# Patient Record
Sex: FEMALE | Race: OTHER | HISPANIC OR LATINO | Employment: FULL TIME | URBAN - METROPOLITAN AREA
[De-identification: names, ages, dates, MRNs, and addresses within clinical notes are randomized per-mention and may not be internally consistent; named-entity substitution may affect disease eponyms.]

---

## 2017-04-05 ENCOUNTER — OFFICE VISIT (OUTPATIENT)
Dept: MEDICAL GROUP | Facility: CLINIC | Age: 32
End: 2017-04-05
Payer: COMMERCIAL

## 2017-04-05 VITALS
OXYGEN SATURATION: 96 % | TEMPERATURE: 98.1 F | DIASTOLIC BLOOD PRESSURE: 64 MMHG | HEIGHT: 66 IN | SYSTOLIC BLOOD PRESSURE: 98 MMHG | WEIGHT: 255 LBS | HEART RATE: 72 BPM | BODY MASS INDEX: 40.98 KG/M2 | RESPIRATION RATE: 16 BRPM

## 2017-04-05 DIAGNOSIS — J06.9 VIRAL UPPER RESPIRATORY TRACT INFECTION: ICD-10-CM

## 2017-04-05 PROCEDURE — 99213 OFFICE O/P EST LOW 20 MIN: CPT | Performed by: NURSE PRACTITIONER

## 2017-04-05 RX ORDER — IBUPROFEN 200 MG
200 TABLET ORAL EVERY 6 HOURS PRN
COMMUNITY
End: 2018-05-30

## 2017-04-05 ASSESSMENT — ENCOUNTER SYMPTOMS
CHILLS: 0
SHORTNESS OF BREATH: 0
SORE THROAT: 0
SPUTUM PRODUCTION: 0
MYALGIAS: 0
COUGH: 0
HEADACHES: 1
FEVER: 0
WHEEZING: 0

## 2017-04-05 ASSESSMENT — PATIENT HEALTH QUESTIONNAIRE - PHQ9: CLINICAL INTERPRETATION OF PHQ2 SCORE: 0

## 2017-04-05 NOTE — MR AVS SNAPSHOT
"        Kinza Juan   2017 2:40 PM   Office Visit   MRN: [id number]    Department:  [facility]   Dept Phone:  [phone]    Description:  Female : 1985   Provider:  TIERRA Marroquin           Reason for Visit     URI sore throat/ ringing on ears/ tired/ x 2 days       Allergies as of 2017     Allergen Noted Reactions    Nkda [No Known Drug Allergy] 2013         You were diagnosed with     Viral upper respiratory tract infection   [[id number]]         Vital Signs     Blood Pressure Pulse Temperature Respirations Height Weight    98/64 mmHg 72 36.7 °C (98.1 °F) 16 1.676 m (5' 6\") 115.667 kg (255 lb)    Body Mass Index Oxygen Saturation Last Menstrual Period Smoking Status          41.18 kg/m2 96% 2012 Never Smoker         Basic Information     Date Of Birth Sex Race Ethnicity Preferred Language    1985 Female  or   Origin (Botswanan,Dominican,Martiniquais,Central African, etc) English      Problem List              ICD-10-CM Priority Class Noted - Resolved    Obesity, morbid, BMI 40.0-49.9 (CMS-HCC) E66.01   2011 - Present    IUD (intrauterine device) in place- placed 2013 Z97.5   2015 - Present    Gastroesophageal reflux disease without esophagitis K21.9   2016 - Present    Family history of diabetes mellitus (DM) Z83.3   2016 - Present    ASCUS with positive high risk HPV [id number]   2016 - Present      Health Maintenance        Date Due Completion Dates    PAP SMEAR 2018, 2014, 2012, 2011    IMM DTaP/Tdap/Td Vaccine (3 - Td) 3/27/2023 3/27/2013, 2010            Current Immunizations     HPV Quadrivalent Vaccine (GARDASIL) 11/10/2009, 2009    Influenza TIV (IM) 2012    Tdap Vaccine 3/27/2013  5:30 AM, 2010      Below and/or attached are the medications your provider expects you to take. Review all of your home medications and newly ordered medications with your provider and/or " pharmacist. Follow medication instructions as directed by your provider and/or pharmacist. Please keep your medication list with you and share with your provider. Update the information when medications are discontinued, doses are changed, or new medications (including over-the-counter products) are added; and carry medication information at all times in the event of emergency situations     Allergies:  NKDA - (reactions not documented)               Medications  Valid as of: April 05, 2017 -  3:13 PM    Generic Name Brand Name Tablet Size Instructions for use    Ibuprofen (Tab) MOTRIN 200 MG Take 200 mg by mouth every 6 hours as needed.        Levonorgestrel (IUD) MIRENA 20 MCG/24HR 1 Each by Intrauterine route Once.        Omeprazole (CAPSULE DELAYED RELEASE) PRILOSEC 20 MG Take 20 mg by mouth every day.        .                 Medicines prescribed today were sent to:     [facility] [address] [place] [address] - [address]     [address] [facility] [address]    Phone: [phone] Fax: [phone]    Open 24 Hours?: No      Medication refill instructions:       If your prescription bottle indicates you have medication refills left, it is not necessary to call your provider’s office. Please contact your pharmacy and they will refill your medication.    If your prescription bottle indicates you do not have any refills left, you may request refills at any time through one of the following ways: The online Vuclip system (except Urgent Care), by calling your provider’s office, or by asking your pharmacy to contact your provider’s office with a refill request. Medication refills are processed only during regular business hours and may not be available until the next business day. Your provider may request additional information or to have a follow-up visit with you prior to refilling your medication.   *Please Note: Medication refills are assigned a new Rx number when refilled electronically. Your pharmacy may indicate that  no refills were authorized even though a new prescription for the same medication is available at the pharmacy. Please request the medicine by name with the pharmacy before contacting your provider for a refill.           [employer]ter[employer] Access Code: Activation code not generated  Current WinBuyert Status: Active

## 2017-04-05 NOTE — PROGRESS NOTES
Chief Complaint   Patient presents with   • URI     sore throat/ ringing on ears/ tired/ x 2 days        HISTORY OF PRESENT ILLNESS: Patient is a 31 y.o. female established patient who presents today due to 2 days of ear plug, itchiness and achy sometimes, causing intermittent headache too. No fever, chills, no coughing. Weird sensation to throat but not sore throat. She is not taking any medications for her symptoms. She would just want to make sure everything looks fine.       Patient Active Problem List    Diagnosis Date Noted   • ASCUS with positive high risk HPV 2016   • Gastroesophageal reflux disease without esophagitis 2016   • Family history of diabetes mellitus (DM) 2016   • IUD (intrauterine device) in place- placed 2015   • Obesity, morbid, BMI 40.0-49.9 (CMS-[facility]) 2011       Allergies:Nkda    Current Outpatient Prescriptions   Medication Sig Dispense Refill   • ibuprofen (MOTRIN) 200 MG Tab Take 200 mg by mouth every 6 hours as needed.     • levonorgestrel (MIRENA) 20 MCG/24HR IUD 1 Each by Intrauterine route Once.     • omeprazole (PRILOSEC) 20 MG delayed-release capsule Take 20 mg by mouth every day.       No current facility-administered medications for this visit.       Social History   Substance Use Topics   • Smoking status: Never Smoker    • Smokeless tobacco: Never Used   • Alcohol Use: 2.4 oz/week     2 Standard drinks or equivalent, 2 Glasses of wine per week       Family Status   Relation Status Death Age   • Paternal Grandmother     • Mother Alive    • Father Alive    • Sister Alive    • Brother Alive    • Maternal Grandmother Alive    • Maternal Grandfather     • Paternal Grandfather       accident     Family History   Problem Relation Age of Onset   • Diabetes Paternal Grandmother    • Diabetes Father    • Stroke Maternal Grandmother    • Hypertension Maternal Grandmother    • Diabetes Maternal Grandfather    • Cancer Neg Hx   "        ROS:  Review of Systems   Constitutional: Negative for fever and chills.   HENT: Positive for ear pain. Negative for congestion and sore throat.    Respiratory: Negative for cough, sputum production, shortness of breath and wheezing.    Musculoskeletal: Negative for myalgias.   Neurological: Positive for headaches.        Objective:     Blood pressure 98/64, pulse 72, temperature 36.7 °C (98.1 °F), resp. rate 16, height 1.676 m (5' 6\"), weight 115.667 kg (255 lb), last menstrual period 06/08/2012, SpO2 96 %.     Physical Exam:  Physical Exam   Constitutional: She is oriented to person, place, and time and well-developed, well-nourished, and in no distress.   HENT:   Head: Normocephalic and atraumatic.   Right Ear: External ear normal. Tympanic membrane is bulging ( very mild).   Left Ear: External ear normal. Tympanic membrane is bulging.   Nose: Nose normal.   Mouth/Throat: Oropharynx is clear and moist. No oropharyngeal exudate.   Eyes: Conjunctivae are normal.   Neck: Neck supple.   Cardiovascular: Normal rate.    Pulmonary/Chest: Effort normal. No respiratory distress. She has no wheezes. She has no rales.   Musculoskeletal: Normal range of motion.   Neurological: She is alert and oriented to person, place, and time.   Vitals reviewed.        Assessment/Plan:  1. Viral upper respiratory tract infection  - explained virus URI to patient, instructed to wait for 5 more days and call back if worsening symptoms in 5 days or any new symptoms. Will consider to cover with antibiotics if indicated. Otherwise, keep hydrated, enough rest, prn tylenol if needed. Pt verbalized understanding.        "

## 2018-01-25 ENCOUNTER — OFFICE VISIT (OUTPATIENT)
Dept: MEDICAL GROUP | Facility: MEDICAL CENTER | Age: 33
End: 2018-01-25
Payer: COMMERCIAL

## 2018-01-25 VITALS
BODY MASS INDEX: 45.96 KG/M2 | TEMPERATURE: 97.5 F | DIASTOLIC BLOOD PRESSURE: 72 MMHG | SYSTOLIC BLOOD PRESSURE: 114 MMHG | OXYGEN SATURATION: 95 % | HEIGHT: 66 IN | RESPIRATION RATE: 16 BRPM | HEART RATE: 74 BPM | WEIGHT: 286 LBS

## 2018-01-25 DIAGNOSIS — J06.9 VIRAL URI WITH COUGH: ICD-10-CM

## 2018-01-25 DIAGNOSIS — E66.01 OBESITY, MORBID, BMI 40.0-49.9 (HCC): ICD-10-CM

## 2018-01-25 PROCEDURE — 99213 OFFICE O/P EST LOW 20 MIN: CPT | Performed by: FAMILY MEDICINE

## 2018-01-25 NOTE — LETTER
2018        Kinza Juan  [address] [place] [id number]        To Whom It May Concern:    Ms. Ayo Juan (: 1985) is a patient at this clinic and was seen for an evaluation today. She can return to work on  with no restrictions.        If you have any questions or concerns, please don't hesitate to call.        Sincerely,        Brodie Shah M.D.    Electronically Signed

## 2018-01-25 NOTE — PROGRESS NOTES
cc: Voice loss    Subjective:     Kinza Juan is a 32 y.o. female presenting with loss of voice that started yesterday. Approximately 3 days ago, she developed a mild headache, sore throat, nasal drainage, mild cough, mild ear pain. She then lost her voice yesterday. Denies any fevers, shortness of breath, chest pain, Angel pain, diarrhea, rashes, recent travel, sick contacts. Overall feels well despite the voiceloss. Has been taking ibuprofen, decongestions, NyQuil with some improvement. She has not had her flu vaccine yet this season      Review of systems:  See above.         Current Outpatient Prescriptions:   •  ibuprofen (MOTRIN) 200 MG Tab, Take 200 mg by mouth every 6 hours as needed., Disp: , Rfl:   •  levonorgestrel (MIRENA) 20 MCG/24HR IUD, 1 Each by Intrauterine route Once., Disp: , Rfl:     Allergies   Allergen Reactions   • Nkda [No Known Drug Allergy]        Past Medical History:   Diagnosis Date   • GERD (gastroesophageal reflux disease)     h pylori 12/2015   • Infectious disease     to the flu   • Pap smear 4/28/9    Normal     Past Surgical History:   Procedure Laterality Date   • PRIMARY C SECTION  3/24/2013    Performed by Briana Cano M.D. at LABOR AND DELIVERY   • ACL RECONSTRUCTION SCOPE  5/31/2012    Performed by CAITY DORSEY at [facility]S   • MEDIAL MENISCECTOMY  5/31/2012    Performed by CAITY DORSEY at [facility] ORS   • DENTAL EXTRACTION(S)  2005     Family History   Problem Relation Age of Onset   • Diabetes Paternal Grandmother    • Diabetes Father    • Stroke Maternal Grandmother    • Hypertension Maternal Grandmother    • Diabetes Maternal Grandfather    • Cancer Neg Hx      Social History     Social History   • Marital status:      Spouse name: N/A   • Number of children: N/A   • Years of education: N/A     Occupational History   • Not on file.     Social History Main Topics   • Smoking status: Never Smoker   • Smokeless tobacco: Never  "Used   • Alcohol use 2.4 oz/week     2 Glasses of wine, 2 Standard drinks or equivalent per week   • Drug use: No   • Sexual activity: Yes     Partners: Male     Birth control/ protection: IUD      Comment: , with boyfriend     Other Topics Concern   • Not on file     Social History Narrative   • No narrative on file       Objective:     Vitals: /72   Pulse 74   Temp 36.4 °C (97.5 °F)   Resp 16   Ht 1.676 m (5' 6\")   Wt (!) 129.7 kg (286 lb)   LMP 06/08/2012   SpO2 95%   Breastfeeding? No   BMI 46.16 kg/m²   General: Alert, pleasant, NAD  HEENT: Normocephalic.  PERRL, EOMI, no icterus or pallor.  Conjunctivae and lids normal. External ears normal. Tympanic membranes pearly, opaque.  Nares patent, mucosa pink.  Oropharynx non-erythematous, mucous membranes moist.  Neck supple.  No thyromegaly or masses palpated. No cervical or supraclavicular lymphadenopathy.  Heart: Regular rate and rhythm.  S1 and S2 normal.  No murmurs appreciated.  Respiratory: Normal respiratory effort.  Clear to auscultation bilaterally.  Abdomen: Non-distended, soft  Skin: Warm, dry, no rashes.  Musculoskeletal: Gait is normal.  Moves all extremities well.  Extremities: No leg edema.   Psych:  Affect/mood is normal, judgement is good, memory is intact, grooming is appropriate.    Assessment/Plan:     Kinza was seen today for uri.    Diagnoses and all orders for this visit:    Viral URI with cough  Discussed likely viral URI, recommended conservative management with rest, fluids, otc meds, hot water and honey, nasal saline rinses.  Note given for work.  Discussed reasons to return, advised cough may last up to 3 weeks.    Obesity, morbid, BMI 40.0-49.9 (CMS-[facility])  -     Patient identified as having weight management issue.  Appropriate orders and counseling given.        Return if symptoms worsen or fail to improve.  "

## 2018-01-29 ENCOUNTER — OFFICE VISIT (OUTPATIENT)
Dept: MEDICAL GROUP | Facility: MEDICAL CENTER | Age: 33
End: 2018-01-29
Payer: COMMERCIAL

## 2018-01-29 VITALS
DIASTOLIC BLOOD PRESSURE: 70 MMHG | SYSTOLIC BLOOD PRESSURE: 110 MMHG | BODY MASS INDEX: 47.09 KG/M2 | HEIGHT: 66 IN | WEIGHT: 293 LBS | OXYGEN SATURATION: 93 % | HEART RATE: 78 BPM | TEMPERATURE: 97.5 F | RESPIRATION RATE: 16 BRPM

## 2018-01-29 DIAGNOSIS — J02.9 PHARYNGITIS, UNSPECIFIED ETIOLOGY: ICD-10-CM

## 2018-01-29 DIAGNOSIS — J06.9 VIRAL UPPER RESPIRATORY TRACT INFECTION: ICD-10-CM

## 2018-01-29 LAB
INT CON NEG: NEGATIVE
INT CON POS: POSITIVE
S PYO AG THROAT QL: NORMAL

## 2018-01-29 PROCEDURE — 99214 OFFICE O/P EST MOD 30 MIN: CPT | Performed by: NURSE PRACTITIONER

## 2018-01-29 PROCEDURE — 87880 STREP A ASSAY W/OPTIC: CPT | Performed by: NURSE PRACTITIONER

## 2018-01-29 RX ORDER — METHYLPREDNISOLONE 4 MG/1
TABLET ORAL
Qty: 21 TAB | Refills: 0 | Status: SHIPPED | OUTPATIENT
Start: 2018-01-29 | End: 2018-05-30

## 2018-01-29 RX ORDER — PROMETHAZINE HYDROCHLORIDE AND CODEINE PHOSPHATE 6.25; 1 MG/5ML; MG/5ML
5 SYRUP ORAL 4 TIMES DAILY PRN
Qty: 120 ML | Refills: 0 | Status: SHIPPED | OUTPATIENT
Start: 2018-01-29 | End: 2018-02-05

## 2018-01-29 ASSESSMENT — ENCOUNTER SYMPTOMS
COUGH: 1
SORE THROAT: 1

## 2018-01-29 NOTE — PROGRESS NOTES
"Subjective:      Kinza Juan is a 32 y.o. female who presents with Follow-Up (cough x 1 week )        CC: 32-year-old patient of  here today for continuing upper respiratory complaints.    HPI Kinza Juan was seen by  on January 25 for 3 days of cough and postnasal drip and sore throat. She was appropriately treated for a viral URI. Patient is here today because she states now she is having more cough, sore throat and hoarse voice. She still has no myalgias, fever, chills, shortness of breath or chest pain. Nothing seems to make her feel better and symptoms are worse when she is active. Vital signs in the office are normal. She is mostly concerned about the sore throat.      Social History   Substance Use Topics   • Smoking status: Never Smoker   • Smokeless tobacco: Never Used   • Alcohol use 2.4 oz/week     2 Glasses of wine, 2 Standard drinks or equivalent per week     Current Outpatient Prescriptions   Medication Sig Dispense Refill   • levonorgestrel (MIRENA) 20 MCG/24HR IUD 1 Each by Intrauterine route Once.     • ibuprofen (MOTRIN) 200 MG Tab Take 200 mg by mouth every 6 hours as needed.       No current facility-administered medications for this visit.      Family History   Problem Relation Age of Onset   • Diabetes Paternal Grandmother    • Diabetes Father    • Stroke Maternal Grandmother    • Hypertension Maternal Grandmother    • Diabetes Maternal Grandfather    • Cancer Neg Hx      Past Medical History:   Diagnosis Date   • GERD (gastroesophageal reflux disease)     h pylori 12/2015   • Infectious disease     to the flu   • Pap smear 4/28/9    Normal       Review of Systems   HENT: Positive for sore throat.    Respiratory: Positive for cough.    All other systems reviewed and are negative.         Objective:     /70   Pulse 78   Temp 36.4 °C (97.5 °F)   Resp 16   Ht 1.676 m (5' 6\")   Wt (!) 133.4 kg (294 lb)   LMP 06/08/2012   SpO2 93%   BMI " 47.45 kg/m²      Physical Exam   Constitutional: She is oriented to person, place, and time. She appears well-developed and well-nourished. No distress.   HENT:   Head: Normocephalic and atraumatic.   Right Ear: External ear normal.   Left Ear: External ear normal.   Nose: Nose normal.   Horse voice. Pharynx shows no erythema or exudate.   Eyes: Right eye exhibits no discharge. Left eye exhibits no discharge.   Neck: Normal range of motion. Neck supple. No thyromegaly present.   Cardiovascular: Normal rate, regular rhythm and normal heart sounds.  Exam reveals no gallop and no friction rub.    No murmur heard.  Pulmonary/Chest: Effort normal and breath sounds normal. She has no wheezes. She has no rales.   Musculoskeletal: She exhibits no edema or tenderness.   Neurological: She is alert and oriented to person, place, and time. She displays normal reflexes.   Skin: Skin is warm and dry. No rash noted. She is not diaphoretic.   Psychiatric: She has a normal mood and affect. Her behavior is normal. Judgment and thought content normal.   Nursing note and vitals reviewed.              Assessment/Plan:     1. Pharyngitis, unspecified etiology  Rapid strep is negative and I advised over-the-counter cough drops or Chloraseptic spray. She may use the cough syrup in the evening for the sore throat and cough but only when not needing to be alert. I explained this is not refillable. She will avoid foods and foods which are spicy or acidic until her throat feels better.  - POCT Rapid Strep A  - promethazine-codeine (PHENERGAN-CODEINE) 6.25-10 MG/5ML Syrup; Take 5 mL by mouth 4 times a day as needed for up to 7 days.  Dispense: 120 mL; Refill: 0    2. Viral upper respiratory tract infection  Lungs sound clear and symptoms of only been present 5 days so I will put her on a Medrol Dosepak for the upper respiratory symptoms. She also has cough syrup to use if needed during the night. She will follow back with her PCP if symptoms do  not improve within the next few days. She was advised to go to urgent care or the emergency room if symptoms worsen despite treatment.   - promethazine-codeine (PHENERGAN-CODEINE) 6.25-10 MG/5ML Syrup; Take 5 mL by mouth 4 times a day as needed for up to 7 days.  Dispense: 120 mL; Refill: 0  - MethylPREDNISolone (MEDROL DOSEPAK) 4 MG Tablet Therapy Pack; As directed on the packaging label.  Dispense: 21 Tab; Refill: 0

## 2018-05-17 ENCOUNTER — HOSPITAL ENCOUNTER (OUTPATIENT)
Facility: MEDICAL CENTER | Age: 33
End: 2018-05-17
Attending: OBSTETRICS & GYNECOLOGY
Payer: COMMERCIAL

## 2018-05-17 ENCOUNTER — GYNECOLOGY VISIT (OUTPATIENT)
Dept: OBGYN | Facility: MEDICAL CENTER | Age: 33
End: 2018-05-17
Payer: COMMERCIAL

## 2018-05-17 VITALS
WEIGHT: 293 LBS | HEIGHT: 66 IN | BODY MASS INDEX: 47.09 KG/M2 | DIASTOLIC BLOOD PRESSURE: 80 MMHG | SYSTOLIC BLOOD PRESSURE: 125 MMHG

## 2018-05-17 DIAGNOSIS — Z97.5 IUD (INTRAUTERINE DEVICE) IN PLACE: ICD-10-CM

## 2018-05-17 DIAGNOSIS — Z30.09 FAMILY PLANNING EDUCATION, GUIDANCE, AND COUNSELING: ICD-10-CM

## 2018-05-17 DIAGNOSIS — E66.01 OBESITY, MORBID, BMI 40.0-49.9 (HCC): ICD-10-CM

## 2018-05-17 DIAGNOSIS — Z12.4 ENCOUNTER FOR SCREENING FOR CERVICAL CANCER: ICD-10-CM

## 2018-05-17 DIAGNOSIS — Z01.419 WELL WOMAN EXAM WITH ROUTINE GYNECOLOGICAL EXAM: ICD-10-CM

## 2018-05-17 PROCEDURE — 88175 CYTOPATH C/V AUTO FLUID REDO: CPT

## 2018-05-17 PROCEDURE — 87591 N.GONORRHOEAE DNA AMP PROB: CPT

## 2018-05-17 PROCEDURE — 87491 CHLMYD TRACH DNA AMP PROBE: CPT

## 2018-05-17 PROCEDURE — 87624 HPV HI-RISK TYP POOLED RSLT: CPT

## 2018-05-17 PROCEDURE — 99385 PREV VISIT NEW AGE 18-39: CPT | Performed by: OBSTETRICS & GYNECOLOGY

## 2018-05-18 DIAGNOSIS — Z12.4 ENCOUNTER FOR SCREENING FOR CERVICAL CANCER: ICD-10-CM

## 2018-05-18 NOTE — PROGRESS NOTES
ANNUAL Gynecologic Exam        HPI Comments:   32 YEAR OLD  presents for well woman exam.   Patient's last menstrual period was 2012.  No periods on the IUD mirena. Due for replacement  No pelvic pains  She gained a lot of wt - not taking care of herself lately  No other gyn complaints  Never smoker  No family history of breast/ovarian cancer    Review of Systems   Pertinent positives documented in HPI and all other systems reviewed & are negative    All PMH, PSH, allergies, social history and FH reviewed and updated today:  Past Medical History:   Diagnosis Date   • GERD (gastroesophageal reflux disease)     h pylori 2015   • Infectious disease     to the flu   • Pap smear     Normal     Past Surgical History:   Procedure Laterality Date   • PRIMARY C SECTION  3/24/2013    Performed by Briana Cano M.D. at LABOR AND DELIVERY   • ACL RECONSTRUCTION SCOPE  2012    Performed by CAITY DORSEY at SURGERY [facility]   • MEDIAL MENISCECTOMY  2012    Performed by CAITY DORSEY at [facility]   • DENTAL EXTRACTION(S)       Nkda [no known drug allergy]  Social History     Social History   • Marital status:      Spouse name: N/A   • Number of children: N/A   • Years of education: N/A     Social History Main Topics   • Smoking status: Never Smoker   • Smokeless tobacco: Never Used   • Alcohol use 2.4 oz/week     2 Glasses of wine, 2 Standard drinks or equivalent per week   • Drug use: No   • Sexual activity: Yes     Partners: Male     Birth control/ protection: IUD      Comment: , with boyfriend     Other Topics Concern   • Not on file     Social History Narrative   • No narrative on file     Family History   Problem Relation Age of Onset   • Diabetes Paternal Grandmother    • Diabetes Father    • Stroke Maternal Grandmother    • Hypertension Maternal Grandmother    • Diabetes Maternal Grandfather    • Cancer Neg Hx      Medications:   Current Outpatient  "Prescriptions Ordered in Epic   Medication Sig Dispense Refill   • MethylPREDNISolone (MEDROL DOSEPAK) 4 MG Tablet Therapy Pack As directed on the packaging label. 21 Tab 0   • ibuprofen (MOTRIN) 200 MG Tab Take 200 mg by mouth every 6 hours as needed.     • levonorgestrel (MIRENA) 20 MCG/24HR IUD 1 Each by Intrauterine route Once.       No current Epic-ordered facility-administered medications on file.       Objective:   Vital measurements:  Blood pressure 125/80, height 1.676 m (5' 6\"), weight (!) 136.1 kg (300 lb), last menstrual period 06/08/2012, not currently breastfeeding.  Body mass index is 48.42 kg/m². (Goal BM I>18 <25)    Physical Exam   Nursing note and vitals reviewed.  Constitutional: She is oriented to person, place, and time. She appears well-developed and well-nourished. No distress.     HEENT:   Head: Normocephalic and atraumatic.   Right Ear: External ear normal.   Left Ear: External ear normal.   Nose: Nose normal.   Eyes: Conjunctivae and EOM are normal. Pupils are equal, round, and reactive to light. No scleral icterus.     Neck: Normal range of motion. Neck supple. No tracheal deviation present. No thyromegaly present.     Pulmonary/Chest: Effort normal and breath sounds normal. No respiratory distress. She has no wheezes. She has no rales. She exhibits no tenderness.     Cardiovascular: Regular, rate and rhythm. No JVD.    Abdominal: Soft. Bowel sounds are normal. She exhibits no distension and no mass. No tenderness. She has no rebound and no guarding.   obese  Breast:  Symmetrical, normal consistency without masses., No dimpling or skin changes, Normal nipples without discharge, negative, No masses    Genitourinary:  Pelvic exam was performed with patient supine.  External genitalia with no abnormal pigmentation, labial fusion,rash, tenderness, lesion or injury to the labia bilaterally.  Vagina is moist with no lesions, foul discharge, erythema, tenderness or bleeding. No foreign body " around the vagina or signs of injury.   Cervix exhibits no motion tenderness, no discharge and no friability. IUD string seen  Uterus and adnexa - not palpable because of body habitus.     Musculoskeletal: Normal range of motion. She exhibits no edema and no tenderness.     Lymphadenopathy: She has no cervical adenopathy.     Neurological: She is alert and oriented to person, place, and time. She exhibits normal muscle tone.     Skin: Skin is warm and dry. No rash noted. She is not diaphoretic. No erythema. No pallor.     Psychiatric: She has a normal mood and affect. Her behavior is normal. Judgment and thought content normal  Assessment:     1. Well woman exam with routine gynecological exam  LIPID PANEL    CMP (12)    HEMOGLOBIN A1C    CBC WITHOUT DIFFERENTIAL    VITAMIN D,25 HYDROXY   2. IUD (intrauterine device) in place  REFERRAL TO GYNECOLOGY   3. Obesity, morbid, BMI 40.0-49.9 ([facility])  LIPID PANEL    CMP (12)    HEMOGLOBIN A1C    CBC WITHOUT DIFFERENTIAL    VITAMIN D,25 HYDROXY    REFERRAL TO BARIATRIC SURGERY   4. Family planning education, guidance, and counseling  REFERRAL TO GYNECOLOGY   5. Encounter for screening for cervical cancer   THINPREP PAP W/HPV AND CTNG     Plan:   Pap and physical exam performed  Monthly SBE.  Counseling: breast self exam, STD prevention, HIV risk factors and prevention and family planning choices; would like another mirena IUD  Encourage exercise and proper diet. Weight loss  Mammograms starting @ age 40 annually.  See medications and orders placed in encounter report.

## 2018-05-21 LAB
C TRACH DNA GENITAL QL NAA+PROBE: NEGATIVE
CYTOLOGY REG CYTOL: NORMAL
HPV HR 12 DNA CVX QL NAA+PROBE: NEGATIVE
HPV16 DNA SPEC QL NAA+PROBE: NEGATIVE
HPV18 DNA SPEC QL NAA+PROBE: NEGATIVE
N GONORRHOEA DNA GENITAL QL NAA+PROBE: NEGATIVE
SPECIMEN SOURCE: NORMAL
SPECIMEN SOURCE: NORMAL

## 2018-05-30 ENCOUNTER — OFFICE VISIT (OUTPATIENT)
Dept: MEDICAL GROUP | Facility: MEDICAL CENTER | Age: 33
End: 2018-05-30
Payer: COMMERCIAL

## 2018-05-30 VITALS
HEIGHT: 66 IN | SYSTOLIC BLOOD PRESSURE: 124 MMHG | BODY MASS INDEX: 47.09 KG/M2 | HEART RATE: 72 BPM | DIASTOLIC BLOOD PRESSURE: 76 MMHG | WEIGHT: 293 LBS | RESPIRATION RATE: 16 BRPM | OXYGEN SATURATION: 99 %

## 2018-05-30 DIAGNOSIS — M77.9 TENDINITIS: ICD-10-CM

## 2018-05-30 DIAGNOSIS — R20.2 RIGHT HAND PARESTHESIA: ICD-10-CM

## 2018-05-30 PROCEDURE — 99214 OFFICE O/P EST MOD 30 MIN: CPT | Performed by: NURSE PRACTITIONER

## 2018-05-30 RX ORDER — MELOXICAM 15 MG/1
15 TABLET ORAL DAILY
Qty: 30 TAB | Refills: 0 | Status: SHIPPED | OUTPATIENT
Start: 2018-05-30 | End: 2018-12-23

## 2018-05-30 ASSESSMENT — ENCOUNTER SYMPTOMS: TINGLING: 1

## 2018-05-30 ASSESSMENT — PATIENT HEALTH QUESTIONNAIRE - PHQ9: CLINICAL INTERPRETATION OF PHQ2 SCORE: 0

## 2018-05-30 NOTE — PROGRESS NOTES
"Subjective:      Kinza Juan is a 33 y.o. female who presents with Other (right hand pain )        CC: Patient here today for hand and arm pain and paresthesias on the right side.    HPI Kinza Juan states she does much typing and use of a computer and therefore has had problems on and off with her right wrist and hand for a while. Recently, it started to worsen. She states now that when she uses a mouse on the computer or types she develops pain along the tendon sheath extending from the base of her right thumb to her elbow. She also reports tingling and numbness mostly of her right thumb and second finger. Symptoms improve with rest. She denies neck pain or upper arm pain. She has not used any medicine for this.      Social History   Substance Use Topics   • Smoking status: Never Smoker   • Smokeless tobacco: Never Used   • Alcohol use 2.4 oz/week     2 Glasses of wine, 2 Standard drinks or equivalent per week     Past Medical History:   Diagnosis Date   • GERD (gastroesophageal reflux disease)     h pylori 12/2015   • Infectious disease     to the flu   • Pap smear 4/28/9    Normal     Current Outpatient Prescriptions   Medication Sig Dispense Refill   • meloxicam (MOBIC) 15 MG tablet Take 1 Tab by mouth every day. 30 Tab 0   • levonorgestrel (MIRENA) 20 MCG/24HR IUD 1 Each by Intrauterine route Once.       No current facility-administered medications for this visit.      Family History   Problem Relation Age of Onset   • Diabetes Paternal Grandmother    • Diabetes Father    • Stroke Maternal Grandmother    • Hypertension Maternal Grandmother    • Diabetes Maternal Grandfather    • Cancer Neg Hx        Review of Systems   Musculoskeletal: Positive for joint pain.   Neurological: Positive for tingling.   All other systems reviewed and are negative.         Objective:     /76   Pulse 72   Resp 16   Ht 1.676 m (5' 6\")   Wt (!) 136.5 kg (301 lb)   LMP 06/08/2012   SpO2 99%   BMI " 48.58 kg/m²      Physical Exam   Constitutional: She is oriented to person, place, and time. She appears well-developed and well-nourished. No distress.   HENT:   Head: Normocephalic and atraumatic.   Right Ear: External ear normal.   Left Ear: External ear normal.   Nose: Nose normal.   Eyes: Right eye exhibits no discharge. Left eye exhibits no discharge.   Neck: Normal range of motion. Neck supple. No thyromegaly present.   Cardiovascular: Normal rate, regular rhythm and normal heart sounds.  Exam reveals no gallop and no friction rub.    No murmur heard.  Pulmonary/Chest: Effort normal and breath sounds normal. She has no wheezes. She has no rales.   Musculoskeletal: She exhibits no edema or tenderness.   Mild tenderness along the tendon sheath of the right anterior arm from the base of thumb to the elbow. Numbness reported of the right thumb and second finger. No edema or erythema noted area and pulses are good.   Neurological: She is alert and oriented to person, place, and time. She displays normal reflexes.   Skin: Skin is warm and dry. No rash noted. She is not diaphoretic.   Psychiatric: She has a normal mood and affect. Her behavior is normal. Judgment and thought content normal.   Nursing note and vitals reviewed.              Assessment/Plan:     1. Tendinitis  Patient advised to rest the area as much as possible and use cool compresses to the area. She was given an anti-inflammatory to use short-term with food for the pain.  - REFERRAL TO PHYSIATRY (PMR)  - meloxicam (MOBIC) 15 MG tablet; Take 1 Tab by mouth every day.  Dispense: 30 Tab; Refill: 0    2. Right hand paresthesia  Patient advised to try a wrist splint and to avoid use of the computer and mouse until this improves. Because of the paresthesias and recurrent history I am going to refer her to physiatry to see if carpal tunnel workup is needed.  - REFERRAL TO PHYSIATRY (PMR)  - meloxicam (MOBIC) 15 MG tablet; Take 1 Tab by mouth every day.   Dispense: 30 Tab; Refill: 0

## 2018-09-06 ENCOUNTER — OFFICE VISIT (OUTPATIENT)
Dept: PHYSICAL MEDICINE AND REHAB | Facility: MEDICAL CENTER | Age: 33
End: 2018-09-06
Payer: COMMERCIAL

## 2018-09-06 VITALS
DIASTOLIC BLOOD PRESSURE: 90 MMHG | WEIGHT: 293 LBS | HEART RATE: 76 BPM | OXYGEN SATURATION: 93 % | SYSTOLIC BLOOD PRESSURE: 132 MMHG | HEIGHT: 66 IN | BODY MASS INDEX: 47.09 KG/M2 | TEMPERATURE: 97.8 F

## 2018-09-06 DIAGNOSIS — G56.01 CARPAL TUNNEL SYNDROME OF RIGHT WRIST: ICD-10-CM

## 2018-09-06 DIAGNOSIS — M65.4 DE QUERVAIN'S TENOSYNOVITIS: ICD-10-CM

## 2018-09-06 PROCEDURE — 99203 OFFICE O/P NEW LOW 30 MIN: CPT | Performed by: PHYSICAL MEDICINE & REHABILITATION

## 2018-09-06 ASSESSMENT — PAIN SCALES - GENERAL: PAINLEVEL: 6=MODERATE PAIN

## 2018-09-06 ASSESSMENT — PATIENT HEALTH QUESTIONNAIRE - PHQ9: CLINICAL INTERPRETATION OF PHQ2 SCORE: 0

## 2018-09-06 NOTE — PROGRESS NOTES
New patient note    Physiatry (physical medicine and  Rehabilitation), interventional spine and sports medicine    Date of Service: 9/6/2018    Chief complaint: Right hand    HISTORY    HPI: Kinza Juan 33 y.o. Right-handed female who presents today for evaluation of symptoms that started in her right hand about 6 months ago.  She states that she has been having symptoms at work with computer work/mouse work.  Some relief with use of splint during the day.  Symptoms started as numbness and tingling in the thumb and index finger, but have progressed to include sharp pain that shoots in these fingers as well as weakness.  Positive flick sign that patient self reports.     The patient denies shoulder or neck pain.     Medical records review:  I reviewed the note from the referring provider Puneet Moreau A.P.N.    Previous treatments:    Physical Therapy: No    Medications the patient is tried: NSAIDs, meloxicam from PCP without help      ROS:   GI: history of GERD  : history of UTI    All other systems reviewed and negative.       PMHx:   Past Medical History:   Diagnosis Date   • GERD (gastroesophageal reflux disease)     h pylori 12/2015   • Infectious disease     to the flu   • Pap smear 4/28/9    Normal       PSHx:   Past Surgical History:   Procedure Laterality Date   • PRIMARY C SECTION  3/24/2013    Performed by Briana Cano M.D. at [facility]   • ACL RECONSTRUCTION SCOPE  5/31/2012    Performed by CAITY DORSEY at SUR[facility]   • MEDIAL MENISCECTOMY  5/31/2012    Performed by CAITY DORSEY at [facility] ORS   • DENTAL EXTRACTION(S)  2005       Family history   Family History   Problem Relation Age of Onset   • Diabetes Paternal Grandmother    • Diabetes Father    • Stroke Maternal Grandmother    • Hypertension Maternal Grandmother    • Diabetes Maternal Grandfather    • Cancer Neg Hx          Medications:   Current Outpatient Prescriptions   Medication   •  "meloxicam (MOBIC) 15 MG tablet   • levonorgestrel (MIRENA) 20 MCG/24HR IUD     No current facility-administered medications for this visit.        Allergies:   Allergies   Allergen Reactions   • Nkda [No Known Drug Allergy]        Social Hx:   Social History     Social History   • Marital status:      Spouse name: N/A   • Number of children: N/A   • Years of education: N/A     Occupational History   • Not on file.     Social History Main Topics   • Smoking status: Never Smoker   • Smokeless tobacco: Never Used   • Alcohol use No   • Drug use: No   • Sexual activity: Yes     Partners: Male     Birth control/ protection: IUD      Comment: , with boyfriend     Other Topics Concern   •  Service No   • Blood Transfusions No   • Caffeine Concern No   • Occupational Exposure No   • Hobby Hazards No   • Sleep Concern No   • Stress Concern No   • Weight Concern Yes   • Special Diet No   • Back Care No   • Exercise No   • Bike Helmet No   • Seat Belt Yes   • Self-Exams No     Social History Narrative   • No narrative on file         EXAMINATION     Physical Exam:   Vitals: Blood pressure 132/90, pulse 76, temperature 36.6 °C (97.8 °F), height 1.676 m (5' 6\"), weight (!) 139.5 kg (307 lb 8.7 oz), SpO2 93 %.    Constitutional:   Body Habitus: Body mass index is 49.64 kg/m².  Cooperation: Fully cooperates with exam  Appearance: Well-groomed, well-nourished, not disheveled, no acute distress    Eyes: No scleral icterus to suggest severe liver disease, no proptosis to suggest severe hyperthyroid    ENT -no obvious auditory deficits, no facial droop     Skin -no rashes or lesions noted     Respiratory-  breathing comfortable on room air, no audible wheezing    Psychiatric- alert and oriented ×3. Normal affect.      Gait - normal gait, no use of ambulatory device, nonantalgic.    Musculoskeletal -   Cervical spine   Inspection: No deformities of the skin over the cervical spine. No rashes or lesions.    full  " A/P ROM in all directions, without  pain      Spurling’s sign: negative bilaterally    No signs of muscular atrophy in bilateral upper extremities     Tinel's is positive at the right wrist and negative at the left wrist.  Phalen's is positive at the right wrist and negative on the left wrist.  Positive Finkelstein's test on the right and negative on the left    Neuro       Key points for the international standards for neurological classification of spinal cord injury (ISNCSCI) to light touch.     Dermatome R L   C4 2 2   C5 2 2   C6 2 2   C7 2 2   C8 2 2   T1 2 2   T2 2 2   L2 2 2   L3 2 2   L4 2 2   L5 2 2   S1 2 2   S2 2 2           Motor Exam Upper Extremities   ? Myotome R L   Shoulder flexion C5 5 5   Elbow flexion C5 5 5   Wrist extension C6 5 5   Elbow extension C7 5 5   Finger flexion C8 5 5   Finger abduction T1 5 5         Motor Exam Lower Extremities    ? Myotome R L   Hip flexion L2 5 5   Knee extension L3 5 5   Ankle dorsiflexion L4 5 5   Toe extension L5 5 5   Ankle plantarflexion S1 5 5         Escobar’s sign negative bilaterally   Clonus of the ankle negative bilaterally     Reflexes  ?  R L   Biceps  2+ 2+   Triceps  2+ 2+       MEDICAL DECISION MAKING    Medical records review: see under HPI section.     DATA    Labs:   Lab Results   Component Value Date/Time    SODIUM 137 02/22/2016 04:48 PM    POTASSIUM 3.7 02/22/2016 04:48 PM    CHLORIDE 104 02/22/2016 04:48 PM    CO2 23 02/22/2016 04:48 PM    ANION 10.0 02/22/2016 04:48 PM    GLUCOSE 87 02/22/2016 04:48 PM    BUN 12 02/22/2016 04:48 PM    CREATININE 0.73 02/22/2016 04:48 PM    CALCIUM 9.4 02/22/2016 04:48 PM    ASTSGOT 21 02/22/2016 04:48 PM    ALTSGPT 18 02/22/2016 04:48 PM    TBILIRUBIN 0.4 02/22/2016 04:48 PM    ALBUMIN 3.8 02/22/2016 04:48 PM    TOTPROTEIN 7.4 02/22/2016 04:48 PM    GLOBULIN 3.6 (H) 02/22/2016 04:48 PM    AGRATIO 1.1 02/22/2016 04:48 PM       Lab Results   Component Value Date/Time    PROTHROMBTM 12.6 04/02/2014 05:00 AM     INR 0.95 04/02/2014 05:00 AM        Lab Results   Component Value Date/Time    WBC 12.9 (H) 02/22/2016 04:48 PM    RBC 4.86 02/22/2016 04:48 PM    HEMOGLOBIN 13.8 02/22/2016 04:48 PM    HEMATOCRIT 44.7 02/22/2016 04:48 PM    MCV 92.0 02/22/2016 04:48 PM    MCH 28.4 02/22/2016 04:48 PM    MCHC 30.9 (L) 02/22/2016 04:48 PM    MPV 11.0 02/22/2016 04:48 PM    NEUTSPOLYS 67.20 02/22/2016 04:48 PM    LYMPHOCYTES 24.90 02/22/2016 04:48 PM    MONOCYTES 5.70 02/22/2016 04:48 PM    EOSINOPHILS 1.20 02/22/2016 04:48 PM    BASOPHILS 0.50 02/22/2016 04:48 PM    HYPOCHROMIA 1+ 04/02/2014 05:00 AM        Lab Results   Component Value Date/Time    HBA1C 6.1 (H) 04/26/2016 04:29 PM        Imaging:  No relevant imaging to review                                                                                    Diagnosis   Visit Diagnoses     ICD-10-CM   1. Carpal tunnel syndrome of right wrist G56.01   2. De Quervain's tenosynovitis M65.4           ASSESSMENT:  Kinza Juan 33 y.o. Female who presents with carpal tunnel syndrome of the right wrist and De Quervain's tenosynovitis     Kinza was seen today for new patient.    Diagnoses and all orders for this visit:    Carpal tunnel syndrome of right wrist  -     REFERRAL TO EMG - PHYSIATRY (PMR)    De Quervain's tenosynovitis      Adjustable thumb spica splint fitted for patient and given at the time of the visit.  Instructed that she should wear this during the day.  She seems to have both CTS and DQT.  Suspect rest will help.    EMG ordered to evaluate severity of the CTS.   Discussed other treatments including physical therapy.    Follow-up:3 weeks and for EMG     Please note that this dictation was created using voice recognition software. I have made every reasonable attempt to correct obvious errors but there may be errors of grammar and content that I may have overlooked prior to finalization of this note.      Jadiel Jennings MD  Physical Medicine and  Rehabilitation  Interventional Spine and Sports Physiatry  [facility]           CC Puneet Moreau A.P.N.

## 2018-09-27 ENCOUNTER — OFFICE VISIT (OUTPATIENT)
Dept: PHYSICAL MEDICINE AND REHAB | Facility: MEDICAL CENTER | Age: 33
End: 2018-09-27
Payer: COMMERCIAL

## 2018-09-27 VITALS
TEMPERATURE: 97.1 F | HEART RATE: 80 BPM | OXYGEN SATURATION: 91 % | WEIGHT: 293 LBS | SYSTOLIC BLOOD PRESSURE: 128 MMHG | HEIGHT: 66 IN | DIASTOLIC BLOOD PRESSURE: 86 MMHG | BODY MASS INDEX: 47.09 KG/M2

## 2018-09-27 DIAGNOSIS — R20.2 PARESTHESIAS: ICD-10-CM

## 2018-09-27 DIAGNOSIS — M65.4 DE QUERVAIN'S TENOSYNOVITIS: ICD-10-CM

## 2018-09-27 DIAGNOSIS — M25.531 RIGHT WRIST PAIN: ICD-10-CM

## 2018-09-27 PROCEDURE — 95909 NRV CNDJ TST 5-6 STUDIES: CPT | Performed by: PHYSICAL MEDICINE & REHABILITATION

## 2018-09-27 PROCEDURE — 99213 OFFICE O/P EST LOW 20 MIN: CPT | Mod: 25 | Performed by: PHYSICAL MEDICINE & REHABILITATION

## 2018-09-27 ASSESSMENT — PAIN SCALES - GENERAL: PAINLEVEL: NO PAIN

## 2018-09-27 NOTE — PROGRESS NOTES
Test Date:  2018    Patient: Kinza Juan : 1985 Physician: Jadiel Jennings MD   Sex: Female Height:  cm Ref Phys: Jadiel Jennings MD   MRN#: [id number] Weight:  lbs. Technician:      Complaints: Right wrist pain    Kinza Juan 33 y.o. Right-handed female who presents today for electrodiagnostic evaluation of symptoms in the right hand and wrist that started about 6 months ago.  She states that she has been having symptoms at work with computer work/mouse work.  She has had symptoms started as numbness and tingling in the thumb and index finger.  Movement of her wrist can still cause pain.  She previously reported positive flick sign that patient self reports and reports that the use of the thumb spica splint has been helpful for her symptoms.  She does feel like the sharp wrist pain has started to improve.       The patient denies shoulder or neck pain.  No radicular symptoms.    PMH/PSH/All/Meds/ROS/FH: unchanged from previous visit 2018    Exam:  Gen: Patient is alert and cooperative and in no acute distress.  Cervical spine ROM is within functional limits   Manual muscle testing reveals no focal motor deficits in the upper or lower extremities bilaterally.  Sensation is grossly intact to light touch in the upper and lower extremities.  Seated SLR is negative bilaterally  Reflexes are 2+ biceps, triceps, brachioradialis bilaterally.  There is no clonus  Gait is steady without assist device or loss of balance  Tinel's is negative at the wrists bilaterally, Phalen's positive on the right and positive finkelstein's test on the right  Tinel's is negative at the elbows bilaterally      NCV & EMG Findings:  All nerve conduction studies (as indicated in the following tables) were within normal limits.      Impression/Recommendations:  Normal study  1. Today's electrodiagnostic findings point against right:      A. Right median mononeuropathy at the wrist (e.g. Carpal tunnel  syndrome (CTS)).  2. Clinically, she has had some improvement of symptoms that were suggestive of CTS.  She is reassured.  It does not seem that she has CTS based on electrodiagnostic findings.  It is very uncommon to have normal NCS and clinical CTS.  Will follow-up and see how she does, consider ultrasound if symptoms persist.  We elected to defer needle exam today based on low clinical suspicion.    3. We did discuss that she continues to have some symptoms of de Quervain's tenosynovitis.  I have recommended that she continue to use the thumb spica splint as much as she can until next follow-up.  We discussed other treatment options, but will exhaust conservative care first.    ___________________________  Jadiel Jennings MD  Physical Medicine and Rehabilitation  Interventional Spine and Sports Physiatry  [facility]          Nerve Conduction Studies  Motor Summary Table     Stim Site NR Onset (ms) Norm Onset (ms) O-P Amp (mV) Norm O-P Amp Site1 Site2 Delta-0 (ms) Dist (cm) Brad (m/s) Norm Brad (m/s)   Right Median Motor (Abd Poll Brev)   Wrist    3.0 <4.2 12.7 >5 Elbow Wrist 3.6 23.0 64 >50   Elbow    6.6  11.3          Ulnar to APB    2.8  6.0            Comparison Summary Table     Stim Site NR Peak (ms) Norm Peak (ms) P-T Amp (µV) Site1 Site2 Delta-P (ms) Norm Delta (ms)   Right Median/Radial Dig I Comparison (Digit 1 - 10cm)   Median    2.4 <2.9 37.3 Median Radial 0.1 <0.4   Radial    2.3 <2.8 11.2       Right Median/Ulnar Dig IV Comparison (Digit 4 - 14cm)   Median Wr    2.9 <3.3 9.7 Median Wr Ulnar Wr 0.1 <0.4   Ulnar Wr    3.0 <3.3 46.0           Nerve Conduction Studies  Motor Left/Right Comparison     Stim Site L Lat (ms) R Lat (ms) L-R Lat (ms) L Amp (mV) R Amp (mV) L-R Amp (%) Site1 Site2 L Brad (m/s) R Brad (m/s) L-R Brad (m/s)   Median Motor (Abd Poll Brev)   Wrist  3.0   12.7  Elbow Wrist  64    Elbow  6.6   11.3         Ulnar to APB  2.8   6.0           Comparison Left/Right Comparison     Stim  Site L Lat (ms) R Lat (ms) L-R Lat (ms) L Amp (µV) R Amp (µV) L-R Amp (%)   Median/Radial Dig I Comparison (Digit 1 - 10cm)   Median  2.4   37.3    Radial  2.3   11.2    Median/Ulnar Dig IV Comparison (Digit 4 - 14cm)   Median Wr  2.9   9.7    Ulnar Wr  3.0   46.0          Waveforms:

## 2018-10-29 DIAGNOSIS — R20.2 RIGHT HAND PARESTHESIA: ICD-10-CM

## 2018-10-29 DIAGNOSIS — M77.9 TENDINITIS: ICD-10-CM

## 2018-10-29 RX ORDER — MELOXICAM 15 MG/1
15 TABLET ORAL DAILY
Qty: 30 TAB | Refills: 0 | OUTPATIENT
Start: 2018-10-29

## 2018-11-01 ENCOUNTER — APPOINTMENT (OUTPATIENT)
Dept: PHYSICAL MEDICINE AND REHAB | Facility: MEDICAL CENTER | Age: 33
End: 2018-11-01
Payer: COMMERCIAL

## 2018-11-13 ENCOUNTER — HOSPITAL ENCOUNTER (OUTPATIENT)
Dept: RADIOLOGY | Facility: MEDICAL CENTER | Age: 33
End: 2018-11-13
Attending: COLON & RECTAL SURGERY
Payer: COMMERCIAL

## 2018-11-13 DIAGNOSIS — Z01.818 PREOPERATIVE EXAMINATION: ICD-10-CM

## 2018-11-13 DIAGNOSIS — E66.01 MORBID OBESITY (HCC): ICD-10-CM

## 2018-11-13 PROCEDURE — A9270 NON-COVERED ITEM OR SERVICE: HCPCS | Performed by: COLON & RECTAL SURGERY

## 2018-11-13 PROCEDURE — 74247 DX-UPPER GI-AIR CONTRAST: CPT

## 2018-11-13 PROCEDURE — 700112 HCHG RX REV CODE 229: Performed by: COLON & RECTAL SURGERY

## 2018-11-13 RX ADMIN — ANTACID/ANTIFLATULENT 1 PACKET: 380; 550; 10; 10 GRANULE, EFFERVESCENT ORAL at 08:45

## 2018-12-23 ENCOUNTER — OFFICE VISIT (OUTPATIENT)
Dept: URGENT CARE | Facility: CLINIC | Age: 33
End: 2018-12-23
Payer: COMMERCIAL

## 2018-12-23 VITALS
HEIGHT: 66 IN | OXYGEN SATURATION: 94 % | HEART RATE: 124 BPM | DIASTOLIC BLOOD PRESSURE: 62 MMHG | TEMPERATURE: 98.9 F | SYSTOLIC BLOOD PRESSURE: 114 MMHG | RESPIRATION RATE: 16 BRPM | BODY MASS INDEX: 47.09 KG/M2 | WEIGHT: 293 LBS

## 2018-12-23 DIAGNOSIS — Z20.828 EXPOSURE TO THE FLU: ICD-10-CM

## 2018-12-23 DIAGNOSIS — R68.89 FLU-LIKE SYMPTOMS: ICD-10-CM

## 2018-12-23 LAB
FLUAV+FLUBV AG SPEC QL IA: POSITIVE
INT CON NEG: NEGATIVE
INT CON POS: POSITIVE

## 2018-12-23 PROCEDURE — 87804 INFLUENZA ASSAY W/OPTIC: CPT | Performed by: FAMILY MEDICINE

## 2018-12-23 PROCEDURE — 99214 OFFICE O/P EST MOD 30 MIN: CPT | Performed by: FAMILY MEDICINE

## 2018-12-23 RX ORDER — OSELTAMIVIR PHOSPHATE 75 MG/1
75 CAPSULE ORAL EVERY 12 HOURS
Qty: 10 CAP | Refills: 0 | Status: SHIPPED | OUTPATIENT
Start: 2018-12-23 | End: 2018-12-28

## 2018-12-23 ASSESSMENT — ENCOUNTER SYMPTOMS
HEMOPTYSIS: 0
FEVER: 1
FOCAL WEAKNESS: 0
HEADACHES: 1
SHORTNESS OF BREATH: 0
COUGH: 1
CHILLS: 1
MYALGIAS: 1
DIZZINESS: 0
ORTHOPNEA: 0

## 2018-12-23 NOTE — LETTER
December 23, 2018         Patient: Kinza Juan   YOB: 1985   Date of Visit: 12/23/2018           To Whom it May Concern:    Kinza Juan was seen in my clinic on 12/23/2018. She may return to work in 3-5 days.    If you have any questions or concerns, please don't hesitate to call.        Sincerely,           Mehran Jansen M.D.  Electronically Signed

## 2018-12-23 NOTE — PROGRESS NOTES
"Subjective:      Kinza Juan is a 33 y.o. female who presents with Flu Like Symptoms (cough, head aches, body aches, chills and fatigue x 1 day )      - This is a very pleasant, well and non-toxic appearing 33 y.o. female with complaints of sudden onset cough/runny nose, body aches, subjective fever, chills and malaise that started last night. Her child currently has the flu.           ALLERGIES:  Nkda [no known drug allergy]     PMH:  Past Medical History:   Diagnosis Date   • GERD (gastroesophageal reflux disease)     h pylori 12/2015   • Infectious disease     to the flu   • Pap smear 4/28/9    Normal        MEDS:    Current Outpatient Prescriptions:   •  oseltamivir (TAMIFLU) 75 MG Cap, Take 1 Cap by mouth every 12 hours for 5 days., Disp: 10 Cap, Rfl: 0  •  levonorgestrel (MIRENA) 20 MCG/24HR IUD, 1 Each by Intrauterine route Once., Disp: , Rfl:     ** I have documented what I find to be significant in regards to past medical, social, family and surgical history  in my HPI or under PMH/PSH/FH review section, otherwise it is contributory **           HPI    Review of Systems   Constitutional: Positive for chills, fever and malaise/fatigue.   HENT: Positive for congestion.    Respiratory: Positive for cough. Negative for hemoptysis and shortness of breath.    Cardiovascular: Negative for chest pain and orthopnea.   Musculoskeletal: Positive for myalgias.   Neurological: Positive for headaches. Negative for dizziness and focal weakness.          Objective:     /62   Pulse (!) 124   Temp 37.2 °C (98.9 °F)   Resp 16   Ht 1.676 m (5' 6\")   Wt (!) 139.7 kg (308 lb)   SpO2 94%   BMI 49.71 kg/m²    HR recheck 100  Physical Exam   Constitutional: She appears well-developed. No distress.   HENT:   Head: Normocephalic and atraumatic.   Mouth/Throat: Oropharynx is clear and moist.   Eyes: Conjunctivae are normal.   Neck: Neck supple.   Cardiovascular: Regular rhythm.    No murmur " heard.  Pulmonary/Chest: Effort normal and breath sounds normal. No respiratory distress.   Neurological: She is alert. She exhibits normal muscle tone.   Skin: Skin is warm and dry.   Psychiatric: She has a normal mood and affect. Judgment normal.   Nursing note and vitals reviewed.              Assessment/Plan:         1. Flu-like symptoms  POCT Influenza A/B    oseltamivir (TAMIFLU) 75 MG Cap   2. Exposure to the flu               Dx & d/c instructions discussed w/ patient and/or family members.     ER precautions (worsening signs symptoms and when to go to ER) discussed.    Follow up w/ PCP in 2-3 days to make sure symptoms improving and no further intervention/treatment and/or work-up needed was advised, ER if feeling worse or not improving in 2 days.    Possible side effects (i.e. Rash, GI upset/constipation, sedation, elevation of BP or sugars) of any medications given discussed.     Patient left in stable condition

## 2019-03-03 ENCOUNTER — HOSPITAL ENCOUNTER (OUTPATIENT)
Facility: MEDICAL CENTER | Age: 34
End: 2019-03-03
Attending: PHYSICIAN ASSISTANT
Payer: COMMERCIAL

## 2019-03-03 ENCOUNTER — OFFICE VISIT (OUTPATIENT)
Dept: URGENT CARE | Facility: CLINIC | Age: 34
End: 2019-03-03
Payer: COMMERCIAL

## 2019-03-03 VITALS
DIASTOLIC BLOOD PRESSURE: 64 MMHG | SYSTOLIC BLOOD PRESSURE: 122 MMHG | HEART RATE: 85 BPM | OXYGEN SATURATION: 97 % | RESPIRATION RATE: 15 BRPM | TEMPERATURE: 97.5 F | HEIGHT: 66 IN | WEIGHT: 293 LBS | BODY MASS INDEX: 47.09 KG/M2

## 2019-03-03 DIAGNOSIS — N30.00 ACUTE CYSTITIS WITHOUT HEMATURIA: ICD-10-CM

## 2019-03-03 DIAGNOSIS — R39.9 UTI SYMPTOMS: ICD-10-CM

## 2019-03-03 LAB
APPEARANCE UR: CLEAR
BILIRUB UR STRIP-MCNC: NORMAL MG/DL
COLOR UR AUTO: YELLOW
GLUCOSE UR STRIP.AUTO-MCNC: NORMAL MG/DL
INT CON NEG: NEGATIVE
INT CON POS: POSITIVE
KETONES UR STRIP.AUTO-MCNC: NORMAL MG/DL
LEUKOCYTE ESTERASE UR QL STRIP.AUTO: NORMAL
NITRITE UR QL STRIP.AUTO: NORMAL
PH UR STRIP.AUTO: 5.5 [PH] (ref 5–8)
POC URINE PREGNANCY TEST: NEGATIVE
PROT UR QL STRIP: NORMAL MG/DL
RBC UR QL AUTO: NORMAL
SP GR UR STRIP.AUTO: 1.02
UROBILINOGEN UR STRIP-MCNC: 0.2 MG/DL

## 2019-03-03 PROCEDURE — 81002 URINALYSIS NONAUTO W/O SCOPE: CPT | Performed by: PHYSICIAN ASSISTANT

## 2019-03-03 PROCEDURE — 99214 OFFICE O/P EST MOD 30 MIN: CPT | Performed by: PHYSICIAN ASSISTANT

## 2019-03-03 PROCEDURE — 99000 SPECIMEN HANDLING OFFICE-LAB: CPT | Performed by: PHYSICIAN ASSISTANT

## 2019-03-03 PROCEDURE — 81025 URINE PREGNANCY TEST: CPT | Performed by: PHYSICIAN ASSISTANT

## 2019-03-03 PROCEDURE — 87086 URINE CULTURE/COLONY COUNT: CPT

## 2019-03-03 RX ORDER — NITROFURANTOIN 25; 75 MG/1; MG/1
100 CAPSULE ORAL EVERY 12 HOURS
Qty: 10 CAP | Refills: 0 | Status: SHIPPED | OUTPATIENT
Start: 2019-03-03 | End: 2019-03-08

## 2019-03-03 ASSESSMENT — ENCOUNTER SYMPTOMS
VOMITING: 0
SORE THROAT: 0
MYALGIAS: 0
WHEEZING: 0
FLANK PAIN: 0
EYE REDNESS: 0
FEVER: 0
NAUSEA: 0
NECK PAIN: 0
HEADACHES: 0
EYE DISCHARGE: 0
CHILLS: 0
COUGH: 0
CHANGE IN BOWEL HABIT: 0

## 2019-03-03 NOTE — PROGRESS NOTES
"Subjective:      Kinza Juan is a 33 y.o. female who presents with UTI            Patient is a 33-year-old female who presents to urgent care with dysuria mainly after urination, lower abdominal pressure off and on for the last 1-2 weeks.  Patient denies any hematuria, flank pain, back pain, nausea, vomiting fevers or chills.  Patient reports that she thought that her symptoms were improving until the last few days.  Patient currently has Mirena which she denies risk of pregnancy.      UTI   This is a new problem. Episode onset: 1-2 weeks ago. The problem occurs intermittently. The problem has been waxing and waning. Associated symptoms include urinary symptoms. Pertinent negatives include no change in bowel habit, chills, congestion, coughing, fever, headaches, myalgias, nausea, neck pain, rash, sore throat or vomiting. Nothing aggravates the symptoms. She has tried nothing for the symptoms.       Review of Systems   Constitutional: Positive for malaise/fatigue. Negative for chills and fever.   HENT: Negative for congestion and sore throat.    Eyes: Negative for discharge and redness.   Respiratory: Negative for cough and wheezing.    Gastrointestinal: Negative for change in bowel habit, nausea and vomiting.   Genitourinary: Positive for dysuria, frequency and urgency. Negative for flank pain and hematuria.   Musculoskeletal: Negative for myalgias and neck pain.   Skin: Negative for itching and rash.   Neurological: Negative for headaches.          Objective:     /64   Pulse 85   Temp 36.4 °C (97.5 °F) (Temporal)   Resp 15   Ht 1.676 m (5' 6\")   Wt (!) 139.7 kg (308 lb)   SpO2 97%   BMI 49.71 kg/m²    PMH:  has a past medical history of GERD (gastroesophageal reflux disease); Infectious disease; and Pap smear (4/28/9). She also has no past medical history of Addisons disease (HCC); Adrenal disorder (HCC); Allergy; Anemia; Anxiety; Arrhythmia; Asthma; Blood transfusion; Cancer (HCC); " Cataract; CHF (congestive heart failure) (HCC); Clotting disorder (HCC); COPD; Cushings syndrome (HCC); Depression; Diabetes (HCC); Diabetic neuropathy (HCC); Glaucoma; Goiter; Headache(784.0); Heart attack (HCC); Heart murmur; HIV (human immunodeficiency virus infection); Hyperlipidemia; Hypertension; IBD (inflammatory bowel disease); Kidney disease; Meningitis; Migraine; Muscle disorder; OSTEOPOROSIS; Parathyroid disorder (HCC); Pituitary disease (HCC); Seizure (HCC); Sickle cell disease (HCC); Stroke (HCC); Substance abuse (HCC); Thyroid disease; Ulcer; or Urinary tract infection, site not specified.  MEDS:   Current Outpatient Prescriptions:   •  nitrofurantoin monohydr macro (MACROBID) 100 MG Cap, Take 1 Cap by mouth every 12 hours for 5 days., Disp: 10 Cap, Rfl: 0  •  levonorgestrel (MIRENA) 20 MCG/24HR IUD, 1 Each by Intrauterine route Once., Disp: , Rfl:   ALLERGIES:   Allergies   Allergen Reactions   • Nkda [No Known Drug Allergy]      SURGHX:   Past Surgical History:   Procedure Laterality Date   • PRIMARY C SECTION  3/24/2013    Performed by Briana Cano M.D. at LABOR AND DELIVERY   • ACL RECONSTRUCTION SCOPE  5/31/2012    Performed by CAITY DORSEY at SURGERY [facility]   • MEDIAL MENISCECTOMY  5/31/2012    Performed by CAITY DORSEY at [facility]   • DENTAL EXTRACTION(S)  2005     SOCHX:  reports that she has never smoked. She has never used smokeless tobacco. She reports that she does not drink alcohol or use drugs.  FH: Family history was reviewed, no pertinent findings to report    Physical Exam   Constitutional: She is oriented to person, place, and time. She appears well-developed.   HENT:   Head: Normocephalic and atraumatic.   Eyes: Pupils are equal, round, and reactive to light. EOM are normal.   Neck: Normal range of motion. Neck supple.   Cardiovascular: Normal rate and regular rhythm.    No murmur heard.  Pulmonary/Chest: Effort normal and breath sounds normal. No  respiratory distress.   Abdominal: Soft. Bowel sounds are normal. She exhibits no distension.    Negative CVAT.    Musculoskeletal: Normal range of motion. She exhibits no edema.   Neurological: She is alert and oriented to person, place, and time.   Skin: Skin is warm and dry.   Psychiatric: She has a normal mood and affect. Her behavior is normal.   Vitals reviewed.            negative- HCG.   Small LE, small blood- sent for culture.     Assessment/Plan:     1. Acute cystitis without hematuria  - POCT Urinalysis  - POCT PREGNANCY  - nitrofurantoin monohydr macro (MACROBID) 100 MG Cap; Take 1 Cap by mouth every 12 hours for 5 days.  Dispense: 10 Cap; Refill: 0  - URINE CULTURE(NEW); Future    Pt. Was given ABX therapy today and will change therapy if culture indicates this is necessary. ER precautions given- worsening symptoms, back pain, abd. Pain, or fevers.   Pt. Is to increase fluids, and take the complete duration of the therapy.   Pt. Understands and agrees with the plan.   F/U with PCP in 3-4 days as needed.

## 2019-03-05 LAB
BACTERIA UR CULT: NORMAL
SIGNIFICANT IND 70042: NORMAL
SITE SITE: NORMAL
SOURCE SOURCE: NORMAL

## 2019-07-19 ENCOUNTER — GYNECOLOGY VISIT (OUTPATIENT)
Dept: OBGYN | Facility: MEDICAL CENTER | Age: 34
End: 2019-07-19
Payer: COMMERCIAL

## 2019-07-19 VITALS
WEIGHT: 293 LBS | SYSTOLIC BLOOD PRESSURE: 110 MMHG | DIASTOLIC BLOOD PRESSURE: 74 MMHG | HEIGHT: 66 IN | BODY MASS INDEX: 47.09 KG/M2

## 2019-07-19 DIAGNOSIS — Z30.019 ENCOUNTER FOR INITIAL PRESCRIPTION OF CONTRACEPTIVES, UNSPECIFIED CONTRACEPTIVE: Primary | ICD-10-CM

## 2019-07-19 DIAGNOSIS — Z30.432 ENCOUNTER FOR IUD REMOVAL: ICD-10-CM

## 2019-07-19 PROBLEM — Z30.9 ENCOUNTER FOR CONTRACEPTIVE MANAGEMENT: Status: ACTIVE | Noted: 2019-07-19

## 2019-07-19 PROCEDURE — 58301 REMOVE INTRAUTERINE DEVICE: CPT | Performed by: NURSE PRACTITIONER

## 2019-07-19 RX ORDER — NORGESTIMATE AND ETHINYL ESTRADIOL 0.25-0.035
1 KIT ORAL DAILY
Qty: 28 TAB | Refills: 11 | Status: SHIPPED | OUTPATIENT
Start: 2019-07-19 | End: 2020-06-19

## 2019-07-19 NOTE — PROGRESS NOTES
S) patient presents for contraceptive encounter today. Wants IUD removed, mirena, placed in 2013. Wants to start on an OCP for now and wants to try to get insurance approval for another mirena.  O) IUD strings clearly visualized       Weight 310       /74  A) contraceptive start up OCP      IUD removal  P) IUD successfully removed with no difficulty. Device intact. Script for sprintec with start up instructions to pharmacy. Paperwork filled out for insurance coverage Mirena

## 2019-07-19 NOTE — PROCEDURES
IUD Removal  Date/Time: 2019 11:29 AM  Performed by: TOMMIE ZAVALA  Authorized by: TOMMIE ZAVALA     Consent:     Consent obtained:  Verbal    Consent given by:  Patient    Procedure risks and benefits discussed: yes      Patient questions answered: yes      Patient agrees, verbalizes understanding, and wants to proceed: yes      Educational handouts given: no      Instructions and paperwork completed: yes    Pre-procedure details:     Reason for removal:  IUD      IUD placed at this facility: yes      Length of time IUD in place:  5 years  Procedure:     Pelvic exam performed: yes      Speculum placed: yes      IUD strings visualized in external os: yes      Removal mechanism:  Ring forceps    IUD removed intact: yes      IUD type removed:  Mirena    Removal complications: no    Post-procedure:     New birth control prescribed: yes      Counseling regarding contraception given: yes

## 2019-07-31 DIAGNOSIS — E66.01 OBESITY, MORBID, BMI 40.0-49.9 (HCC): ICD-10-CM

## 2020-05-19 DIAGNOSIS — E66.01 OBESITY, MORBID, BMI 40.0-49.9 (HCC): ICD-10-CM

## 2020-06-19 ENCOUNTER — HOSPITAL ENCOUNTER (OUTPATIENT)
Facility: MEDICAL CENTER | Age: 35
End: 2020-06-21
Attending: EMERGENCY MEDICINE | Admitting: HOSPITALIST
Payer: COMMERCIAL

## 2020-06-19 ENCOUNTER — HOSPITAL ENCOUNTER (OUTPATIENT)
Dept: LAB | Facility: MEDICAL CENTER | Age: 35
End: 2020-06-19
Attending: EMERGENCY MEDICINE
Payer: COMMERCIAL

## 2020-06-19 ENCOUNTER — HOSPITAL ENCOUNTER (OUTPATIENT)
Dept: RADIOLOGY | Facility: MEDICAL CENTER | Age: 35
End: 2020-06-19
Attending: EMERGENCY MEDICINE
Payer: COMMERCIAL

## 2020-06-19 ENCOUNTER — OFFICE VISIT (OUTPATIENT)
Dept: URGENT CARE | Facility: CLINIC | Age: 35
End: 2020-06-19
Payer: COMMERCIAL

## 2020-06-19 VITALS
RESPIRATION RATE: 16 BRPM | TEMPERATURE: 96.5 F | OXYGEN SATURATION: 98 % | SYSTOLIC BLOOD PRESSURE: 122 MMHG | DIASTOLIC BLOOD PRESSURE: 78 MMHG | BODY MASS INDEX: 42.62 KG/M2 | HEART RATE: 82 BPM | WEIGHT: 265.2 LBS | HEIGHT: 66 IN

## 2020-06-19 DIAGNOSIS — R10.11 RIGHT UPPER QUADRANT ABDOMINAL PAIN: ICD-10-CM

## 2020-06-19 DIAGNOSIS — K80.00 CALCULUS OF GALLBLADDER WITH ACUTE CHOLECYSTITIS WITHOUT OBSTRUCTION: ICD-10-CM

## 2020-06-19 DIAGNOSIS — K81.0 ACUTE CHOLECYSTITIS: ICD-10-CM

## 2020-06-19 PROBLEM — D72.829 LEUKOCYTOSIS: Status: ACTIVE | Noted: 2020-06-19

## 2020-06-19 LAB
ALBUMIN SERPL BCP-MCNC: 3.7 G/DL (ref 3.2–4.9)
ALBUMIN/GLOB SERPL: 1.1 G/DL
ALP SERPL-CCNC: 110 U/L (ref 30–99)
ALT SERPL-CCNC: 7 U/L (ref 2–50)
ANION GAP SERPL CALC-SCNC: 12 MMOL/L (ref 7–16)
APPEARANCE UR: CLEAR
AST SERPL-CCNC: 7 U/L (ref 12–45)
BASOPHILS # BLD AUTO: 0.6 % (ref 0–1.8)
BASOPHILS # BLD: 0.07 K/UL (ref 0–0.12)
BILIRUB SERPL-MCNC: 0.3 MG/DL (ref 0.1–1.5)
BILIRUB UR STRIP-MCNC: NEGATIVE MG/DL
BUN SERPL-MCNC: 10 MG/DL (ref 8–22)
CALCIUM SERPL-MCNC: 8.9 MG/DL (ref 8.5–10.5)
CHLORIDE SERPL-SCNC: 101 MMOL/L (ref 96–112)
CO2 SERPL-SCNC: 23 MMOL/L (ref 20–33)
COLOR UR AUTO: YELLOW
COVID ORDER STATUS COVID19: NORMAL
CREAT SERPL-MCNC: 0.67 MG/DL (ref 0.5–1.4)
EOSINOPHIL # BLD AUTO: 0.14 K/UL (ref 0–0.51)
EOSINOPHIL NFR BLD: 1.2 % (ref 0–6.9)
ERYTHROCYTE [DISTWIDTH] IN BLOOD BY AUTOMATED COUNT: 45.8 FL (ref 35.9–50)
GLOBULIN SER CALC-MCNC: 3.4 G/DL (ref 1.9–3.5)
GLUCOSE SERPL-MCNC: 96 MG/DL (ref 65–99)
GLUCOSE UR STRIP.AUTO-MCNC: NEGATIVE MG/DL
HCT VFR BLD AUTO: 44 % (ref 37–47)
HGB BLD-MCNC: 13.7 G/DL (ref 12–16)
IMM GRANULOCYTES # BLD AUTO: 0.07 K/UL (ref 0–0.11)
IMM GRANULOCYTES NFR BLD AUTO: 0.6 % (ref 0–0.9)
INT CON NEG: NORMAL
INT CON POS: NORMAL
KETONES UR STRIP.AUTO-MCNC: NEGATIVE MG/DL
LEUKOCYTE ESTERASE UR QL STRIP.AUTO: NEGATIVE
LIPASE SERPL-CCNC: 24 U/L (ref 11–82)
LYMPHOCYTES # BLD AUTO: 2.82 K/UL (ref 1–4.8)
LYMPHOCYTES NFR BLD: 23.7 % (ref 22–41)
MAGNESIUM SERPL-MCNC: 2.1 MG/DL (ref 1.5–2.5)
MCH RBC QN AUTO: 29.2 PG (ref 27–33)
MCHC RBC AUTO-ENTMCNC: 31.1 G/DL (ref 33.6–35)
MCV RBC AUTO: 93.8 FL (ref 81.4–97.8)
MONOCYTES # BLD AUTO: 0.66 K/UL (ref 0–0.85)
MONOCYTES NFR BLD AUTO: 5.5 % (ref 0–13.4)
NEUTROPHILS # BLD AUTO: 8.14 K/UL (ref 2–7.15)
NEUTROPHILS NFR BLD: 68.4 % (ref 44–72)
NITRITE UR QL STRIP.AUTO: NEGATIVE
NRBC # BLD AUTO: 0 K/UL
NRBC BLD-RTO: 0 /100 WBC
PH UR STRIP.AUTO: 6 [PH] (ref 5–8)
PLATELET # BLD AUTO: 326 K/UL (ref 164–446)
PMV BLD AUTO: 10.9 FL (ref 9–12.9)
POC URINE PREGNANCY TEST: NEGATIVE
POTASSIUM SERPL-SCNC: 4.3 MMOL/L (ref 3.6–5.5)
PROT SERPL-MCNC: 7.1 G/DL (ref 6–8.2)
PROT UR QL STRIP: NEGATIVE MG/DL
RBC # BLD AUTO: 4.69 M/UL (ref 4.2–5.4)
RBC UR QL AUTO: NORMAL
SARS-COV-2 RNA RESP QL NAA+PROBE: NOTDETECTED
SODIUM SERPL-SCNC: 136 MMOL/L (ref 135–145)
SP GR UR STRIP.AUTO: 1.02
SPECIMEN SOURCE: NORMAL
UROBILINOGEN UR STRIP-MCNC: 0.2 MG/DL
WBC # BLD AUTO: 11.9 K/UL (ref 4.8–10.8)

## 2020-06-19 PROCEDURE — C9803 HOPD COVID-19 SPEC COLLECT: HCPCS | Performed by: HOSPITALIST

## 2020-06-19 PROCEDURE — U0004 COV-19 TEST NON-CDC HGH THRU: HCPCS

## 2020-06-19 PROCEDURE — 81002 URINALYSIS NONAUTO W/O SCOPE: CPT | Performed by: EMERGENCY MEDICINE

## 2020-06-19 PROCEDURE — 85025 COMPLETE CBC W/AUTO DIFF WBC: CPT

## 2020-06-19 PROCEDURE — 76705 ECHO EXAM OF ABDOMEN: CPT

## 2020-06-19 PROCEDURE — 81025 URINE PREGNANCY TEST: CPT | Performed by: EMERGENCY MEDICINE

## 2020-06-19 PROCEDURE — 36415 COLL VENOUS BLD VENIPUNCTURE: CPT

## 2020-06-19 PROCEDURE — 80053 COMPREHEN METABOLIC PANEL: CPT

## 2020-06-19 PROCEDURE — 83735 ASSAY OF MAGNESIUM: CPT

## 2020-06-19 PROCEDURE — 700111 HCHG RX REV CODE 636 W/ 250 OVERRIDE (IP): Performed by: EMERGENCY MEDICINE

## 2020-06-19 PROCEDURE — 96375 TX/PRO/DX INJ NEW DRUG ADDON: CPT

## 2020-06-19 PROCEDURE — G0378 HOSPITAL OBSERVATION PER HR: HCPCS

## 2020-06-19 PROCEDURE — 700102 HCHG RX REV CODE 250 W/ 637 OVERRIDE(OP): Performed by: HOSPITALIST

## 2020-06-19 PROCEDURE — 96374 THER/PROPH/DIAG INJ IV PUSH: CPT

## 2020-06-19 PROCEDURE — 83690 ASSAY OF LIPASE: CPT

## 2020-06-19 PROCEDURE — 99203 OFFICE O/P NEW LOW 30 MIN: CPT | Performed by: EMERGENCY MEDICINE

## 2020-06-19 PROCEDURE — A9270 NON-COVERED ITEM OR SERVICE: HCPCS | Performed by: HOSPITALIST

## 2020-06-19 PROCEDURE — 99285 EMERGENCY DEPT VISIT HI MDM: CPT

## 2020-06-19 PROCEDURE — 700105 HCHG RX REV CODE 258: Performed by: HOSPITALIST

## 2020-06-19 PROCEDURE — 99220 PR INITIAL OBSERVATION CARE,LEVL III: CPT | Performed by: HOSPITALIST

## 2020-06-19 RX ORDER — ACETAMINOPHEN 325 MG/1
650 TABLET ORAL EVERY 6 HOURS PRN
Status: DISCONTINUED | OUTPATIENT
Start: 2020-06-19 | End: 2020-06-21 | Stop reason: HOSPADM

## 2020-06-19 RX ORDER — PROMETHAZINE HYDROCHLORIDE 25 MG/1
12.5-25 TABLET ORAL EVERY 4 HOURS PRN
Status: DISCONTINUED | OUTPATIENT
Start: 2020-06-19 | End: 2020-06-21 | Stop reason: HOSPADM

## 2020-06-19 RX ORDER — PHENTERMINE HYDROCHLORIDE 30 MG/1
CAPSULE ORAL
Status: ON HOLD | COMMUNITY
Start: 2020-06-11 | End: 2020-06-21

## 2020-06-19 RX ORDER — LABETALOL HYDROCHLORIDE 5 MG/ML
10 INJECTION, SOLUTION INTRAVENOUS EVERY 4 HOURS PRN
Status: DISCONTINUED | OUTPATIENT
Start: 2020-06-19 | End: 2020-06-21 | Stop reason: HOSPADM

## 2020-06-19 RX ORDER — BISACODYL 10 MG
10 SUPPOSITORY, RECTAL RECTAL
Status: DISCONTINUED | OUTPATIENT
Start: 2020-06-19 | End: 2020-06-21 | Stop reason: HOSPADM

## 2020-06-19 RX ORDER — PROCHLORPERAZINE EDISYLATE 5 MG/ML
5-10 INJECTION INTRAMUSCULAR; INTRAVENOUS EVERY 4 HOURS PRN
Status: DISCONTINUED | OUTPATIENT
Start: 2020-06-19 | End: 2020-06-21 | Stop reason: HOSPADM

## 2020-06-19 RX ORDER — SODIUM CHLORIDE 9 MG/ML
INJECTION, SOLUTION INTRAVENOUS CONTINUOUS
Status: DISCONTINUED | OUTPATIENT
Start: 2020-06-19 | End: 2020-06-21 | Stop reason: HOSPADM

## 2020-06-19 RX ORDER — OXYCODONE HYDROCHLORIDE 5 MG/1
5 TABLET ORAL
Status: DISCONTINUED | OUTPATIENT
Start: 2020-06-19 | End: 2020-06-21 | Stop reason: HOSPADM

## 2020-06-19 RX ORDER — ONDANSETRON 2 MG/ML
4 INJECTION INTRAMUSCULAR; INTRAVENOUS ONCE
Status: COMPLETED | OUTPATIENT
Start: 2020-06-19 | End: 2020-06-19

## 2020-06-19 RX ORDER — POLYETHYLENE GLYCOL 3350 17 G/17G
1 POWDER, FOR SOLUTION ORAL
Status: DISCONTINUED | OUTPATIENT
Start: 2020-06-19 | End: 2020-06-21 | Stop reason: HOSPADM

## 2020-06-19 RX ORDER — ACETAMINOPHEN 500 MG
500 TABLET ORAL EVERY 6 HOURS PRN
Status: ON HOLD | COMMUNITY
End: 2020-06-21

## 2020-06-19 RX ORDER — PROMETHAZINE HYDROCHLORIDE 12.5 MG/1
12.5-25 SUPPOSITORY RECTAL EVERY 4 HOURS PRN
Status: DISCONTINUED | OUTPATIENT
Start: 2020-06-19 | End: 2020-06-21 | Stop reason: HOSPADM

## 2020-06-19 RX ORDER — MORPHINE SULFATE 4 MG/ML
2 INJECTION, SOLUTION INTRAMUSCULAR; INTRAVENOUS
Status: DISCONTINUED | OUTPATIENT
Start: 2020-06-19 | End: 2020-06-21 | Stop reason: HOSPADM

## 2020-06-19 RX ORDER — ONDANSETRON 2 MG/ML
4 INJECTION INTRAMUSCULAR; INTRAVENOUS EVERY 4 HOURS PRN
Status: DISCONTINUED | OUTPATIENT
Start: 2020-06-19 | End: 2020-06-21 | Stop reason: HOSPADM

## 2020-06-19 RX ORDER — KETOROLAC TROMETHAMINE 30 MG/ML
15 INJECTION, SOLUTION INTRAMUSCULAR; INTRAVENOUS ONCE
Status: COMPLETED | OUTPATIENT
Start: 2020-06-19 | End: 2020-06-19

## 2020-06-19 RX ORDER — OXYCODONE HYDROCHLORIDE 5 MG/1
2.5 TABLET ORAL
Status: DISCONTINUED | OUTPATIENT
Start: 2020-06-19 | End: 2020-06-21 | Stop reason: HOSPADM

## 2020-06-19 RX ORDER — ONDANSETRON 4 MG/1
4 TABLET, ORALLY DISINTEGRATING ORAL EVERY 4 HOURS PRN
Status: DISCONTINUED | OUTPATIENT
Start: 2020-06-19 | End: 2020-06-21 | Stop reason: HOSPADM

## 2020-06-19 RX ORDER — AMOXICILLIN 250 MG
2 CAPSULE ORAL 2 TIMES DAILY
Status: DISCONTINUED | OUTPATIENT
Start: 2020-06-19 | End: 2020-06-21 | Stop reason: HOSPADM

## 2020-06-19 RX ADMIN — OXYCODONE 2.5 MG: 5 TABLET ORAL at 22:19

## 2020-06-19 RX ADMIN — ONDANSETRON 4 MG: 2 INJECTION INTRAMUSCULAR; INTRAVENOUS at 16:25

## 2020-06-19 RX ADMIN — SODIUM CHLORIDE: 9 INJECTION, SOLUTION INTRAVENOUS at 18:30

## 2020-06-19 RX ADMIN — FENTANYL CITRATE 100 MCG: 50 INJECTION INTRAMUSCULAR; INTRAVENOUS at 16:25

## 2020-06-19 RX ADMIN — KETOROLAC TROMETHAMINE 15 MG: 30 INJECTION, SOLUTION INTRAMUSCULAR at 16:24

## 2020-06-19 ASSESSMENT — LIFESTYLE VARIABLES
HAVE YOU EVER FELT YOU SHOULD CUT DOWN ON YOUR DRINKING: NO
EVER HAD A DRINK FIRST THING IN THE MORNING TO STEADY YOUR NERVES TO GET RID OF A HANGOVER: NO
HOW MANY TIMES IN THE PAST YEAR HAVE YOU HAD 5 OR MORE DRINKS IN A DAY: 0
CONSUMPTION TOTAL: INCOMPLETE
TOTAL SCORE: 0
EVER FELT BAD OR GUILTY ABOUT YOUR DRINKING: NO
ALCOHOL_USE: YES
EVER FELT BAD OR GUILTY ABOUT YOUR DRINKING: NO
TOTAL SCORE: 0
DO YOU DRINK ALCOHOL: NO
ON A TYPICAL DAY WHEN YOU DRINK ALCOHOL HOW MANY DRINKS DO YOU HAVE: 1
EVER HAD A DRINK FIRST THING IN THE MORNING TO STEADY YOUR NERVES TO GET RID OF A HANGOVER: NO
DOES PATIENT WANT TO STOP DRINKING: NO
AVERAGE NUMBER OF DAYS PER WEEK YOU HAVE A DRINK CONTAINING ALCOHOL: 0.5
TOTAL SCORE: 0
TOTAL SCORE: 0
HAVE PEOPLE ANNOYED YOU BY CRITICIZING YOUR DRINKING: NO
CONSUMPTION TOTAL: NEGATIVE
HAVE YOU EVER FELT YOU SHOULD CUT DOWN ON YOUR DRINKING: NO
TOTAL SCORE: 0
HAVE PEOPLE ANNOYED YOU BY CRITICIZING YOUR DRINKING: NO
TOTAL SCORE: 0
EVER_SMOKED: NEVER

## 2020-06-19 ASSESSMENT — ENCOUNTER SYMPTOMS
CONSTIPATION: 0
DIARRHEA: 0
RESPIRATORY NEGATIVE: 1
CHILLS: 0
BLOOD IN STOOL: 0
HEARTBURN: 0
PSYCHIATRIC NEGATIVE: 1
PALPITATIONS: 0
HEMOPTYSIS: 0
CONSTIPATION: 0
MUSCULOSKELETAL NEGATIVE: 1
DIZZINESS: 0
DIAPHORESIS: 0
ANOREXIA: 0
NERVOUS/ANXIOUS: 0
VOMITING: 0
HEARTBURN: 1
CARDIOVASCULAR NEGATIVE: 1
SHORTNESS OF BREATH: 0
FEVER: 0
SEIZURES: 0
HEMATOCHEZIA: 0
BLOOD IN STOOL: 0
DOUBLE VISION: 0
NAUSEA: 1
HEADACHES: 1
ABDOMINAL PAIN: 1
BELCHING: 0
BRUISES/BLEEDS EASILY: 0
LOSS OF CONSCIOUSNESS: 0
MYALGIAS: 0
EYES NEGATIVE: 1
ABDOMINAL PAIN: 1
COUGH: 0
FEVER: 0
CHILLS: 0
FOCAL WEAKNESS: 0
NAUSEA: 1
CONSTITUTIONAL NEGATIVE: 1
COUGH: 0
WHEEZING: 0
FLANK PAIN: 1
VOMITING: 0
DIARRHEA: 0

## 2020-06-19 ASSESSMENT — PATIENT HEALTH QUESTIONNAIRE - PHQ9
2. FEELING DOWN, DEPRESSED, IRRITABLE, OR HOPELESS: NOT AT ALL
SUM OF ALL RESPONSES TO PHQ9 QUESTIONS 1 AND 2: 0
1. LITTLE INTEREST OR PLEASURE IN DOING THINGS: NOT AT ALL

## 2020-06-19 ASSESSMENT — PAIN SCALES - GENERAL: PAINLEVEL: 7=MODERATE-SEVERE PAIN

## 2020-06-19 ASSESSMENT — FIBROSIS 4 INDEX
FIB4 SCORE: 0.28
FIB4 SCORE: 0.28

## 2020-06-19 NOTE — PROGRESS NOTES
Subjective:      Kinza Juan is a 35 y.o. female who presents with Abdominal Pain (pt states that she has pain in her upper stomach. started 4 days ago. bloated, pain with urination at times. )            Abdominal Pain   This is a new problem. Episode onset: 4 days. The onset quality is gradual. The problem occurs constantly. The most recent episode lasted 4 days. The problem has been gradually worsening. The pain is located in the RUQ. The quality of the pain is a sensation of fullness and dull. The abdominal pain radiates to the right flank. Associated symptoms include dysuria and nausea. Pertinent negatives include no anorexia, belching, constipation, diarrhea, fever, frequency, hematochezia, hematuria, melena, myalgias or vomiting. Nothing aggravates the pain. The pain is relieved by nothing. She has tried H2 blockers and antacids for the symptoms. The treatment provided mild relief. Her past medical history is significant for GERD.       Review of Systems   Constitutional: Negative for chills and fever.   Respiratory: Negative for cough and shortness of breath.    Cardiovascular: Negative for chest pain.   Gastrointestinal: Positive for abdominal pain, heartburn and nausea. Negative for anorexia, blood in stool, constipation, diarrhea, hematochezia, melena and vomiting.   Genitourinary: Positive for dysuria and flank pain. Negative for frequency, hematuria and urgency.        Denies pregnancy. No vaginal discharge or bleeding.   Musculoskeletal: Negative for myalgias.   Skin: Negative for rash.     Past Medical History:   Diagnosis Date   • GERD (gastroesophageal reflux disease)     h pylori 12/2015   • Infectious disease     to the flu   • Pap smear 4/28/9    Normal     Past Surgical History:   Procedure Laterality Date   • PRIMARY C SECTION  3/24/2013    Performed by Briana Cano M.D. at LABOR AND DELIVERY   • ACL RECONSTRUCTION SCOPE  5/31/2012    Performed by CAITY DORSEY at [facility]  "[facility] ORS   • MEDIAL MENISCECTOMY  5/31/2012    Performed by CAIYT DORSEY at SURGERY [facility]   • DENTAL EXTRACTION(S)  2005      Allergy:  Nkda [no known drug allergy]     Current Outpatient Medications:   •  phentermine, TAKE 1 CAPSULE BY MOUTH ONCE DAILY BEFORE BREAKFAST FOR 30 DAYS, Taking  •  norgestimate-ethinyl estradiol, 1 Tab, Oral, DAILY (Patient not taking: Reported on 6/19/2020), Not Taking  •  levonorgestrel, 1 Each, Intrauterine, Once, Not Taking   family history includes Diabetes in her father, maternal grandfather, and paternal grandmother; Hypertension in her maternal grandmother; Stroke in her maternal grandmother.   Social History     Tobacco Use   • Smoking status: Never Smoker   • Smokeless tobacco: Never Used   Substance Use Topics   • Alcohol use: Yes     Alcohol/week: 1.2 oz     Types: 2 Standard drinks or equivalent per week     Comment: occ   • Drug use: Yes     Types: Marijuana, Oral     Comment: occ         Objective:     /78   Pulse 82   Temp 35.8 °C (96.5 °F) (Temporal)   Resp 16   Ht 1.676 m (5' 6\")   Wt 120.3 kg (265 lb 3.2 oz)   SpO2 98%   BMI 42.80 kg/m²      Physical Exam  Constitutional:       General: She is not in acute distress.     Appearance: She is well-developed and overweight. She is not ill-appearing.   Eyes:      General: Lids are normal. No scleral icterus.     Conjunctiva/sclera: Conjunctivae normal.   Neck:      Vascular: No JVD.      Trachea: Phonation normal.   Cardiovascular:      Rate and Rhythm: Normal rate and regular rhythm.      Heart sounds: Normal heart sounds.   Pulmonary:      Effort: Pulmonary effort is normal.      Breath sounds: Normal breath sounds.   Abdominal:      General: There is no distension.      Palpations: Abdomen is soft.      Tenderness: There is abdominal tenderness in the right upper quadrant and epigastric area. There is no right CVA tenderness, left CVA tenderness, guarding or rebound. Positive signs include " Bray's sign. Negative signs include McBurney's sign.   Skin:     General: Skin is warm and dry.   Neurological:      Mental Status: She is alert and oriented to person, place, and time.   Psychiatric:         Behavior: Behavior is cooperative.            [facility] ED charge nurse provided notification by phone.   Assessment/Plan:       1. Calculus of gallbladder with acute cholecystitis without obstruction  Discussed with surgeon on-call; we will follow-up in office if patient can tolerate it.  Discussed with patient by phone; would prefer admission through ED.  Advised NPO  2. Right upper quadrant abdominal pain  Small blood- POCT Urinalysis  negative- POCT Pregnancy  US GB; per radiologist:  Markedly dilated gallbladder and cholelithiasis. Cholecystitis is not excluded. Radionuclide hepatobiliary imaging is consideration for further evaluation.  The common duct measures 5.5 mm in diameter.  Hepatic steatosis. No free fluid.  CBC minimal leukocytosis, left shift unchanged  CMP unremarkable  Lipase normal

## 2020-06-19 NOTE — ED TRIAGE NOTES
"Chief Complaint   Patient presents with   • Abdominal Pain     Pt seen at  this AM and had bloodwork and US done and stones found in gallbladder. Pain in RUQ worse in the last 4 days radiating to her back.    • Sent from Urgent Care     Told to come to ER to possibly remove.   /61   Pulse 71   Temp 36.1 °C (96.9 °F) (Temporal)   Resp 16   Ht 1.676 m (5' 6\")   Wt 120.5 kg (265 lb 10.5 oz)   SpO2 99%   BMI 42.88 kg/m²   Pt placed back in lobby, educated on triage process, and told to inform staff of any change in condition.     "

## 2020-06-19 NOTE — H&P
Hospital Medicine History & Physical Note    Date of Service  6/19/2020    Primary Care Physician  TANISHA Gao    Code Status  Full Code    Chief Complaint  Chief Complaint   Patient presents with   • Abdominal Pain     Pt seen at  this AM and had bloodwork and US done and stones found in gallbladder. Pain in RUQ worse in the last 4 days radiating to her back.    • Sent from Urgent Care       History of Presenting Illness  35 y.o. female who presented 6/19/2020 with right upper quadrant abdominal pain that is recurrent.  The patient's abdominal pain at this point was evaluated urgent care initially.  The patient now has come to the hospital after she was evaluated and she is found to have multiple gallstones with a distended tight gallbladder.  The patient at this point will be kept n.p.o. will be given fluid resuscitation pain management.  I have spoken with Dr. Iftikhar Flowers of surgery and the patient will be going for surgical removal of the gallbladder this afternoon.    Review of Systems  Review of Systems   Constitutional: Negative.  Negative for chills, diaphoresis and fever.   HENT: Negative.    Eyes: Negative.  Negative for double vision.   Respiratory: Negative.  Negative for cough, hemoptysis and wheezing.    Cardiovascular: Negative.  Negative for chest pain, palpitations and leg swelling.   Gastrointestinal: Positive for abdominal pain (RUQ) and nausea. Negative for blood in stool, constipation, diarrhea, heartburn and vomiting.   Genitourinary: Negative.  Negative for frequency, hematuria and urgency.   Musculoskeletal: Negative.  Negative for joint pain.   Skin: Negative.  Negative for itching and rash.   Neurological: Positive for headaches. Negative for dizziness, focal weakness, seizures and loss of consciousness.   Endo/Heme/Allergies: Negative.  Does not bruise/bleed easily.   Psychiatric/Behavioral: Negative.  Negative for suicidal ideas. The patient is not nervous/anxious.    All  other systems reviewed and are negative.      Past Medical History   has a past medical history of GERD (gastroesophageal reflux disease), Infectious disease, and Pap smear (4/28/9).    Surgical History   has a past surgical history that includes dental extraction(s) (2005); acl reconstruction scope (5/31/2012); medial meniscectomy (5/31/2012); and primary c section (3/24/2013).     Family History  family history includes Diabetes in her father, maternal grandfather, and paternal grandmother; Hypertension in her maternal grandmother; Stroke in her maternal grandmother.     Social History   reports that she has never smoked. She has never used smokeless tobacco. She reports current alcohol use of about 1.2 oz of alcohol per week. She reports current drug use. Drugs: Marijuana and Oral.    Allergies  No Known Allergies    Medications  Prior to Admission Medications   Prescriptions Last Dose Informant Patient Reported? Taking?   phentermine 30 MG capsule taking x 2 weeks at Unknown time  Yes No   Sig: TAKE 1 CAPSULE BY MOUTH ONCE DAILY BEFORE BREAKFAST FOR 30 DAYS      Facility-Administered Medications: None       Physical Exam  Temp:  [36.1 °C (96.9 °F)-36.6 °C (97.8 °F)] 36.6 °C (97.8 °F)  Pulse:  [56-87] 56  Resp:  [16-18] 18  BP: ()/(51-61) 92/51  SpO2:  [94 %-99 %] 94 %    Physical Exam  Vitals signs and nursing note reviewed.   Constitutional:       Appearance: She is well-developed. She is obese. She is ill-appearing.   HENT:      Head: Normocephalic and atraumatic.      Right Ear: External ear normal.      Left Ear: External ear normal.      Nose: Nose normal.      Mouth/Throat:      Mouth: Mucous membranes are moist.      Pharynx: Oropharynx is clear.   Eyes:      Extraocular Movements: Extraocular movements intact.      Conjunctiva/sclera: Conjunctivae normal.      Pupils: Pupils are equal, round, and reactive to light.   Neck:      Musculoskeletal: Normal range of motion and neck supple.      Thyroid:  No thyromegaly.      Vascular: No JVD.   Cardiovascular:      Rate and Rhythm: Normal rate and regular rhythm.      Pulses: Normal pulses.      Heart sounds: Normal heart sounds.   Pulmonary:      Effort: Pulmonary effort is normal.      Breath sounds: Normal breath sounds.   Chest:      Chest wall: No tenderness.   Abdominal:      General: There is no distension.      Palpations: There is no mass.      Tenderness: There is abdominal tenderness in the right upper quadrant. There is no guarding or rebound.   Musculoskeletal: Normal range of motion.      Right lower leg: No edema.      Left lower leg: No edema.   Lymphadenopathy:      Cervical: No cervical adenopathy.   Skin:     General: Skin is warm and dry.      Capillary Refill: Capillary refill takes 2 to 3 seconds.      Findings: No erythema or rash.   Neurological:      General: No focal deficit present.      Mental Status: She is alert and oriented to person, place, and time. Mental status is at baseline.      GCS: GCS eye subscore is 4. GCS verbal subscore is 5. GCS motor subscore is 6.      Cranial Nerves: No cranial nerve deficit.      Deep Tendon Reflexes: Reflexes are normal and symmetric.   Psychiatric:         Mood and Affect: Mood normal.         Behavior: Behavior normal.         Thought Content: Thought content normal.         Judgment: Judgment normal.         Laboratory:  Recent Labs     06/19/20  0926   WBC 11.9*   RBC 4.69   HEMOGLOBIN 13.7   HEMATOCRIT 44.0   MCV 93.8   MCH 29.2   MCHC 31.1*   RDW 45.8   PLATELETCT 326   MPV 10.9     Recent Labs     06/19/20  0926   SODIUM 136   POTASSIUM 4.3   CHLORIDE 101   CO2 23   GLUCOSE 96   BUN 10   CREATININE 0.67   CALCIUM 8.9     Recent Labs     06/19/20  0926   ALTSGPT 7   ASTSGOT 7*   ALKPHOSPHAT 110*   TBILIRUBIN 0.3   LIPASE 24   GLUCOSE 96         No results for input(s): NTPROBNP in the last 72 hours.      No results for input(s): TROPONINT in the last 72 hours.    Imaging:  No orders to display          Assessment/Plan:    * Acute cholecystitis  Assessment & Plan  Patient has been having right upper quadrant abdominal pain for the past several days.  Patient on imaging studies has a distended tense gallbladder with multiple gallstones in it.  The common bile duct is 5.5 mm and not distended  Liver function tests are normal  Patient has been consulted with surgery and will be going to surgery this evening to have her gallbladder removed.  Keep n.p.o.  Optimize pain management    Leukocytosis  Assessment & Plan  Secondary to acute cholecystitis.  This does not indicate an infection but rather an inflammatory response  Continue to monitor white blood cell count currently avoid long-term antibiotic management, perioperative antibiotics are acceptable.    Gastroesophageal reflux disease without esophagitis- (present on admission)  Assessment & Plan  Continue with monitoring currently she does not have any reflux and thus I will not put her on Pepcid or PPI pending surgery.    Obesity, morbid, BMI 40.0-49.9 ([facility])- (present on admission)  Assessment & Plan  Body mass index is 42.91 kg/m².  Patient will need outpatient weight loss management program.

## 2020-06-19 NOTE — ED PROVIDER NOTES
ED Provider Note    Scribed for Yasmany Rg M.D. by Gladys Linares. 6/19/2020,  3:58 PM.    CHIEF COMPLAINT  Chief Complaint   Patient presents with   • Abdominal Pain     Pt seen at  this AM and had bloodwork and US done and stones found in gallbladder. Pain in RUQ worse in the last 4 days radiating to her back.    • Sent from Urgent Care       [facility]  Kinza Juan is a 35 y.o. female who presents to the Emergency Department for worsening abdominal pain onset 2 weeks ago. She states she has pain on and off for a few years which she attributed to GERD. However 2 weeks ago her pain became constant. She describes her pervious pain as a burning sensation which lasted a few hours then would be resolved for a few month before returning. Patient says that her pervious pain is different from her pain now which is a constant discomfort. She adds that she was unable to sleep last night beause of pain which prompted her to go to  this morning. The US showed and enlarged gallbladder and gall stones. Patient has been taking medication for weight loss however has not taken it the last week.  She has not had nausea or vomiting or fevers or chills, chest pain, cough or shortness of breath.    REVIEW OF SYSTEMS  See [facility] for further details. All other systems are negative.     PAST MEDICAL HISTORY   has a past medical history of GERD (gastroesophageal reflux disease), Infectious disease, and Pap smear (4/28/9).    SOCIAL HISTORY  Social History     Tobacco Use   • Smoking status: Never Smoker   • Smokeless tobacco: Never Used   Substance and Sexual Activity   • Alcohol use: Yes     Alcohol/week: 1.2 oz     Types: 2 Standard drinks or equivalent per week     Comment: occ   • Drug use: Yes     Types: Marijuana, Oral     Comment: occ   • Sexual activity: Yes     Partners: Male     Birth control/protection: I.U.D.     Comment: , with boyfriend     Social History     Substance and Sexual Activity   Drug Use Yes  "  • Types: Marijuana, Oral    Comment: occ       SURGICAL HISTORY   has a past surgical history that includes dental extraction(s) (2005); acl reconstruction scope (5/31/2012); medial meniscectomy (5/31/2012); and primary c section (3/24/2013).    CURRENT MEDICATIONS  Home Medications     Reviewed by Miguel Davis (Pharmacy Tech) on 06/19/20 at 1657  Med List Status: Complete   Medication Last Dose Status   acetaminophen (TYLENOL) 500 MG Tab 6/19/2020 Active   bismuth subsalicylate (PEPTO-BISMOL) 262 MG/15ML Suspension 6/19/2020 Active   FAMOTIDINE PO 6/18/2020 Active   phentermine 30 MG capsule 6/17/2020 Active                ALLERGIES  No Known Allergies    PHYSICAL EXAM  VITAL SIGNS: /61   Pulse 71   Temp 36.1 °C (96.9 °F) (Temporal)   Resp 16   Ht 1.676 m (5' 6\")   Wt 120.5 kg (265 lb 10.5 oz)   SpO2 99%   BMI 42.88 kg/m²   Pulse ox interpretation: I interpret this pulse ox as normal.  Constitutional: Alert in no apparent distress.  HENT: No signs of trauma, Bilateral external ears normal, Nose normal.   Eyes: Conjunctiva normal, Non-icteric.   Neck: Normal range of motion, Supple, No stridor.   Lymphatic: No lymphadenopathy noted.   Cardiovascular: Regular rate and rhythm, no murmurs.   Thorax & Lungs: Normal breath sounds, No respiratory distress, No wheezing, No chest tenderness.   Abdomen: Bowel sounds normal, Soft, No tenderness, No masses, No pulsatile masses. No peritoneal signs.  Skin: Warm, Dry, No erythema, No rash.   Back: No midline bony tenderness.  Extremities: Intact distal pulses, No edema, No cyanosis.  Musculoskeletal: Good range of motion in all major joints. No or major deformities noted.   Neurologic: Alert , Normal motor function, Normal sensory function, No focal deficits noted.   Psychiatric: Affect normal, Judgment normal, Mood normal.     COURSE & MEDICAL DECISION MAKING  Nursing notes, VS, PMSFHx reviewed in chart.     3:58 PM Patient seen and examined at bedside. " Reviewed UC evaluation which showed mostly normal labs with a slight alk phos elevation. US results showed distended gallbladder with multiple gallstones. I discussed that gall stones can cause the occasional pain she was having before however they can get stuck and cause a more constant pain. Patient will be admitted for pain control and Hida scan to further evaluate her gallbladder and stones. Patient will be treated with Toradol 15 mg, fentanyl 100 mcg, and Zofran 4 mg for her symptoms.     4:22 PM Paged hospitalist.    4:40 PM - I discussed the patient's case and the above findings with Dr. Gupta (Hospitalist) who agreed to evaluate patient for hospitalization. Patients care will be transferred at this time.     4:41 PM I discussed the patient's case again with Dr. Gupta, who spoke with Dr. Flowers, who will take the patient to the operating room and around 10 PM tonight.     DISPOSITION:  Patient will be hospitalized by Dr. Gupta in guarded condition.    FINAL IMPRESSION  1. Biliary colic  2. Gallstones  3. Intractable abdominal pain     Gladys [facility]IS[facility] (Tim), am scribing for, and in the presence of, Yasmany Rg M.D..    Electronically signed by: Gladys Linares (iTm), 6/19/2020    IYasmany M.D. personally performed the services described in this documentation, as scribed by Gladys Linares in my presence, and it is both accurate and complete. C    The note accurately reflects work and decisions made by me.  Yasmany Rg M.D.  6/19/2020  5:17 PM

## 2020-06-19 NOTE — ED NOTES
Pharmacy Medication Reconciliation      Medication reconciliation updated and complete per pt at bedside  Allergies have been verified  No oral ABX within the last 14 days  Pt home pharmacy:[facility]     Patient reports she has only been taking the PHENTERMINE for about two weeks

## 2020-06-20 ENCOUNTER — ANESTHESIA EVENT (OUTPATIENT)
Dept: SURGERY | Facility: MEDICAL CENTER | Age: 35
End: 2020-06-20
Payer: COMMERCIAL

## 2020-06-20 ENCOUNTER — ANESTHESIA (OUTPATIENT)
Dept: SURGERY | Facility: MEDICAL CENTER | Age: 35
End: 2020-06-20
Payer: COMMERCIAL

## 2020-06-20 LAB
ANION GAP SERPL CALC-SCNC: 7 MMOL/L (ref 7–16)
BUN SERPL-MCNC: 10 MG/DL (ref 8–22)
CALCIUM SERPL-MCNC: 7.9 MG/DL (ref 8.5–10.5)
CHLORIDE SERPL-SCNC: 103 MMOL/L (ref 96–112)
CHOLEST SERPL-MCNC: 123 MG/DL (ref 100–199)
CO2 SERPL-SCNC: 22 MMOL/L (ref 20–33)
CREAT SERPL-MCNC: 0.57 MG/DL (ref 0.5–1.4)
ERYTHROCYTE [DISTWIDTH] IN BLOOD BY AUTOMATED COUNT: 46.5 FL (ref 35.9–50)
GLUCOSE SERPL-MCNC: 97 MG/DL (ref 65–99)
HCT VFR BLD AUTO: 37.8 % (ref 37–47)
HDLC SERPL-MCNC: 29 MG/DL
HGB BLD-MCNC: 12.2 G/DL (ref 12–16)
LDLC SERPL CALC-MCNC: 63 MG/DL
MCH RBC QN AUTO: 30 PG (ref 27–33)
MCHC RBC AUTO-ENTMCNC: 32.3 G/DL (ref 33.6–35)
MCV RBC AUTO: 93.1 FL (ref 81.4–97.8)
PLATELET # BLD AUTO: 269 K/UL (ref 164–446)
PMV BLD AUTO: 10.3 FL (ref 9–12.9)
POTASSIUM SERPL-SCNC: 3.8 MMOL/L (ref 3.6–5.5)
RBC # BLD AUTO: 4.06 M/UL (ref 4.2–5.4)
SODIUM SERPL-SCNC: 132 MMOL/L (ref 135–145)
TRIGL SERPL-MCNC: 156 MG/DL (ref 0–149)
WBC # BLD AUTO: 11.3 K/UL (ref 4.8–10.8)

## 2020-06-20 PROCEDURE — 700101 HCHG RX REV CODE 250: Performed by: ANESTHESIOLOGY

## 2020-06-20 PROCEDURE — 700111 HCHG RX REV CODE 636 W/ 250 OVERRIDE (IP): Performed by: PHYSICIAN ASSISTANT

## 2020-06-20 PROCEDURE — 500445 HCHG HEMOSTAT, SURGICEL 4X8: Performed by: COLON & RECTAL SURGERY

## 2020-06-20 PROCEDURE — 700101 HCHG RX REV CODE 250: Performed by: COLON & RECTAL SURGERY

## 2020-06-20 PROCEDURE — 96368 THER/DIAG CONCURRENT INF: CPT

## 2020-06-20 PROCEDURE — A9270 NON-COVERED ITEM OR SERVICE: HCPCS | Performed by: HOSPITALIST

## 2020-06-20 PROCEDURE — 160028 HCHG SURGERY MINUTES - 1ST 30 MINS LEVEL 3: Performed by: COLON & RECTAL SURGERY

## 2020-06-20 PROCEDURE — 700101 HCHG RX REV CODE 250: Performed by: INTERNAL MEDICINE

## 2020-06-20 PROCEDURE — 501583 HCHG TROCAR, THRD CAN&SEAL 5X100: Performed by: COLON & RECTAL SURGERY

## 2020-06-20 PROCEDURE — 36415 COLL VENOUS BLD VENIPUNCTURE: CPT

## 2020-06-20 PROCEDURE — 96365 THER/PROPH/DIAG IV INF INIT: CPT

## 2020-06-20 PROCEDURE — 160009 HCHG ANES TIME/MIN: Performed by: COLON & RECTAL SURGERY

## 2020-06-20 PROCEDURE — 85027 COMPLETE CBC AUTOMATED: CPT

## 2020-06-20 PROCEDURE — 501570 HCHG TROCAR, SEPARATOR: Performed by: COLON & RECTAL SURGERY

## 2020-06-20 PROCEDURE — A9270 NON-COVERED ITEM OR SERVICE: HCPCS | Performed by: ANESTHESIOLOGY

## 2020-06-20 PROCEDURE — 160002 HCHG RECOVERY MINUTES (STAT): Performed by: COLON & RECTAL SURGERY

## 2020-06-20 PROCEDURE — 501571 HCHG TROCAR, SEPARATOR 12X100: Performed by: COLON & RECTAL SURGERY

## 2020-06-20 PROCEDURE — 700111 HCHG RX REV CODE 636 W/ 250 OVERRIDE (IP): Performed by: INTERNAL MEDICINE

## 2020-06-20 PROCEDURE — 700105 HCHG RX REV CODE 258: Performed by: PHYSICIAN ASSISTANT

## 2020-06-20 PROCEDURE — 96367 TX/PROPH/DG ADDL SEQ IV INF: CPT

## 2020-06-20 PROCEDURE — 96366 THER/PROPH/DIAG IV INF ADDON: CPT

## 2020-06-20 PROCEDURE — 96375 TX/PRO/DX INJ NEW DRUG ADDON: CPT

## 2020-06-20 PROCEDURE — 700111 HCHG RX REV CODE 636 W/ 250 OVERRIDE (IP): Performed by: ANESTHESIOLOGY

## 2020-06-20 PROCEDURE — 501838 HCHG SUTURE GENERAL: Performed by: COLON & RECTAL SURGERY

## 2020-06-20 PROCEDURE — 700105 HCHG RX REV CODE 258: Performed by: HOSPITALIST

## 2020-06-20 PROCEDURE — 80061 LIPID PANEL: CPT

## 2020-06-20 PROCEDURE — 501399 HCHG SPECIMAN BAG, ENDO CATC: Performed by: COLON & RECTAL SURGERY

## 2020-06-20 PROCEDURE — 99226 PR SUBSEQUENT OBSERVATION CARE,LEVEL III: CPT | Performed by: INTERNAL MEDICINE

## 2020-06-20 PROCEDURE — 700105 HCHG RX REV CODE 258: Performed by: INTERNAL MEDICINE

## 2020-06-20 PROCEDURE — 700111 HCHG RX REV CODE 636 W/ 250 OVERRIDE (IP): Performed by: HOSPITALIST

## 2020-06-20 PROCEDURE — 96376 TX/PRO/DX INJ SAME DRUG ADON: CPT

## 2020-06-20 PROCEDURE — G0378 HOSPITAL OBSERVATION PER HR: HCPCS

## 2020-06-20 PROCEDURE — 160048 HCHG OR STATISTICAL LEVEL 1-5: Performed by: COLON & RECTAL SURGERY

## 2020-06-20 PROCEDURE — 700102 HCHG RX REV CODE 250 W/ 637 OVERRIDE(OP): Performed by: HOSPITALIST

## 2020-06-20 PROCEDURE — 501338 HCHG SHEARS, ENDO: Performed by: COLON & RECTAL SURGERY

## 2020-06-20 PROCEDURE — 160039 HCHG SURGERY MINUTES - EA ADDL 1 MIN LEVEL 3: Performed by: COLON & RECTAL SURGERY

## 2020-06-20 PROCEDURE — 501497 HCHG SURGICLIP: Performed by: COLON & RECTAL SURGERY

## 2020-06-20 PROCEDURE — 502571 HCHG PACK, LAP CHOLE: Performed by: COLON & RECTAL SURGERY

## 2020-06-20 PROCEDURE — 80048 BASIC METABOLIC PNL TOTAL CA: CPT

## 2020-06-20 PROCEDURE — 700102 HCHG RX REV CODE 250 W/ 637 OVERRIDE(OP): Performed by: ANESTHESIOLOGY

## 2020-06-20 PROCEDURE — 88304 TISSUE EXAM BY PATHOLOGIST: CPT

## 2020-06-20 PROCEDURE — 160035 HCHG PACU - 1ST 60 MINS PHASE I: Performed by: COLON & RECTAL SURGERY

## 2020-06-20 RX ORDER — KETOROLAC TROMETHAMINE 30 MG/ML
INJECTION, SOLUTION INTRAMUSCULAR; INTRAVENOUS PRN
Status: DISCONTINUED | OUTPATIENT
Start: 2020-06-20 | End: 2020-06-20 | Stop reason: SURG

## 2020-06-20 RX ORDER — MIDAZOLAM HYDROCHLORIDE 1 MG/ML
INJECTION INTRAMUSCULAR; INTRAVENOUS PRN
Status: DISCONTINUED | OUTPATIENT
Start: 2020-06-20 | End: 2020-06-20 | Stop reason: SURG

## 2020-06-20 RX ORDER — HYDROMORPHONE HYDROCHLORIDE 1 MG/ML
0.6 INJECTION, SOLUTION INTRAMUSCULAR; INTRAVENOUS; SUBCUTANEOUS
Status: DISCONTINUED | OUTPATIENT
Start: 2020-06-20 | End: 2020-06-20 | Stop reason: HOSPADM

## 2020-06-20 RX ORDER — ROCURONIUM BROMIDE 10 MG/ML
INJECTION, SOLUTION INTRAVENOUS PRN
Status: DISCONTINUED | OUTPATIENT
Start: 2020-06-20 | End: 2020-06-20 | Stop reason: SURG

## 2020-06-20 RX ORDER — ONDANSETRON 2 MG/ML
INJECTION INTRAMUSCULAR; INTRAVENOUS PRN
Status: DISCONTINUED | OUTPATIENT
Start: 2020-06-20 | End: 2020-06-20 | Stop reason: SURG

## 2020-06-20 RX ORDER — MEPERIDINE HYDROCHLORIDE 25 MG/ML
6.25 INJECTION INTRAMUSCULAR; INTRAVENOUS; SUBCUTANEOUS
Status: DISCONTINUED | OUTPATIENT
Start: 2020-06-20 | End: 2020-06-20 | Stop reason: HOSPADM

## 2020-06-20 RX ORDER — HYDROMORPHONE HYDROCHLORIDE 1 MG/ML
0.2 INJECTION, SOLUTION INTRAMUSCULAR; INTRAVENOUS; SUBCUTANEOUS
Status: DISCONTINUED | OUTPATIENT
Start: 2020-06-20 | End: 2020-06-20 | Stop reason: HOSPADM

## 2020-06-20 RX ORDER — DIPHENHYDRAMINE HYDROCHLORIDE 50 MG/ML
12.5 INJECTION INTRAMUSCULAR; INTRAVENOUS
Status: DISCONTINUED | OUTPATIENT
Start: 2020-06-20 | End: 2020-06-20 | Stop reason: HOSPADM

## 2020-06-20 RX ORDER — CEFOTETAN DISODIUM 2 G/20ML
INJECTION, POWDER, FOR SOLUTION INTRAMUSCULAR; INTRAVENOUS PRN
Status: DISCONTINUED | OUTPATIENT
Start: 2020-06-20 | End: 2020-06-20 | Stop reason: SURG

## 2020-06-20 RX ORDER — BUPIVACAINE HYDROCHLORIDE AND EPINEPHRINE 5; 5 MG/ML; UG/ML
INJECTION, SOLUTION EPIDURAL; INTRACAUDAL; PERINEURAL
Status: DISCONTINUED | OUTPATIENT
Start: 2020-06-20 | End: 2020-06-20 | Stop reason: HOSPADM

## 2020-06-20 RX ORDER — HYDROMORPHONE HYDROCHLORIDE 1 MG/ML
0.4 INJECTION, SOLUTION INTRAMUSCULAR; INTRAVENOUS; SUBCUTANEOUS
Status: DISCONTINUED | OUTPATIENT
Start: 2020-06-20 | End: 2020-06-20 | Stop reason: HOSPADM

## 2020-06-20 RX ORDER — ONDANSETRON 2 MG/ML
4 INJECTION INTRAMUSCULAR; INTRAVENOUS
Status: DISCONTINUED | OUTPATIENT
Start: 2020-06-20 | End: 2020-06-20 | Stop reason: HOSPADM

## 2020-06-20 RX ORDER — HALOPERIDOL 5 MG/ML
1 INJECTION INTRAMUSCULAR
Status: DISCONTINUED | OUTPATIENT
Start: 2020-06-20 | End: 2020-06-20 | Stop reason: HOSPADM

## 2020-06-20 RX ORDER — OXYCODONE HCL 5 MG/5 ML
5 SOLUTION, ORAL ORAL
Status: COMPLETED | OUTPATIENT
Start: 2020-06-20 | End: 2020-06-20

## 2020-06-20 RX ORDER — OXYCODONE HCL 5 MG/5 ML
10 SOLUTION, ORAL ORAL
Status: COMPLETED | OUTPATIENT
Start: 2020-06-20 | End: 2020-06-20

## 2020-06-20 RX ORDER — METOPROLOL TARTRATE 1 MG/ML
1 INJECTION, SOLUTION INTRAVENOUS
Status: DISCONTINUED | OUTPATIENT
Start: 2020-06-20 | End: 2020-06-20 | Stop reason: HOSPADM

## 2020-06-20 RX ORDER — DEXAMETHASONE SODIUM PHOSPHATE 4 MG/ML
INJECTION, SOLUTION INTRA-ARTICULAR; INTRALESIONAL; INTRAMUSCULAR; INTRAVENOUS; SOFT TISSUE PRN
Status: DISCONTINUED | OUTPATIENT
Start: 2020-06-20 | End: 2020-06-20 | Stop reason: SURG

## 2020-06-20 RX ORDER — HYDRALAZINE HYDROCHLORIDE 20 MG/ML
5 INJECTION INTRAMUSCULAR; INTRAVENOUS
Status: DISCONTINUED | OUTPATIENT
Start: 2020-06-20 | End: 2020-06-20 | Stop reason: HOSPADM

## 2020-06-20 RX ORDER — SODIUM CHLORIDE, SODIUM LACTATE, POTASSIUM CHLORIDE, CALCIUM CHLORIDE 600; 310; 30; 20 MG/100ML; MG/100ML; MG/100ML; MG/100ML
INJECTION, SOLUTION INTRAVENOUS CONTINUOUS
Status: DISCONTINUED | OUTPATIENT
Start: 2020-06-20 | End: 2020-06-20

## 2020-06-20 RX ADMIN — MORPHINE SULFATE 2 MG: 4 INJECTION INTRAVENOUS at 10:44

## 2020-06-20 RX ADMIN — PROPOFOL 200 MG: 10 INJECTION, EMULSION INTRAVENOUS at 07:31

## 2020-06-20 RX ADMIN — HYDROMORPHONE HYDROCHLORIDE 0.4 MG: 1 INJECTION, SOLUTION INTRAMUSCULAR; INTRAVENOUS; SUBCUTANEOUS at 08:49

## 2020-06-20 RX ADMIN — ONDANSETRON 4 MG: 2 INJECTION INTRAMUSCULAR; INTRAVENOUS at 08:04

## 2020-06-20 RX ADMIN — DOCUSATE SODIUM 50 MG AND SENNOSIDES 8.6 MG 2 TABLET: 8.6; 5 TABLET, FILM COATED ORAL at 17:22

## 2020-06-20 RX ADMIN — CEFOTETAN DISODIUM 2 G: 2 INJECTION, POWDER, FOR SOLUTION INTRAMUSCULAR; INTRAVENOUS at 07:29

## 2020-06-20 RX ADMIN — SODIUM CHLORIDE: 9 INJECTION, SOLUTION INTRAVENOUS at 02:33

## 2020-06-20 RX ADMIN — FENTANYL CITRATE 100 MCG: 50 INJECTION INTRAMUSCULAR; INTRAVENOUS at 07:30

## 2020-06-20 RX ADMIN — FAMOTIDINE 20 MG: 10 INJECTION, SOLUTION INTRAVENOUS at 17:21

## 2020-06-20 RX ADMIN — EPHEDRINE SULFATE 10 MG: 50 INJECTION, SOLUTION INTRAVENOUS at 07:47

## 2020-06-20 RX ADMIN — FENTANYL CITRATE 50 MCG: 50 INJECTION INTRAMUSCULAR; INTRAVENOUS at 08:42

## 2020-06-20 RX ADMIN — MIDAZOLAM HYDROCHLORIDE 2 MG: 1 INJECTION, SOLUTION INTRAMUSCULAR; INTRAVENOUS at 07:29

## 2020-06-20 RX ADMIN — ONDANSETRON 4 MG: 2 INJECTION INTRAMUSCULAR; INTRAVENOUS at 22:55

## 2020-06-20 RX ADMIN — OXYCODONE 5 MG: 5 TABLET ORAL at 17:21

## 2020-06-20 RX ADMIN — ROCURONIUM BROMIDE 50 MG: 10 INJECTION, SOLUTION INTRAVENOUS at 07:31

## 2020-06-20 RX ADMIN — PIPERACILLIN SODIUM AND TAZOBACTAM SODIUM 3.38 G: 3; .375 INJECTION, POWDER, FOR SOLUTION INTRAVENOUS at 12:16

## 2020-06-20 RX ADMIN — OXYCODONE 5 MG: 5 TABLET ORAL at 14:08

## 2020-06-20 RX ADMIN — SUGAMMADEX 200 MG: 100 INJECTION, SOLUTION INTRAVENOUS at 08:17

## 2020-06-20 RX ADMIN — OXYCODONE HYDROCHLORIDE 10 MG: 5 SOLUTION ORAL at 08:41

## 2020-06-20 RX ADMIN — KETOROLAC TROMETHAMINE 30 MG: 30 INJECTION, SOLUTION INTRAMUSCULAR at 08:04

## 2020-06-20 RX ADMIN — HYDROMORPHONE HYDROCHLORIDE 0.2 MG: 1 INJECTION, SOLUTION INTRAMUSCULAR; INTRAVENOUS; SUBCUTANEOUS at 09:00

## 2020-06-20 RX ADMIN — DEXAMETHASONE SODIUM PHOSPHATE 4 MG: 4 INJECTION, SOLUTION INTRA-ARTICULAR; INTRALESIONAL; INTRAMUSCULAR; INTRAVENOUS; SOFT TISSUE at 08:04

## 2020-06-20 RX ADMIN — METRONIDAZOLE 500 MG: 500 INJECTION, SOLUTION INTRAVENOUS at 14:09

## 2020-06-20 RX ADMIN — CEFTRIAXONE SODIUM 2000 MG: 2 INJECTION, POWDER, FOR SOLUTION INTRAMUSCULAR; INTRAVENOUS at 14:08

## 2020-06-20 RX ADMIN — FENTANYL CITRATE 50 MCG: 50 INJECTION INTRAMUSCULAR; INTRAVENOUS at 08:34

## 2020-06-20 RX ADMIN — HYDROMORPHONE HYDROCHLORIDE 0.4 MG: 1 INJECTION, SOLUTION INTRAMUSCULAR; INTRAVENOUS; SUBCUTANEOUS at 08:54

## 2020-06-20 RX ADMIN — METRONIDAZOLE 500 MG: 500 INJECTION, SOLUTION INTRAVENOUS at 20:47

## 2020-06-20 RX ADMIN — OXYCODONE 5 MG: 5 TABLET ORAL at 20:46

## 2020-06-20 ASSESSMENT — PAIN SCALES - WONG BAKER
WONGBAKER_NUMERICALRESPONSE: HURTS JUST A LITTLE BIT
WONGBAKER_NUMERICALRESPONSE: HURTS JUST A LITTLE BIT

## 2020-06-20 ASSESSMENT — PAIN SCALES - GENERAL: PAIN_LEVEL: 3

## 2020-06-20 NOTE — PROGRESS NOTES
Hospital Medicine Daily Progress Note    Date of Service  6/20/2020    Chief Complaint  RUQ pain    Hospital Course    35 y.o. female without much past medical history except for GERD, admitted 6/19/2020 with progressively worsening right upper quadrant abdominal pain.  She was found to have multiple gallstones, with distended tight gallbladder on right upper quadrant imaging.  Lab work showed WBC of 11,900, with alkaline phosphatase of 110, and normal transaminases and total bilirubin.  Surgery was consulted, and plan for laparoscopic cholecystectomy.        Interval Problem Update  6/20/2020 - I reviewed the patient's chart. There were no significant overnight events. Remains hemodynamically stable and afebrile. Stable on RA.  Will BC 11,300.  Sodium 132.  Renal function is normal.  COVID-19 PCR is negative.  She is now s/p lap cholecystectomy.  He recommends keeping her overnight for IV antibiotics.    > I have personally seen and examined the patient today. (+) pain on the abdomen, controlled with PRN analgesics.  She is not nauseated, and no vomiting.  She is not short of breath.  She has voided/urinated, and has ambulated to the bathroom.  She has not passed gas yet.      Consultants/Specialty  Surgery    Code Status  Full    Disposition  Monitor in the CDU    Review of Systems  ROS     Pertinent positives/negatives as mentioned above.     A complete review of systems was personally done by me. All other systems were negative.       Physical Exam  Temp:  [36.1 °C (96.9 °F)-36.8 °C (98.3 °F)] 36.2 °C (97.2 °F)  Pulse:  [] 66  Resp:  [16-20] 16  BP: ()/(50-63) 104/61  SpO2:  [92 %-99 %] 98 %    Physical Exam  Vitals signs reviewed.   Constitutional:       General: She is not in acute distress.     Appearance: Normal appearance. She is obese. She is not ill-appearing or diaphoretic.      Comments: Body mass index is 42.91 kg/m².   HENT:      Head: Normocephalic and atraumatic.      Right Ear: External  ear normal.      Left Ear: External ear normal.      Mouth/Throat:      Mouth: Mucous membranes are moist.      Pharynx: No oropharyngeal exudate or posterior oropharyngeal erythema.   Eyes:      General: No scleral icterus.     Extraocular Movements: Extraocular movements intact.      Conjunctiva/sclera: Conjunctivae normal.      Pupils: Pupils are equal, round, and reactive to light.   Neck:      Musculoskeletal: Normal range of motion and neck supple. No neck rigidity or muscular tenderness.   Cardiovascular:      Rate and Rhythm: Normal rate and regular rhythm.      Heart sounds: Normal heart sounds. No murmur.   Pulmonary:      Effort: Pulmonary effort is normal. No respiratory distress.      Breath sounds: No stridor. No wheezing, rhonchi or rales.      Comments: Diminished air entry B/L bases, otherwise clear to auscultation  Chest:      Chest wall: No tenderness.   Abdominal:      General: There is no distension.      Palpations: Abdomen is soft. There is no mass.      Tenderness: There is no abdominal tenderness. There is no guarding or rebound.      Comments: Hypoactive bowel sounds  Tenderness over the RUQ  Laparoscopic incisions with minimal bleeding   Musculoskeletal: Normal range of motion.         General: No swelling.      Right lower leg: No edema.      Left lower leg: No edema.   Lymphadenopathy:      Cervical: No cervical adenopathy.   Skin:     General: Skin is warm and dry.      Coloration: Skin is not jaundiced.      Findings: No rash.   Neurological:      General: No focal deficit present.      Mental Status: She is alert and oriented to person, place, and time. Mental status is at baseline.      Cranial Nerves: No cranial nerve deficit.   Psychiatric:         Mood and Affect: Mood normal.         Behavior: Behavior normal.         Thought Content: Thought content normal.         Judgment: Judgment normal.            Fluids    Intake/Output Summary (Last 24 hours) at 6/20/2020 7213  Last data  filed at 6/20/2020 0915  Gross per 24 hour   Intake 1400 ml   Output 50 ml   Net 1350 ml       Laboratory  Recent Labs     06/19/20 0926 06/20/20 0312   WBC 11.9* 11.3*   RBC 4.69 4.06*   HEMOGLOBIN 13.7 12.2   HEMATOCRIT 44.0 37.8   MCV 93.8 93.1   MCH 29.2 30.0   MCHC 31.1* 32.3*   RDW 45.8 46.5   PLATELETCT 326 269   MPV 10.9 10.3     Recent Labs     06/19/20 0926 06/20/20 0312   SODIUM 136 132*   POTASSIUM 4.3 3.8   CHLORIDE 101 103   CO2 23 22   GLUCOSE 96 97   BUN 10 10   CREATININE 0.67 0.57   CALCIUM 8.9 7.9*             Recent Labs     06/20/20 0312   TRIGLYCERIDE 156*   HDL 29*   LDL 63       Imaging  No orders to display        Assessment/Plan  * Acute cholecystitis- (present on admission)  Assessment & Plan  -Now status post laparoscopic cholecystectomy.  No biliary dilation.  LFTs are normal.  -Continue antibiotics, de-escalate to IV ceftriaxone and Flagyl.  On discharge, will transition to oral Omnicef and Flagyl to complete 10 days course.  -Await return of bowel function.  Clear liquid diet for now, advance as tolerated once passing gas and having bowel movements.  -Continue pain control with oral oxycodone, and IV morphine, along with as needed antiemetics.    Gastroesophageal reflux disease without esophagitis- (present on admission)  Assessment & Plan  -I will put her on famotidine 20 mg IV twice daily for now.    Obesity, morbid, BMI 40.0-49.9 ([facility])- (present on admission)  Assessment & Plan  - Body mass index is 42.91 kg/m²..  - Counseled on weight loss.  - outpatient referral for outpatient weight management.       VTE prophylaxis: SCD

## 2020-06-20 NOTE — ASSESSMENT & PLAN NOTE
-Now status post laparoscopic cholecystectomy.  No biliary dilation.  LFTs are normal.  -Continue antibiotics, de-escalate to IV ceftriaxone and Flagyl.  On discharge, will transition to oral Omnicef and Flagyl to complete 10 days course.  -Await return of bowel function.  Clear liquid diet for now, advance as tolerated once passing gas and having bowel movements.  -Continue pain control with oral oxycodone, and IV morphine, along with as needed antiemetics.

## 2020-06-20 NOTE — ASSESSMENT & PLAN NOTE
- Body mass index is 42.91 kg/m²..  - Counseled on weight loss.  - outpatient referral for outpatient weight management.

## 2020-06-20 NOTE — PROGRESS NOTES
Received pt from PACU,on arrival pt awake,wants to go to rest room,pt ambulated to rest room and she voided without difficulty,pt c/o pain,will medicate,clear liquid started,pt tolerating.

## 2020-06-20 NOTE — ANESTHESIA PREPROCEDURE EVALUATION
Relevant Problems   GI   (+) Gastroesophageal reflux disease without esophagitis       Physical Exam    Airway   Mallampati: II  TM distance: >3 FB  Neck ROM: full       Cardiovascular - normal exam  Rhythm: regular  Rate: normal  (-) murmur     Dental - normal exam           Pulmonary - normal exam  Breath sounds clear to auscultation     Abdominal    Neurological - normal exam                 Anesthesia Plan    ASA 2- EMERGENT   ASA physical status emergent criteria: acutely contaminated wound or identified infection source    Plan - general       Airway plan will be ETT        Induction: intravenous    Postoperative Plan: Postoperative administration of opioids is intended.    Pertinent diagnostic labs and testing reviewed    Informed Consent:    Anesthetic plan and risks discussed with patient.    Use of blood products discussed with: patient whom consented to blood products.

## 2020-06-20 NOTE — ANESTHESIA PROCEDURE NOTES
Airway    Date/Time: 6/20/2020 7:31 AM  Performed by: Juice Torres M.D.  Authorized by: Juice Torres M.D.     Location:  OR  Urgency:  Elective  Indications for Airway Management:  Anesthesia      Spontaneous Ventilation: absent    Sedation Level:  Deep  Preoxygenated: Yes    Patient Position:  Sniffing  Final Airway Type:  Endotracheal airway  Final Endotracheal Airway:  ETT  Cuffed: Yes    Technique Used for Successful ETT Placement:  Direct laryngoscopy    Insertion Site:  Oral  Blade Type:  Solomon  Laryngoscope Blade/Videolaryngoscope Blade Size:  3  ETT Size (mm):  7.0  Measured from:  Teeth  ETT to Teeth (cm):  23  Placement Verified by: auscultation and capnometry    Cormack-Lehane Classification:  Grade I - full view of glottis  Number of Attempts at Approach:  1

## 2020-06-20 NOTE — PROGRESS NOTES
Patient was seen and examined.  Vital signs and labs reviewed.  Counseled patient on findings, surgery, diet, actively level and progress this far.  Questions answered.  Plan lap elpidio.

## 2020-06-20 NOTE — ASSESSMENT & PLAN NOTE
Secondary to acute cholecystitis.  This does not indicate an infection but rather an inflammatory response  Continue to monitor white blood cell count currently avoid long-term antibiotic management, perioperative antibiotics are acceptable.

## 2020-06-20 NOTE — ANESTHESIA POSTPROCEDURE EVALUATION
Patient: Kinza Juan    Procedure Summary     Date:  06/20/20 Room / Location:  [place] [address] / SURGERY [facility]    Anesthesia Start:  0729 Anesthesia Stop:      Procedure:  CHOLECYSTECTOMY, LAPAROSCOPIC (Abdomen) Diagnosis:  (cholelithiasis)    Surgeon:  Iftikhar Flowers M.D. Responsible Provider:  Juice Torres M.D.    Anesthesia Type:  general ASA Status:  2 - Emergent          Final Anesthesia Type: general  Last vitals  BP   Blood Pressure: (!) 89/52(RN notified)    Temp   36.8 °C (98.3 °F)    Pulse   Pulse: (!) 54(RN notified)   Resp   18    SpO2   95 %      Anesthesia Post Evaluation    Patient location during evaluation: PACU  Patient participation: complete - patient participated  Level of consciousness: awake and alert  Pain score: 3    Airway patency: patent  Anesthetic complications: no  Cardiovascular status: hemodynamically stable  Respiratory status: acceptable  Hydration status: euvolemic    PONV: none           Nurse Pain Score: 7 (NPRS)

## 2020-06-20 NOTE — PROGRESS NOTES
Pt medicated for pain,voiding good,incision site oozing blood,band aid changed and kept clean,pt ambulated,IS given,d/b/c encouraged,with support on incision site.

## 2020-06-20 NOTE — ANESTHESIA TIME REPORT
Anesthesia Start and Stop Event Times     Date Time Event    6/20/2020 0729 Anesthesia Start     0731 Ready for Procedure     0828 Anesthesia Stop        Responsible Staff  06/20/20    Name Role Begin End    Juice Torres M.D. Anesth 0729 0828        Preop Diagnosis (Free Text):  Pre-op Diagnosis     cholelithiasis        Preop Diagnosis (Codes):    Post op Diagnosis  Acute cholecystitis  Lap Jenni    Premium Reason  E. Weekend    Comments: Premium KARIN

## 2020-06-20 NOTE — OP REPORT
NAME:  Kinza Juan  MRN:  [id number]  :  1985      DATE OF OPERATION: 2020    PREOPERATIVE DIAGNOSIS: Acute cholecystitis    POSTOPERATIVE DIAGNOSIS: Acute cholecystitis    OPERATION PERFORMED: Laparoscopic cholecystectomy    SURGEON: Iftikhar Flowers MD    ANESTHESIOLOGIST:  Anesthesiologist: Juice Torres M.D.    ANESTHESIA: General endotracheal anesthesia.     FINDINGS: The gallbladder showed signs of Acute cholecystitis and hydrops.     SPECIMEN: Gallbladder.    ESTIMATED BLOOD LOSS: <50 cc.     INDICATIONS: The patient is a 35 y.o. female with a history of symptomatic Acute cholecystitis. She is taken to the operating room today for laparoscopic cholecystectomy.     PROCEDURE: Following informed consent, the patient was properly identified, taken to the operating room, and placed in the supine position where general endotracheal anesthesia was administered. Intravenous antibiotics were administered by the anesthesiologist in the correct time interval. Sequential compression devices were employed. The abdomen was prepped and draped into a sterile field.     A bladeless optical entry trocar was carefully inserted into the abdomen and a pneumoperitoneum was established in the usual fashion.  A 5 mm separator port was introduced. A 5 mm lens/camera was passed into the peritoneal cavity.  An 11 mm port was placed in the epigastric midline under direct vision. Two 5 mm right subcostal ports were placed under direct vision.     Careful examination of the gallbladder and liver were performed.  240cc of clear hydrops were aspirated from the gallbladder. The gallbladder was then grasped and elevated upward toward the right shoulder.  Dissection was carried out in hepatocystic triangle definitively identifying the cystic duct and cystic   artery. These structures were multiply clipped proximally, once distally and   divided. The gallbladder was dissected free from the undersurface of the   liver using  electrocautery and placed within an EndoCatch bag. It was delivered intact from the abdominal cavity through the epigastric port site. The gallbladder fossa was irrigated. All excess irrigant was evacuated from the abdominal cavity. Hemostasis was satisfactory.     The ports were removed and the abdomen desufflated. The enlarged epigastric port site fascia was approximated with a 0 Vicryl suture. The port site skin incisions were closed with interrupted 4-0 Vicryl subcuticular sutures.  Steri-Strips and Benzoin were applied beneath sterile Band-Aids.     The patient tolerated the procedure well and there were no apparent complications. All sponge, needle, and instrument counts were correct on 2 separate occasions. She was awakened, extubated, and transferred to the recovery room in satisfactory condition.       ____________________________________   Iftikhar Flowers MD  DD: 6/20/2020  12:21 PM    CC:  [facility];

## 2020-06-20 NOTE — PROGRESS NOTES
A/o, respirations are even and unlabored on room air ,assessment completed, vital signs stable except low BP 92/51,pt complaining of abdominal pain,  IV fluids running per orders, updated communication board,  poc discussed and understood, verbalized understanding, pt aware NPO for surgery, all questions answered at this time, fall precautions in place, call button within reach, will continue to monitor.

## 2020-06-20 NOTE — ANESTHESIA QCDR
2019 [facility] Clinical Data Registry (for Quality Improvement)     Postoperative nausea/vomiting risk protocol (Adult = 18 yrs and Pediatric 3-17 yrs)- (430 and 463)  General inhalation anesthetic (NOT TIVA) with PONV risk factors: Yes  Provision of anti-emetic therapy with at least 2 different classes of agents: Yes   Patient DID NOT receive anti-emetic therapy and reason is documented in Medical Record:  N/A    Multimodal Pain Management- (477)  Non-emergent surgery AND patient age >= 18:   Use of Multimodal Pain Management, two or more drugs and/or interventions, NOT including systemic opioids:   Exception: Documented allergy to multiple classes of analgesics:     Smoking Abstinence (404)  Patient is current smoker (cigarette, pipe, e-cig, marijuanna):   Elective Surgery:   Abstinence instructions provided prior to day of surgery:   Patient abstained from smoking on day of surgery:     Pre-Op Beta-Blocker in Isolated CABG (44)  Isolated CABG AND patient age >= 18:   Beta-blocker admin within 24 hours of surgical incision:   Exception:of medical reason(s) for not administering beta blocker within 24 hours prior to surgical incision (e.g., not  indicated,other medical reason):     PACU assessment of acute postoperative pain prior to Anesthesia Care End- Applies to Patients Age = 18- (ABG7)  Initial PACU pain score is which of the following: < 7/10  Patient unable to report pain score: N/A    Post-anesthetic transfer of care checklist/protocol to PACU/ICU- (426 and 427)  Upon conclusion of case, patient transferred to which of the following locations: PACU/Non-ICU  Use of transfer checklist/protocol:   Exclusion: Service Performed in Patient Hospital Room (and thus did not require transfer):   Unplanned admission to ICU related to anesthesia service up through end of PACU care- (MD51)  Unplanned admission to ICU (not initially anticipated at anesthesia start time): No

## 2020-06-20 NOTE — CONSULTS
DATE OF SERVICE:  2020    CHIEF COMPLAINT:  Abdominal pain.    HISTORY OF PRESENT ILLNESS:  The patient is a 35-year-old female who has had   off and on episodes of right upper quadrant pain.  She developed more severe   right upper quadrant pain that became unremitting over the last few days and   has radiated to her back.  She has had anorexia and nausea, but denies fevers,   chills, or jaundice.  Imaging demonstrated gallbladder distention and   cholelithiasis.    PAST MEDICAL HISTORY:  Gastroesophageal reflux disease, obesity, degenerative   joint disease of the knee, , ACL arthroscopy.    MEDICATIONS:  Phentermine.    ALLERGIES:  No known drug allergies.    SOCIAL HISTORY:  Denies tobacco or alcohol abuse.    FAMILY HISTORY:  Diabetes, hypertension.    REVIEW OF SYSTEMS:  NEUROLOGIC:  Denies strokes or seizures.  CARDIAC:  Denies heart attacks, chest pain.  LUNGS:  Denies shortness of breath, coughing, wheezing.  GASTROINTESTINAL:  Has had some right upper quadrant pain episodes.  Denies   hematemesis, rectal bleeding.  GENITOURINARY:  Denies dysuria or hematuria.  MUSCULOSKELETAL:  No fractures, arthroplasty.  EYES:  No recent visual changes.  EARS:  No hearing aids, hearing loss, balance troubles.    PHYSICAL EXAMINATION:  GENERAL:  Nontoxic, but uncomfortable certainly.  Pleasant, but uncomfortable.  VITAL SIGNS:  Afebrile on admission, heart rate 70, blood pressure in the   105/61 range, temperature 36.1, respirations 16.  HEENT:  Eyes anicteric.  Extraocular movements intact.  Wears eyeglasses.    Oropharynx clear.  NECK:  Supple, good range of motion.  CHEST AND RESPIRATORY:  Unlabored breathing, normal excursions.  CARDIOVASCULAR:  Regular rate and rhythm.  ABDOMEN:  Soft, but tender in the right upper quadrant.  No generalized   rebound or guarding.  EXTREMITIES:  Warm and acyanotic.  No edema.  NEUROLOGIC:  Nonfocal with normal power and strength throughout.  PSYCHIATRIC:  Mood, affect,  and judgment appear within normal limits.  Body   mass index of 43.    LABORATORY STUDIES:  Reviewed demonstrating white blood count of 11.9 on   admission, hemoglobin 13.7.  The liver function tests, AST, ALT, and total   bilirubin all within normal limits.  Total bilirubin 0.3.    IMAGING:  Reviewed as well.  The right upper quadrant ultrasound demonstrating   gallstones with dilated gallbladder, common bile duct 5.5 mm.    IMPRESSION:  1.  Right upper quadrant and back pain, nausea and anorexia.  2.  Cholelithiasis and probable acute cholecystitis.  3.  Morbid obesity.    PLAN:  I talked at length with the patient about the nature of her symptoms,   nature of the findings.  We discussed the rationale, pros and cons, risks and   alternatives of laparoscopic cholecystectomy.  We discussed the risks of   infection, bleeding, reoperation, retained common bile duct stones, conversion   to open surgery, stomach, duodenal, liver, bile duct or other organ injury,   fistula or abscess, postcholecystectomy diarrhea, and other potential long or   short-term complications, disabilities or interventions.  Her questions were   answered in full.  She understands and wishes to proceed ahead.       ____________________________________     MD MEGAN ANDINO / SOULEYMANE    DD:  06/20/2020 08:47:41  DT:  06/20/2020 10:27:49    D#:  [id number]  Job#:  [id number]    cc: TORI MADRIGAL

## 2020-06-20 NOTE — PROGRESS NOTES
Assessment completed. Pt A&Ox4. Respirations are even and unlabored on RA. Pt denies abdominal pain at this time. Monitors applied, VS stable, call light and belongings within reach. POC updated (surgery for lap elpidio). Pt educated on room and call light, pt verbalized understanding. Communication board updated. Needs met. NPO for surgery.

## 2020-06-21 VITALS
TEMPERATURE: 96.9 F | HEART RATE: 61 BPM | DIASTOLIC BLOOD PRESSURE: 58 MMHG | BODY MASS INDEX: 42.73 KG/M2 | HEIGHT: 66 IN | WEIGHT: 265.88 LBS | SYSTOLIC BLOOD PRESSURE: 102 MMHG | OXYGEN SATURATION: 90 % | RESPIRATION RATE: 18 BRPM

## 2020-06-21 PROBLEM — D72.829 LEUKOCYTOSIS: Status: ACTIVE | Noted: 2020-06-21

## 2020-06-21 LAB
ANION GAP SERPL CALC-SCNC: 8 MMOL/L (ref 7–16)
BASOPHILS # BLD AUTO: 0.2 % (ref 0–1.8)
BASOPHILS # BLD: 0.03 K/UL (ref 0–0.12)
BUN SERPL-MCNC: 5 MG/DL (ref 8–22)
CALCIUM SERPL-MCNC: 8.4 MG/DL (ref 8.5–10.5)
CHLORIDE SERPL-SCNC: 108 MMOL/L (ref 96–112)
CO2 SERPL-SCNC: 24 MMOL/L (ref 20–33)
COMMENT 1642: NORMAL
CREAT SERPL-MCNC: 0.55 MG/DL (ref 0.5–1.4)
EOSINOPHIL # BLD AUTO: 0.02 K/UL (ref 0–0.51)
EOSINOPHIL NFR BLD: 0.1 % (ref 0–6.9)
ERYTHROCYTE [DISTWIDTH] IN BLOOD BY AUTOMATED COUNT: 49.3 FL (ref 35.9–50)
GLUCOSE SERPL-MCNC: 102 MG/DL (ref 65–99)
HCT VFR BLD AUTO: 43.9 % (ref 37–47)
HGB BLD-MCNC: 13.4 G/DL (ref 12–16)
IMM GRANULOCYTES # BLD AUTO: 0.09 K/UL (ref 0–0.11)
IMM GRANULOCYTES NFR BLD AUTO: 0.5 % (ref 0–0.9)
LYMPHOCYTES # BLD AUTO: 2.14 K/UL (ref 1–4.8)
LYMPHOCYTES NFR BLD: 13 % (ref 22–41)
MCH RBC QN AUTO: 29.8 PG (ref 27–33)
MCHC RBC AUTO-ENTMCNC: 30.5 G/DL (ref 33.6–35)
MCV RBC AUTO: 97.6 FL (ref 81.4–97.8)
MONOCYTES # BLD AUTO: 0.82 K/UL (ref 0–0.85)
MONOCYTES NFR BLD AUTO: 5 % (ref 0–13.4)
MORPHOLOGY BLD-IMP: NORMAL
NEUTROPHILS # BLD AUTO: 13.34 K/UL (ref 2–7.15)
NEUTROPHILS NFR BLD: 81.2 % (ref 44–72)
NRBC # BLD AUTO: 0 K/UL
NRBC BLD-RTO: 0 /100 WBC
PLATELET # BLD AUTO: 192 K/UL (ref 164–446)
PMV BLD AUTO: 11.4 FL (ref 9–12.9)
POTASSIUM SERPL-SCNC: 3.9 MMOL/L (ref 3.6–5.5)
RBC # BLD AUTO: 4.5 M/UL (ref 4.2–5.4)
SODIUM SERPL-SCNC: 140 MMOL/L (ref 135–145)
WBC # BLD AUTO: 16.4 K/UL (ref 4.8–10.8)

## 2020-06-21 PROCEDURE — 99217 PR OBSERVATION CARE DISCHARGE: CPT | Performed by: INTERNAL MEDICINE

## 2020-06-21 PROCEDURE — A9270 NON-COVERED ITEM OR SERVICE: HCPCS | Performed by: HOSPITALIST

## 2020-06-21 PROCEDURE — 85025 COMPLETE CBC W/AUTO DIFF WBC: CPT

## 2020-06-21 PROCEDURE — 96376 TX/PRO/DX INJ SAME DRUG ADON: CPT

## 2020-06-21 PROCEDURE — 700105 HCHG RX REV CODE 258: Performed by: INTERNAL MEDICINE

## 2020-06-21 PROCEDURE — 700101 HCHG RX REV CODE 250: Performed by: INTERNAL MEDICINE

## 2020-06-21 PROCEDURE — 96366 THER/PROPH/DIAG IV INF ADDON: CPT

## 2020-06-21 PROCEDURE — 700111 HCHG RX REV CODE 636 W/ 250 OVERRIDE (IP): Performed by: INTERNAL MEDICINE

## 2020-06-21 PROCEDURE — 80048 BASIC METABOLIC PNL TOTAL CA: CPT

## 2020-06-21 PROCEDURE — 700102 HCHG RX REV CODE 250 W/ 637 OVERRIDE(OP): Performed by: HOSPITALIST

## 2020-06-21 PROCEDURE — 700105 HCHG RX REV CODE 258: Performed by: HOSPITALIST

## 2020-06-21 PROCEDURE — G0378 HOSPITAL OBSERVATION PER HR: HCPCS

## 2020-06-21 RX ORDER — OXYCODONE HYDROCHLORIDE 5 MG/1
5 TABLET ORAL
Qty: 40 TAB | Refills: 0 | Status: SHIPPED | OUTPATIENT
Start: 2020-06-21 | End: 2020-06-21 | Stop reason: SDUPTHER

## 2020-06-21 RX ORDER — AMOXICILLIN AND CLAVULANATE POTASSIUM 875; 125 MG/1; MG/1
1 TABLET, FILM COATED ORAL 2 TIMES DAILY
Qty: 10 TAB | Refills: 0 | Status: SHIPPED | OUTPATIENT
Start: 2020-06-21 | End: 2020-06-21 | Stop reason: SDUPTHER

## 2020-06-21 RX ORDER — AMOXICILLIN AND CLAVULANATE POTASSIUM 875; 125 MG/1; MG/1
1 TABLET, FILM COATED ORAL 2 TIMES DAILY
Qty: 10 TAB | Refills: 0 | Status: SHIPPED | OUTPATIENT
Start: 2020-06-21 | End: 2020-06-26

## 2020-06-21 RX ORDER — OXYCODONE HYDROCHLORIDE 5 MG/1
5 TABLET ORAL
Qty: 30 TAB | Refills: 0 | Status: SHIPPED | OUTPATIENT
Start: 2020-06-21 | End: 2020-06-26

## 2020-06-21 RX ADMIN — SODIUM CHLORIDE: 9 INJECTION, SOLUTION INTRAVENOUS at 02:18

## 2020-06-21 RX ADMIN — ACETAMINOPHEN 650 MG: 325 TABLET, FILM COATED ORAL at 00:16

## 2020-06-21 RX ADMIN — OXYCODONE 5 MG: 5 TABLET ORAL at 05:54

## 2020-06-21 RX ADMIN — FAMOTIDINE 20 MG: 10 INJECTION, SOLUTION INTRAVENOUS at 06:53

## 2020-06-21 RX ADMIN — CEFTRIAXONE SODIUM 2000 MG: 2 INJECTION, POWDER, FOR SOLUTION INTRAMUSCULAR; INTRAVENOUS at 06:59

## 2020-06-21 RX ADMIN — METRONIDAZOLE 500 MG: 500 INJECTION, SOLUTION INTRAVENOUS at 05:55

## 2020-06-21 RX ADMIN — OXYCODONE 5 MG: 5 TABLET ORAL at 00:45

## 2020-06-21 RX ADMIN — OXYCODONE 5 MG: 5 TABLET ORAL at 11:30

## 2020-06-21 NOTE — PROGRESS NOTES
IV removed. Discharge instructions provided and patient verbalizes understanding. Patient states that all question have been answered. Copy of discharge provided to patient and signed. Prescription: sent to pharmacy of choice. Patient states that all personal items are in possess. Patient escorted off unit by wheelchair.

## 2020-06-21 NOTE — DISCHARGE INSTRUCTIONS
Discharge Instructions    Discharged to home by car with relative. Discharged via wheelchair, hospital escort: Yes.  Special equipment needed: Not Applicable    Be sure to schedule a follow-up appointment with your primary care doctor or any specialists as instructed.     Discharge Plan:   Diet Plan: Discussed  Activity Level: Discussed  Confirmed Follow up Appointment: Appointment Scheduled  Confirmed Symptoms Management: Discussed  Medication Reconciliation Updated: Yes    I understand that a diet low in cholesterol, fat, and sodium is recommended for good health. Unless I have been given specific instructions below for another diet, I accept this instruction as my diet prescription.   Other diet: heart healthy and soft diet    Special Instructions: None    · Is patient discharged on Warfarin / Coumadin?   No       Laparoscopic Cholecystectomy, Care After  These instructions give you information on caring for yourself after your procedure. Your doctor may also give you more specific instructions. Call your doctor if you have any problems or questions after your procedure.   HOME CARE  · Change your bandages (dressings) as told by your doctor.  · Keep the wound dry and clean. Wash the wound gently with soap and water. Pat the wound dry with a clean towel.  · Do not take baths, swim, or use hot tubs for 2 weeks, or as told by your doctor.  · Only take medicine as told by your doctor.  · Eat a normal diet as told by your doctor.  · Do not lift anything heavier than 10 pounds (4.5 kg) until your doctor says it is okay.  · Do not play contact sports for 1 week, or as told by your doctor.  GET HELP IF:  · Your wound is red, puffy (swollen), or painful.  · You have yellowish-white fluid (pus) coming from the wound.  · You have fluid draining from the wound for more than 1 day.  · You have a bad smell coming from the wound.  · Your wound breaks open.  GET HELP RIGHT AWAY IF:   · You have a rash.  · You have trouble  breathing.  · You have chest pain.  · You have a fever.  · You have pain in the shoulders (shoulder strap areas) that is getting worse.  · You feel dizzy or pass out (faint).  · You have severe belly (abdominal) pain.  · You feel sick to your stomach (nauseous) or throw up (vomit) for more than 1 day.     This information is not intended to replace advice given to you by your health care provider. Make sure you discuss any questions you have with your health care provider.     Document Released: 09/26/2009 Document Revised: 10/08/2014 Document Reviewed: 07/30/2014  Simple[employer] Interactive Patient Education ©2016 [employer]      Depression / Suicide Risk    As you are discharged from this [facility] facility, it is important to learn how to keep safe from harming yourself.    Recognize the warning signs:  · Abrupt changes in personality, positive or negative- including increase in energy   · Giving away possessions  · Change in eating patterns- significant weight changes-  positive or negative  · Change in sleeping patterns- unable to sleep or sleeping all the time   · Unwillingness or inability to communicate  · Depression  · Unusual sadness, discouragement and loneliness  · Talk of wanting to die  · Neglect of personal appearance   · Rebelliousness- reckless behavior  · Withdrawal from people/activities they love  · Confusion- inability to concentrate     If you or a loved one observes any of these behaviors or has concerns about self-harm, here's what you can do:  · Talk about it- your feelings and reasons for harming yourself  · Remove any means that you might use to hurt yourself (examples: pills, rope, extension cords, firearm)  · Get professional help from the community (Mental Health, Substance Abuse, psychological counseling)  · Do not be alone:Call your Safe Contact- someone whom you trust who will be there for you.  · Call your local CRISIS HOTLINE 197-8946 or 559-371-2690  · Call your local Children's  Mobile Crisis Response Team [place] (287) 644-9835 or www.[employer]  · Call the toll free National Suicide Prevention Hotlines   · National Suicide Prevention Lifeline 805-589-LBYF (2094)  · National Hope Line Network 800-SUICIDE (100-5530)

## 2020-06-21 NOTE — PROGRESS NOTES
Patient was seen and examined.  Walked, tolerating PO without emesis, voided, very sore but well controlled with oxycodone, wants to go home. Vital signs and labs reviewed. WBC noted, pulse 60 now, wounds clean, looks well.  Counseled patient on diet, actively level and progress this far.  Questions answered.

## 2020-06-21 NOTE — PROGRESS NOTES
Assessment completed. Pt A&Ox4.  Respirations even, unlabored on  2L nasal cannula.  Pt reports pain.    POC discussed: IV antibiotics and pain control. Communication board updated.   Bed in locked, lowest position.  Call light and belongings within reach.  Needs met, will continue to monitor.

## 2020-06-21 NOTE — DISCHARGE SUMMARY
DATE OF DISCHARGE:  06/21/2020    PRINCIPAL DIAGNOSIS:  Acute cholecystitis.    PRINCIPAL PROCEDURE:  Laparoscopic cholecystectomy.    HISTORY OF PRESENT ILLNESS:  Briefly, the patient is a 35-year-old female who   was admitted with right upper quadrant pain.  She is found to have findings   consistent with acute cholecystitis.    HOSPITAL COURSE:  The patient was admitted to the hospital.  She underwent a   laparoscopic cholecystectomy, which demonstrated a very distended gallbladder   with hydrops.  Cholecystectomy was performed.  Postoperatively, she is very   sore, but much improved, tolerating oral intake, ambulatory, postoperative   reactive white blood count of 16, was accompanied by a normal temperature and   a heart rate that fell down to the 60s.  She feels she is improving and wishes   to go home.  I have counseled her to call or return if she has pain, redness,   fever, nausea, vomiting, jaundice, or any new symptoms.  She is to call for a   followup appointment as well.  Her questions were answered in full.    DISCHARGE CONDITION:  Good.    DISCHARGE MEDICATIONS:  Include Augmentin 5 days and oxycodone p.r.n. pain.       ____________________________________     MD MEGAN ANDINO / SOULEYMANE    DD:  06/21/2020 09:43:07  DT:  06/21/2020 09:54:07    D#:  [id number]  Job#:  [id number]

## 2020-06-21 NOTE — DISCHARGE SUMMARY
Discharge Summary    CHIEF COMPLAINT ON ADMISSION  Chief Complaint   Patient presents with   • Abdominal Pain     Pt seen at  this AM and had bloodwork and US done and stones found in gallbladder. Pain in RUQ worse in the last 4 days radiating to her back.    • Sent from Urgent Care       Reason for Admission  Abd Pain     Admission Date  6/19/2020    CODE STATUS  Full Code    HPI & HOSPITAL COURSE  This is a 35 y.o. female without much past medical history except for GERD, admitted 6/19/2020 with progressively worsening right upper quadrant abdominal pain.  She was found to have multiple gallstones, with distended tight gallbladder on right upper quadrant imaging.  Lab work showed WBC of 11,900, with alkaline phosphatase of 110, and normal transaminases and total bilirubin. COVID-19 PCR was negative.   Surgery was consulted, and patient had laparoscopic cholecystectomy.  She was started on antibiotics, and was monitored overnight.  She did well postoperatively, with uncomplicated postoperative course.  She had no signs of sepsis, and remained hemodynamically stable and afebrile. She has been cleared to discharge by surgery.    I have personally seen and examined the patient on the day of discharge. With her clinical improvement, she was deemed ready to discharge from the hospital as she did not have any further hospitalization needs. Patient felt comfortable going home. The discharge plan was discussed with the patient, with which she was agreeable to.     Therefore, she is discharged in good and stable condition to home with close outpatient follow-up.        Discharge Date  6/21/2020      FOLLOW UP ITEMS POST DISCHARGE  -She will be continued on 5 days of Augmentin.  She was also prescribed as needed oxycodone for pain.  She will follow-up with Dr. Flowers in the office.   - counseled to seek immediate medical attention, or return to the ED for recurrent or worsening symptoms.      DISCHARGE DIAGNOSES  Principal  Problem:    Acute cholecystitis POA: Yes  Active Problems:    Leukocytosis, postoperative, likely stress related POA: No    Obesity, morbid, BMI 40.0-49.9 ([facility]) POA: Yes    Gastroesophageal reflux disease without esophagitis POA: Yes  Resolved Problems:    * No resolved hospital problems. *      FOLLOW UP  No future appointments.  No follow-up provider specified.    MEDICATIONS ON DISCHARGE     Medication List      START taking these medications      Instructions   amoxicillin-clavulanate 875-125 MG Tabs  Commonly known as:  AUGMENTIN   Take 1 Tab by mouth 2 times a day for 5 days.  Dose:  1 Tab     oxyCODONE immediate-release 5 MG Tabs  Commonly known as:  ROXICODONE   Take 1 Tab by mouth every 3 hours as needed for up to 5 days.  Dose:  5 mg        CONTINUE taking these medications      Instructions   FAMOTIDINE PO   Take 1 Tab by mouth 1 time daily as needed.  Dose:  1 Tab        STOP taking these medications    acetaminophen 500 MG Tabs  Commonly known as:  TYLENOL     bismuth subsalicylate 262 MG/15ML Susp  Commonly known as:  PEPTO-BISMOL     phentermine 30 MG capsule            Allergies  No Known Allergies    DIET  Orders Placed This Encounter   Procedures   • Diet Order Clear Liquid     Standing Status:   Standing     Number of Occurrences:   1     Order Specific Question:   Diet:     Answer:   Clear Liquid [10]       ACTIVITY  As tolerated.  Weight bearing as tolerated    CONSULTATIONS  Surgery    PROCEDURES  As above    LABORATORY  Lab Results   Component Value Date    SODIUM 140 06/21/2020    POTASSIUM 3.9 06/21/2020    CHLORIDE 108 06/21/2020    CO2 24 06/21/2020    GLUCOSE 102 (H) 06/21/2020    BUN 5 (L) 06/21/2020    CREATININE 0.55 06/21/2020        Lab Results   Component Value Date    WBC 16.4 (H) 06/21/2020    HEMOGLOBIN 13.4 06/21/2020    HEMATOCRIT 43.9 06/21/2020    PLATELETCT 192 06/21/2020        Total time of the discharge process = 40 minutes.

## 2020-06-22 LAB — PATHOLOGY CONSULT NOTE: NORMAL

## 2020-06-23 ENCOUNTER — OFFICE VISIT (OUTPATIENT)
Dept: MEDICAL GROUP | Facility: MEDICAL CENTER | Age: 35
End: 2020-06-23
Payer: COMMERCIAL

## 2020-06-23 VITALS
TEMPERATURE: 99 F | HEIGHT: 66 IN | RESPIRATION RATE: 16 BRPM | SYSTOLIC BLOOD PRESSURE: 110 MMHG | HEART RATE: 75 BPM | DIASTOLIC BLOOD PRESSURE: 74 MMHG | WEIGHT: 265 LBS | OXYGEN SATURATION: 94 % | BODY MASS INDEX: 42.59 KG/M2

## 2020-06-23 DIAGNOSIS — Z90.49 S/P CHOLECYSTECTOMY: ICD-10-CM

## 2020-06-23 DIAGNOSIS — E66.01 OBESITY, MORBID, BMI 40.0-49.9 (HCC): ICD-10-CM

## 2020-06-23 PROBLEM — D72.829 LEUKOCYTOSIS: Status: RESOLVED | Noted: 2020-06-21 | Resolved: 2020-06-23

## 2020-06-23 PROBLEM — Z30.9 ENCOUNTER FOR CONTRACEPTIVE MANAGEMENT: Status: RESOLVED | Noted: 2019-07-19 | Resolved: 2020-06-23

## 2020-06-23 PROBLEM — Z30.432 ENCOUNTER FOR IUD REMOVAL: Status: RESOLVED | Noted: 2019-07-19 | Resolved: 2020-06-23

## 2020-06-23 PROBLEM — K81.0 ACUTE CHOLECYSTITIS: Status: RESOLVED | Noted: 2020-06-19 | Resolved: 2020-06-23

## 2020-06-23 PROCEDURE — 99213 OFFICE O/P EST LOW 20 MIN: CPT | Performed by: NURSE PRACTITIONER

## 2020-06-23 RX ORDER — DOCUSATE SODIUM 100 MG/1
100 CAPSULE, LIQUID FILLED ORAL 2 TIMES DAILY
Qty: 30 CAP | Refills: 0 | Status: SHIPPED | OUTPATIENT
Start: 2020-06-23 | End: 2020-09-13

## 2020-06-23 ASSESSMENT — PATIENT HEALTH QUESTIONNAIRE - PHQ9: CLINICAL INTERPRETATION OF PHQ2 SCORE: 0

## 2020-06-23 ASSESSMENT — FIBROSIS 4 INDEX: FIB4 SCORE: 0.47

## 2020-06-23 ASSESSMENT — ENCOUNTER SYMPTOMS
ABDOMINAL PAIN: 1
CONSTIPATION: 1

## 2020-06-23 NOTE — PROGRESS NOTES
Subjective:      Kinza Juan is a 35 y.o. female who presents with Hospital Follow-up        CC: Patient is here today accompanied by her  for follow-up post cholecystectomy.    HPI       1. S/P cholecystectomy  Patient went to the urgent care in June 19 with complaints of right upper quadrant abdominal pain.  She was found to have gallstones and appropriately referred to the ER.  She underwent a cholecystectomy with laparoscope the next day with .  She was sent home on pain medication and Augmentin.  She states she is taking her antibiotics.    She states as of today she is improving although she still has incisional pain and has just started to take her oxycodone prescribed.  This is caused some mild constipation and bloating.  She denies fever, diarrhea, dysuria, shortness of breath or leg pain.  She has been working from home for her job.    2. Obesity, morbid, BMI 40.0-49.9 ([facility])  Patient had been referred just prior to this to  for the weight loss clinic and she does plan on following with them when she is better.  Past Medical History:   Diagnosis Date   • GERD (gastroesophageal reflux disease)     h pylori 12/2015   • Infectious disease     to the flu   • Pap smear 4/28/9    Normal     Social History     Socioeconomic History   • Marital status:      Spouse name: Not on file   • Number of children: Not on file   • Years of education: Not on file   • Highest education level: Not on file   Occupational History   • Not on file   Social Needs   • Financial resource strain: Not on file   • Food insecurity     Worry: Not on file     Inability: Not on file   • Transportation needs     Medical: Not on file     Non-medical: Not on file   Tobacco Use   • Smoking status: Never Smoker   • Smokeless tobacco: Never Used   Substance and Sexual Activity   • Alcohol use: Yes     Alcohol/week: 1.2 oz     Types: 2 Standard drinks or equivalent per week     Comment: occ   • Drug use:  Yes     Types: Marijuana, Oral     Comment: occ   • Sexual activity: Yes     Partners: Male     Birth control/protection: I.U.D.     Comment: , with boyfriend   Lifestyle   • Physical activity     Days per week: Not on file     Minutes per session: Not on file   • Stress: Not on file   Relationships   • Social connections     Talks on phone: Not on file     Gets together: Not on file     Attends Bahai service: Not on file     Active member of club or organization: Not on file     Attends meetings of clubs or organizations: Not on file     Relationship status: Not on file   • Intimate partner violence     Fear of current or ex partner: Not on file     Emotionally abused: Not on file     Physically abused: Not on file     Forced sexual activity: Not on file   Other Topics Concern   •  Service No   • Blood Transfusions No   • Caffeine Concern No   • Occupational Exposure No   • Hobby Hazards No   • Sleep Concern No   • Stress Concern No   • Weight Concern Yes   • Special Diet No   • Back Care No   • Exercise No   • Bike Helmet No   • Seat Belt Yes   • Self-Exams No   Social History Narrative   • Not on file     Current Outpatient Medications   Medication Sig Dispense Refill   • docusate sodium (COLACE) 100 MG Cap Take 1 Cap by mouth 2 times a day. 30 Cap 0   • oxyCODONE immediate-release (ROXICODONE) 5 MG Tab Take 1 Tab by mouth every 3 hours as needed for up to 5 days. 30 Tab 0   • amoxicillin-clavulanate (AUGMENTIN) 875-125 MG Tab Take 1 Tab by mouth 2 times a day for 5 days. 10 Tab 0   • FAMOTIDINE PO Take 1 Tab by mouth 1 time daily as needed.       No current facility-administered medications for this visit.      Family History   Problem Relation Age of Onset   • Diabetes Paternal Grandmother    • Diabetes Father    • Stroke Maternal Grandmother    • Hypertension Maternal Grandmother    • Diabetes Maternal Grandfather    • Cancer Neg Hx          Review of Systems   Gastrointestinal: Positive  "for abdominal pain and constipation.   All other systems reviewed and are negative.         Objective:     /74 (BP Location: Right arm, Patient Position: Sitting, BP Cuff Size: Adult)   Pulse 75   Temp 37.2 °C (99 °F)   Resp 16   Ht 1.676 m (5' 6\")   Wt 120.2 kg (265 lb)   SpO2 94%   BMI 42.77 kg/m²      Physical Exam  Vitals signs and nursing note reviewed.   Constitutional:       General: She is not in acute distress.     Appearance: She is well-developed. She is not diaphoretic.   HENT:      Head: Normocephalic and atraumatic.      Right Ear: External ear normal.      Left Ear: External ear normal.      Nose: Nose normal.   Eyes:      General:         Right eye: No discharge.         Left eye: No discharge.   Neck:      Musculoskeletal: Normal range of motion and neck supple.      Thyroid: No thyromegaly.   Cardiovascular:      Rate and Rhythm: Normal rate and regular rhythm.      Heart sounds: Normal heart sounds. No murmur. No friction rub. No gallop.    Pulmonary:      Effort: Pulmonary effort is normal.      Breath sounds: Normal breath sounds. No wheezing or rales.   Abdominal:      Comments: Dressings in place over abdominal incision sites with dried blood and no evidence of new discharge or surrounding redness at the sites.  There is no distention of the abdomen.   Musculoskeletal:         General: No tenderness.   Skin:     General: Skin is warm and dry.      Findings: No rash.   Neurological:      Mental Status: She is alert and oriented to person, place, and time.      Deep Tendon Reflexes: Reflexes normal.   Psychiatric:         Behavior: Behavior normal.         Thought Content: Thought content normal.         Judgment: Judgment normal.                 Assessment/Plan:       1. S/P cholecystectomy  Patient's vital signs are normal and she appears to be healing well.  She has an appointment with her surgeon on Monday.  I advised her to avoid fatty foods and she was given Colace to use as " needed when she is using her oxycodone to prevent constipation.  She is to finish her antibiotics.  Signs and symptoms of wound infection were discussed.  - docusate sodium (COLACE) 100 MG Cap; Take 1 Cap by mouth 2 times a day.  Dispense: 30 Cap; Refill: 0    2. Obesity, morbid, BMI 40.0-49.9 ([facility])  Patient will follow-up with  when she is healed.

## 2020-07-17 ENCOUNTER — APPOINTMENT (OUTPATIENT)
Dept: MEDICAL GROUP | Facility: MEDICAL CENTER | Age: 35
End: 2020-07-17
Payer: COMMERCIAL

## 2020-08-24 ENCOUNTER — NURSE TRIAGE (OUTPATIENT)
Dept: HEALTH INFORMATION MANAGEMENT | Facility: OTHER | Age: 35
End: 2020-08-24

## 2020-08-24 DIAGNOSIS — Z20.822 EXPOSURE TO COVID-19 VIRUS: ICD-10-CM

## 2020-08-24 NOTE — TELEPHONE ENCOUNTER
1. Caller Name:Kinza Juan                Call Back Number: [phone]  [facility] PCP or Specialty Provider: Yes         2.  In the last two weeks, has the patient had any new or worsening symptoms (not explained by alternative diagnosis)? No.    3.  Does patient have any comoribidities? None     4.  Has the patient traveled in the last 14 days OR had any known contact with someone who is suspected or confirmed to have COVID-19?  Yes, co-worker's child tested positive for Covid.     5. POTENTIAL PUI (LOW): Advised to perform self care, monitor for worsening symptoms and to call back in 3 days if no improvement. Instructed to self isolate and contact [facility] at [phone]         Note routed to [facility] Provider: Provider action needed: Patient is requesting Covid Nasoswab test order.

## 2020-08-25 ENCOUNTER — HOSPITAL ENCOUNTER (OUTPATIENT)
Dept: LAB | Facility: MEDICAL CENTER | Age: 35
End: 2020-08-25
Attending: NURSE PRACTITIONER
Payer: COMMERCIAL

## 2020-08-25 LAB
COVID ORDER STATUS COVID19: NORMAL
SARS-COV-2 RNA RESP QL NAA+PROBE: NOTDETECTED
SPECIMEN SOURCE: NORMAL

## 2020-08-25 PROCEDURE — U0003 INFECTIOUS AGENT DETECTION BY NUCLEIC ACID (DNA OR RNA); SEVERE ACUTE RESPIRATORY SYNDROME CORONAVIRUS 2 (SARS-COV-2) (CORONAVIRUS DISEASE [COVID-19]), AMPLIFIED PROBE TECHNIQUE, MAKING USE OF HIGH THROUGHPUT TECHNOLOGIES AS DESCRIBED BY CMS-2020-01-R: HCPCS

## 2020-09-12 ENCOUNTER — HOSPITAL ENCOUNTER (EMERGENCY)
Facility: MEDICAL CENTER | Age: 35
End: 2020-09-12
Attending: EMERGENCY MEDICINE
Payer: COMMERCIAL

## 2020-09-12 ENCOUNTER — APPOINTMENT (OUTPATIENT)
Dept: RADIOLOGY | Facility: MEDICAL CENTER | Age: 35
End: 2020-09-12
Attending: EMERGENCY MEDICINE
Payer: COMMERCIAL

## 2020-09-12 VITALS
DIASTOLIC BLOOD PRESSURE: 51 MMHG | HEART RATE: 68 BPM | RESPIRATION RATE: 16 BRPM | TEMPERATURE: 97.3 F | BODY MASS INDEX: 42.16 KG/M2 | WEIGHT: 262.35 LBS | HEIGHT: 66 IN | SYSTOLIC BLOOD PRESSURE: 102 MMHG | OXYGEN SATURATION: 97 %

## 2020-09-12 DIAGNOSIS — R74.01 TRANSAMINITIS: ICD-10-CM

## 2020-09-12 DIAGNOSIS — R10.13 EPIGASTRIC ABDOMINAL PAIN: ICD-10-CM

## 2020-09-12 LAB
ALBUMIN SERPL BCP-MCNC: 3.7 G/DL (ref 3.2–4.9)
ALBUMIN/GLOB SERPL: 1.1 G/DL
ALP SERPL-CCNC: 369 U/L (ref 30–99)
ALT SERPL-CCNC: 151 U/L (ref 2–50)
ANION GAP SERPL CALC-SCNC: 11 MMOL/L (ref 7–16)
AST SERPL-CCNC: 338 U/L (ref 12–45)
BASOPHILS # BLD AUTO: 0.4 % (ref 0–1.8)
BASOPHILS # BLD: 0.05 K/UL (ref 0–0.12)
BILIRUB SERPL-MCNC: 0.8 MG/DL (ref 0.1–1.5)
BUN SERPL-MCNC: 15 MG/DL (ref 8–22)
CALCIUM SERPL-MCNC: 8.8 MG/DL (ref 8.5–10.5)
CHLORIDE SERPL-SCNC: 100 MMOL/L (ref 96–112)
CO2 SERPL-SCNC: 25 MMOL/L (ref 20–33)
CREAT SERPL-MCNC: 0.66 MG/DL (ref 0.5–1.4)
EOSINOPHIL # BLD AUTO: 0.09 K/UL (ref 0–0.51)
EOSINOPHIL NFR BLD: 0.7 % (ref 0–6.9)
ERYTHROCYTE [DISTWIDTH] IN BLOOD BY AUTOMATED COUNT: 45.8 FL (ref 35.9–50)
GLOBULIN SER CALC-MCNC: 3.4 G/DL (ref 1.9–3.5)
GLUCOSE SERPL-MCNC: 89 MG/DL (ref 65–99)
HCG SERPL QL: NEGATIVE
HCT VFR BLD AUTO: 43.1 % (ref 37–47)
HGB BLD-MCNC: 13.8 G/DL (ref 12–16)
IMM GRANULOCYTES # BLD AUTO: 0.06 K/UL (ref 0–0.11)
IMM GRANULOCYTES NFR BLD AUTO: 0.5 % (ref 0–0.9)
LIPASE SERPL-CCNC: 43 U/L (ref 11–82)
LYMPHOCYTES # BLD AUTO: 2.81 K/UL (ref 1–4.8)
LYMPHOCYTES NFR BLD: 22.3 % (ref 22–41)
MCH RBC QN AUTO: 29.6 PG (ref 27–33)
MCHC RBC AUTO-ENTMCNC: 32 G/DL (ref 33.6–35)
MCV RBC AUTO: 92.3 FL (ref 81.4–97.8)
MONOCYTES # BLD AUTO: 1.05 K/UL (ref 0–0.85)
MONOCYTES NFR BLD AUTO: 8.3 % (ref 0–13.4)
NEUTROPHILS # BLD AUTO: 8.52 K/UL (ref 2–7.15)
NEUTROPHILS NFR BLD: 67.8 % (ref 44–72)
NRBC # BLD AUTO: 0 K/UL
NRBC BLD-RTO: 0 /100 WBC
PLATELET # BLD AUTO: 322 K/UL (ref 164–446)
PMV BLD AUTO: 10.6 FL (ref 9–12.9)
POTASSIUM SERPL-SCNC: 4.8 MMOL/L (ref 3.6–5.5)
PROT SERPL-MCNC: 7.1 G/DL (ref 6–8.2)
RBC # BLD AUTO: 4.67 M/UL (ref 4.2–5.4)
SODIUM SERPL-SCNC: 136 MMOL/L (ref 135–145)
WBC # BLD AUTO: 12.6 K/UL (ref 4.8–10.8)

## 2020-09-12 PROCEDURE — 700102 HCHG RX REV CODE 250 W/ 637 OVERRIDE(OP): Performed by: EMERGENCY MEDICINE

## 2020-09-12 PROCEDURE — 96375 TX/PRO/DX INJ NEW DRUG ADDON: CPT

## 2020-09-12 PROCEDURE — 96374 THER/PROPH/DIAG INJ IV PUSH: CPT

## 2020-09-12 PROCEDURE — 80053 COMPREHEN METABOLIC PANEL: CPT

## 2020-09-12 PROCEDURE — 85025 COMPLETE CBC W/AUTO DIFF WBC: CPT

## 2020-09-12 PROCEDURE — 83690 ASSAY OF LIPASE: CPT

## 2020-09-12 PROCEDURE — 99285 EMERGENCY DEPT VISIT HI MDM: CPT

## 2020-09-12 PROCEDURE — A9270 NON-COVERED ITEM OR SERVICE: HCPCS | Performed by: EMERGENCY MEDICINE

## 2020-09-12 PROCEDURE — 700111 HCHG RX REV CODE 636 W/ 250 OVERRIDE (IP): Performed by: EMERGENCY MEDICINE

## 2020-09-12 PROCEDURE — 76705 ECHO EXAM OF ABDOMEN: CPT

## 2020-09-12 PROCEDURE — 84703 CHORIONIC GONADOTROPIN ASSAY: CPT

## 2020-09-12 RX ORDER — ONDANSETRON 4 MG/1
4 TABLET, ORALLY DISINTEGRATING ORAL EVERY 4 HOURS PRN
Qty: 10 TAB | Refills: 0 | Status: ON HOLD | OUTPATIENT
Start: 2020-09-12 | End: 2020-09-17 | Stop reason: SDUPTHER

## 2020-09-12 RX ORDER — KETOROLAC TROMETHAMINE 30 MG/ML
30 INJECTION, SOLUTION INTRAMUSCULAR; INTRAVENOUS ONCE
Status: COMPLETED | OUTPATIENT
Start: 2020-09-12 | End: 2020-09-12

## 2020-09-12 RX ORDER — HYDROCODONE BITARTRATE AND ACETAMINOPHEN 5; 325 MG/1; MG/1
1-2 TABLET ORAL EVERY 4 HOURS PRN
Qty: 10 TAB | Refills: 0 | Status: ON HOLD | OUTPATIENT
Start: 2020-09-12 | End: 2020-09-17

## 2020-09-12 RX ORDER — ONDANSETRON 2 MG/ML
4 INJECTION INTRAMUSCULAR; INTRAVENOUS ONCE
Status: COMPLETED | OUTPATIENT
Start: 2020-09-12 | End: 2020-09-12

## 2020-09-12 RX ADMIN — LIDOCAINE HYDROCHLORIDE 30 ML: 20 SOLUTION OROPHARYNGEAL at 14:01

## 2020-09-12 RX ADMIN — ONDANSETRON 4 MG: 2 INJECTION INTRAMUSCULAR; INTRAVENOUS at 14:44

## 2020-09-12 RX ADMIN — KETOROLAC TROMETHAMINE 30 MG: 30 INJECTION, SOLUTION INTRAMUSCULAR at 14:43

## 2020-09-12 ASSESSMENT — FIBROSIS 4 INDEX: FIB4 SCORE: 0.47

## 2020-09-12 NOTE — ED TRIAGE NOTES
"Kinza Juan presents to triage, wearing a mask, reporting:  Chief Complaint   Patient presents with   • Abdominal Pain     upper, midline. episodic \"burning sensation\"   Pt denies vomiting. +nausea. Hx of cholecystectomy 2 mo ago. Pt has been taking antacids w/o relief.     Pt denies any respiratory or flu like symptoms at this time.    Pt speaking in full sentences, NAD noted. Pt educated of triage process, placed back in waiting area pending room assignment.    "

## 2020-09-12 NOTE — ED PROVIDER NOTES
"ED Provider Note    CHIEF COMPLAINT  Chief Complaint   Patient presents with   • Abdominal Pain     upper, midline. episodic \"burning sensation\"       HPI  Kinza Juan is a 35 y.o. female who presents to the emergency department through triage with significant other for epigastric abdominal pain.  Patient describes discomfort, cramping, burning for the last couple of days.  No nausea or vomiting.  No diarrhea.  Normal bowel movements.  Normal appetite and tolerating food and fluids without pain getting worse.  Patient describes \"episodes\" of the same previously.  Thought to be secondary to her gallbladder, this was removed a couple months ago.  She is only had a couple \"episodes\" since then although states this 1 is lasting longer than normal and feels \"stronger.\"  Denies travel or sick contacts.  No medications at home for discomfort.  Denies pregnancy, LMP 1 month ago, home pregnancy negative.    REVIEW OF SYSTEMS  See HPI for further details. All other systems are negative.     PAST MEDICAL HISTORY   has a past medical history of GERD (gastroesophageal reflux disease), Infectious disease, and Pap smear (4/28/9).    SOCIAL HISTORY  Social History     Tobacco Use   • Smoking status: Never Smoker   • Smokeless tobacco: Never Used   Substance and Sexual Activity   • Alcohol use: Yes     Alcohol/week: 1.2 oz     Types: 2 Standard drinks or equivalent per week     Comment: occ   • Drug use: Yes     Types: Marijuana, Oral     Comment: occ   • Sexual activity: Yes     Partners: Male     Birth control/protection: I.U.D.     Comment: , with boyfriend       SURGICAL HISTORY   has a past surgical history that includes dental extraction(s) (2005); acl reconstruction scope (5/31/2012); medial meniscectomy (5/31/2012); primary c section (3/24/2013); and elpidio by laparoscopy (6/20/2020).    CURRENT MEDICATIONS  Home Medications     Reviewed by Schuyler B Collett, R.N. (Registered Nurse) on 09/12/20 at 1213  " "Med List Status: <None>   Medication Last Dose Status   docusate sodium (COLACE) 100 MG Cap  Active   FAMOTIDINE PO  Active                ALLERGIES  No Known Allergies    PHYSICAL EXAM  VITAL SIGNS: /56   Pulse 69   Temp 36.3 °C (97.3 °F) (Temporal)   Resp 15   Ht 1.676 m (5' 6\")   Wt 119 kg (262 lb 5.6 oz)   SpO2 100%   BMI 42.34 kg/m²   Pulse ox interpretation: I interpret this pulse ox as normal.  Constitutional: Alert in no apparent distress.  HENT: Normocephalic, atraumatic. Bilateral external ears normal, Nose normal. Moist mucous membranes.    Eyes: Pupils are equal and reactive, Conjunctiva normal.   Neck: Normal range of motion, Supple  Lymphatic: No lymphadenopathy noted.   Cardiovascular: Regular rate and rhythm, no murmurs. Distal pulses intact.    Thorax & Lungs: Normal breath sounds.  No wheezing/rales/ronchi. No increased work of breathing  Abdomen: Soft, non-distended.  Mild discomfort in epigastric region without rebound, guarding or peritonitis.  No palpable pulsatile mass..  Skin: Warm, Dry, No erythema, No rash.   Musculoskeletal: Good range of motion in all major joints.   Neurologic: Alert and oriented x4.  Ambulates independently.  Psychiatric: Affect normal, Judgment normal, Mood normal.       DIAGNOSTIC STUDIES / PROCEDURES    LABS  Results for orders placed or performed during the hospital encounter of 09/12/20   CBC WITH DIFFERENTIAL   Result Value Ref Range    WBC 12.6 (H) 4.8 - 10.8 K/uL    RBC 4.67 4.20 - 5.40 M/uL    Hemoglobin 13.8 12.0 - 16.0 g/dL    Hematocrit 43.1 37.0 - 47.0 %    MCV 92.3 81.4 - 97.8 fL    MCH 29.6 27.0 - 33.0 pg    MCHC 32.0 (L) 33.6 - 35.0 g/dL    RDW 45.8 35.9 - 50.0 fL    Platelet Count 322 164 - 446 K/uL    MPV 10.6 9.0 - 12.9 fL    Neutrophils-Polys 67.80 44.00 - 72.00 %    Lymphocytes 22.30 22.00 - 41.00 %    Monocytes 8.30 0.00 - 13.40 %    Eosinophils 0.70 0.00 - 6.90 %    Basophils 0.40 0.00 - 1.80 %    Immature Granulocytes 0.50 0.00 - " 0.90 %    Nucleated RBC 0.00 /100 WBC    Neutrophils (Absolute) 8.52 (H) 2.00 - 7.15 K/uL    Lymphs (Absolute) 2.81 1.00 - 4.80 K/uL    Monos (Absolute) 1.05 (H) 0.00 - 0.85 K/uL    Eos (Absolute) 0.09 0.00 - 0.51 K/uL    Baso (Absolute) 0.05 0.00 - 0.12 K/uL    Immature Granulocytes (abs) 0.06 0.00 - 0.11 K/uL    NRBC (Absolute) 0.00 K/uL   COMP METABOLIC PANEL   Result Value Ref Range    Sodium 136 135 - 145 mmol/L    Potassium 4.8 3.6 - 5.5 mmol/L    Chloride 100 96 - 112 mmol/L    Co2 25 20 - 33 mmol/L    Anion Gap 11.0 7.0 - 16.0    Glucose 89 65 - 99 mg/dL    Bun 15 8 - 22 mg/dL    Creatinine 0.66 0.50 - 1.40 mg/dL    Calcium 8.8 8.5 - 10.5 mg/dL    AST(SGOT) 338 (H) 12 - 45 U/L    ALT(SGPT) 151 (H) 2 - 50 U/L    Alkaline Phosphatase 369 (H) 30 - 99 U/L    Total Bilirubin 0.8 0.1 - 1.5 mg/dL    Albumin 3.7 3.2 - 4.9 g/dL    Total Protein 7.1 6.0 - 8.2 g/dL    Globulin 3.4 1.9 - 3.5 g/dL    A-G Ratio 1.1 g/dL   LIPASE   Result Value Ref Range    Lipase 43 11 - 82 U/L   HCG QUAL SERUM   Result Value Ref Range    Beta-Hcg Qualitative Serum Negative Negative   ESTIMATED GFR   Result Value Ref Range    GFR If African American >60 >60 mL/min/1.73 m 2    GFR If Non African American >60 >60 mL/min/1.73 m 2     RADIOLOGY  US-RUQ   Final Result      1.  Post cholecystectomy.      2.  Liver is mildly enlarged.      3.  No other abnormalities identified.          COURSE & MEDICAL DECISION MAKING  Nursing notes and vital signs were reviewed. (See chart for details)  The patients records were reviewed, history was obtained from the patient;     Evaluation for epigastric abdominal pain is unrevealing.  Abdomen is tender without peritonitis or acute abdomen.  No improvement with GI cocktail.  Patient did have some relief with Toradol.  Labs with a mild nonspecific leukocytosis, no left shift or bandemia.  Transaminitis with otherwise normal total bilirubin.  No pancreatitis.  No electrolyte derangement.  Status post  cholecystectomy few months ago.  Right upper quadrant ultrasound was otherwise normal biliary tree, normal size common bile duct.  Cannot exclude retained stone, although this can be managed outpatient if symptoms persist with referral to GI or for MRCP.   Vital signs stable without fever, tachycardia or hypotension.  No vomiting or diarrhea.  Tolerating oral fluids.  Patient does request pain medication for home for this recurrence until further evaluation can be complete.    Patient is stable for discharge at this time, anticipatory guidance provided, Zofran for nausea or vomiting, Norco for breakthrough pain close follow-up is encouraged (order for ED follow-up completed), and strict ED return instructions have been detailed. Patient is agreeable to the disposition and plan.    Patient's blood pressure was elevated in the emergency department, and has been referred to primary care for close monitoring.     reviewed, no pattern for concern.  In prescribing controlled substances to this patient, I certify that I have obtained and reviewed the medical history of Kinza Juan. I have also made a good aneta effort to obtain applicable records from other providers who have treated the patient and records did not demonstrate any increased risk of substance abuse that would prevent me from prescribing controlled substances.     I have conducted a physical exam and documented it. I have reviewed Ms. yAo Juan’s prescription history as maintained by the [place] Prescription Monitoring Program.     I have assessed the patient’s risk for abuse, dependency, and addiction using the validated Opioid Risk Tool available at https://www.mdcalc.com/adrqmi-whnh-yavl-ort-narcotic-abuse.     Given the above, I believe the benefits of controlled substance therapy outweigh the risks. The reasons for prescribing controlled substances include non-narcotic, oral analgesic alternatives have been inadequate for pain  control. Accordingly, I have discussed the risk and benefits, treatment plan, and alternative therapies with the patient.     FINAL IMPRESSION  (R10.13) Epigastric abdominal pain  (R74.0) Transaminitis      Electronically signed by: Carmen Hearn D.O., 9/12/2020 4:06 PM      This dictation was created using voice recognition software. The accuracy of the dictation is limited to the abilities of the software. I expect there may be some errors of grammar and possibly content. The nursing notes were reviewed and certain aspects of this information were incorporated into this note.

## 2020-09-12 NOTE — DISCHARGE INSTRUCTIONS
Follow-up with primary care next week for reevaluation, medication management, close blood pressure monitoring and repeat labs, additional imaging or referral to GI if symptoms persist.  A message has been left for ED scheduling, you should receive a phone call for appointment follow-up.    Encourage oral fluid hydration.  [place] diet as tolerated.    Norco every 4-6 hours as needed for breakthrough pain.  Zofran, oral dissolving tablet, every 4-6 hours as needed for nausea or vomiting.    Return to the emergency department for persistent or worsening abdominal pain, fever, vomiting, bloody stools or other new concerns.

## 2020-09-13 ENCOUNTER — APPOINTMENT (OUTPATIENT)
Dept: RADIOLOGY | Facility: MEDICAL CENTER | Age: 35
DRG: 445 | End: 2020-09-13
Attending: EMERGENCY MEDICINE
Payer: COMMERCIAL

## 2020-09-13 ENCOUNTER — HOSPITAL ENCOUNTER (INPATIENT)
Facility: MEDICAL CENTER | Age: 35
LOS: 3 days | DRG: 445 | End: 2020-09-17
Attending: EMERGENCY MEDICINE | Admitting: INTERNAL MEDICINE
Payer: COMMERCIAL

## 2020-09-13 DIAGNOSIS — R10.9 ACUTE ABDOMINAL PAIN: ICD-10-CM

## 2020-09-13 DIAGNOSIS — R79.89 ABNORMAL LFTS: ICD-10-CM

## 2020-09-13 DIAGNOSIS — R74.01 TRANSAMINITIS: ICD-10-CM

## 2020-09-13 DIAGNOSIS — R10.13 EPIGASTRIC ABDOMINAL PAIN: ICD-10-CM

## 2020-09-13 LAB
BASOPHILS # BLD AUTO: 0.5 % (ref 0–1.8)
BASOPHILS # BLD: 0.06 K/UL (ref 0–0.12)
EOSINOPHIL # BLD AUTO: 0.2 K/UL (ref 0–0.51)
EOSINOPHIL NFR BLD: 1.6 % (ref 0–6.9)
ERYTHROCYTE [DISTWIDTH] IN BLOOD BY AUTOMATED COUNT: 46.2 FL (ref 35.9–50)
HCG SERPL QL: NEGATIVE
HCT VFR BLD AUTO: 42.6 % (ref 37–47)
HGB BLD-MCNC: 13.4 G/DL (ref 12–16)
IMM GRANULOCYTES # BLD AUTO: 0.04 K/UL (ref 0–0.11)
IMM GRANULOCYTES NFR BLD AUTO: 0.3 % (ref 0–0.9)
LACTATE BLD-SCNC: 1.3 MMOL/L (ref 0.5–2)
LYMPHOCYTES # BLD AUTO: 1.9 K/UL (ref 1–4.8)
LYMPHOCYTES NFR BLD: 14.8 % (ref 22–41)
MCH RBC QN AUTO: 29.1 PG (ref 27–33)
MCHC RBC AUTO-ENTMCNC: 31.5 G/DL (ref 33.6–35)
MCV RBC AUTO: 92.6 FL (ref 81.4–97.8)
MONOCYTES # BLD AUTO: 0.91 K/UL (ref 0–0.85)
MONOCYTES NFR BLD AUTO: 7.1 % (ref 0–13.4)
NEUTROPHILS # BLD AUTO: 9.74 K/UL (ref 2–7.15)
NEUTROPHILS NFR BLD: 75.7 % (ref 44–72)
NRBC # BLD AUTO: 0 K/UL
NRBC BLD-RTO: 0 /100 WBC
PLATELET # BLD AUTO: 318 K/UL (ref 164–446)
PMV BLD AUTO: 10.4 FL (ref 9–12.9)
RBC # BLD AUTO: 4.6 M/UL (ref 4.2–5.4)
WBC # BLD AUTO: 12.9 K/UL (ref 4.8–10.8)

## 2020-09-13 PROCEDURE — 83605 ASSAY OF LACTIC ACID: CPT

## 2020-09-13 PROCEDURE — 700111 HCHG RX REV CODE 636 W/ 250 OVERRIDE (IP): Performed by: EMERGENCY MEDICINE

## 2020-09-13 PROCEDURE — C9803 HOPD COVID-19 SPEC COLLECT: HCPCS | Performed by: EMERGENCY MEDICINE

## 2020-09-13 PROCEDURE — 83690 ASSAY OF LIPASE: CPT

## 2020-09-13 PROCEDURE — 80053 COMPREHEN METABOLIC PANEL: CPT

## 2020-09-13 PROCEDURE — 96375 TX/PRO/DX INJ NEW DRUG ADDON: CPT

## 2020-09-13 PROCEDURE — 99285 EMERGENCY DEPT VISIT HI MDM: CPT

## 2020-09-13 PROCEDURE — 85025 COMPLETE CBC W/AUTO DIFF WBC: CPT

## 2020-09-13 PROCEDURE — 84703 CHORIONIC GONADOTROPIN ASSAY: CPT

## 2020-09-13 RX ORDER — OMEPRAZOLE 20 MG/1
CAPSULE, DELAYED RELEASE ORAL
Status: ON HOLD | COMMUNITY
Start: 2020-07-09 | End: 2022-02-03

## 2020-09-13 RX ORDER — ONDANSETRON 2 MG/ML
4 INJECTION INTRAMUSCULAR; INTRAVENOUS ONCE
Status: COMPLETED | OUTPATIENT
Start: 2020-09-13 | End: 2020-09-13

## 2020-09-13 RX ORDER — MORPHINE SULFATE 4 MG/ML
4 INJECTION, SOLUTION INTRAMUSCULAR; INTRAVENOUS ONCE
Status: COMPLETED | OUTPATIENT
Start: 2020-09-13 | End: 2020-09-13

## 2020-09-13 RX ORDER — PHENTERMINE HYDROCHLORIDE 37.5 MG/1
CAPSULE ORAL
COMMUNITY
Start: 2020-08-21 | End: 2020-12-04

## 2020-09-13 RX ADMIN — MORPHINE SULFATE 4 MG: 4 INJECTION INTRAVENOUS at 23:31

## 2020-09-13 RX ADMIN — ONDANSETRON 4 MG: 2 INJECTION INTRAMUSCULAR; INTRAVENOUS at 23:31

## 2020-09-13 ASSESSMENT — FIBROSIS 4 INDEX: FIB4 SCORE: 2.9

## 2020-09-13 ASSESSMENT — PAIN DESCRIPTION - PAIN TYPE: TYPE: ACUTE PAIN

## 2020-09-14 ENCOUNTER — APPOINTMENT (OUTPATIENT)
Dept: RADIOLOGY | Facility: MEDICAL CENTER | Age: 35
DRG: 445 | End: 2020-09-14
Attending: INTERNAL MEDICINE
Payer: COMMERCIAL

## 2020-09-14 PROBLEM — R10.11 RIGHT UPPER QUADRANT PAIN: Status: ACTIVE | Noted: 2020-09-14

## 2020-09-14 PROBLEM — K80.50 CHOLEDOCHOLITHIASIS: Status: ACTIVE | Noted: 2020-09-14

## 2020-09-14 LAB
ALBUMIN SERPL BCP-MCNC: 3.7 G/DL (ref 3.2–4.9)
ALBUMIN/GLOB SERPL: 1.1 G/DL
ALP SERPL-CCNC: 357 U/L (ref 30–99)
ALT SERPL-CCNC: 316 U/L (ref 2–50)
ANION GAP SERPL CALC-SCNC: 12 MMOL/L (ref 7–16)
AST SERPL-CCNC: 257 U/L (ref 12–45)
BILIRUB SERPL-MCNC: 3.3 MG/DL (ref 0.1–1.5)
BLOOD CULTURE HOLD CXBCH: NORMAL
BLOOD CULTURE HOLD CXBCH: NORMAL
BUN SERPL-MCNC: 8 MG/DL (ref 8–22)
CALCIUM SERPL-MCNC: 8.8 MG/DL (ref 8.5–10.5)
CHLORIDE SERPL-SCNC: 99 MMOL/L (ref 96–112)
CO2 SERPL-SCNC: 22 MMOL/L (ref 20–33)
COVID ORDER STATUS COVID19: NORMAL
CREAT SERPL-MCNC: 0.71 MG/DL (ref 0.5–1.4)
GLOBULIN SER CALC-MCNC: 3.3 G/DL (ref 1.9–3.5)
GLUCOSE SERPL-MCNC: 129 MG/DL (ref 65–99)
LIPASE SERPL-CCNC: 32 U/L (ref 11–82)
POTASSIUM SERPL-SCNC: 4 MMOL/L (ref 3.6–5.5)
PROT SERPL-MCNC: 7 G/DL (ref 6–8.2)
SARS-COV-2 RNA RESP QL NAA+PROBE: NOTDETECTED
SODIUM SERPL-SCNC: 133 MMOL/L (ref 135–145)
SPECIMEN SOURCE: NORMAL

## 2020-09-14 PROCEDURE — 96375 TX/PRO/DX INJ NEW DRUG ADDON: CPT

## 2020-09-14 PROCEDURE — 700111 HCHG RX REV CODE 636 W/ 250 OVERRIDE (IP): Performed by: INTERNAL MEDICINE

## 2020-09-14 PROCEDURE — U0003 INFECTIOUS AGENT DETECTION BY NUCLEIC ACID (DNA OR RNA); SEVERE ACUTE RESPIRATORY SYNDROME CORONAVIRUS 2 (SARS-COV-2) (CORONAVIRUS DISEASE [COVID-19]), AMPLIFIED PROBE TECHNIQUE, MAKING USE OF HIGH THROUGHPUT TECHNOLOGIES AS DESCRIBED BY CMS-2020-01-R: HCPCS

## 2020-09-14 PROCEDURE — 700105 HCHG RX REV CODE 258: Performed by: INTERNAL MEDICINE

## 2020-09-14 PROCEDURE — 700117 HCHG RX CONTRAST REV CODE 255: Performed by: EMERGENCY MEDICINE

## 2020-09-14 PROCEDURE — 700111 HCHG RX REV CODE 636 W/ 250 OVERRIDE (IP): Performed by: EMERGENCY MEDICINE

## 2020-09-14 PROCEDURE — A9270 NON-COVERED ITEM OR SERVICE: HCPCS | Performed by: INTERNAL MEDICINE

## 2020-09-14 PROCEDURE — 700102 HCHG RX REV CODE 250 W/ 637 OVERRIDE(OP): Performed by: INTERNAL MEDICINE

## 2020-09-14 PROCEDURE — 74177 CT ABD & PELVIS W/CONTRAST: CPT

## 2020-09-14 PROCEDURE — 96376 TX/PRO/DX INJ SAME DRUG ADON: CPT

## 2020-09-14 PROCEDURE — 96365 THER/PROPH/DIAG IV INF INIT: CPT

## 2020-09-14 PROCEDURE — 74181 MRI ABDOMEN W/O CONTRAST: CPT

## 2020-09-14 PROCEDURE — 99223 1ST HOSP IP/OBS HIGH 75: CPT | Performed by: INTERNAL MEDICINE

## 2020-09-14 PROCEDURE — 770006 HCHG ROOM/CARE - MED/SURG/GYN SEMI*

## 2020-09-14 RX ORDER — OXYCODONE HYDROCHLORIDE 5 MG/1
5 TABLET ORAL ONCE
Status: COMPLETED | OUTPATIENT
Start: 2020-09-14 | End: 2020-09-14

## 2020-09-14 RX ORDER — HEPARIN SODIUM 5000 [USP'U]/ML
5000 INJECTION, SOLUTION INTRAVENOUS; SUBCUTANEOUS EVERY 8 HOURS
Status: DISCONTINUED | OUTPATIENT
Start: 2020-09-14 | End: 2020-09-17 | Stop reason: HOSPADM

## 2020-09-14 RX ORDER — ONDANSETRON 2 MG/ML
4 INJECTION INTRAMUSCULAR; INTRAVENOUS EVERY 4 HOURS PRN
Status: DISCONTINUED | OUTPATIENT
Start: 2020-09-14 | End: 2020-09-17 | Stop reason: HOSPADM

## 2020-09-14 RX ORDER — HYDROMORPHONE HYDROCHLORIDE 1 MG/ML
1 INJECTION, SOLUTION INTRAMUSCULAR; INTRAVENOUS; SUBCUTANEOUS ONCE
Status: COMPLETED | OUTPATIENT
Start: 2020-09-14 | End: 2020-09-14

## 2020-09-14 RX ORDER — AMOXICILLIN 250 MG
2 CAPSULE ORAL 2 TIMES DAILY
Status: DISCONTINUED | OUTPATIENT
Start: 2020-09-14 | End: 2020-09-17 | Stop reason: HOSPADM

## 2020-09-14 RX ORDER — MORPHINE SULFATE 4 MG/ML
4 INJECTION, SOLUTION INTRAMUSCULAR; INTRAVENOUS ONCE
Status: COMPLETED | OUTPATIENT
Start: 2020-09-14 | End: 2020-09-14

## 2020-09-14 RX ORDER — ENALAPRILAT 1.25 MG/ML
1.25 INJECTION INTRAVENOUS EVERY 6 HOURS PRN
Status: DISCONTINUED | OUTPATIENT
Start: 2020-09-14 | End: 2020-09-17 | Stop reason: HOSPADM

## 2020-09-14 RX ORDER — KETOROLAC TROMETHAMINE 30 MG/ML
15 INJECTION, SOLUTION INTRAMUSCULAR; INTRAVENOUS EVERY 6 HOURS PRN
Status: DISCONTINUED | OUTPATIENT
Start: 2020-09-14 | End: 2020-09-15

## 2020-09-14 RX ORDER — SODIUM CHLORIDE 9 MG/ML
INJECTION, SOLUTION INTRAVENOUS CONTINUOUS
Status: DISCONTINUED | OUTPATIENT
Start: 2020-09-14 | End: 2020-09-17 | Stop reason: HOSPADM

## 2020-09-14 RX ORDER — BISACODYL 10 MG
10 SUPPOSITORY, RECTAL RECTAL
Status: DISCONTINUED | OUTPATIENT
Start: 2020-09-14 | End: 2020-09-17 | Stop reason: HOSPADM

## 2020-09-14 RX ORDER — POLYETHYLENE GLYCOL 3350 17 G/17G
1 POWDER, FOR SOLUTION ORAL
Status: DISCONTINUED | OUTPATIENT
Start: 2020-09-14 | End: 2020-09-17 | Stop reason: HOSPADM

## 2020-09-14 RX ORDER — ONDANSETRON 4 MG/1
4 TABLET, ORALLY DISINTEGRATING ORAL EVERY 4 HOURS PRN
Status: DISCONTINUED | OUTPATIENT
Start: 2020-09-14 | End: 2020-09-17 | Stop reason: HOSPADM

## 2020-09-14 RX ADMIN — ONDANSETRON 4 MG: 2 INJECTION INTRAMUSCULAR; INTRAVENOUS at 16:39

## 2020-09-14 RX ADMIN — PIPERACILLIN SODIUM AND TAZOBACTAM SODIUM 4.5 G: 4; .5 INJECTION, POWDER, LYOPHILIZED, FOR SOLUTION INTRAVENOUS at 13:46

## 2020-09-14 RX ADMIN — PIPERACILLIN AND TAZOBACTAM 4.5 G: 4; .5 INJECTION, POWDER, LYOPHILIZED, FOR SOLUTION INTRAVENOUS; PARENTERAL at 03:40

## 2020-09-14 RX ADMIN — DOCUSATE SODIUM 50 MG AND SENNOSIDES 8.6 MG 2 TABLET: 8.6; 5 TABLET, FILM COATED ORAL at 18:10

## 2020-09-14 RX ADMIN — KETOROLAC TROMETHAMINE 15 MG: 30 INJECTION, SOLUTION INTRAMUSCULAR at 23:49

## 2020-09-14 RX ADMIN — HYDROMORPHONE HYDROCHLORIDE 1 MG: 1 INJECTION, SOLUTION INTRAMUSCULAR; INTRAVENOUS; SUBCUTANEOUS at 02:35

## 2020-09-14 RX ADMIN — HEPARIN SODIUM 5000 UNITS: 5000 INJECTION, SOLUTION INTRAVENOUS; SUBCUTANEOUS at 14:29

## 2020-09-14 RX ADMIN — IOHEXOL 100 ML: 350 INJECTION, SOLUTION INTRAVENOUS at 01:15

## 2020-09-14 RX ADMIN — ONDANSETRON 4 MG: 2 INJECTION INTRAMUSCULAR; INTRAVENOUS at 23:50

## 2020-09-14 RX ADMIN — PIPERACILLIN SODIUM AND TAZOBACTAM SODIUM 4.5 G: 4; .5 INJECTION, POWDER, LYOPHILIZED, FOR SOLUTION INTRAVENOUS at 21:06

## 2020-09-14 RX ADMIN — PIPERACILLIN SODIUM AND TAZOBACTAM SODIUM 4.5 G: 4; .5 INJECTION, POWDER, LYOPHILIZED, FOR SOLUTION INTRAVENOUS at 06:19

## 2020-09-14 RX ADMIN — MORPHINE SULFATE 4 MG: 4 INJECTION INTRAVENOUS at 01:49

## 2020-09-14 RX ADMIN — HEPARIN SODIUM 5000 UNITS: 5000 INJECTION, SOLUTION INTRAVENOUS; SUBCUTANEOUS at 21:05

## 2020-09-14 RX ADMIN — OXYCODONE HYDROCHLORIDE 5 MG: 5 TABLET ORAL at 09:56

## 2020-09-14 RX ADMIN — KETOROLAC TROMETHAMINE 15 MG: 30 INJECTION, SOLUTION INTRAMUSCULAR at 11:49

## 2020-09-14 RX ADMIN — KETOROLAC TROMETHAMINE 15 MG: 30 INJECTION, SOLUTION INTRAMUSCULAR at 18:05

## 2020-09-14 RX ADMIN — ONDANSETRON 4 MG: 2 INJECTION INTRAMUSCULAR; INTRAVENOUS at 11:47

## 2020-09-14 RX ADMIN — SODIUM CHLORIDE: 9 INJECTION, SOLUTION INTRAVENOUS at 03:00

## 2020-09-14 RX ADMIN — KETOROLAC TROMETHAMINE 15 MG: 30 INJECTION, SOLUTION INTRAMUSCULAR at 05:07

## 2020-09-14 ASSESSMENT — PATIENT HEALTH QUESTIONNAIRE - PHQ9
SUM OF ALL RESPONSES TO PHQ9 QUESTIONS 1 AND 2: 0
2. FEELING DOWN, DEPRESSED, IRRITABLE, OR HOPELESS: NOT AT ALL
1. LITTLE INTEREST OR PLEASURE IN DOING THINGS: NOT AT ALL

## 2020-09-14 ASSESSMENT — LIFESTYLE VARIABLES
HOW MANY TIMES IN THE PAST YEAR HAVE YOU HAD 5 OR MORE DRINKS IN A DAY: 1
HAVE PEOPLE ANNOYED YOU BY CRITICIZING YOUR DRINKING: NO
EVER FELT BAD OR GUILTY ABOUT YOUR DRINKING: NO
TOTAL SCORE: 0
AVERAGE NUMBER OF DAYS PER WEEK YOU HAVE A DRINK CONTAINING ALCOHOL: 0
TOTAL SCORE: 0
DOES PATIENT WANT TO STOP DRINKING: NO
EVER HAD A DRINK FIRST THING IN THE MORNING TO STEADY YOUR NERVES TO GET RID OF A HANGOVER: NO
CONSUMPTION TOTAL: POSITIVE
TOTAL SCORE: 0
ALCOHOL_USE: YES
HAVE YOU EVER FELT YOU SHOULD CUT DOWN ON YOUR DRINKING: NO
ON A TYPICAL DAY WHEN YOU DRINK ALCOHOL HOW MANY DRINKS DO YOU HAVE: 1

## 2020-09-14 ASSESSMENT — COGNITIVE AND FUNCTIONAL STATUS - GENERAL
MOBILITY SCORE: 24
SUGGESTED CMS G CODE MODIFIER MOBILITY: CH
SUGGESTED CMS G CODE MODIFIER DAILY ACTIVITY: CH
DAILY ACTIVITIY SCORE: 24

## 2020-09-14 ASSESSMENT — PAIN DESCRIPTION - PAIN TYPE: TYPE: ACUTE PAIN

## 2020-09-14 ASSESSMENT — FIBROSIS 4 INDEX: FIB4 SCORE: 1.55

## 2020-09-14 NOTE — PROGRESS NOTES
New admit from er per michael, alert and oriented x 4, kept NPO with ice chips, still c/o mid abdominal pain radiating at the right flank pain 6/10 pain level, denies n/v, due med given as ordered and ivf continued, call light within reach, encourage to call for assistance, steady on her feet,  needs attended, will continue to monitor abdominal pain and n/v.

## 2020-09-14 NOTE — DIETARY
Nutrition services: consult for pt with malnutrition score of 3 on admitting screen. Pt is noted to be unsure of how much weight she has lost over 1 month and has a decreased appetite secondary to taking diet pills. Currently NPO with abdominal pain noted to likely be a retained gallstone. No nutritional concerns at this time.

## 2020-09-14 NOTE — ED NOTES
Pt ambulatory to room from lobby, holding right upper quadrant. Pt offered wheelchair but declines at this time. Pt able to provide urine sample on the way.

## 2020-09-14 NOTE — CARE PLAN
Problem: Communication  Goal: The ability to communicate needs accurately and effectively will improve  Outcome: PROGRESSING AS EXPECTED  Note: Pt uses call light appropriately to notify RN of pain and needs when they arise. Will continue to monitor.      Problem: Safety  Goal: Will remain free from falls  Outcome: PROGRESSING AS EXPECTED  Note: Pt educated on fall precautions. Pt wearing non slip socks, bed locked in lowest position, call light and belongings within reach, hourly rounding place.

## 2020-09-14 NOTE — H&P
"Hospital Medicine History & Physical Note    Date of Service  9/14/2020    Primary Care Physician  TANISHA Gao    Consultants      Code Status  Full Code    Chief Complaint  Chief Complaint   Patient presents with   • RUQ Pain     Pain starts in the RUQ and radiates to back   • Flank Pain     \"Feels like a hot knife is right there\"        History of Presenting Illness  35 y.o. female who presented 9/13/2020 with 48 hours of 5 out of 5 colicky right upper quadrant pain reminiscent of the pain that prompted a cholecystectomy in June 2020.  Is been associated with fever, chills, nausea without vomiting and loose stools.  In the emergency department she is found to have leukocytosis, elevated LFTs and a bilirubin of 3.3.  CT abdomen reveals gallbladder fossa fluid collection of 2.7 x 0.9 x 1.7 concerning for biliary leak or seroma.  She received symptomatic management, IV Rocephin and is referred to the hospitalist for admission  She denies recent antibiotic use and is otherwise been feeling well.    review of Systems  Review of Systems   All other systems reviewed and are negative.      Past Medical History   has a past medical history of GERD (gastroesophageal reflux disease), Infectious disease, and Pap smear (4/28/9).    Surgical History   has a past surgical history that includes dental extraction(s) (2005); acl reconstruction scope (5/31/2012); medial meniscectomy (5/31/2012); primary c section (3/24/2013); and elpidio by laparoscopy (6/20/2020).     Family History  family history includes Diabetes in her father, maternal grandfather, and paternal grandmother; Hypertension in her maternal grandmother; Stroke in her maternal grandmother.     Social History   reports that she has never smoked. She has never used smokeless tobacco. She reports current alcohol use of about 1.2 oz of alcohol per week. She reports current drug use. Drugs: Marijuana and Oral.    Allergies  No Known " Allergies    Medications  Prior to Admission Medications   Prescriptions Last Dose Informant Patient Reported? Taking?   HYDROcodone-acetaminophen (NORCO) 5-325 MG Tab per tablet 9/13/2020 at 2100  No No   Sig: Take 1-2 Tabs by mouth every four hours as needed for up to 3 days.   omeprazole (PRILOSEC) 20 MG delayed-release capsule 9/13/2020 at Unknown time  Yes No   Sig: TAKE 1 CAPSULE BY MOUTH ONCE DAILY BEFORE A MEAL FOR 30 DAYS   ondansetron (ZOFRAN ODT) 4 MG TABLET DISPERSIBLE   No No   Sig: Take 1 Tab by mouth every four hours as needed.   phentermine 37.5 MG capsule   Yes No   Sig: TAKE 1 CAPSULE BY MOUTH ONCE DAILY BEFORE BREAKFAST FOR 30 DAYS      Facility-Administered Medications: None       Physical Exam  Temp:  [36.2 °C (97.2 °F)] 36.2 °C (97.2 °F)  Pulse:  [63-80] 63  Resp:  [16-23] 20  BP: (121-134)/(64-84) 121/71  SpO2:  [93 %-98 %] 94 %    Physical Exam  Vitals signs and nursing note reviewed.   Constitutional:       General: She is not in acute distress.     Appearance: Normal appearance. She is obese. She is ill-appearing. She is not toxic-appearing.   HENT:      Head: Normocephalic and atraumatic.      Nose: Nose normal. No congestion or rhinorrhea.      Mouth/Throat:      Mouth: Mucous membranes are moist.      Pharynx: Oropharynx is clear.   Eyes:      Extraocular Movements: Extraocular movements intact.      Conjunctiva/sclera: Conjunctivae normal.      Pupils: Pupils are equal, round, and reactive to light.   Neck:      Musculoskeletal: Normal range of motion and neck supple. No muscular tenderness.      Vascular: No carotid bruit.   Cardiovascular:      Rate and Rhythm: Normal rate and regular rhythm.      Pulses: Normal pulses.      Heart sounds: Normal heart sounds. No murmur. No gallop.    Pulmonary:      Effort: No respiratory distress.      Breath sounds: Normal breath sounds. No wheezing or rales.   Abdominal:      General: Abdomen is flat. Bowel sounds are normal. There is no  distension.      Palpations: Abdomen is soft. There is no mass.      Tenderness: There is abdominal tenderness. There is no guarding or rebound.      Hernia: No hernia is present.   Musculoskeletal:         General: No tenderness or signs of injury.   Lymphadenopathy:      Cervical: No cervical adenopathy.   Skin:     Capillary Refill: Capillary refill takes less than 2 seconds.      Coloration: Skin is not jaundiced or pale.      Findings: No bruising.   Neurological:      General: No focal deficit present.      Mental Status: She is alert and oriented to person, place, and time. Mental status is at baseline.      Cranial Nerves: No cranial nerve deficit.      Motor: No weakness.      Coordination: Coordination normal.   Psychiatric:         Mood and Affect: Mood normal.         Thought Content: Thought content normal.         Judgment: Judgment normal.         Laboratory:  Recent Labs     09/12/20  1229 09/13/20  2326   WBC 12.6* 12.9*   RBC 4.67 4.60   HEMOGLOBIN 13.8 13.4   HEMATOCRIT 43.1 42.6   MCV 92.3 92.6   MCH 29.6 29.1   MCHC 32.0* 31.5*   RDW 45.8 46.2   PLATELETCT 322 318   MPV 10.6 10.4     Recent Labs     09/12/20  1229 09/13/20  2326   SODIUM 136 133*   POTASSIUM 4.8 4.0   CHLORIDE 100 99   CO2 25 22   GLUCOSE 89 129*   BUN 15 8   CREATININE 0.66 0.71   CALCIUM 8.8 8.8     Recent Labs     09/12/20  1229 09/13/20  2326   ALTSGPT 151* 316*   ASTSGOT 338* 257*   ALKPHOSPHAT 369* 357*   TBILIRUBIN 0.8 3.3*   LIPASE 43 32   GLUCOSE 89 129*         No results for input(s): NTPROBNP in the last 72 hours.      No results for input(s): TROPONINT in the last 72 hours.    Imaging:  CT-ABDOMEN-PELVIS WITH   Final Result      Prior cholecystectomy with trace fluid in the gallbladder fossa which could be hematoma or seroma or less likely small abscess or bile leak               [id number]    (Results Pending)         Assessment/Plan:  I anticipate this patient will require at least two midnights for  appropriate medical management, necessitating inpatient admission.    Choledocholithiasis  Assessment & Plan  N.p.o., Zosyn, symptomatic management, IV fluid challenge,  Follow-up MRCP, obtain GI versus surgical consultation in the a.m.    Right upper quadrant pain  Assessment & Plan  Secondary to #1, Toradol and/or fentanyl as needed

## 2020-09-14 NOTE — ED TRIAGE NOTES
"Kinza Francisna   35 y.o. female     Chief Complaint   Patient presents with   • RUQ Pain     Pain starts in the RUQ and radiates to back   • Flank Pain     \"Feels like a hot knife is right there\"       Pt amb to triage with steady gait for above complaint.     Pain started around 2000 tonight.  Back pain has been present all day. Patient was seen yesterday here, the pain have moved to the right.  Pain is worse and different than last visit.      Pt is alert and oriented, speaking in full sentences, follows commands and responds appropriately to questions. NAD. Resp are even and unlabored.     Pt placed in lobby. Pt educated on triage process. Pt encouraged to alert staff for any changes.    /84   Pulse 79   Temp 36.2 °C (97.2 °F) (Temporal)   Resp 16   Ht 1.676 m (5' 6\")   Wt 120.2 kg (264 lb 15.9 oz)   SpO2 98%   BMI 42.77 kg/m²     "

## 2020-09-14 NOTE — CARE PLAN
Problem: Knowledge Deficit  Goal: Knowledge of disease process/condition, treatment plan, diagnostic tests, and medications will improve  Note: Treatment plan discussed, pt aware of the plan.     Problem: Discharge Barriers/Planning  Goal: Patient's continuum of care needs will be met  Note: Due med given for pain and reassessed after 2 hours per protocol.

## 2020-09-14 NOTE — ED NOTES
Pt aox4, skin pink warm and dry, airway patent, rr even and unlabored, nad noted. No new complaints. Vss. Pt transports with transport team without distress.

## 2020-09-14 NOTE — ASSESSMENT & PLAN NOTE
N.p.o., Zosyn, symptomatic management, IV fluid challenge,  Follow-up MRCP, obtain GI versus surgical consultation in the a.m.  9/15: MRCP showed 6 mm obstructing stone in the mid CBD with dilated proximal CBD up to 10 mm.  Discussed with GI Dr. Kelly.  Planning for ERCP in the a.m.  9/16: Status post ERCP with CBD and PD plastic stent placement.  Continue to trend LFTs.  Start clears and advance as tolerated.  Check lipase tomorrow.  Continue antibiotics.

## 2020-09-14 NOTE — H&P
Surgery General History & Physical Note    Date  9/14/2020    Primary Care Physician  TAMIR Gao.    Patient of Dr. Flowers, s/p Laparoscopic Cholecystectomy 6/2020.  General Surgeon covering     HPI  This is a 35 y.o. female who presented with epigastric and right upper quadrant pain with nausea.  She describes the pain as sharp, 10/10 pain that radiates to her back.  She has been having milder similar symptoms since her surgery.  Currently there are no modifying factors.  She denies having darkened urine, denies hematemesis or dark tarry stools.    USD shows CBD dilated to 6.2 mm  CT shows trace fluid in the gallbladder fossa.      Past Medical History:   Diagnosis Date   • GERD (gastroesophageal reflux disease)     h pylori 12/2015   • Infectious disease     to the flu   • Pap smear 4/28/9    Normal       Past Surgical History:   Procedure Laterality Date   • JACQUI BY LAPAROSCOPY  6/20/2020    Procedure: CHOLECYSTECTOMY, LAPAROSCOPIC;  Surgeon: Iftikhar Flowers M.D.;  Location: SURGERY [facility];  Service: General   • PRIMARY C SECTION  3/24/2013    Performed by Briana Cano M.D. at LABOR AND DELIVERY   • ACL RECONSTRUCTION SCOPE  5/31/2012    Performed by CAITY DORSEY at SURGERY [facility]   • MEDIAL MENISCECTOMY  5/31/2012    Performed by CAITY DORSEY at [facility]   • DENTAL EXTRACTION(S)  2005       Current Facility-Administered Medications   Medication Dose Route Frequency Provider Last Rate Last Dose   • senna-docusate (PERICOLACE or SENOKOT S) 8.6-50 MG per tablet 2 Tab  2 Tab Oral BID Carlos Rivera M.D.   Stopped at 09/14/20 0600    And   • polyethylene glycol/lytes (MIRALAX) PACKET 1 Packet  1 Packet Oral QDAY PRN Carlos Rivera M.D.        And   • magnesium hydroxide (MILK OF MAGNESIA) suspension 30 mL  30 mL Oral QDAY PRN Carlos Rivera M.D.        And   • bisacodyl (DULCOLAX) suppository 10 mg  10 mg Rectal QDAY PRN Carlos Rivera M.D.       • NS infusion    Intravenous Continuous Carlos Rivera M.D. 200 mL/hr at 09/14/20 0300     • heparin injection 5,000 Units  5,000 Units Subcutaneous Q8HRS Carlos Rivera M.D.   Stopped at 09/14/20 0600   • enalaprilat (VASOTEC) injection 1.25 mg  1.25 mg Intravenous Q6HRS PRN Carlos Rivera M.D.       • piperacillin-tazobactam (ZOSYN) 4.5 g in  mL IVPB  4.5 g Intravenous Q8HRS Carlos Rivera M.D.   Stopped at 09/14/20 1019   • ketorolac (TORADOL) injection 15 mg  15 mg Intravenous Q6HRS PRN Carlos Rivera M.D.   15 mg at 09/14/20 1149   • ondansetron (ZOFRAN) syringe/vial injection 4 mg  4 mg Intravenous Q4HRS PRN Guillaume Romero M.D.   4 mg at 09/14/20 1147   • ondansetron (ZOFRAN ODT) dispertab 4 mg  4 mg Oral Q4HRS PRN Guillaume Romero M.D.           Social History     Socioeconomic History   • Marital status:      Spouse name: Not on file   • Number of children: Not on file   • Years of education: Not on file   • Highest education level: Not on file   Occupational History   • Not on file   Social Needs   • Financial resource strain: Not on file   • Food insecurity     Worry: Not on file     Inability: Not on file   • Transportation needs     Medical: Not on file     Non-medical: Not on file   Tobacco Use   • Smoking status: Never Smoker   • Smokeless tobacco: Never Used   Substance and Sexual Activity   • Alcohol use: Yes     Alcohol/week: 1.2 oz     Types: 2 Standard drinks or equivalent per week     Comment: occ   • Drug use: Yes     Types: Marijuana, Oral     Comment: occ   • Sexual activity: Yes     Partners: Male     Birth control/protection: I.U.D.     Comment: , with boyfriend   Lifestyle   • Physical activity     Days per week: Not on file     Minutes per session: Not on file   • Stress: Not on file   Relationships   • Social connections     Talks on phone: Not on file     Gets together: Not on file     Attends Taoism service: Not on file     Active member of club or organization: Not on file      Attends meetings of clubs or organizations: Not on file     Relationship status: Not on file   • Intimate partner violence     Fear of current or ex partner: Not on file     Emotionally abused: Not on file     Physically abused: Not on file     Forced sexual activity: Not on file   Other Topics Concern   •  Service No   • Blood Transfusions No   • Caffeine Concern No   • Occupational Exposure No   • Hobby Hazards No   • Sleep Concern No   • Stress Concern No   • Weight Concern Yes   • Special Diet No   • Back Care No   • Exercise No   • Bike Helmet No   • Seat Belt Yes   • Self-Exams No   Social History Narrative   • Not on file       Family History   Problem Relation Age of Onset   • Diabetes Paternal Grandmother    • Diabetes Father    • Stroke Maternal Grandmother    • Hypertension Maternal Grandmother    • Diabetes Maternal Grandfather    • Cancer Neg Hx        Allergies  Patient has no known allergies.    Review of Systems    All other review of systems, on a twelve point review according to AMA guidelines, were negative except for that stated in the HPI.    Physical Exam  Vitals signs reviewed.   Constitutional:       General: She is in acute distress.      Appearance: She is obese.   HENT:      Head: Normocephalic and atraumatic.      Mouth/Throat:      Mouth: Mucous membranes are dry.   Eyes:      General: No scleral icterus.     Pupils: Pupils are equal, round, and reactive to light.   Neck:      Musculoskeletal: Normal range of motion.   Cardiovascular:      Rate and Rhythm: Normal rate and regular rhythm.      Pulses: Normal pulses.      Heart sounds: Normal heart sounds.   Pulmonary:      Breath sounds: Normal breath sounds. No stridor. No wheezing.   Abdominal:      Palpations: Abdomen is soft.      Tenderness: There is no abdominal tenderness.      Comments: There is tenderness RUQ with deep palpation  No rebound or guarding   Skin:     General: Skin is warm and dry.   Neurological:       General: No focal deficit present.      Mental Status: She is alert and oriented to person, place, and time.   Psychiatric:         Mood and Affect: Mood normal.         Judgment: Judgment normal.         Vital Signs  Blood Pressure: 112/62   Temperature: 37 °C (98.6 °F)   Pulse: 73   Respiration: 17   Pulse Oximetry: 99 %       Labs:  Recent Labs     09/12/20  1229 09/13/20  2326   WBC 12.6* 12.9*   RBC 4.67 4.60   HEMOGLOBIN 13.8 13.4   HEMATOCRIT 43.1 42.6   MCV 92.3 92.6   MCH 29.6 29.1   MCHC 32.0* 31.5*   RDW 45.8 46.2   PLATELETCT 322 318   MPV 10.6 10.4     Recent Labs     09/12/20 1229 09/13/20  2326   SODIUM 136 133*   POTASSIUM 4.8 4.0   CHLORIDE 100 99   CO2 25 22   GLUCOSE 89 129*   BUN 15 8   CREATININE 0.66 0.71   CALCIUM 8.8 8.8         Recent Labs     09/12/20  1229 09/13/20  2326   ASTSGOT 338* 257*   ALTSGPT 151* 316*   TBILIRUBIN 0.8 3.3*   ALKPHOSPHAT 369* 357*   GLOBULIN 3.4 3.3       Radiology:  CT-ABDOMEN-PELVIS WITH   Final Result      Prior cholecystectomy with trace fluid in the gallbladder fossa which could be hematoma or seroma or less likely small abscess or bile leak               [id number]    (Results Pending)         Assessment/Plan:  S/p Laparoscopic Cholecystectomy, Acute onset pain with mildly elevated T. Bilirubin, elevated AST/ALT.  CBD mildly dilated on USD.  Likely retained gallstone.   Patient does not have peritonitis, this far out from surgery bile leak is unlikely.  Keep patient NPO for now    MRCP pending.    Will follow up on results.

## 2020-09-14 NOTE — PROGRESS NOTES
I saw and examined the patient today.  Patient has recent history of cholecystectomy by Dr. love.  Came in again with abdominal pain.  Currently pending MRI abdomen and depending on the results we will need to consult either surgery or GI.

## 2020-09-14 NOTE — ED PROVIDER NOTES
"ED Provider Note    Scribed for Cesar Mg M.D. by Marquez Santamaria. 9/13/2020  11:11 PM    Primary care provider: TANISHA Gao  Means of arrival: Walk-in  History obtained from: Patient  History limited by: None    CHIEF COMPLAINT  Chief Complaint   Patient presents with   • RUQ Pain     Pain starts in the RUQ and radiates to back   • Flank Pain     \"Feels like a hot knife is right there\"        HPI  Kinza Juan is a 35 y.o. female who presents to the Emergency Department for sharp right sided abdominal pain that radiates to the right side of her back onset around 8:00 PM tonight. Patient states that she has been experiencing shaking, chills, and decreased appetite. Patient describes her recent bowel movements as \"greasy\". Patient was seen at [facility] yesterday for similar symptoms, but patient states that the pain is worse at this time and has moved more to the right side. Patient states that her symptoms are different than kidney stone pain which she has experienced before. Patient denies any fever, hematuria, or vomiting. Patient denies any chance of pregnancy. Patient denies any alcohol use. Patient states that her surgery was performed by Dr. Flowers.     PPE Note: I personally donned full PPE for all patient encounters during this visit, including being clean-shaven with an N95 respirator mask, gloves, and goggles.     Scribe remained outside the patient's room and did not have any contact with the patient for the duration of patient encounter.     REVIEW OF SYSTEMS  Pertinent positives include: abdominal pain, shaking, chills, and decreased appetite. Pertinent negatives include: fever, hematuria, or vomiting. See history of present illness. All other systems are negative.     PAST MEDICAL HISTORY   has a past medical history of GERD (gastroesophageal reflux disease), Infectious disease, and Pap smear (4/28/9).    SURGICAL HISTORY   has a past surgical history that includes dental " "extraction(s) (2005); acl reconstruction scope (5/31/2012); medial meniscectomy (5/31/2012); primary c section (3/24/2013); and elpidio by laparoscopy (6/20/2020).    SOCIAL HISTORY  Social History     Tobacco Use   • Smoking status: Never Smoker   • Smokeless tobacco: Never Used   Substance Use Topics   • Alcohol use: Yes     Alcohol/week: 1.2 oz     Types: 2 Standard drinks or equivalent per week     Comment: occ   • Drug use: Yes     Types: Marijuana, Oral     Comment: occ      Social History     Substance and Sexual Activity   Drug Use Yes   • Types: Marijuana, Oral    Comment: occ       FAMILY HISTORY  Family History   Problem Relation Age of Onset   • Diabetes Paternal Grandmother    • Diabetes Father    • Stroke Maternal Grandmother    • Hypertension Maternal Grandmother    • Diabetes Maternal Grandfather    • Cancer Neg Hx        CURRENT MEDICATIONS  Home Medications     Reviewed by Reggie Jaeger R.N. (Registered Nurse) on 09/13/20 at 2241  Med List Status: Partial   Medication Last Dose Status   HYDROcodone-acetaminophen (NORCO) 5-325 MG Tab per tablet 9/13/2020 Active   omeprazole (PRILOSEC) 20 MG delayed-release capsule 9/13/2020 Active   ondansetron (ZOFRAN ODT) 4 MG TABLET DISPERSIBLE  Active   phentermine 37.5 MG capsule  Active                ALLERGIES  No Known Allergies    PHYSICAL EXAM  VITAL SIGNS: /84   Pulse 79   Temp 36.2 °C (97.2 °F) (Temporal)   Resp 16   Ht 1.676 m (5' 6\")   Wt 120.2 kg (264 lb 15.9 oz)   SpO2 98%   BMI 42.77 kg/m²   Constitutional: Alert, uncomfortable appearing.   HENT: No signs of trauma, Bilateral external ears normal, Nose normal. Uvula midline.   Eyes: Pupils are equal and reactive, Conjunctiva normal, Non-icteric.   Neck: Normal range of motion, No tenderness, Supple, No stridor.   Lymphatic: No lymphadenopathy noted.   Cardiovascular: Regular rate and rhythm, no murmurs.   Thorax & Lungs: Normal breath sounds, No respiratory distress, No " wheezing, No chest tenderness.   Abdomen:  Right upper quadrant tenderness to palpation, mid epigastric tenderness to palpation, no lower abdominal tenderness to palpation. Soft, No peritoneal signs, No masses, No pulsatile masses.   Skin: Warm, Dry, No erythema, No rash.   Back: No bony tenderness, No CVA tenderness.   Extremities: Intact distal pulses, No edema, No tenderness, No cyanosis.  Musculoskeletal: Good range of motion in all major joints. No tenderness to palpation or major deformities noted.   Neurologic: Alert , Normal motor function, Normal sensory function, No focal deficits noted.   Psychiatric: Affect normal, Judgment normal, Mood normal.     DIAGNOSTIC STUDIES / PROCEDURES    LABS  Labs Reviewed   CBC WITH DIFFERENTIAL - Abnormal; Notable for the following components:       Result Value    WBC 12.9 (*)     MCHC 31.5 (*)     Neutrophils-Polys 75.70 (*)     Lymphocytes 14.80 (*)     Neutrophils (Absolute) 9.74 (*)     Monos (Absolute) 0.91 (*)     All other components within normal limits   COMP METABOLIC PANEL - Abnormal; Notable for the following components:    Sodium 133 (*)     Glucose 129 (*)     AST(SGOT) 257 (*)     ALT(SGPT) 316 (*)     Alkaline Phosphatase 357 (*)     Total Bilirubin 3.3 (*)     All other components within normal limits   LIPASE   LACTIC ACID   HCG QUAL SERUM   COVID/SARS COV-2   ESTIMATED GFR      All labs reviewed by me.    RADIOLOGY  CT-ABDOMEN-PELVIS WITH   Final Result      Prior cholecystectomy with trace fluid in the gallbladder fossa which could be hematoma or seroma or less likely small abscess or bile leak                 The radiologist's interpretation of all radiological studies have been reviewed by me.    COURSE & MEDICAL DECISION MAKING  Nursing notes, VS, PMSFHx reviewed in chart.    35 y.o. female p/w chief complaint of abdominal pain.    11:11 PM Patient seen and examined at bedside. Reviewed patient's ultrasound results from yesterday which showed post  cholecystectomy, liver is mildly enlarged, and no other abnormalities. Labs and imaging ordered. Patient treated with morphine injection 4 mg and Zofran injection 4 mg.     I verified that the patient was wearing a mask and I was wearing appropriate PPE every time I entered the room. The patient's mask was on the patient at all times during my encounter except for a brief view of the oropharynx.     The differential diagnoses include but are not limited to:   #abdominal pain     CT scan of abdomen ordered in order to rule out obvious retained stone vs mass vs abscess    No RLQ pain, TTP or fever to suggest appendicitis  No LLQ pain or TTP or fever to suggest diverticulitis  No pain at of proportion to suggest mesenteric ischemia  No e/o rash or zoster  No e/o UTI  No elevation in lipase to suggest pancreatitis  ECG unremarkable and no chest pain to suggest intrathoracic etiology of abd pain    I reviewed the images from ultrasound of abdomen yesterday   Patient describes pain is unbearable at home and given intractable pain along with abnormal LFTs plan for admit for urgent MRCP.    1:45 AM Paged Dr. Flowers. Patient treated with Morphine injection 4 mg.   1:54 AM I discussed the patient's case and the above findings with Dr. Green for Dr. Flowers who recommended MRCP.   2:22 AM I discussed the patient's case and the above findings with Dr. Rivera (Hospitalist) who agreed to hospitalize the patient.  2:25 AM Patient was reevaluated at bedside. Discussed lab and radiology results with the patient and informed them that they are to be hospitalized. Patient verbalized her understanding and agreement with the plan of hospitalization.       DISPOSITION:  Patient will be hospitalized by Dr. Rivera in guarded condition.      FINAL IMPRESSION  1. Abnormal LFTs    2. Acute abdominal pain          Marquez [facility]) am scribing for, and in the presence of, Cesar Mg M.D..    Electronically signed by: Marquez  Hina (Scribe), 9/13/2020    ICesar M.D. personally performed the services described in this documentation, as scribed by Marquez Santamaria in my presence, and it is both accurate and complete. C    The note accurately reflects work and decisions made by me.  Cesar Mg M.D.  9/14/2020  6:59 AM

## 2020-09-14 NOTE — PROGRESS NOTES
· 2 RN skin check completed with Jim CARMEN.  · Devices in place eyeglasses, iv access and 02 silicone tubing .  · Skin assessed under devices yes.  · Confirmed pressure ulcers found on N/A.  · New potential pressure ulcers noted on N/A. Wound consult placed? N/A. Photo uploaded? N/A.   · The following interventions are in place extra pillow for comfort and encouraged to turn self side to side .          General skin status: fair and intact except a bruise from blood draw at her left       arm.

## 2020-09-15 LAB
ALBUMIN SERPL BCP-MCNC: 3.4 G/DL (ref 3.2–4.9)
ALBUMIN/GLOB SERPL: 1.1 G/DL
ALP SERPL-CCNC: 289 U/L (ref 30–99)
ALT SERPL-CCNC: 211 U/L (ref 2–50)
ANION GAP SERPL CALC-SCNC: 15 MMOL/L (ref 7–16)
AST SERPL-CCNC: 89 U/L (ref 12–45)
BASOPHILS # BLD AUTO: 0.4 % (ref 0–1.8)
BASOPHILS # BLD: 0.03 K/UL (ref 0–0.12)
BILIRUB SERPL-MCNC: 4 MG/DL (ref 0.1–1.5)
BUN SERPL-MCNC: 5 MG/DL (ref 8–22)
CALCIUM SERPL-MCNC: 8.4 MG/DL (ref 8.5–10.5)
CHLORIDE SERPL-SCNC: 100 MMOL/L (ref 96–112)
CO2 SERPL-SCNC: 21 MMOL/L (ref 20–33)
CREAT SERPL-MCNC: 0.46 MG/DL (ref 0.5–1.4)
EOSINOPHIL # BLD AUTO: 0.17 K/UL (ref 0–0.51)
EOSINOPHIL NFR BLD: 2.1 % (ref 0–6.9)
ERYTHROCYTE [DISTWIDTH] IN BLOOD BY AUTOMATED COUNT: 47.3 FL (ref 35.9–50)
GLOBULIN SER CALC-MCNC: 3.1 G/DL (ref 1.9–3.5)
GLUCOSE SERPL-MCNC: 102 MG/DL (ref 65–99)
HCT VFR BLD AUTO: 42.4 % (ref 37–47)
HGB BLD-MCNC: 13.5 G/DL (ref 12–16)
IMM GRANULOCYTES # BLD AUTO: 0.04 K/UL (ref 0–0.11)
IMM GRANULOCYTES NFR BLD AUTO: 0.5 % (ref 0–0.9)
LYMPHOCYTES # BLD AUTO: 1.59 K/UL (ref 1–4.8)
LYMPHOCYTES NFR BLD: 19.2 % (ref 22–41)
MCH RBC QN AUTO: 29.5 PG (ref 27–33)
MCHC RBC AUTO-ENTMCNC: 31.8 G/DL (ref 33.6–35)
MCV RBC AUTO: 92.6 FL (ref 81.4–97.8)
MONOCYTES # BLD AUTO: 0.67 K/UL (ref 0–0.85)
MONOCYTES NFR BLD AUTO: 8.1 % (ref 0–13.4)
NEUTROPHILS # BLD AUTO: 5.79 K/UL (ref 2–7.15)
NEUTROPHILS NFR BLD: 69.7 % (ref 44–72)
NRBC # BLD AUTO: 0 K/UL
NRBC BLD-RTO: 0 /100 WBC
PLATELET # BLD AUTO: 271 K/UL (ref 164–446)
PMV BLD AUTO: 10.7 FL (ref 9–12.9)
POTASSIUM SERPL-SCNC: 4 MMOL/L (ref 3.6–5.5)
PROT SERPL-MCNC: 6.5 G/DL (ref 6–8.2)
RBC # BLD AUTO: 4.58 M/UL (ref 4.2–5.4)
SODIUM SERPL-SCNC: 136 MMOL/L (ref 135–145)
WBC # BLD AUTO: 8.3 K/UL (ref 4.8–10.8)

## 2020-09-15 PROCEDURE — 700105 HCHG RX REV CODE 258: Performed by: INTERNAL MEDICINE

## 2020-09-15 PROCEDURE — 700102 HCHG RX REV CODE 250 W/ 637 OVERRIDE(OP): Performed by: FAMILY MEDICINE

## 2020-09-15 PROCEDURE — A9270 NON-COVERED ITEM OR SERVICE: HCPCS | Performed by: FAMILY MEDICINE

## 2020-09-15 PROCEDURE — 36415 COLL VENOUS BLD VENIPUNCTURE: CPT

## 2020-09-15 PROCEDURE — 700102 HCHG RX REV CODE 250 W/ 637 OVERRIDE(OP): Performed by: INTERNAL MEDICINE

## 2020-09-15 PROCEDURE — 99233 SBSQ HOSP IP/OBS HIGH 50: CPT | Performed by: FAMILY MEDICINE

## 2020-09-15 PROCEDURE — 700111 HCHG RX REV CODE 636 W/ 250 OVERRIDE (IP): Performed by: INTERNAL MEDICINE

## 2020-09-15 PROCEDURE — 770006 HCHG ROOM/CARE - MED/SURG/GYN SEMI*

## 2020-09-15 PROCEDURE — 80053 COMPREHEN METABOLIC PANEL: CPT

## 2020-09-15 PROCEDURE — A9270 NON-COVERED ITEM OR SERVICE: HCPCS | Performed by: INTERNAL MEDICINE

## 2020-09-15 PROCEDURE — 85025 COMPLETE CBC W/AUTO DIFF WBC: CPT

## 2020-09-15 RX ORDER — OXYCODONE HYDROCHLORIDE 5 MG/1
5-10 TABLET ORAL EVERY 4 HOURS PRN
Status: DISCONTINUED | OUTPATIENT
Start: 2020-09-15 | End: 2020-09-17 | Stop reason: HOSPADM

## 2020-09-15 RX ADMIN — PIPERACILLIN SODIUM AND TAZOBACTAM SODIUM 4.5 G: 4; .5 INJECTION, POWDER, LYOPHILIZED, FOR SOLUTION INTRAVENOUS at 20:16

## 2020-09-15 RX ADMIN — KETOROLAC TROMETHAMINE 15 MG: 30 INJECTION, SOLUTION INTRAMUSCULAR at 06:12

## 2020-09-15 RX ADMIN — OXYCODONE HYDROCHLORIDE 5 MG: 5 TABLET ORAL at 11:53

## 2020-09-15 RX ADMIN — PIPERACILLIN SODIUM AND TAZOBACTAM SODIUM 4.5 G: 4; .5 INJECTION, POWDER, LYOPHILIZED, FOR SOLUTION INTRAVENOUS at 13:37

## 2020-09-15 RX ADMIN — PIPERACILLIN SODIUM AND TAZOBACTAM SODIUM 4.5 G: 4; .5 INJECTION, POWDER, LYOPHILIZED, FOR SOLUTION INTRAVENOUS at 04:52

## 2020-09-15 RX ADMIN — DOCUSATE SODIUM 50 MG AND SENNOSIDES 8.6 MG 2 TABLET: 8.6; 5 TABLET, FILM COATED ORAL at 17:48

## 2020-09-15 RX ADMIN — OXYCODONE HYDROCHLORIDE 5 MG: 5 TABLET ORAL at 16:15

## 2020-09-15 RX ADMIN — OXYCODONE HYDROCHLORIDE 5 MG: 5 TABLET ORAL at 20:16

## 2020-09-15 ASSESSMENT — ENCOUNTER SYMPTOMS
NEUROLOGICAL NEGATIVE: 1
HEMOPTYSIS: 0
SPUTUM PRODUCTION: 0
NAUSEA: 1
RESPIRATORY NEGATIVE: 1
BLURRED VISION: 0
CHILLS: 1
EYES NEGATIVE: 1
NECK PAIN: 0
VOMITING: 1
ABDOMINAL PAIN: 1
PSYCHIATRIC NEGATIVE: 1
DIZZINESS: 0
BACK PAIN: 0
CARDIOVASCULAR NEGATIVE: 1
DOUBLE VISION: 0
COUGH: 0
HEADACHES: 0
MUSCULOSKELETAL NEGATIVE: 1
HEARTBURN: 0
MYALGIAS: 0
TINGLING: 0
PALPITATIONS: 0
FEVER: 0
DEPRESSION: 0

## 2020-09-15 ASSESSMENT — PAIN DESCRIPTION - PAIN TYPE
TYPE: ACUTE PAIN

## 2020-09-15 ASSESSMENT — LIFESTYLE VARIABLES: SUBSTANCE_ABUSE: 0

## 2020-09-15 NOTE — PROGRESS NOTES
[facility] Medicine Daily Progress Note    Date of Service  9/15/2020    Chief Complaint  35 y.o. female admitted 9/13/2020 with abdominal pain.    Hospital Course  This is a 35 years old female who has past medical history of morbid obesity and recent lap elpidio done on June 2020 comes in with abdominal pain associated with nausea and vomiting.  Noted to have elevated LFTs and bilirubin.  Abdominal ultrasound showed dilated CBD up to 6.2 mm.  MRCP showed 6 mm obstructing mid CBD stone.  GI consulted.  Planning for ERCP.  Interval Problem Update  Resting in bed comfortably.  No nausea or vomiting this morning.  Abdominal pain fairly controlled.  Has been afebrile.  Hemodynamically stable.  Planning for ERCP tomorrow morning.  No acute distress noted.  No issues overnight per staff.    Consultants/Specialty  GI Dr. Kelly  General surgery    Code Status  Full Code    Disposition  Likely home once medically cleared    Review of Systems  Review of Systems   Constitutional: Positive for chills. Negative for fever.   HENT: Negative for ear pain, hearing loss and tinnitus.    Eyes: Negative for blurred vision and double vision.   Respiratory: Negative for cough, hemoptysis and sputum production.    Cardiovascular: Negative for chest pain and palpitations.   Gastrointestinal: Positive for abdominal pain, nausea and vomiting. Negative for heartburn.   Genitourinary: Negative for dysuria, frequency and urgency.   Musculoskeletal: Negative for back pain, myalgias and neck pain.   Skin: Negative for rash.   Neurological: Negative for dizziness, tingling and headaches.   Psychiatric/Behavioral: Negative for depression, substance abuse and suicidal ideas.        Physical Exam  Temp:  [36.6 °C (97.8 °F)-37.2 °C (99 °F)] 37.2 °C (99 °F)  Pulse:  [64-74] 64  Resp:  [16-18] 17  BP: (107-122)/(59-66) 122/66  SpO2:  [93 %-99 %] 95 %    Physical Exam  Constitutional:       General: She is not in acute distress.     Appearance: She is  obese. She is ill-appearing.   HENT:      Head: Normocephalic and atraumatic.   Eyes:      General: Scleral icterus present.      Extraocular Movements: Extraocular movements intact.      Pupils: Pupils are equal, round, and reactive to light.   Cardiovascular:      Rate and Rhythm: Normal rate and regular rhythm.      Heart sounds: No friction rub. No gallop.    Pulmonary:      Effort: Pulmonary effort is normal. No respiratory distress.   Abdominal:      General: There is no distension.      Tenderness: There is abdominal tenderness in the epigastric area.   Skin:     Coloration: Skin is jaundiced. Skin is not pale.   Neurological:      General: No focal deficit present.      Mental Status: She is alert and oriented to person, place, and time.   Psychiatric:         Mood and Affect: Mood normal.         Behavior: Behavior normal.         Fluids  No intake or output data in the 24 hours ending 09/15/20 1127    Laboratory  Recent Labs     09/12/20  1229 09/13/20  2326 09/15/20  0804   WBC 12.6* 12.9* 8.3   RBC 4.67 4.60 4.58   HEMOGLOBIN 13.8 13.4 13.5   HEMATOCRIT 43.1 42.6 42.4   MCV 92.3 92.6 92.6   MCH 29.6 29.1 29.5   MCHC 32.0* 31.5* 31.8*   RDW 45.8 46.2 47.3   PLATELETCT 322 318 271   MPV 10.6 10.4 10.7     Recent Labs     09/12/20  1229 09/13/20  2326 09/15/20  0804   SODIUM 136 133* 136   POTASSIUM 4.8 4.0 4.0   CHLORIDE 100 99 100   CO2 25 22 21   GLUCOSE 89 129* 102*   BUN 15 8 5*   CREATININE 0.66 0.71 0.46*   CALCIUM 8.8 8.8 8.4*                   Imaging  [id number]   Final Result         1. A 6 mm obstructing stone in the mid CBD. Dilated proximal CBD up to 10 mm.      CT-ABDOMEN-PELVIS WITH   Final Result      Prior cholecystectomy with trace fluid in the gallbladder fossa which could be hematoma or seroma or less likely small abscess or bile leak                    Assessment/Plan  Choledocholithiasis  Assessment & Plan  N.p.o., Zosyn, symptomatic management, IV fluid challenge,  Follow-up  MRCP, obtain GI versus surgical consultation in the a.m.  9/15: MRCP showed 6 mm obstructing stone in the mid CBD with dilated proximal CBD up to 10 mm.  Discussed with GI Dr. Kelly.  Planning for ERCP in the a.m.    Right upper quadrant pain  Assessment & Plan  Secondary to #1, Toradol and/or fentanyl as needed       VTE prophylaxis: Heparin

## 2020-09-15 NOTE — CARE PLAN
"  Problem: Pain Management  Goal: Pain level will decrease to patient's comfort goal  Outcome: PROGRESSING AS EXPECTED  Note: Pt reports decreased overall pain. Will continue to monitor.      Problem: Psychosocial Needs:  Goal: Level of anxiety will decrease  Outcome: PROGRESSING AS EXPECTED  Note: Pt reports decreased anxiety and \"relief\" after plan of care discussed with MD during rounding. Will continue to monitor.      "

## 2020-09-15 NOTE — PROGRESS NOTES
Assumed care at 0700. Received report from night shift RN. Pt is awake and alert in bed, A&Ox 4, on RA, reports a pain level of 4/10, denies pain intervention at this time. Plan of care discussed with pt. Call light and belongings within reach. Hourly rounding in place. Communication board updated. Pt denies any additional needs at this time.

## 2020-09-15 NOTE — CARE PLAN
Problem: Psychosocial Needs:  Goal: Level of anxiety will decrease  Outcome: PROGRESSING SLOWER THAN EXPECTED

## 2020-09-15 NOTE — CONSULTS
Date of Consultation:  9/15/2020    Patient: : Kinza Juan  MRN: [id number]    Referring Physician: Margaret Ramos     GI:Lee Kelly M.D.     Reason for Consultation: cholelithiasis/right upper quadrant abdominal pain    History of Present Illness:     35-year-old obese female, who had cholecystectomy in June 2020. Soon after the surgery she had some right upper quadrant pain but this was thought to be related to surgery and subsided after that.  She was asymptomatic since then.  Last Thursday she started with the right upper quadrant abdominal pain 10 out of 10 in intensity worsening, associated with nausea, chills and fever.  She denies any vomiting or diarrhea or hematochezia.  The pain was initially in the epigastrium and radiating to the right upper quadrant and then to the back.  She could not attribute any initiating factors the pain is relieved with pain medications from the emergency room however it is currently intermittent and located.     CT abdomen in the emergency room revealed a gallbladder fossa fluid collection 2.7 x 0.9 x 1.7 concerning for biliary leak or seroma.  Patient is currently on IV Zosyn.  She is feeling much better in regards to pain    GI has been consulted for possible ERCP      Past Medical History:   Diagnosis Date   • Pap smear 4/28/9    Normal   • GERD (gastroesophageal reflux disease)     h pylori 12/2015   • Infectious disease     to the flu         Past Surgical History:   Procedure Laterality Date   • JACQUI BY LAPAROSCOPY  6/20/2020    Procedure: CHOLECYSTECTOMY, LAPAROSCOPIC;  Surgeon: Iftikhar Flowers M.D.;  Location: [facility];  Service: General   • PRIMARY C SECTION  3/24/2013    Performed by Briana Cano M.D. at [facility]   • ACL RECONSTRUCTION SCOPE  5/31/2012    Performed by CAITY DORSEY at [facility]   • MEDIAL MENISCECTOMY  5/31/2012    Performed by CAITY DORSEY at [facility]   • DENTAL EXTRACTION(S)   2005       Family History   Problem Relation Age of Onset   • Diabetes Paternal Grandmother    • Diabetes Father    • Stroke Maternal Grandmother    • Hypertension Maternal Grandmother    • Diabetes Maternal Grandfather    • Cancer Neg Hx        Social History     Socioeconomic History   • Marital status:      Spouse name: Not on file   • Number of children: Not on file   • Years of education: Not on file   • Highest education level: Not on file   Occupational History   • Not on file   Social Needs   • Financial resource strain: Not on file   • Food insecurity     Worry: Not on file     Inability: Not on file   • Transportation needs     Medical: Not on file     Non-medical: Not on file   Tobacco Use   • Smoking status: Never Smoker   • Smokeless tobacco: Never Used   Substance and Sexual Activity   • Alcohol use: Yes     Alcohol/week: 1.2 oz     Types: 2 Standard drinks or equivalent per week     Comment: occ   • Drug use: Yes     Types: Marijuana, Oral     Comment: occ   • Sexual activity: Yes     Partners: Male     Birth control/protection: I.U.D.     Comment: , with boyfriend   Lifestyle   • Physical activity     Days per week: Not on file     Minutes per session: Not on file   • Stress: Not on file   Relationships   • Social connections     Talks on phone: Not on file     Gets together: Not on file     Attends Shinto service: Not on file     Active member of club or organization: Not on file     Attends meetings of clubs or organizations: Not on file     Relationship status: Not on file   • Intimate partner violence     Fear of current or ex partner: Not on file     Emotionally abused: Not on file     Physically abused: Not on file     Forced sexual activity: Not on file   Other Topics Concern   •  Service No   • Blood Transfusions No   • Caffeine Concern No   • Occupational Exposure No   • Hobby Hazards No   • Sleep Concern No   • Stress Concern No   • Weight Concern Yes   • Special  Diet No   • Back Care No   • Exercise No   • Bike Helmet No   • Seat Belt Yes   • Self-Exams No   Social History Narrative   • Not on file       Review of Systems   Constitutional: Positive for chills and malaise/fatigue.   HENT: Negative.    Eyes: Negative.    Respiratory: Negative.    Cardiovascular: Negative.    Gastrointestinal: Positive for abdominal pain and nausea.   Genitourinary: Negative.    Musculoskeletal: Negative.    Neurological: Negative.    Endo/Heme/Allergies: Negative.    Psychiatric/Behavioral: Negative.          Physical Exam:  Vitals:    09/14/20 1620 09/14/20 1935 09/15/20 0425 09/15/20 0904   BP: 112/59 113/66 107/63 122/66   Pulse: 68 74 66 64   Resp: 16 17 18 17   Temp: 36.6 °C (97.8 °F) 37 °C (98.6 °F) 37.2 °C (98.9 °F) 37.2 °C (99 °F)   TempSrc: Temporal Temporal Temporal Temporal   SpO2: 99% 96% 93% 95%   Weight:       Height:           Physical Exam   Constitutional: She is oriented to person, place, and time and well-developed, well-nourished, and in no distress.   Obese    Eyes: Pupils are equal, round, and reactive to light. Conjunctivae are normal.   Neck: Normal range of motion. Neck supple.   Cardiovascular: Normal rate and regular rhythm.   Pulmonary/Chest: Effort normal and breath sounds normal.   Abdominal: Soft. Bowel sounds are normal. There is abdominal tenderness.   Musculoskeletal: Normal range of motion.   Neurological: She is alert and oriented to person, place, and time.   Skin: Skin is warm and dry.         Labs:  Recent Labs     09/13/20  2326 09/15/20  0804   WBC 12.9* 8.3   RBC 4.60 4.58   HEMOGLOBIN 13.4 13.5   HEMATOCRIT 42.6 42.4   MCV 92.6 92.6   MCH 29.1 29.5   MCHC 31.5* 31.8*   RDW 46.2 47.3   PLATELETCT 318 271   MPV 10.4 10.7     Recent Labs     09/13/20  2326 09/15/20  0804   SODIUM 133* 136   POTASSIUM 4.0 4.0   CHLORIDE 99 100   CO2 22 21   GLUCOSE 129* 102*   BUN 8 5*             Imaging:    CT abd 9/14/20     Prior cholecystectomy with trace fluid in  the gallbladder fossa which could be hematoma or seroma or less likely small abscess or bile leak     MRI abd 9/14/20   A 6 mm obstructing stone in the mid CBD. Dilated proximal CBD up to 10 mm.    Impressions:   35-year-old obese lady with a history of laparoscopic cholecystectomy in June 2020, followed with the intermittent right upper quadrant pain which has been progressive over the last 1 week with elevated liver function test and obstructive jaundice in the setting of leukocytosis and fever suggestive of cholangitis.  CT abdomen reveals gallbladder fossa fluid which can be a seroma less likely to be an abscess or a bile leak.  MRI abdomen reveals distal CBD stone    1.  Cholangitis  2.  Right upper quadrant abdominal pain  3.  Leukocytosis  4.  Choledocholithiasis  5.  Abnormal CT scan      Recommendations:     -keep nothing by mouth after midnight  -Continue antibiotics  -No NSAID use, antiplatelets or anticoagulants  -We will plan for ERCP in AM       This note was generated using voice recognition software which has a small chance of producing errors of grammar and possibly content. I have made every reasonable attempt to find and correct any obvious errors, but expect that some may not be found prior to finalization of this note.

## 2020-09-16 ENCOUNTER — ANESTHESIA (OUTPATIENT)
Dept: SURGERY | Facility: MEDICAL CENTER | Age: 35
DRG: 445 | End: 2020-09-16
Payer: COMMERCIAL

## 2020-09-16 ENCOUNTER — ANESTHESIA EVENT (OUTPATIENT)
Dept: SURGERY | Facility: MEDICAL CENTER | Age: 35
DRG: 445 | End: 2020-09-16
Payer: COMMERCIAL

## 2020-09-16 ENCOUNTER — APPOINTMENT (OUTPATIENT)
Dept: RADIOLOGY | Facility: MEDICAL CENTER | Age: 35
DRG: 445 | End: 2020-09-16
Attending: INTERNAL MEDICINE
Payer: COMMERCIAL

## 2020-09-16 LAB — HCG UR QL: NEGATIVE

## 2020-09-16 PROCEDURE — 160203 HCHG ENDO MINUTES - 1ST 30 MINS LEVEL 4: Performed by: INTERNAL MEDICINE

## 2020-09-16 PROCEDURE — 81025 URINE PREGNANCY TEST: CPT

## 2020-09-16 PROCEDURE — 0FC98ZZ EXTIRPATION OF MATTER FROM COMMON BILE DUCT, VIA NATURAL OR ARTIFICIAL OPENING ENDOSCOPIC: ICD-10-PCS | Performed by: INTERNAL MEDICINE

## 2020-09-16 PROCEDURE — 700102 HCHG RX REV CODE 250 W/ 637 OVERRIDE(OP): Performed by: INTERNAL MEDICINE

## 2020-09-16 PROCEDURE — 99232 SBSQ HOSP IP/OBS MODERATE 35: CPT | Performed by: FAMILY MEDICINE

## 2020-09-16 PROCEDURE — 160035 HCHG PACU - 1ST 60 MINS PHASE I: Performed by: INTERNAL MEDICINE

## 2020-09-16 PROCEDURE — C2617 STENT, NON-COR, TEM W/O DEL: HCPCS | Performed by: INTERNAL MEDICINE

## 2020-09-16 PROCEDURE — 0F798DZ DILATION OF COMMON BILE DUCT WITH INTRALUMINAL DEVICE, VIA NATURAL OR ARTIFICIAL OPENING ENDOSCOPIC: ICD-10-PCS | Performed by: INTERNAL MEDICINE

## 2020-09-16 PROCEDURE — 306311 ANTI-EMBOLISM STOCKINGS XXLRG LNG: Performed by: FAMILY MEDICINE

## 2020-09-16 PROCEDURE — 370081 HCHG STENT PANCREATIC PUSHER: Performed by: INTERNAL MEDICINE

## 2020-09-16 PROCEDURE — 700101 HCHG RX REV CODE 250: Performed by: ANESTHESIOLOGY

## 2020-09-16 PROCEDURE — 700105 HCHG RX REV CODE 258: Performed by: INTERNAL MEDICINE

## 2020-09-16 PROCEDURE — 700102 HCHG RX REV CODE 250 W/ 637 OVERRIDE(OP): Performed by: FAMILY MEDICINE

## 2020-09-16 PROCEDURE — 700117 HCHG RX CONTRAST REV CODE 255: Performed by: INTERNAL MEDICINE

## 2020-09-16 PROCEDURE — 700111 HCHG RX REV CODE 636 W/ 250 OVERRIDE (IP): Performed by: ANESTHESIOLOGY

## 2020-09-16 PROCEDURE — A9270 NON-COVERED ITEM OR SERVICE: HCPCS | Performed by: FAMILY MEDICINE

## 2020-09-16 PROCEDURE — 700105 HCHG RX REV CODE 258: Performed by: ANESTHESIOLOGY

## 2020-09-16 PROCEDURE — A9270 NON-COVERED ITEM OR SERVICE: HCPCS | Performed by: INTERNAL MEDICINE

## 2020-09-16 PROCEDURE — 160009 HCHG ANES TIME/MIN: Performed by: INTERNAL MEDICINE

## 2020-09-16 PROCEDURE — 160036 HCHG PACU - EA ADDL 30 MINS PHASE I: Performed by: INTERNAL MEDICINE

## 2020-09-16 PROCEDURE — 110371 HCHG SHELL REV 272: Performed by: INTERNAL MEDICINE

## 2020-09-16 PROCEDURE — 501838 HCHG SUTURE GENERAL: Performed by: INTERNAL MEDICINE

## 2020-09-16 PROCEDURE — 770006 HCHG ROOM/CARE - MED/SURG/GYN SEMI*

## 2020-09-16 PROCEDURE — 500066 HCHG BITE BLOCK, ECT: Performed by: INTERNAL MEDICINE

## 2020-09-16 PROCEDURE — 502240 HCHG MISC OR SUPPLY RC 0272: Performed by: INTERNAL MEDICINE

## 2020-09-16 PROCEDURE — 160002 HCHG RECOVERY MINUTES (STAT): Performed by: INTERNAL MEDICINE

## 2020-09-16 PROCEDURE — 700106 HCHG RX REV CODE 271: Performed by: INTERNAL MEDICINE

## 2020-09-16 PROCEDURE — 160048 HCHG OR STATISTICAL LEVEL 1-5: Performed by: INTERNAL MEDICINE

## 2020-09-16 PROCEDURE — 74328 X-RAY BILE DUCT ENDOSCOPY: CPT

## 2020-09-16 PROCEDURE — BF101ZZ FLUOROSCOPY OF BILE DUCTS USING LOW OSMOLAR CONTRAST: ICD-10-PCS | Performed by: INTERNAL MEDICINE

## 2020-09-16 PROCEDURE — 700111 HCHG RX REV CODE 636 W/ 250 OVERRIDE (IP): Performed by: INTERNAL MEDICINE

## 2020-09-16 PROCEDURE — 160208 HCHG ENDO MINUTES - EA ADDL 1 MIN LEVEL 4: Performed by: INTERNAL MEDICINE

## 2020-09-16 DEVICE — STENT BILIARY ADVANTIX 10FR X 9CM: Type: IMPLANTABLE DEVICE | Site: ESOPHAGUS | Status: FUNCTIONAL

## 2020-09-16 DEVICE — IMPLANTABLE DEVICE: Type: IMPLANTABLE DEVICE | Site: ESOPHAGUS | Status: FUNCTIONAL

## 2020-09-16 RX ORDER — SODIUM CHLORIDE, SODIUM LACTATE, POTASSIUM CHLORIDE, AND CALCIUM CHLORIDE .6; .31; .03; .02 G/100ML; G/100ML; G/100ML; G/100ML
2000 IRRIGANT IRRIGATION
Status: DISCONTINUED | OUTPATIENT
Start: 2020-09-16 | End: 2020-09-16 | Stop reason: HOSPADM

## 2020-09-16 RX ORDER — HYDROMORPHONE HYDROCHLORIDE 1 MG/ML
0.2 INJECTION, SOLUTION INTRAMUSCULAR; INTRAVENOUS; SUBCUTANEOUS
Status: DISCONTINUED | OUTPATIENT
Start: 2020-09-16 | End: 2020-09-16 | Stop reason: HOSPADM

## 2020-09-16 RX ORDER — INDOMETHACIN 50 MG/1
100 SUPPOSITORY RECTAL ONCE
Status: DISCONTINUED | OUTPATIENT
Start: 2020-09-16 | End: 2020-09-16

## 2020-09-16 RX ORDER — INDOMETHACIN 50 MG/1
100 SUPPOSITORY RECTAL ONCE
Status: COMPLETED | OUTPATIENT
Start: 2020-09-16 | End: 2020-09-16

## 2020-09-16 RX ORDER — SODIUM CHLORIDE, SODIUM LACTATE, POTASSIUM CHLORIDE, CALCIUM CHLORIDE 600; 310; 30; 20 MG/100ML; MG/100ML; MG/100ML; MG/100ML
INJECTION, SOLUTION INTRAVENOUS
Status: DISCONTINUED | OUTPATIENT
Start: 2020-09-16 | End: 2020-09-16 | Stop reason: SURG

## 2020-09-16 RX ORDER — HYDROMORPHONE HYDROCHLORIDE 1 MG/ML
0.4 INJECTION, SOLUTION INTRAMUSCULAR; INTRAVENOUS; SUBCUTANEOUS
Status: DISCONTINUED | OUTPATIENT
Start: 2020-09-16 | End: 2020-09-16 | Stop reason: HOSPADM

## 2020-09-16 RX ORDER — ONDANSETRON 2 MG/ML
INJECTION INTRAMUSCULAR; INTRAVENOUS PRN
Status: DISCONTINUED | OUTPATIENT
Start: 2020-09-16 | End: 2020-09-16 | Stop reason: SURG

## 2020-09-16 RX ORDER — HALOPERIDOL 5 MG/ML
1 INJECTION INTRAMUSCULAR
Status: DISCONTINUED | OUTPATIENT
Start: 2020-09-16 | End: 2020-09-16 | Stop reason: HOSPADM

## 2020-09-16 RX ORDER — INDOMETHACIN 50 MG/1
SUPPOSITORY RECTAL
Status: DISPENSED
Start: 2020-09-16 | End: 2020-09-16

## 2020-09-16 RX ORDER — HYDROMORPHONE HYDROCHLORIDE 1 MG/ML
0.1 INJECTION, SOLUTION INTRAMUSCULAR; INTRAVENOUS; SUBCUTANEOUS
Status: DISCONTINUED | OUTPATIENT
Start: 2020-09-16 | End: 2020-09-16 | Stop reason: HOSPADM

## 2020-09-16 RX ORDER — OXYCODONE HYDROCHLORIDE AND ACETAMINOPHEN 5; 325 MG/1; MG/1
2 TABLET ORAL
Status: DISCONTINUED | OUTPATIENT
Start: 2020-09-16 | End: 2020-09-16 | Stop reason: HOSPADM

## 2020-09-16 RX ORDER — OXYCODONE HYDROCHLORIDE AND ACETAMINOPHEN 5; 325 MG/1; MG/1
1 TABLET ORAL
Status: DISCONTINUED | OUTPATIENT
Start: 2020-09-16 | End: 2020-09-16 | Stop reason: HOSPADM

## 2020-09-16 RX ORDER — METOPROLOL TARTRATE 1 MG/ML
1 INJECTION, SOLUTION INTRAVENOUS
Status: DISCONTINUED | OUTPATIENT
Start: 2020-09-16 | End: 2020-09-16 | Stop reason: HOSPADM

## 2020-09-16 RX ORDER — LABETALOL HYDROCHLORIDE 5 MG/ML
5 INJECTION, SOLUTION INTRAVENOUS
Status: DISCONTINUED | OUTPATIENT
Start: 2020-09-16 | End: 2020-09-16 | Stop reason: HOSPADM

## 2020-09-16 RX ORDER — ONDANSETRON 2 MG/ML
4 INJECTION INTRAMUSCULAR; INTRAVENOUS
Status: COMPLETED | OUTPATIENT
Start: 2020-09-16 | End: 2020-09-16

## 2020-09-16 RX ORDER — MEPERIDINE HYDROCHLORIDE 25 MG/ML
12.5 INJECTION INTRAMUSCULAR; INTRAVENOUS; SUBCUTANEOUS
Status: DISCONTINUED | OUTPATIENT
Start: 2020-09-16 | End: 2020-09-16 | Stop reason: HOSPADM

## 2020-09-16 RX ORDER — SODIUM CHLORIDE, SODIUM LACTATE, POTASSIUM CHLORIDE, CALCIUM CHLORIDE 600; 310; 30; 20 MG/100ML; MG/100ML; MG/100ML; MG/100ML
INJECTION, SOLUTION INTRAVENOUS CONTINUOUS
Status: DISCONTINUED | OUTPATIENT
Start: 2020-09-16 | End: 2020-09-16 | Stop reason: HOSPADM

## 2020-09-16 RX ORDER — LIDOCAINE HYDROCHLORIDE 20 MG/ML
INJECTION, SOLUTION EPIDURAL; INFILTRATION; INTRACAUDAL; PERINEURAL PRN
Status: DISCONTINUED | OUTPATIENT
Start: 2020-09-16 | End: 2020-09-16 | Stop reason: SURG

## 2020-09-16 RX ORDER — MIDAZOLAM HYDROCHLORIDE 1 MG/ML
1 INJECTION INTRAMUSCULAR; INTRAVENOUS
Status: DISCONTINUED | OUTPATIENT
Start: 2020-09-16 | End: 2020-09-16 | Stop reason: HOSPADM

## 2020-09-16 RX ORDER — DIPHENHYDRAMINE HYDROCHLORIDE 50 MG/ML
12.5 INJECTION INTRAMUSCULAR; INTRAVENOUS
Status: DISCONTINUED | OUTPATIENT
Start: 2020-09-16 | End: 2020-09-16 | Stop reason: HOSPADM

## 2020-09-16 RX ADMIN — PIPERACILLIN SODIUM AND TAZOBACTAM SODIUM 4.5 G: 4; .5 INJECTION, POWDER, LYOPHILIZED, FOR SOLUTION INTRAVENOUS at 12:42

## 2020-09-16 RX ADMIN — ONDANSETRON 4 MG: 2 INJECTION INTRAMUSCULAR; INTRAVENOUS at 09:52

## 2020-09-16 RX ADMIN — EPHEDRINE SULFATE 25 MG: 50 INJECTION, SOLUTION INTRAVENOUS at 09:52

## 2020-09-16 RX ADMIN — LIDOCAINE HYDROCHLORIDE 50 MG: 20 INJECTION, SOLUTION EPIDURAL; INFILTRATION; INTRACAUDAL at 09:52

## 2020-09-16 RX ADMIN — PIPERACILLIN SODIUM AND TAZOBACTAM SODIUM 4.5 G: 4; .5 INJECTION, POWDER, LYOPHILIZED, FOR SOLUTION INTRAVENOUS at 04:20

## 2020-09-16 RX ADMIN — Medication 100 MG: at 09:52

## 2020-09-16 RX ADMIN — ONDANSETRON 4 MG: 2 INJECTION INTRAMUSCULAR; INTRAVENOUS at 11:29

## 2020-09-16 RX ADMIN — SODIUM CHLORIDE, POTASSIUM CHLORIDE, SODIUM LACTATE AND CALCIUM CHLORIDE: 600; 310; 30; 20 INJECTION, SOLUTION INTRAVENOUS at 09:52

## 2020-09-16 RX ADMIN — DOCUSATE SODIUM 50 MG AND SENNOSIDES 8.6 MG 2 TABLET: 8.6; 5 TABLET, FILM COATED ORAL at 05:03

## 2020-09-16 RX ADMIN — INDOMETHACIN 100 MG: 50 SUPPOSITORY RECTAL at 11:03

## 2020-09-16 RX ADMIN — PIPERACILLIN SODIUM AND TAZOBACTAM SODIUM 4.5 G: 4; .5 INJECTION, POWDER, LYOPHILIZED, FOR SOLUTION INTRAVENOUS at 21:24

## 2020-09-16 RX ADMIN — SODIUM CHLORIDE: 9 INJECTION, SOLUTION INTRAVENOUS at 12:49

## 2020-09-16 RX ADMIN — PROPOFOL 200 MG: 10 INJECTION, EMULSION INTRAVENOUS at 09:52

## 2020-09-16 RX ADMIN — OXYCODONE HYDROCHLORIDE 10 MG: 5 TABLET ORAL at 00:18

## 2020-09-16 RX ADMIN — HEPARIN SODIUM 5000 UNITS: 5000 INJECTION, SOLUTION INTRAVENOUS; SUBCUTANEOUS at 14:41

## 2020-09-16 RX ADMIN — FENTANYL CITRATE 50 MCG: 50 INJECTION INTRAMUSCULAR; INTRAVENOUS at 11:06

## 2020-09-16 RX ADMIN — OXYCODONE HYDROCHLORIDE 5 MG: 5 TABLET ORAL at 04:20

## 2020-09-16 ASSESSMENT — COGNITIVE AND FUNCTIONAL STATUS - GENERAL
DAILY ACTIVITIY SCORE: 24
MOBILITY SCORE: 24
SUGGESTED CMS G CODE MODIFIER MOBILITY: CH
SUGGESTED CMS G CODE MODIFIER DAILY ACTIVITY: CH

## 2020-09-16 ASSESSMENT — ENCOUNTER SYMPTOMS
COUGH: 0
HEADACHES: 0
HEARTBURN: 0
NECK PAIN: 0
PALPITATIONS: 0
ABDOMINAL PAIN: 1
ORTHOPNEA: 0
HEMOPTYSIS: 0
SPUTUM PRODUCTION: 0
BLURRED VISION: 0
VOMITING: 1
TINGLING: 0
NAUSEA: 1
DEPRESSION: 0
MYALGIAS: 0
DOUBLE VISION: 0
CHILLS: 1
PHOTOPHOBIA: 0
DIZZINESS: 0
FEVER: 0

## 2020-09-16 ASSESSMENT — PAIN DESCRIPTION - PAIN TYPE
TYPE: ACUTE PAIN

## 2020-09-16 ASSESSMENT — LIFESTYLE VARIABLES: SUBSTANCE_ABUSE: 0

## 2020-09-16 ASSESSMENT — PAIN SCALES - GENERAL: PAIN_LEVEL: 0

## 2020-09-16 NOTE — CARE PLAN
Problem: Communication  Goal: The ability to communicate needs accurately and effectively will improve  Outcome: PROGRESSING AS EXPECTED  Note: Pt able to make needs known through use of call light system. Hourly rounding in place.      Problem: Safety  Goal: Will remain free from injury  Outcome: PROGRESSING AS EXPECTED  Note: Pt is not a fall risk. Nonskid socks on when pt is ambulating. No bed alarm on. Steady gait.

## 2020-09-16 NOTE — PROGRESS NOTES
[facility] Medicine Daily Progress Note    Date of Service  9/16/2020    Chief Complaint  35 y.o. female admitted 9/13/2020 with abdominal pain.    Hospital Course  This is a 35 years old female who has past medical history of morbid obesity and recent lap elpidio done on June 2020 comes in with abdominal pain associated with nausea and vomiting.  Noted to have elevated LFTs and bilirubin.  Abdominal ultrasound showed dilated CBD up to 6.2 mm.  MRCP showed 6 mm obstructing mid CBD stone.  GI consulted.  Planning for ERCP.  Interval Problem Update  Resting in bed comfortably.  No nausea or vomiting this morning.  Abdominal pain fairly controlled.  Has been afebrile.  Hemodynamically stable.  Planning for ERCP tomorrow morning.  No acute distress noted.  No issues overnight per staff.  9/16: Resting in bed comfortable.  Pain fairly controlled.  Has been n.p.o. for ERCP schedule which was done in the morning.  Had CBD and PD plastic stent placed.  Patient tolerated procedure well.  Has been afebrile.  Hemodynamically stable.  No issues overnight per staff.  Consultants/Specialty  GI Dr. Kelly  General surgery    Code Status  Full Code    Disposition  Likely home once medically cleared    Review of Systems  Review of Systems   Constitutional: Positive for chills. Negative for fever.   HENT: Negative for ear pain, hearing loss and tinnitus.    Eyes: Negative for blurred vision, double vision and photophobia.   Respiratory: Negative for cough, hemoptysis and sputum production.    Cardiovascular: Negative for chest pain, palpitations and orthopnea.   Gastrointestinal: Positive for abdominal pain, nausea and vomiting. Negative for heartburn.   Genitourinary: Negative for dysuria, frequency and urgency.   Musculoskeletal: Negative for myalgias and neck pain.   Skin: Negative for rash.   Neurological: Negative for dizziness, tingling and headaches.   Psychiatric/Behavioral: Negative for depression, substance abuse and suicidal  ideas.        Physical Exam  Temp:  [36.1 °C (97 °F)-37.1 °C (98.7 °F)] 36.2 °C (97.1 °F)  Pulse:  [] 77  Resp:  [16-20] 17  BP: (101-125)/(53-78) 125/74  SpO2:  [92 %-100 %] 95 %    Physical Exam  Constitutional:       General: She is not in acute distress.     Appearance: She is obese. She is not ill-appearing.   HENT:      Head: Normocephalic and atraumatic.   Eyes:      General: Scleral icterus present.      Extraocular Movements: Extraocular movements intact.      Pupils: Pupils are equal, round, and reactive to light.   Cardiovascular:      Rate and Rhythm: Normal rate and regular rhythm.      Heart sounds: No friction rub. No gallop.    Pulmonary:      Effort: Pulmonary effort is normal. No respiratory distress.      Breath sounds: No wheezing.   Abdominal:      General: There is no distension.      Tenderness: There is abdominal tenderness in the epigastric area.   Skin:     Coloration: Skin is jaundiced. Skin is not pale.   Neurological:      General: No focal deficit present.      Mental Status: She is alert and oriented to person, place, and time.      Cranial Nerves: No cranial nerve deficit.   Psychiatric:         Mood and Affect: Mood normal.         Behavior: Behavior normal.         Fluids    Intake/Output Summary (Last 24 hours) at 9/16/2020 1311  Last data filed at 9/16/2020 1046  Gross per 24 hour   Intake 800 ml   Output --   Net 800 ml       Laboratory  Recent Labs     09/13/20  2326 09/15/20  0804   WBC 12.9* 8.3   RBC 4.60 4.58   HEMOGLOBIN 13.4 13.5   HEMATOCRIT 42.6 42.4   MCV 92.6 92.6   MCH 29.1 29.5   MCHC 31.5* 31.8*   RDW 46.2 47.3   PLATELETCT 318 271   MPV 10.4 10.7     Recent Labs     09/13/20  2326 09/15/20  0804   SODIUM 133* 136   POTASSIUM 4.0 4.0   CHLORIDE 99 100   CO2 22 21   GLUCOSE 129* 102*   BUN 8 5*   CREATININE 0.71 0.46*   CALCIUM 8.8 8.4*                   Imaging  [id number] DUCTS   Final Result      ERCP examination demonstrating choledocholithiasis with  stone removal and biliary stent placement.      [id number]/O   Final Result         1. A 6 mm obstructing stone in the mid CBD. Dilated proximal CBD up to 10 mm.      CT-ABDOMEN-PELVIS WITH   Final Result      Prior cholecystectomy with trace fluid in the gallbladder fossa which could be hematoma or seroma or less likely small abscess or bile leak                    Assessment/Plan  Choledocholithiasis  Assessment & Plan  N.p.o., Zosyn, symptomatic management, IV fluid challenge,  Follow-up MRCP, obtain GI versus surgical consultation in the a.m.  9/15: MRCP showed 6 mm obstructing stone in the mid CBD with dilated proximal CBD up to 10 mm.  Discussed with GI Dr. Kelly.  Planning for ERCP in the a.m.  9/16: Status post ERCP with CBD and PD plastic stent placement.  Continue to trend LFTs.  Start clears and advance as tolerated.  Check lipase tomorrow.  Continue antibiotics.      Right upper quadrant pain  Assessment & Plan  Secondary to #1, pain management.       VTE prophylaxis: Heparin

## 2020-09-16 NOTE — PROGRESS NOTES
"Assumed pt care at 1900 from day RN. Aware of fall risk status. VS /65   Pulse 71   Temp 36.8 °C (98.2 °F) (Temporal)   Resp 17   Ht 1.676 m (5' 6\")   Wt 120.2 kg (264 lb 15.9 oz)   SpO2 95%   BMI 42.77 kg/m² . Assessment complete. Discussed plan of care with patient. ERCP in AM.    "

## 2020-09-16 NOTE — ANESTHESIA POSTPROCEDURE EVALUATION
Patient: Kinza Juan    Procedure Summary     Date: 09/16/20 Room / Location: [facility] ROOM 26 / SURGERY SAME DAY [facility]    Anesthesia Start: 0952 Anesthesia Stop:     Procedure: ERCP (ENDOSCOPIC RETROGRADE CHOLANGIOPANCREATOGRAPHY) (N/A Esophagus) Diagnosis: (CHOLEDOCHOLITHIASIS)    Surgeon: Lee Kelly M.D. Responsible Provider: Maxi Sierra M.D.    Anesthesia Type: general ASA Status: 3          Final Anesthesia Type: general  Last vitals  BP   Blood Pressure: 101/62    Temp   36.7 °C (98 °F)    Pulse   Pulse: 69   Resp   17    SpO2   93 %      Anesthesia Post Evaluation    Patient location during evaluation: PACU  Patient participation: complete - patient participated  Level of consciousness: awake and alert  Pain score: 0    Airway patency: patent  Anesthetic complications: no  Cardiovascular status: hemodynamically stable  Respiratory status: acceptable  Hydration status: euvolemic    PONV: none           Nurse Pain Score: 5 (NPRS)

## 2020-09-16 NOTE — ANESTHESIA PROCEDURE NOTES
Airway    Date/Time: 9/16/2020 9:52 AM  Performed by: Maxi Sierra M.D.  Authorized by: Maxi Sierra M.D.     Location:  OR  Urgency:  Elective  Indications for Airway Management:  Anesthesia      Spontaneous Ventilation: absent    Sedation Level:  Deep  Preoxygenated: Yes    Patient Position:  Sniffing  Final Airway Type:  Endotracheal airway  Final Endotracheal Airway:  ETT  Cuffed: Yes    Technique Used for Successful ETT Placement:  Direct laryngoscopy    Insertion Site:  Oral  Blade Type:  Solomon  Laryngoscope Blade/Videolaryngoscope Blade Size:  3  ETT Size (mm):  7.0  Measured from:  Teeth  ETT to Teeth (cm):  20  Placement Verified by: auscultation and capnometry    Cormack-Lehane Classification:  Grade I - full view of glottis  Number of Attempts at Approach:  1

## 2020-09-16 NOTE — DISCHARGE PLANNING
Anticipated Discharge Disposition: Home    Action: Discussed in IDT rounds; MD anticipates pt will discharge home with no needs when medically cleared.     Barriers to Discharge: ERCP 9/16/2020    Plan: Continue to follow and assist with discharge planning needs as they arise.

## 2020-09-16 NOTE — PROGRESS NOTES
Assumed care at 0700, report received from ELLA López RN.  Pt A&O x 4, states pain is 4/10--more discomfort than pain. Bed locked, 2 rails up, bed in lowest position. Call light in place, belongings at bedside, no needs at this time and hourly rounding in place.  Pt has been npo since 0000. Pt's urine sent to lab for pregnancy test prior to procedure. Plan of care reviewed with pt.     0813 pt off unit with transport via gurney. Report given to pre-op KERMIT Stratton.

## 2020-09-16 NOTE — ANESTHESIA PREPROCEDURE EVALUATION
Relevant Problems   GI   (+) Gastroesophageal reflux disease without esophagitis       Physical Exam    Airway   Mallampati: II  TM distance: >3 FB  Neck ROM: full       Cardiovascular - normal exam  Rhythm: regular  Rate: normal  (-) murmur     Dental - normal exam           Pulmonary - normal exam  Breath sounds clear to auscultation     Abdominal    Neurological - normal exam                 Anesthesia Plan    ASA 3   ASA physical status 3 criteria: morbid obesity - BMI greater than or equal to 40    Plan - general       Airway plan will be ETT        Induction: intravenous    Postoperative Plan: Postoperative administration of opioids is intended.    Pertinent diagnostic labs and testing reviewed    Informed Consent:    Anesthetic plan and risks discussed with patient.    Use of blood products discussed with: patient whom consented to blood products.

## 2020-09-16 NOTE — OR NURSING
1115 report received by KERMIT Finch. Pt moaning and c/o pain. Pt given 50mcg of fentanyl. LR infusing. VS WNL. Mask at 4L.     1130 pt c/o nausea, zofran given.    1150 report given to KERMIT Zapata who will be receiving pt back to [facility]. All questions answered.     1205 transport here [facility]

## 2020-09-16 NOTE — OP REPORT
Date of Procedure: 9/16/2020   10:37 AM      Referring Physicians:   1. Margaret Ramos     Procedure performed:  1.ERCP  with sphincterotomy  2.ERCP with biliary stent placement   3.ERCP with PD stent placement   4.ERCP with removal of sludge     ====================================  Anesthesiologist: Maxi Sierra M.D.  Surgeon: Lee Kelly M.D.     -----------------------------------------------------------------  Preoperative diagnosis:   1. Abnormal Imaging of the abdomen   2. Elevated LFTs   3. 3. Abd pain     Postoperative diagnosis:  1. Bile leak   2. Biliary sludge   ------------------------------------------------------------------    Impression:    Biliary cholangiogram:  The intrahepatic ducts are not dilated   The left and right main hepatic ducts are not dilated   The common hepatic duct measures 12 mm in size   Clips seen in the GB fossa   Extravasation of contrast seen in the GB/ ? Distal to take off of cystic duct   The CBD measures 13 mm in size   Few small floating specks seen on cholangiogram     -S/p removal of biliary sludge   -S/p placement of a 10F x 9 cm straight plastic biliary stent  -S/p placement of a 4F x 3 cm plastic PD stent    Recommendations:    1. Trend LFTs   2. Continue antibiotics x 5 days  3. Start on clears and advance as tolerated   4. Xray abdomen (KUB) in 2 weeks to evaluate the passage of PD stent   5. Follow in the GI office 4-6 weeks after DC ( with the xray results)   6.Rpt ERCP with cholangiogram and stent removal in 10-12 weeks   7. No antiplatelets, anticoagulants or NSAIDs x 3 days     ==================================================================================  Indication:     35-year-old obese female, who had cholecystectomy in June 2020. Soon after the surgery she had some right upper quadrant pain but this was thought to be related to surgery and subsided after that.  She was asymptomatic since then.  Last Thursday she started with the right upper  quadrant abdominal pain 10 out of 10 in intensity worsening, associated with nausea, chills and fever.  She denies any vomiting or diarrhea or hematochezia.  The pain was initially in the epigastrium and radiating to the right upper quadrant and then to the back.  She could not attribute any initiating factors the pain is relieved with pain medications from the emergency room however it is currently intermittent and located.      CT abdomen in the emergency room revealed a gallbladder fossa fluid collection 2.7 x 0.9 x 1.7 concerning for biliary leak or seroma.  Patient is currently on IV Zosyn.  She is here for ERCP with biliary stent placement   -------------------------    Medications:  The patient was performed under general anesthesia with monitored anesthesia care.  Propofol sedation was administered under discretion of anesthesiology.  Please refer to their notes for more details.     Universal Precautions:  [place] Precautions were followed throughout the entire procedure.  The patient was continuously monitored on Blood pressure, pulse oximetry, and heart rate monitoring though out the procedure and afterwards. A time out was taken prior to the start of the procedure to identify the patient's name, date of birth, ID badge, and appropriate procedure site/procedure type.    Informed Consent:  Informed consent was obtained from the patient after the risks, benefits, and indications were explained to the patient in detail.  The alternatives to the procedure were explained as well.  Risks such as bleeding, infection, perforation, complications with sedation, and need for open surgery for complications were explained in detail prior to obtaining consent.  The patient was given opportunity to ask questions.  If need be these same risks, benefits, indication, and complications were explained to the patients family member prior to obtaining consent for the procedure.    Description of procedure:    Patient was  brought to ERCP fluoro/operating room and met by staff and physicians.  After procedure was confirmed and appropriate procedure was identified preparations were made for sedation.  A time out was taken to identify the patients name, procedure site/type, and ID badge.    The patient was then sedated per anesthesiology and intubated.  The patient was then placed on the fluoroscopy table on their abdomen.    An oral bite block was placed between the incisors.  When the patient was appropriately sedated the GIF Olympus side viewing Duodenal/ERCP endoscope was used to perform the procedure.    The Side viewing Duodenal/ERCP endoscope was passed through the oropharynx to the second duodenum and positioned to the ampulla.    Esophagus:    There were limited views of the esophagus using side viewing endoscope - these were normal in appearance.     Stomach:    Careful examination was made of the body and antrum of the stomach -using side viewing endoscope - these were normal in appearance.    Retroflexed views and photographs were obtained of the Fundus and cardia as well using side viewing endoscope - these were normal in appearance.   Duodenum:  The endoscope was passed through the pylorus into the duodenal bulb which was examined using side viewing endoscope - this was normal in appearance.    Further advancement allowed for examination of the 1st and 2nd portion of the duodenum.    A  film was captured    Views of the Ampulla were obtained and endoscopic photographs were captured.  The AMPULLA was normal in appearance.    Selective cannulation of the biliary system was performed using a balloon Retrieval catheter.  We were able to access the biliary system on first attempt at cannulation. Contrast was injected  A guidewire was advanced in to the biliary system. A .035 wire was used.  ---  Pancreatogram:    obtained. Pancreatic system unintentionally cannulated    ---  Biliary cholangiogram:  The intrahepatic ducts  are not dilated   The left and right main hepatic ducts are not dilated   The common hepatic duct measures 12 mm in size   Clips seen in the GB fossa   Extravasation of contrast seen in the GB/ ? Distal to take off of cystic duct   The CBD measures 13 mm in size   Few small floating specks seen on cholangiogram     Multiple fluoroscopic pictures were obtained to evaluate the bilary system.  After cholangiogram was obtained a guidewire was advanced to the intrahepatic ducts and maintained in position.     A controlled biliary SPHINCTEROTOMY was performed using pure endocut electrocautery with the ERBE electrocautery unit and sphincterotome    ----  The guidewire was maintained in the intrahepatic system.  The sphincterotome was removed.  The retrieval balloon catheter was placed over the guidewire and into the ductal system    A 9-12mm balloon retrieval catheter was advanced to the bifurcation and inflated to 12m. An occlusion cholangiogram was obtained.  Balloon sweeps were performed of the CBD and the balloon catheter was used to remove stones/sludge from the biliary tree:  A small amount of sludge removed     ---  All sludge and biliary stones were removed from the bile ducts and the biliary system was cleared.  ---------  Stent:     A 10F x 9 cm straight plastic biliary stent was placed in the CBD under fluoro guidance   Free flow of contrast and bile seen after stent placement   A 4F x 3 cm plastic PD stent with external phalanges only were placed in the PD under fluoro guidance       ------------  The endoscope was moved back to the stomach.  The insufflated air and bile products were suctioned.  The endoscope was slowly withdrawn.  No additional findings.  No acute complications.  The patient was extubated and taken to  PACU for recovery.

## 2020-09-17 VITALS
TEMPERATURE: 98.5 F | WEIGHT: 264.99 LBS | SYSTOLIC BLOOD PRESSURE: 111 MMHG | DIASTOLIC BLOOD PRESSURE: 58 MMHG | HEIGHT: 66 IN | BODY MASS INDEX: 42.59 KG/M2 | OXYGEN SATURATION: 94 % | RESPIRATION RATE: 16 BRPM | HEART RATE: 72 BPM

## 2020-09-17 PROBLEM — K80.50 CHOLEDOCHOLITHIASIS: Status: RESOLVED | Noted: 2020-09-14 | Resolved: 2020-09-17

## 2020-09-17 LAB
ALBUMIN SERPL BCP-MCNC: 3 G/DL (ref 3.2–4.9)
ALBUMIN/GLOB SERPL: 1.1 G/DL
ALP SERPL-CCNC: 236 U/L (ref 30–99)
ALT SERPL-CCNC: 128 U/L (ref 2–50)
ANION GAP SERPL CALC-SCNC: 11 MMOL/L (ref 7–16)
AST SERPL-CCNC: 46 U/L (ref 12–45)
BASOPHILS # BLD AUTO: 0.4 % (ref 0–1.8)
BASOPHILS # BLD: 0.05 K/UL (ref 0–0.12)
BILIRUB SERPL-MCNC: 1.5 MG/DL (ref 0.1–1.5)
BUN SERPL-MCNC: 5 MG/DL (ref 8–22)
CALCIUM SERPL-MCNC: 8.3 MG/DL (ref 8.5–10.5)
CHLORIDE SERPL-SCNC: 103 MMOL/L (ref 96–112)
CO2 SERPL-SCNC: 25 MMOL/L (ref 20–33)
CREAT SERPL-MCNC: 0.51 MG/DL (ref 0.5–1.4)
EOSINOPHIL # BLD AUTO: 0.12 K/UL (ref 0–0.51)
EOSINOPHIL NFR BLD: 1 % (ref 0–6.9)
ERYTHROCYTE [DISTWIDTH] IN BLOOD BY AUTOMATED COUNT: 47.4 FL (ref 35.9–50)
GLOBULIN SER CALC-MCNC: 2.8 G/DL (ref 1.9–3.5)
GLUCOSE SERPL-MCNC: 99 MG/DL (ref 65–99)
HCT VFR BLD AUTO: 36.9 % (ref 37–47)
HGB BLD-MCNC: 11.7 G/DL (ref 12–16)
IMM GRANULOCYTES # BLD AUTO: 0.08 K/UL (ref 0–0.11)
IMM GRANULOCYTES NFR BLD AUTO: 0.6 % (ref 0–0.9)
LIPASE SERPL-CCNC: 58 U/L (ref 11–82)
LYMPHOCYTES # BLD AUTO: 2.57 K/UL (ref 1–4.8)
LYMPHOCYTES NFR BLD: 20.4 % (ref 22–41)
MCH RBC QN AUTO: 29.3 PG (ref 27–33)
MCHC RBC AUTO-ENTMCNC: 31.7 G/DL (ref 33.6–35)
MCV RBC AUTO: 92.5 FL (ref 81.4–97.8)
MONOCYTES # BLD AUTO: 1.04 K/UL (ref 0–0.85)
MONOCYTES NFR BLD AUTO: 8.2 % (ref 0–13.4)
NEUTROPHILS # BLD AUTO: 8.75 K/UL (ref 2–7.15)
NEUTROPHILS NFR BLD: 69.4 % (ref 44–72)
NRBC # BLD AUTO: 0 K/UL
NRBC BLD-RTO: 0 /100 WBC
PLATELET # BLD AUTO: 292 K/UL (ref 164–446)
PMV BLD AUTO: 10.2 FL (ref 9–12.9)
POTASSIUM SERPL-SCNC: 3.5 MMOL/L (ref 3.6–5.5)
PROT SERPL-MCNC: 5.8 G/DL (ref 6–8.2)
RBC # BLD AUTO: 3.99 M/UL (ref 4.2–5.4)
SODIUM SERPL-SCNC: 139 MMOL/L (ref 135–145)
WBC # BLD AUTO: 12.6 K/UL (ref 4.8–10.8)

## 2020-09-17 PROCEDURE — 700105 HCHG RX REV CODE 258: Performed by: INTERNAL MEDICINE

## 2020-09-17 PROCEDURE — 83690 ASSAY OF LIPASE: CPT

## 2020-09-17 PROCEDURE — A9270 NON-COVERED ITEM OR SERVICE: HCPCS | Performed by: FAMILY MEDICINE

## 2020-09-17 PROCEDURE — 85025 COMPLETE CBC W/AUTO DIFF WBC: CPT

## 2020-09-17 PROCEDURE — 80053 COMPREHEN METABOLIC PANEL: CPT

## 2020-09-17 PROCEDURE — 700102 HCHG RX REV CODE 250 W/ 637 OVERRIDE(OP): Performed by: FAMILY MEDICINE

## 2020-09-17 PROCEDURE — 99239 HOSP IP/OBS DSCHRG MGMT >30: CPT | Performed by: FAMILY MEDICINE

## 2020-09-17 PROCEDURE — 700111 HCHG RX REV CODE 636 W/ 250 OVERRIDE (IP): Performed by: INTERNAL MEDICINE

## 2020-09-17 PROCEDURE — RXMED WILLOW AMBULATORY MEDICATION CHARGE: Performed by: FAMILY MEDICINE

## 2020-09-17 RX ORDER — ONDANSETRON 4 MG/1
4 TABLET, ORALLY DISINTEGRATING ORAL EVERY 4 HOURS PRN
Qty: 20 TAB | Refills: 0 | Status: SHIPPED | OUTPATIENT
Start: 2020-09-17 | End: 2020-12-04

## 2020-09-17 RX ORDER — AMOXICILLIN AND CLAVULANATE POTASSIUM 875; 125 MG/1; MG/1
1 TABLET, FILM COATED ORAL 2 TIMES DAILY
Qty: 6 TAB | Refills: 0 | Status: SHIPPED | OUTPATIENT
Start: 2020-09-17 | End: 2020-09-21

## 2020-09-17 RX ADMIN — SODIUM CHLORIDE: 9 INJECTION, SOLUTION INTRAVENOUS at 03:00

## 2020-09-17 RX ADMIN — PIPERACILLIN SODIUM AND TAZOBACTAM SODIUM 4.5 G: 4; .5 INJECTION, POWDER, LYOPHILIZED, FOR SOLUTION INTRAVENOUS at 05:24

## 2020-09-17 NOTE — PROGRESS NOTES
D/c instructions given to pt regarding medications and follow up appt. Educated on worsening s/s, pt understands and questions answered. Pt awaiting for family member to

## 2020-09-17 NOTE — DISCHARGE SUMMARY
"Discharge Summary    CHIEF COMPLAINT ON ADMISSION  Chief Complaint   Patient presents with   • RUQ Pain     Pain starts in the RUQ and radiates to back   • Flank Pain     \"Feels like a hot knife is right there\"        Reason for Admission  Back Pain     Admission Date  9/13/2020    CODE STATUS  Prior    HPI & HOSPITAL COURSE  This is a 35 years old female who has past medical history of morbid obesity and recent lap elpidio done on June 2020 comes in with abdominal pain associated with nausea and vomiting.  Noted to have elevated LFTs and bilirubin.  Abdominal ultrasound showed dilated CBD up to 6.2 mm.  MRCP showed 6 mm obstructing mid CBD stone.  GI consulted.    Had ERCP with CBD and PD plastic stent placed.  Her LFTs trended down.  Abdominal pain much improved.  Was tolerating p.o. intake well.  No nausea or vomiting.  GI recommended outpatient follow-up and to repeat ERCP in 6 to 8 weeks to remove the plastic stents.  Also recommended to complete a course of antibiotics.  Was hemodynamically clinically stable.  Was cleared for discharge from medical standpoint.       Therefore, she is discharged in good and stable condition to home with close outpatient follow-up.    The patient met 2-midnight criteria for an inpatient stay at the time of discharge.    Discharge Date  9/17/2020    FOLLOW UP ITEMS POST DISCHARGE  Follow-up with PCP in 1 week  Follow-up with GI as per recommendations    DISCHARGE DIAGNOSES  Active Problems:    Right upper quadrant pain POA: Unknown  Resolved Problems:    Choledocholithiasis POA: Unknown      FOLLOW UP  No future appointments.  Puneet Moreau, AAftabPAftabNAftab  [address]  [address]  [facility] [address]  [phone]          Lee Kelly M.D.  [address] [facility] [id number] [phone]    Schedule an appointment as soon as possible for a visit in 4 weeks        MEDICATIONS ON DISCHARGE     Medication List      START taking these medications      Instructions "   amoxicillin-clavulanate 875-125 MG Tabs  Commonly known as: AUGMENTIN   Take 1 Tab by mouth 2 times a day for 3 days.  Dose: 1 Tab        CONTINUE taking these medications      Instructions   omeprazole 20 MG delayed-release capsule  Commonly known as: PRILOSEC   TAKE 1 CAPSULE BY MOUTH ONCE DAILY BEFORE A MEAL FOR 30 DAYS     ondansetron 4 MG Tbdp  Commonly known as: Zofran ODT   Take 1 Tab by mouth every four hours as needed.  Dose: 4 mg     phentermine 37.5 MG capsule   TAKE 1 CAPSULE BY MOUTH ONCE DAILY BEFORE BREAKFAST FOR 30 DAYS        STOP taking these medications    HYDROcodone-acetaminophen 5-325 MG Tabs per tablet  Commonly known as: NORCO            Allergies  No Known Allergies    DIET  No orders of the defined types were placed in this encounter.      ACTIVITY  As tolerated.  Weight bearing as tolerated    CONSULTATIONS  GI     PROCEDURES  ERCP as above     LABORATORY  Lab Results   Component Value Date    SODIUM 139 09/17/2020    POTASSIUM 3.5 (L) 09/17/2020    CHLORIDE 103 09/17/2020    CO2 25 09/17/2020    GLUCOSE 99 09/17/2020    BUN 5 (L) 09/17/2020    CREATININE 0.51 09/17/2020        Lab Results   Component Value Date    WBC 12.6 (H) 09/17/2020    HEMOGLOBIN 11.7 (L) 09/17/2020    HEMATOCRIT 36.9 (L) 09/17/2020    PLATELETCT 292 09/17/2020        Total time of the discharge process exceeds 32 minutes.

## 2020-09-17 NOTE — PROGRESS NOTES
Pt escorted to discharge eduarda (DICK). Report given to DICK RN. Pt left unit with all belongings intact.

## 2020-09-17 NOTE — DISCHARGE INSTRUCTIONS
Discharge Instructions    Discharged to home by car with relative. Discharged via wheelchair, hospital escort: Yes.  Special equipment needed: Not Applicable    Be sure to schedule a follow-up appointment with your primary care doctor or any specialists as instructed.     Discharge Plan:        I understand that a diet low in cholesterol, fat, and sodium is recommended for good health. Unless I have been given specific instructions below for another diet, I accept this instruction as my diet prescription.   Other diet: regular    Special Instructions: None    · Is patient discharged on Warfarin / Coumadin?   No       Laparoscopic Cholecystectomy, Care After  This sheet gives you information about how to care for yourself after your procedure. Your doctor may also give you more specific instructions. If you have problems or questions, contact your doctor.  Follow these instructions at home:  Care for cuts from surgery (incisions)    · Follow instructions from your doctor about how to take care of your cuts from surgery. Make sure you:  ? Wash your hands with soap and water before you change your bandage (dressing). If you cannot use soap and water, use hand .  ? Change your bandage as told by your doctor.  ? Leave stitches (sutures), skin glue, or skin tape (adhesive) strips in place. They may need to stay in place for 2 weeks or longer. If tape strips get loose and curl up, you may trim the loose edges. Do not remove tape strips completely unless your doctor says it is okay.  · Do not take baths, swim, or use a hot tub until your doctor says it is okay. Ask your doctor if you can take showers. You may only be allowed to take sponge baths for bathing.  · Check your surgical cut area every day for signs of infection. Check for:  ? More redness, swelling, or pain.  ? More fluid or blood.  ? Warmth.  ? Pus or a bad smell.  Activity  · Do not drive or use heavy machinery while taking prescription pain  medicine.  · Do not lift anything that is heavier than 10 lb (4.5 kg) until your doctor says it is okay.  · Do not play contact sports until your doctor says it is okay.  · Do not drive for 24 hours if you were given a medicine to help you relax (sedative).  · Rest as needed. Do not return to work or school until your doctor says it is okay.  General instructions  · Take over-the-counter and prescription medicines only as told by your doctor.  · To prevent or treat constipation while you are taking prescription pain medicine, your doctor may recommend that you:  ? Drink enough fluid to keep your pee (urine) clear or pale yellow.  ? Take over-the-counter or prescription medicines.  ? Eat foods that are high in fiber, such as fresh fruits and vegetables, whole grains, and beans.  ? Limit foods that are high in fat and processed sugars, such as fried and sweet foods.  Contact a doctor if:  · You develop a rash.  · You have more redness, swelling, or pain around your surgical cuts.  · You have more fluid or blood coming from your surgical cuts.  · Your surgical cuts feel warm to the touch.  · You have pus or a bad smell coming from your surgical cuts.  · You have a fever.  · One or more of your surgical cuts breaks open.  Get help right away if:  · You have trouble breathing.  · You have chest pain.  · You have pain that is getting worse in your shoulders.  · You faint or feel dizzy when you stand.  · You have very bad pain in your belly (abdomen).  · You are sick to your stomach (nauseous) for more than one day.  · You have throwing up (vomiting) that lasts for more than one day.  · You have leg pain.  This information is not intended to replace advice given to you by your health care provider. Make sure you discuss any questions you have with your health care provider.  Document Released: 09/26/2009 Document Revised: 11/30/2018 Document Reviewed: 06/05/2017  Elsevier Patient Education © 2020 [employer]    Cholelithiasis    Cholelithiasis is a form of gallbladder disease in which gallstones form in the gallbladder. The gallbladder is an organ that stores bile. Bile is made in the liver, and it helps to digest fats. Gallstones begin as small crystals and slowly grow into stones. They may cause no symptoms until the gallbladder tightens (contracts) and a gallstone is blocking the duct (gallbladder attack), which can cause pain. Cholelithiasis is also referred to as gallstones.  There are two main types of gallstones:  · Cholesterol stones. These are made of hardened cholesterol and are usually yellow-green in color. They are the most common type of gallstone. Cholesterol is a white, waxy, fat-like substance that is made in the liver.  · Pigment stones. These are dark in color and are made of a red-yellow substance that forms when hemoglobin from red blood cells breaks down (bilirubin).  What are the causes?  This condition may be caused by an imbalance in the substances that bile is made of. This can happen if the bile:  · Has too much bilirubin.  · Has too much cholesterol.  · Does not have enough bile salts. These salts help the body absorb and digest fats.  In some cases, this condition can also be caused by the gallbladder not emptying completely or often enough.  What increases the risk?  The following factors may make you more likely to develop this condition:  · Being female.  · Having multiple pregnancies. Health care providers sometimes advise removing diseased gallbladders before future pregnancies.  · Eating a diet that is heavy in fried foods, fat, and refined carbohydrates, like white bread and white rice.  · Being obese.  · Being older than age 40.  · Prolonged use of medicines that contain female hormones (estrogen).  · Having diabetes mellitus.  · Rapidly losing weight.  · Having a family history of gallstones.  · Being of  or [place] descent.  · Having an intestinal disease such  as Crohn disease.  · Having metabolic syndrome.  · Having cirrhosis.  · Having severe types of anemia such as sickle cell anemia.  What are the signs or symptoms?  In most cases, there are no symptoms. These are known as silent gallstones. If a gallstone blocks the bile ducts, it can cause a gallbladder attack. The main symptom of a gallbladder attack is sudden pain in the upper right abdomen. The pain usually comes at night or after eating a large meal. The pain can last for one or several hours and can spread to the right shoulder or chest.  If the bile duct is blocked for more than a few hours, it can cause infection or inflammation of the gallbladder, liver, or pancreas, which may cause:  · Nausea.  · Vomiting.  · Abdominal pain that lasts for 5 hours or more.  · Fever or chills.  · Yellowing of the skin or the whites of the eyes (jaundice).  · Dark urine.  · Light-colored stools.  How is this diagnosed?  This condition may be diagnosed based on:  · A physical exam.  · Your medical history.  · An ultrasound of your gallbladder.  · CT scan.  · MRI.  · Blood tests to check for signs of infection or inflammation.  · A scan of your gallbladder and bile ducts (biliary system) using nonharmful radioactive material and special cameras that can see the radioactive material (cholescintigram). This test checks to see how your gallbladder contracts and whether bile ducts are blocked.  · Inserting a small tube with a camera on the end (endoscope) through your mouth to inspect bile ducts and check for blockages (endoscopic retrograde cholangiopancreatogram).  How is this treated?  Treatment for gallstones depends on the severity of the condition. Silent gallstones do not need treatment. If the gallstones cause a gallbladder attack or other symptoms, treatment may be required. Options for treatment include:  · Surgery to remove the gallbladder (cholecystectomy). This is the most common treatment.  · Medicines to dissolve  gallstones. These are most effective at treating small gallstones. You may need to take medicines for up to 6-12 months.  · Shock wave treatment (extracorporeal biliary lithotripsy). In this treatment, an ultrasound machine sends shock waves to the gallbladder to break gallstones into smaller pieces. These pieces can then be passed into the intestines or be dissolved by medicine. This is rarely used.  · Removing gallstones through endoscopic retrograde cholangiopancreatogram. A small basket can be attached to the endoscope and used to capture and remove gallstones.  Follow these instructions at home:  · Take over-the-counter and prescription medicines only as told by your health care provider.  · Maintain a healthy weight and follow a healthy diet. This includes:  ? Reducing fatty foods, such as fried food.  ? Reducing refined carbohydrates, like white bread and white rice.  ? Increasing fiber. Aim for foods like almonds, fruit, and beans.  · Keep all follow-up visits as told by your health care provider. This is important.  Contact a health care provider if:  · You think you have had a gallbladder attack.  · You have been diagnosed with silent gallstones and you develop abdominal pain or indigestion.  Get help right away if:  · You have pain from a gallbladder attack that lasts for more than 2 hours.  · You have abdominal pain that lasts for more than 5 hours.  · You have a fever or chills.  · You have persistent nausea and vomiting.  · You develop jaundice.  · You have dark urine or light-colored stools.  Summary  · Cholelithiasis (also called gallstones) is a form of gallbladder disease in which gallstones form in the gallbladder.  · This condition is caused by an imbalance in the substances that make up bile. This can happen if the bile has too much cholesterol, too much bilirubin, or not enough bile salts.  · You are more likely to develop this condition if you are female, pregnant, using medicines with  estrogen, obese, older than age 40, or have a family history of gallstones. You may also develop gallstones if you have diabetes, an intestinal disease, cirrhosis, or metabolic syndrome.  · Treatment for gallstones depends on the severity of the condition. Silent gallstones do not need treatment.  · If gallstones cause a gallbladder attack or other symptoms, treatment may be needed. The most common treatment is surgery to remove the gallbladder.  This information is not intended to replace advice given to you by your health care provider. Make sure you discuss any questions you have with your health care provider.  Document Released: 12/14/2006 Document Revised: 11/30/2018 Document Reviewed: 09/03/2017  [employer] Patient Education © 2020 [employer]      Depression / Suicide Risk    As you are discharged from this Psychiatric hospital facility, it is important to learn how to keep safe from harming yourself.    Recognize the warning signs:  · Abrupt changes in personality, positive or negative- including increase in energy   · Giving away possessions  · Change in eating patterns- significant weight changes-  positive or negative  · Change in sleeping patterns- unable to sleep or sleeping all the time   · Unwillingness or inability to communicate  · Depression  · Unusual sadness, discouragement and loneliness  · Talk of wanting to die  · Neglect of personal appearance   · Rebelliousness- reckless behavior  · Withdrawal from people/activities they love  · Confusion- inability to concentrate     If you or a loved one observes any of these behaviors or has concerns about self-harm, here's what you can do:  · Talk about it- your feelings and reasons for harming yourself  · Remove any means that you might use to hurt yourself (examples: pills, rope, extension cords, firearm)  · Get professional help from the community (Mental Health, Substance Abuse, psychological counseling)  · Do not be alone:Call your Safe Contact- someone  whom you trust who will be there for you.  · Call your local CRISIS HOTLINE 027-7322 or 613-151-3759  · Call your local Children's Mobile Crisis Response Team [place] (512) 950-2182 or www.[employer]  · Call the toll free National Suicide Prevention Hotlines   · National Suicide Prevention Lifeline 307-200-OKQA (7515)  · [employer] Line Network 800-SUICIDE (860-8777)

## 2020-09-18 ENCOUNTER — PHARMACY VISIT (OUTPATIENT)
Dept: PHARMACY | Facility: MEDICAL CENTER | Age: 35
End: 2020-09-18
Payer: COMMERCIAL

## 2020-10-24 ENCOUNTER — IMMUNIZATION (OUTPATIENT)
Dept: SOCIAL WORK | Facility: CLINIC | Age: 35
End: 2020-10-24
Payer: COMMERCIAL

## 2020-10-24 DIAGNOSIS — Z23 NEED FOR VACCINATION: Primary | ICD-10-CM

## 2020-10-24 PROCEDURE — 90686 IIV4 VACC NO PRSV 0.5 ML IM: CPT | Performed by: REGISTERED NURSE

## 2020-10-24 PROCEDURE — 90471 IMMUNIZATION ADMIN: CPT | Performed by: REGISTERED NURSE

## 2020-10-29 ENCOUNTER — HOSPITAL ENCOUNTER (OUTPATIENT)
Dept: RADIOLOGY | Facility: MEDICAL CENTER | Age: 35
End: 2020-10-29
Attending: INTERNAL MEDICINE
Payer: COMMERCIAL

## 2020-10-29 ENCOUNTER — HOSPITAL ENCOUNTER (OUTPATIENT)
Dept: LAB | Facility: MEDICAL CENTER | Age: 35
End: 2020-10-29
Attending: INTERNAL MEDICINE
Payer: COMMERCIAL

## 2020-10-29 DIAGNOSIS — R10.13 ABDOMINAL PAIN, EPIGASTRIC: ICD-10-CM

## 2020-10-29 DIAGNOSIS — K21.00 GASTROESOPHAGEAL REFLUX DISEASE WITH ESOPHAGITIS, UNSPECIFIED WHETHER HEMORRHAGE: ICD-10-CM

## 2020-10-29 DIAGNOSIS — E66.9 OBESITY, UNSPECIFIED CLASSIFICATION, UNSPECIFIED OBESITY TYPE, UNSPECIFIED WHETHER SERIOUS COMORBIDITY PRESENT: ICD-10-CM

## 2020-10-29 LAB
ALBUMIN SERPL BCP-MCNC: 3.7 G/DL (ref 3.2–4.9)
ALBUMIN/GLOB SERPL: 1.1 G/DL
ALP SERPL-CCNC: 111 U/L (ref 30–99)
ALT SERPL-CCNC: 18 U/L (ref 2–50)
ANION GAP SERPL CALC-SCNC: 9 MMOL/L (ref 7–16)
AST SERPL-CCNC: 13 U/L (ref 12–45)
BILIRUB SERPL-MCNC: 0.3 MG/DL (ref 0.1–1.5)
BUN SERPL-MCNC: 17 MG/DL (ref 8–22)
CALCIUM SERPL-MCNC: 9 MG/DL (ref 8.5–10.5)
CHLORIDE SERPL-SCNC: 99 MMOL/L (ref 96–112)
CO2 SERPL-SCNC: 26 MMOL/L (ref 20–33)
CREAT SERPL-MCNC: 0.62 MG/DL (ref 0.5–1.4)
GLOBULIN SER CALC-MCNC: 3.3 G/DL (ref 1.9–3.5)
GLUCOSE SERPL-MCNC: 86 MG/DL (ref 65–99)
POTASSIUM SERPL-SCNC: 3.9 MMOL/L (ref 3.6–5.5)
PROT SERPL-MCNC: 7 G/DL (ref 6–8.2)
SODIUM SERPL-SCNC: 134 MMOL/L (ref 135–145)

## 2020-10-29 PROCEDURE — 80053 COMPREHEN METABOLIC PANEL: CPT

## 2020-10-29 PROCEDURE — 74018 RADEX ABDOMEN 1 VIEW: CPT

## 2020-10-29 PROCEDURE — 36415 COLL VENOUS BLD VENIPUNCTURE: CPT

## 2020-11-03 ENCOUNTER — GYNECOLOGY VISIT (OUTPATIENT)
Dept: OBGYN | Facility: CLINIC | Age: 35
End: 2020-11-03
Payer: COMMERCIAL

## 2020-11-03 VITALS — WEIGHT: 268 LBS | SYSTOLIC BLOOD PRESSURE: 119 MMHG | DIASTOLIC BLOOD PRESSURE: 66 MMHG | BODY MASS INDEX: 43.26 KG/M2

## 2020-11-03 DIAGNOSIS — N92.6 IRREGULAR MENSTRUAL CYCLE: ICD-10-CM

## 2020-11-03 PROCEDURE — 99214 OFFICE O/P EST MOD 30 MIN: CPT | Performed by: OBSTETRICS & GYNECOLOGY

## 2020-11-03 RX ORDER — ACETAMINOPHEN AND CODEINE PHOSPHATE 120; 12 MG/5ML; MG/5ML
1 SOLUTION ORAL DAILY
Qty: 28 TAB | Refills: 12 | Status: ON HOLD | OUTPATIENT
Start: 2020-11-03 | End: 2022-02-03

## 2020-11-03 ASSESSMENT — FIBROSIS 4 INDEX: FIB4 SCORE: 0.36

## 2020-11-03 NOTE — NON-PROVIDER
Patient here for annual exam  Last pap done/result: 05/17/2018, negative  LMP: 10/18/2020  BCM: none currently  Phone number: [phone]  Pharmacy verified  C/o pain in the upper right quadrant  Having trouble conceiving

## 2020-11-04 ENCOUNTER — HOSPITAL ENCOUNTER (OUTPATIENT)
Dept: LAB | Facility: MEDICAL CENTER | Age: 35
End: 2020-11-04
Attending: OBSTETRICS & GYNECOLOGY
Payer: COMMERCIAL

## 2020-11-04 ENCOUNTER — TELEPHONE (OUTPATIENT)
Dept: OBGYN | Facility: CLINIC | Age: 35
End: 2020-11-04

## 2020-11-04 ENCOUNTER — OFFICE VISIT (OUTPATIENT)
Dept: MEDICAL GROUP | Facility: MEDICAL CENTER | Age: 35
End: 2020-11-04
Payer: COMMERCIAL

## 2020-11-04 VITALS
HEIGHT: 66 IN | BODY MASS INDEX: 42.59 KG/M2 | WEIGHT: 265 LBS | HEART RATE: 84 BPM | RESPIRATION RATE: 16 BRPM | OXYGEN SATURATION: 94 % | DIASTOLIC BLOOD PRESSURE: 72 MMHG | TEMPERATURE: 98.2 F | SYSTOLIC BLOOD PRESSURE: 124 MMHG

## 2020-11-04 DIAGNOSIS — G89.29 CHRONIC ABDOMINAL PAIN: ICD-10-CM

## 2020-11-04 DIAGNOSIS — N92.6 IRREGULAR MENSTRUAL CYCLE: ICD-10-CM

## 2020-11-04 DIAGNOSIS — R10.9 CHRONIC ABDOMINAL PAIN: ICD-10-CM

## 2020-11-04 PROBLEM — R10.11 RIGHT UPPER QUADRANT PAIN: Status: RESOLVED | Noted: 2020-09-14 | Resolved: 2020-11-04

## 2020-11-04 LAB
ESTRADIOL SERPL-MCNC: 179 PG/ML
FSH SERPL-ACNC: 3.4 MIU/ML
LH SERPL-ACNC: 6.6 IU/L
T4 FREE SERPL-MCNC: 1.13 NG/DL (ref 0.93–1.7)
TSH SERPL DL<=0.005 MIU/L-ACNC: 1.43 UIU/ML (ref 0.38–5.33)

## 2020-11-04 PROCEDURE — 84439 ASSAY OF FREE THYROXINE: CPT

## 2020-11-04 PROCEDURE — 82670 ASSAY OF TOTAL ESTRADIOL: CPT

## 2020-11-04 PROCEDURE — 99214 OFFICE O/P EST MOD 30 MIN: CPT | Performed by: NURSE PRACTITIONER

## 2020-11-04 PROCEDURE — 83001 ASSAY OF GONADOTROPIN (FSH): CPT

## 2020-11-04 PROCEDURE — 84403 ASSAY OF TOTAL TESTOSTERONE: CPT

## 2020-11-04 PROCEDURE — 84402 ASSAY OF FREE TESTOSTERONE: CPT

## 2020-11-04 PROCEDURE — 36415 COLL VENOUS BLD VENIPUNCTURE: CPT

## 2020-11-04 PROCEDURE — 84270 ASSAY OF SEX HORMONE GLOBUL: CPT

## 2020-11-04 PROCEDURE — 84443 ASSAY THYROID STIM HORMONE: CPT

## 2020-11-04 PROCEDURE — 83002 ASSAY OF GONADOTROPIN (LH): CPT

## 2020-11-04 RX ORDER — TIZANIDINE 4 MG/1
4 TABLET ORAL NIGHTLY PRN
Qty: 30 TAB | Refills: 5 | Status: ON HOLD | OUTPATIENT
Start: 2020-11-04 | End: 2022-02-03

## 2020-11-04 RX ORDER — TRAMADOL HYDROCHLORIDE 50 MG/1
100 TABLET ORAL EVERY 6 HOURS PRN
Qty: 50 TAB | Refills: 0 | Status: SHIPPED | OUTPATIENT
Start: 2020-11-04 | End: 2020-11-18

## 2020-11-04 ASSESSMENT — FIBROSIS 4 INDEX: FIB4 SCORE: 0.36

## 2020-11-04 NOTE — PROGRESS NOTES
Subjective:      Orquidea Juan is a 35 y.o. female who presents with Abdominal Pain        CC: Patient is here today for problems with recurring abdominal pain.    HPI       1. Chronic abdominal pain  Patient was having issues with abdominal pain earlier this year and eventually had a cholecystectomy in June.  She continued to have symptoms for which she went to the emergency room on 9/12 and 9/13/2020.  She had an ERCP for removal of biliary sludge and stent placement.  Despite this, she states she continues to get pains in her right upper abdomen.    She has just recently been to gastroenterology and the plan is to get a KUB and lab work and then do another ERCP and evaluate for PD and CBD stent.  Patient's main concern is the pain can be incapacitating at times and she is not sure what to do.  She has tried over-the-counter medicines but they have not helped.  Her procedure is not scheduled for over another month.  She does not want to have to keep going to the ER when the pain becomes severe.  She does not know what triggers the pain and has been trying to eat less fat in her diet.  She has no associated nausea or vomiting.  Past Medical History:   Diagnosis Date   • GERD (gastroesophageal reflux disease)     h pylori 12/2015   • Infectious disease     to the flu   • Pap smear 4/28/9    Normal     Social History     Socioeconomic History   • Marital status:      Spouse name: Not on file   • Number of children: Not on file   • Years of education: Not on file   • Highest education level: Not on file   Occupational History   • Not on file   Social Needs   • Financial resource strain: Not on file   • Food insecurity     Worry: Not on file     Inability: Not on file   • Transportation needs     Medical: Not on file     Non-medical: Not on file   Tobacco Use   • Smoking status: Never Smoker   • Smokeless tobacco: Never Used   Substance and Sexual Activity   • Alcohol use: Yes     Alcohol/week: 1.2 oz      Types: 2 Standard drinks or equivalent per week     Comment: occ   • Drug use: Yes     Types: Marijuana, Oral     Comment: occ   • Sexual activity: Yes     Partners: Male     Birth control/protection: I.U.D.     Comment: , with boyfriend   Lifestyle   • Physical activity     Days per week: Not on file     Minutes per session: Not on file   • Stress: Not on file   Relationships   • Social connections     Talks on phone: Not on file     Gets together: Not on file     Attends Religion service: Not on file     Active member of club or organization: Not on file     Attends meetings of clubs or organizations: Not on file     Relationship status: Not on file   • Intimate partner violence     Fear of current or ex partner: Not on file     Emotionally abused: Not on file     Physically abused: Not on file     Forced sexual activity: Not on file   Other Topics Concern   •  Service No   • Blood Transfusions No   • Caffeine Concern No   • Occupational Exposure No   • Hobby Hazards No   • Sleep Concern No   • Stress Concern No   • Weight Concern Yes   • Special Diet No   • Back Care No   • Exercise No   • Bike Helmet No   • Seat Belt Yes   • Self-Exams No   Social History Narrative   • Not on file     Current Outpatient Medications   Medication Sig Dispense Refill   • tizanidine (ZANAFLEX) 4 MG Tab Take 1 Tab by mouth at bedtime as needed. 30 Tab 5   • traMADol (ULTRAM) 50 MG Tab Take 2 Tabs by mouth every 6 hours as needed for up to 14 days. 50 Tab 0   • norethindrone (MICRONOR) 0.35 MG tablet Take 1 Tab by mouth every day. 28 Tab 12   • omeprazole (PRILOSEC) 20 MG delayed-release capsule TAKE 1 CAPSULE BY MOUTH ONCE DAILY BEFORE A MEAL FOR 30 DAYS     • ondansetron (ZOFRAN ODT) 4 MG TABLET DISPERSIBLE Take 1 Tab by mouth every four hours as needed. (Patient not taking: Reported on 11/4/2020) 20 Tab 0   • phentermine 37.5 MG capsule TAKE 1 CAPSULE BY MOUTH ONCE DAILY BEFORE BREAKFAST FOR 30 DAYS       No  "current facility-administered medications for this visit.      Family History   Problem Relation Age of Onset   • Diabetes Paternal Grandmother    • Diabetes Father    • Stroke Maternal Grandmother    • Hypertension Maternal Grandmother    • Diabetes Maternal Grandfather    • Cancer Neg Hx          Review of Systems   Gastrointestinal: Positive for abdominal pain.   All other systems reviewed and are negative.         Objective:     /72 (BP Location: Left arm, Patient Position: Sitting, BP Cuff Size: Adult)   Pulse 84   Temp 36.8 °C (98.2 °F) (Temporal)   Resp 16   Ht 1.676 m (5' 6\")   Wt 120.2 kg (265 lb)   LMP 10/18/2020 (Exact Date)   SpO2 94%   BMI 42.77 kg/m²      Physical Exam  Vitals signs and nursing note reviewed.   Constitutional:       General: She is not in acute distress.     Appearance: She is well-developed. She is not diaphoretic.   HENT:      Head: Normocephalic and atraumatic.      Right Ear: External ear normal.      Left Ear: External ear normal.      Nose: Nose normal.   Eyes:      General:         Right eye: No discharge.         Left eye: No discharge.   Neck:      Musculoskeletal: Normal range of motion and neck supple.      Thyroid: No thyromegaly.   Cardiovascular:      Rate and Rhythm: Normal rate and regular rhythm.      Heart sounds: Normal heart sounds. No murmur. No friction rub. No gallop.    Pulmonary:      Effort: Pulmonary effort is normal.      Breath sounds: Normal breath sounds. No wheezing or rales.   Musculoskeletal:         General: No tenderness.   Skin:     General: Skin is warm and dry.      Findings: No rash.   Neurological:      Mental Status: She is alert and oriented to person, place, and time.      Deep Tendon Reflexes: Reflexes normal.   Psychiatric:         Behavior: Behavior normal.         Thought Content: Thought content normal.         Judgment: Judgment normal.                 Assessment/Plan:        1. Chronic abdominal pain  Hopefully patient's " problems will resolve after her procedure with [facility] consultants in December.  I agree with her that I do not want her having to go back to the ER every few weeks for pain management so I will place her on a short course of tramadol which she states she will only use when the pain is severe.  She also received a muscle relaxer to use in the evening if needed.  She has not used these medicines when she needs to be alert.  ORT shows low risk for addiction and a drug contract was reviewed and signed.  She is not currently on any other controlled substances.  We agreed that if this does not improve after her procedure we will consider pain management.  - tizanidine (ZANAFLEX) 4 MG Tab; Take 1 Tab by mouth at bedtime as needed.  Dispense: 30 Tab; Refill: 5  - traMADol (ULTRAM) 50 MG Tab; Take 2 Tabs by mouth every 6 hours as needed for up to 14 days.  Dispense: 50 Tab; Refill: 0  - Consent for Opiate Prescription

## 2020-11-04 NOTE — TELEPHONE ENCOUNTER
Called pt to schedule gyn fv with  after 11/9 for U/S results , ok per  to use provider slot. MARY

## 2020-11-05 ASSESSMENT — ENCOUNTER SYMPTOMS: ABDOMINAL PAIN: 1

## 2020-11-09 ENCOUNTER — HOSPITAL ENCOUNTER (OUTPATIENT)
Dept: RADIOLOGY | Facility: MEDICAL CENTER | Age: 35
End: 2020-11-09
Attending: OBSTETRICS & GYNECOLOGY
Payer: COMMERCIAL

## 2020-11-09 DIAGNOSIS — N92.6 IRREGULAR MENSTRUAL CYCLE: ICD-10-CM

## 2020-11-09 LAB
SHBG SERPL-SCNC: 43 NMOL/L (ref 30–135)
TESTOST FREE SERPL-MCNC: 3.4 PG/ML (ref 1.3–9.2)
TESTOST SERPL-MCNC: 24 NG/DL (ref 9–55)

## 2020-11-09 PROCEDURE — 76830 TRANSVAGINAL US NON-OB: CPT

## 2020-11-12 ENCOUNTER — HOSPITAL ENCOUNTER (OUTPATIENT)
Dept: LAB | Facility: MEDICAL CENTER | Age: 35
End: 2020-11-12
Attending: NURSE PRACTITIONER
Payer: COMMERCIAL

## 2020-11-12 DIAGNOSIS — Z20.822 CLOSE EXPOSURE TO COVID-19 VIRUS: ICD-10-CM

## 2020-11-12 PROCEDURE — C9803 HOPD COVID-19 SPEC COLLECT: HCPCS

## 2020-11-12 PROCEDURE — U0003 INFECTIOUS AGENT DETECTION BY NUCLEIC ACID (DNA OR RNA); SEVERE ACUTE RESPIRATORY SYNDROME CORONAVIRUS 2 (SARS-COV-2) (CORONAVIRUS DISEASE [COVID-19]), AMPLIFIED PROBE TECHNIQUE, MAKING USE OF HIGH THROUGHPUT TECHNOLOGIES AS DESCRIBED BY CMS-2020-01-R: HCPCS

## 2020-11-12 NOTE — PROGRESS NOTES
Chief Complaint   Patient presents with   • Annual Exam   Chief complaint: Irregular cycles and difficulty conceiving    History of present illness:   35 y.o.  presents with above chief complaint.  Patient reports that her cycles have been very irregular in the last few months.  She does report less than 8 cycles per year now.  She is also been trying to conceive with her partner and has been unsuccessful.  History of one successful pregnancy in 2013.    ROS: Pertinent positives documented in HPI and all other systems reviewed & are negative    POBHx:  1 para 1-0-0-1    PGYNHx: Infertility    All PMH, PSH, meds, allergies, social history and FH reviewed and updated today:  Past Medical History:   Diagnosis Date   • GERD (gastroesophageal reflux disease)     h pylori 2015   • Infectious disease     to the flu   • Pap smear     Normal       Past Surgical History:   Procedure Laterality Date   • PB ERCP,DIAGNOSTIC N/A 2020    Procedure: ERCP (ENDOSCOPIC RETROGRADE CHOLANGIOPANCREATOGRAPHY);  Surgeon: Lee Kelly M.D.;  Location: SURGERY SAME DAY [facility];  Service: Gastroenterology   • JACQUI BY LAPAROSCOPY  2020    Procedure: CHOLECYSTECTOMY, LAPAROSCOPIC;  Surgeon: Iftikhar Flowers M.D.;  Location: SURGERY [facility];  Service: General   • PRIMARY C SECTION  3/24/2013    Performed by Briana Cano M.D. at [facility]   • ACL RECONSTRUCTION SCOPE  2012    Performed by CAITY DORSEY at [facility]   • MEDIAL MENISCECTOMY  2012    Performed by CAITY DORSEY at [facility]   • DENTAL EXTRACTION(S)         Allergies: No Known Allergies    Social History     Socioeconomic History   • Marital status:      Spouse name: Not on file   • Number of children: Not on file   • Years of education: Not on file   • Highest education level: Not on file   Occupational History   • Not on file   Social Needs   • Financial resource strain: Not on  file   • Food insecurity     Worry: Not on file     Inability: Not on file   • Transportation needs     Medical: Not on file     Non-medical: Not on file   Tobacco Use   • Smoking status: Never Smoker   • Smokeless tobacco: Never Used   Substance and Sexual Activity   • Alcohol use: Yes     Alcohol/week: 1.2 oz     Types: 2 Standard drinks or equivalent per week     Comment: occ   • Drug use: Yes     Types: Marijuana, Oral     Comment: occ   • Sexual activity: Yes     Partners: Male     Birth control/protection: I.U.D.     Comment: , with boyfriend   Lifestyle   • Physical activity     Days per week: Not on file     Minutes per session: Not on file   • Stress: Not on file   Relationships   • Social connections     Talks on phone: Not on file     Gets together: Not on file     Attends Church service: Not on file     Active member of club or organization: Not on file     Attends meetings of clubs or organizations: Not on file     Relationship status: Not on file   • Intimate partner violence     Fear of current or ex partner: Not on file     Emotionally abused: Not on file     Physically abused: Not on file     Forced sexual activity: Not on file   Other Topics Concern   •  Service No   • Blood Transfusions No   • Caffeine Concern No   • Occupational Exposure No   • Hobby Hazards No   • Sleep Concern No   • Stress Concern No   • Weight Concern Yes   • Special Diet No   • Back Care No   • Exercise No   • Bike Helmet No   • Seat Belt Yes   • Self-Exams No   Social History Narrative   • Not on file       Family History   Problem Relation Age of Onset   • Diabetes Paternal Grandmother    • Diabetes Father    • Stroke Maternal Grandmother    • Hypertension Maternal Grandmother    • Diabetes Maternal Grandfather    • Cancer Neg Hx        Physical exam:  /66 (BP Location: Right arm, Patient Position: Sitting)   Wt 121.6 kg (268 lb)     GENERAL APPEARANCE: healthy, alert, no distress  NECK nontender,  no masses, thyromegaly or nodules  HEART RRR with normal S1 and S2 ,no murmurs, no gallops, no peripheral edema  LUNG clear to auscultation, normal respiratory effort  ABDOMEN Abdomen soft, non-tender. BS normal. No masses,  No organomegaly  FEMALE GYN: normal female external genitalia without lesions, BUS normal without lesions, vaginal mucosa pink and moist, no vaginal discharge noted, cervix normal in appearance without lesions, no CMT, urethra is normal without discharge or scarring, no bladder fullness or masses, normal anus and perineum. Uterus mobile, normal size, and nontender. Ovaries normal size and nontender bilaterally. No palpable masses.  No inguinal hernia present .  EXTREMITIES:negative clubbing, cyanosis, edema    NEURO Awake, alert and oriented x 3, Normal gait, no sensory deficits  SKIN No rashes, or ulcers or lesions seen  PSYCHIATRIC: Patient shows appropriate affect, is alert and oriented x3, intact judgment and insight.      Assessment:  1. Irregular menstrual cycle  LUTEINIZING HORMONE SERUM (LH)    FSH    ESTRADIOL    TSH    FREE THYROXINE    US-PELVIC COMPLETE (TRANSABDOMINAL/TRANSVAGINAL) (COMBO)    TESTOSTERONE F&T FEMALES/CHILD       Plan:    Given irregular cycles and audible menorrhea, high degree of suspicion for PCOS.    We will order pelvic ultrasound along with laboratory testing.    Follow-up once labs and ultrasound are performed.

## 2020-11-19 ENCOUNTER — GYNECOLOGY VISIT (OUTPATIENT)
Dept: OBGYN | Facility: CLINIC | Age: 35
End: 2020-11-19
Payer: COMMERCIAL

## 2020-11-19 VITALS — DIASTOLIC BLOOD PRESSURE: 62 MMHG | WEIGHT: 275 LBS | SYSTOLIC BLOOD PRESSURE: 138 MMHG | BODY MASS INDEX: 44.39 KG/M2

## 2020-11-19 DIAGNOSIS — E28.2 PCOS (POLYCYSTIC OVARIAN SYNDROME): ICD-10-CM

## 2020-11-19 PROCEDURE — 99213 OFFICE O/P EST LOW 20 MIN: CPT | Performed by: OBSTETRICS & GYNECOLOGY

## 2020-11-19 ASSESSMENT — FIBROSIS 4 INDEX: FIB4 SCORE: 0.36

## 2020-11-19 NOTE — PROGRESS NOTES
Chief complaint: Follow-up ultrasound      History of present illness: 35 y.o. presents to office for follow-up ultrasound results and plan of care.  No new complaints.    Patient continues to have upper abdominal pain.      Review of systems:  Pertinent positives documented in HPI and a comprehensive review of system is negative    All PMH, PSH, allergies, social history and FH reviewed and updated today:  Past Medical History:   Diagnosis Date   • GERD (gastroesophageal reflux disease)     h pylori 12/2015   • Infectious disease     to the flu   • Pap smear 4/28/9    Normal       Past Surgical History:   Procedure Laterality Date   • PB ERCP,DIAGNOSTIC N/A 9/16/2020    Procedure: ERCP (ENDOSCOPIC RETROGRADE CHOLANGIOPANCREATOGRAPHY);  Surgeon: Lee Kelly M.D.;  Location: SURGERY SAME DAY [facility];  Service: Gastroenterology   • JACQUI BY LAPAROSCOPY  6/20/2020    Procedure: CHOLECYSTECTOMY, LAPAROSCOPIC;  Surgeon: Iftikhar Flowers M.D.;  Location: SURGERY [facility];  Service: General   • PRIMARY C SECTION  3/24/2013    Performed by Briana Cano M.D. at [facility]   • ACL RECONSTRUCTION SCOPE  5/31/2012    Performed by CAITY DORSEY at SURGERY [facility]   • MEDIAL MENISCECTOMY  5/31/2012    Performed by CAITY DORSEY at [facility]   • DENTAL EXTRACTION(S)  2005       Allergies: No Known Allergies    Social History     Socioeconomic History   • Marital status:      Spouse name: Not on file   • Number of children: Not on file   • Years of education: Not on file   • Highest education level: Not on file   Occupational History   • Not on file   Social Needs   • Financial resource strain: Not on file   • Food insecurity     Worry: Not on file     Inability: Not on file   • Transportation needs     Medical: Not on file     Non-medical: Not on file   Tobacco Use   • Smoking status: Never Smoker   • Smokeless tobacco: Never Used   Substance and Sexual Activity   • Alcohol use:  Yes     Alcohol/week: 1.2 oz     Types: 2 Standard drinks or equivalent per week     Comment: occ   • Drug use: Yes     Types: Marijuana, Oral     Comment: occ   • Sexual activity: Yes     Partners: Male     Birth control/protection: I.U.D.     Comment: , with boyfriend   Lifestyle   • Physical activity     Days per week: Not on file     Minutes per session: Not on file   • Stress: Not on file   Relationships   • Social connections     Talks on phone: Not on file     Gets together: Not on file     Attends Yazidi service: Not on file     Active member of club or organization: Not on file     Attends meetings of clubs or organizations: Not on file     Relationship status: Not on file   • Intimate partner violence     Fear of current or ex partner: Not on file     Emotionally abused: Not on file     Physically abused: Not on file     Forced sexual activity: Not on file   Other Topics Concern   •  Service No   • Blood Transfusions No   • Caffeine Concern No   • Occupational Exposure No   • Hobby Hazards No   • Sleep Concern No   • Stress Concern No   • Weight Concern Yes   • Special Diet No   • Back Care No   • Exercise No   • Bike Helmet No   • Seat Belt Yes   • Self-Exams No   Social History Narrative   • Not on file       Family History   Problem Relation Age of Onset   • Diabetes Paternal Grandmother    • Diabetes Father    • Stroke Maternal Grandmother    • Hypertension Maternal Grandmother    • Diabetes Maternal Grandfather    • Cancer Neg Hx        Physical exam:  /62   Wt 124.7 kg (275 lb)     GENERAL APPEARANCE: healthy, alert, no distress  PSYCHIATRIC: Patient shows appropriate affect, is alert and oriented x3, intact judgment and insight.      Assessment:  1. PCOS (polycystic ovarian syndrome)         Plan:    Discussed with patient findings on ultrasound as well as laboratory data.  No evidence of elevated testosterone, thyroid labs within normal limits.    Ultrasound consistent  with PCOS.  Of note, 2 small cysts also noted on left and right ovaries.    Patient is undergoing evaluation for chronic abdominal pain at this time.  Has an ERCP scheduled for next month.  She is not ready to conceive quite yet.    She will remain on birth control pills at this time until she is ready to begin treatment and attempting conception.    I would like her to come see me 4 to 6 weeks before she begins attempting conception to discuss Clomid/Femara treatment and have a repeat ultrasound performed to assess the cysts on her ovaries.    Questions answered    Spent  15 minutes in face-to-face patient contact in which greater than 50% of that visit was spent in counseling/coordination of care including medical and surgical options of care.

## 2020-11-19 NOTE — NON-PROVIDER
Pt here to discuss u/s results   Pt states no concerns    Good # [phone]  Pharmacy verified.

## 2020-12-04 ENCOUNTER — PRE-ADMISSION TESTING (OUTPATIENT)
Dept: ADMISSIONS | Facility: MEDICAL CENTER | Age: 35
End: 2020-12-04
Attending: INTERNAL MEDICINE
Payer: COMMERCIAL

## 2020-12-04 VITALS — BODY MASS INDEX: 44.39 KG/M2 | HEIGHT: 66 IN

## 2020-12-04 NOTE — OR NURSING
Preadmit instructions and anesthesia fasting instructions given. Any patient questions addressed. Pt instructed to take these medications on day of surgery: prilosec. Pt instructed to self-isolate after covid test. Email to Dr. Laurent for stated BMI.

## 2020-12-05 ENCOUNTER — PRE-ADMISSION TESTING (OUTPATIENT)
Dept: ADMISSIONS | Facility: MEDICAL CENTER | Age: 35
End: 2020-12-05
Attending: INTERNAL MEDICINE
Payer: COMMERCIAL

## 2020-12-05 DIAGNOSIS — Z01.812 PRE-OPERATIVE LABORATORY EXAMINATION: ICD-10-CM

## 2020-12-05 PROCEDURE — C9803 HOPD COVID-19 SPEC COLLECT: HCPCS

## 2020-12-05 PROCEDURE — U0003 INFECTIOUS AGENT DETECTION BY NUCLEIC ACID (DNA OR RNA); SEVERE ACUTE RESPIRATORY SYNDROME CORONAVIRUS 2 (SARS-COV-2) (CORONAVIRUS DISEASE [COVID-19]), AMPLIFIED PROBE TECHNIQUE, MAKING USE OF HIGH THROUGHPUT TECHNOLOGIES AS DESCRIBED BY CMS-2020-01-R: HCPCS

## 2020-12-08 ENCOUNTER — ANESTHESIA EVENT (OUTPATIENT)
Dept: SURGERY | Facility: MEDICAL CENTER | Age: 35
End: 2020-12-08
Payer: COMMERCIAL

## 2020-12-09 ENCOUNTER — ANESTHESIA (OUTPATIENT)
Dept: SURGERY | Facility: MEDICAL CENTER | Age: 35
End: 2020-12-09
Payer: COMMERCIAL

## 2020-12-09 ENCOUNTER — APPOINTMENT (OUTPATIENT)
Dept: RADIOLOGY | Facility: MEDICAL CENTER | Age: 35
End: 2020-12-09
Attending: INTERNAL MEDICINE
Payer: COMMERCIAL

## 2020-12-09 ENCOUNTER — HOSPITAL ENCOUNTER (OUTPATIENT)
Facility: MEDICAL CENTER | Age: 35
End: 2020-12-09
Attending: INTERNAL MEDICINE | Admitting: INTERNAL MEDICINE
Payer: COMMERCIAL

## 2020-12-09 VITALS
BODY MASS INDEX: 44.98 KG/M2 | HEART RATE: 93 BPM | DIASTOLIC BLOOD PRESSURE: 60 MMHG | RESPIRATION RATE: 16 BRPM | OXYGEN SATURATION: 97 % | WEIGHT: 278.66 LBS | SYSTOLIC BLOOD PRESSURE: 106 MMHG | TEMPERATURE: 98.2 F

## 2020-12-09 PROCEDURE — 700105 HCHG RX REV CODE 258: Performed by: INTERNAL MEDICINE

## 2020-12-09 PROCEDURE — 74328 X-RAY BILE DUCT ENDOSCOPY: CPT

## 2020-12-09 PROCEDURE — 700111 HCHG RX REV CODE 636 W/ 250 OVERRIDE (IP): Performed by: ANESTHESIOLOGY

## 2020-12-09 PROCEDURE — 500066 HCHG BITE BLOCK, ECT: Performed by: INTERNAL MEDICINE

## 2020-12-09 PROCEDURE — 160046 HCHG PACU - 1ST 60 MINS PHASE II: Performed by: INTERNAL MEDICINE

## 2020-12-09 PROCEDURE — 700101 HCHG RX REV CODE 250: Performed by: ANESTHESIOLOGY

## 2020-12-09 PROCEDURE — 160048 HCHG OR STATISTICAL LEVEL 1-5: Performed by: INTERNAL MEDICINE

## 2020-12-09 PROCEDURE — 160009 HCHG ANES TIME/MIN: Performed by: INTERNAL MEDICINE

## 2020-12-09 PROCEDURE — 160002 HCHG RECOVERY MINUTES (STAT): Performed by: INTERNAL MEDICINE

## 2020-12-09 PROCEDURE — 160202 HCHG ENDO MINUTES - 1ST 30 MINS LEVEL 3: Performed by: INTERNAL MEDICINE

## 2020-12-09 PROCEDURE — 160036 HCHG PACU - EA ADDL 30 MINS PHASE I: Performed by: INTERNAL MEDICINE

## 2020-12-09 PROCEDURE — 160025 RECOVERY II MINUTES (STATS): Performed by: INTERNAL MEDICINE

## 2020-12-09 PROCEDURE — 110371 HCHG SHELL REV 272: Performed by: INTERNAL MEDICINE

## 2020-12-09 PROCEDURE — 160207 HCHG ENDO MINUTES - EA ADDL 1 MIN LEVEL 3: Performed by: INTERNAL MEDICINE

## 2020-12-09 PROCEDURE — 160035 HCHG PACU - 1ST 60 MINS PHASE I: Performed by: INTERNAL MEDICINE

## 2020-12-09 PROCEDURE — 700101 HCHG RX REV CODE 250: Performed by: INTERNAL MEDICINE

## 2020-12-09 PROCEDURE — 502240 HCHG MISC OR SUPPLY RC 0272: Performed by: INTERNAL MEDICINE

## 2020-12-09 RX ORDER — MIDAZOLAM HYDROCHLORIDE 1 MG/ML
1 INJECTION INTRAMUSCULAR; INTRAVENOUS
Status: DISCONTINUED | OUTPATIENT
Start: 2020-12-09 | End: 2020-12-09 | Stop reason: HOSPADM

## 2020-12-09 RX ORDER — MIDAZOLAM HYDROCHLORIDE 1 MG/ML
INJECTION INTRAMUSCULAR; INTRAVENOUS PRN
Status: DISCONTINUED | OUTPATIENT
Start: 2020-12-09 | End: 2020-12-09 | Stop reason: SURG

## 2020-12-09 RX ORDER — LABETALOL HYDROCHLORIDE 5 MG/ML
5 INJECTION, SOLUTION INTRAVENOUS
Status: DISCONTINUED | OUTPATIENT
Start: 2020-12-09 | End: 2020-12-09 | Stop reason: HOSPADM

## 2020-12-09 RX ORDER — SODIUM CHLORIDE, SODIUM LACTATE, POTASSIUM CHLORIDE, CALCIUM CHLORIDE 600; 310; 30; 20 MG/100ML; MG/100ML; MG/100ML; MG/100ML
INJECTION, SOLUTION INTRAVENOUS CONTINUOUS
Status: DISCONTINUED | OUTPATIENT
Start: 2020-12-09 | End: 2020-12-09 | Stop reason: HOSPADM

## 2020-12-09 RX ORDER — DIPHENHYDRAMINE HYDROCHLORIDE 50 MG/ML
12.5 INJECTION INTRAMUSCULAR; INTRAVENOUS
Status: DISCONTINUED | OUTPATIENT
Start: 2020-12-09 | End: 2020-12-09 | Stop reason: HOSPADM

## 2020-12-09 RX ORDER — OXYCODONE HCL 5 MG/5 ML
5 SOLUTION, ORAL ORAL
Status: DISCONTINUED | OUTPATIENT
Start: 2020-12-09 | End: 2020-12-09 | Stop reason: HOSPADM

## 2020-12-09 RX ORDER — ROCURONIUM BROMIDE 10 MG/ML
INJECTION, SOLUTION INTRAVENOUS PRN
Status: DISCONTINUED | OUTPATIENT
Start: 2020-12-09 | End: 2020-12-09 | Stop reason: SURG

## 2020-12-09 RX ORDER — DIPHENHYDRAMINE HYDROCHLORIDE 50 MG/ML
INJECTION INTRAMUSCULAR; INTRAVENOUS PRN
Status: DISCONTINUED | OUTPATIENT
Start: 2020-12-09 | End: 2020-12-09 | Stop reason: SURG

## 2020-12-09 RX ORDER — HALOPERIDOL 5 MG/ML
1 INJECTION INTRAMUSCULAR
Status: DISCONTINUED | OUTPATIENT
Start: 2020-12-09 | End: 2020-12-09 | Stop reason: HOSPADM

## 2020-12-09 RX ORDER — CEFAZOLIN SODIUM 1 G/3ML
INJECTION, POWDER, FOR SOLUTION INTRAMUSCULAR; INTRAVENOUS PRN
Status: DISCONTINUED | OUTPATIENT
Start: 2020-12-09 | End: 2020-12-09 | Stop reason: SURG

## 2020-12-09 RX ORDER — DEXAMETHASONE SODIUM PHOSPHATE 4 MG/ML
INJECTION, SOLUTION INTRA-ARTICULAR; INTRALESIONAL; INTRAMUSCULAR; INTRAVENOUS; SOFT TISSUE PRN
Status: DISCONTINUED | OUTPATIENT
Start: 2020-12-09 | End: 2020-12-09 | Stop reason: SURG

## 2020-12-09 RX ORDER — OXYCODONE HCL 5 MG/5 ML
10 SOLUTION, ORAL ORAL
Status: DISCONTINUED | OUTPATIENT
Start: 2020-12-09 | End: 2020-12-09 | Stop reason: HOSPADM

## 2020-12-09 RX ORDER — LIDOCAINE HYDROCHLORIDE 20 MG/ML
INJECTION, SOLUTION EPIDURAL; INFILTRATION; INTRACAUDAL; PERINEURAL PRN
Status: DISCONTINUED | OUTPATIENT
Start: 2020-12-09 | End: 2020-12-09 | Stop reason: SURG

## 2020-12-09 RX ORDER — ONDANSETRON 2 MG/ML
INJECTION INTRAMUSCULAR; INTRAVENOUS PRN
Status: DISCONTINUED | OUTPATIENT
Start: 2020-12-09 | End: 2020-12-09 | Stop reason: SURG

## 2020-12-09 RX ORDER — LIDOCAINE HYDROCHLORIDE 40 MG/ML
SOLUTION TOPICAL PRN
Status: DISCONTINUED | OUTPATIENT
Start: 2020-12-09 | End: 2020-12-09 | Stop reason: SURG

## 2020-12-09 RX ORDER — METOCLOPRAMIDE HYDROCHLORIDE 5 MG/ML
INJECTION INTRAMUSCULAR; INTRAVENOUS PRN
Status: DISCONTINUED | OUTPATIENT
Start: 2020-12-09 | End: 2020-12-09 | Stop reason: SURG

## 2020-12-09 RX ORDER — ONDANSETRON 2 MG/ML
4 INJECTION INTRAMUSCULAR; INTRAVENOUS
Status: COMPLETED | OUTPATIENT
Start: 2020-12-09 | End: 2020-12-09

## 2020-12-09 RX ADMIN — LIDOCAINE HYDROCHLORIDE 160 MG: 40 SOLUTION TOPICAL at 13:17

## 2020-12-09 RX ADMIN — ROCURONIUM BROMIDE 50 MG: 10 INJECTION, SOLUTION INTRAVENOUS at 13:17

## 2020-12-09 RX ADMIN — FENTANYL CITRATE 25 MCG: 50 INJECTION, SOLUTION INTRAMUSCULAR; INTRAVENOUS at 14:21

## 2020-12-09 RX ADMIN — FENTANYL CITRATE 100 MCG: 50 INJECTION, SOLUTION INTRAMUSCULAR; INTRAVENOUS at 13:17

## 2020-12-09 RX ADMIN — MIDAZOLAM HYDROCHLORIDE 2 MG: 1 INJECTION, SOLUTION INTRAMUSCULAR; INTRAVENOUS at 13:13

## 2020-12-09 RX ADMIN — SUGAMMADEX 200 MG: 100 INJECTION, SOLUTION INTRAVENOUS at 13:55

## 2020-12-09 RX ADMIN — PROPOFOL 190 MG: 10 INJECTION, EMULSION INTRAVENOUS at 13:17

## 2020-12-09 RX ADMIN — DIPHENHYDRAMINE HYDROCHLORIDE 25 MG: 50 INJECTION, SOLUTION INTRAMUSCULAR; INTRAVENOUS at 13:21

## 2020-12-09 RX ADMIN — ONDANSETRON 4 MG: 2 INJECTION INTRAMUSCULAR; INTRAVENOUS at 13:49

## 2020-12-09 RX ADMIN — DEXAMETHASONE SODIUM PHOSPHATE 8 MG: 4 INJECTION, SOLUTION INTRAMUSCULAR; INTRAVENOUS at 13:21

## 2020-12-09 RX ADMIN — WATER 15 ML: 100 IRRIGANT IRRIGATION at 12:04

## 2020-12-09 RX ADMIN — ONDANSETRON 4 MG: 2 INJECTION INTRAMUSCULAR; INTRAVENOUS at 14:09

## 2020-12-09 RX ADMIN — METOCLOPRAMIDE 10 MG: 5 INJECTION, SOLUTION INTRAMUSCULAR; INTRAVENOUS at 13:21

## 2020-12-09 RX ADMIN — CEFAZOLIN 3 G: 1 INJECTION, POWDER, FOR SOLUTION INTRAVENOUS at 13:13

## 2020-12-09 RX ADMIN — FENTANYL CITRATE 25 MCG: 50 INJECTION, SOLUTION INTRAMUSCULAR; INTRAVENOUS at 14:44

## 2020-12-09 RX ADMIN — SODIUM CHLORIDE, POTASSIUM CHLORIDE, SODIUM LACTATE AND CALCIUM CHLORIDE: 600; 310; 30; 20 INJECTION, SOLUTION INTRAVENOUS at 12:04

## 2020-12-09 RX ADMIN — SODIUM CHLORIDE, POTASSIUM CHLORIDE, SODIUM LACTATE AND CALCIUM CHLORIDE: 600; 310; 30; 20 INJECTION, SOLUTION INTRAVENOUS at 13:13

## 2020-12-09 RX ADMIN — FENTANYL CITRATE 25 MCG: 50 INJECTION, SOLUTION INTRAMUSCULAR; INTRAVENOUS at 14:12

## 2020-12-09 RX ADMIN — LIDOCAINE HYDROCHLORIDE 100 MG: 20 INJECTION, SOLUTION EPIDURAL; INFILTRATION; INTRACAUDAL; PERINEURAL at 13:17

## 2020-12-09 ASSESSMENT — PAIN SCALES - GENERAL: PAIN_LEVEL: 3

## 2020-12-09 ASSESSMENT — FIBROSIS 4 INDEX: FIB4 SCORE: 0.36

## 2020-12-09 NOTE — ANESTHESIA PROCEDURE NOTES
Airway    Date/Time: 12/9/2020 1:17 PM  Performed by: Sabi Ellis M.D.  Authorized by: Sabi Ellis M.D.     Location:  OR  Urgency:  Elective  Indications for Airway Management:  Anesthesia      Spontaneous Ventilation: absent    Sedation Level:  Deep  Preoxygenated: Yes    Patient Position:  Sniffing  Mask Difficulty Assessment:  2 - vent by mask + OA or adjuvant +/- NMBA  Final Airway Type:  Endotracheal airway  Final Endotracheal Airway:  ETT  Cuffed: Yes    Technique Used for Successful ETT Placement:  Direct laryngoscopy    Insertion Site:  Oral  Blade Type:  Solomon  Laryngoscope Blade/Videolaryngoscope Blade Size:  3  ETT Size (mm):  7.0  Measured from:  Teeth  ETT to Teeth (cm):  21  Placement Verified by: auscultation and capnometry    Cormack-Lehane Classification:  Grade I - full view of glottis  Number of Attempts at Approach:  1

## 2020-12-09 NOTE — ANESTHESIA TIME REPORT
Anesthesia Start and Stop Event Times     Date Time Event    12/9/2020 1249 Ready for Procedure     1313 Anesthesia Start     1359 Anesthesia Stop        Responsible Staff  12/09/20    Name Role Begin End    Sabi Ellis M.D. Anesth 1313 1359        Preop Diagnosis (Free Text):  Pre-op Diagnosis     GASTROESOPHAGEAL REFLUX DISEASE        Preop Diagnosis (Codes):  Diagnosis Information     Diagnosis Code(s): Choledocholithiasis [K80.50]        Post op Diagnosis  GERD (gastroesophageal reflux disease)      Premium Reason  Non-Premium    Comments:

## 2020-12-09 NOTE — DISCHARGE INSTRUCTIONS
"Fat and Cholesterol Restricted Eating Plan  Getting too much fat and cholesterol in your diet may cause health problems. Choosing the right foods helps keep your fat and cholesterol at normal levels. This can keep you from getting certain diseases.  Your doctor may recommend an eating plan that includes:  · Total fat: ______% or less of total calories a day.  · Saturated fat: ______% or less of total calories a day.  · Cholesterol: less than _________mg a day.  · Fiber: ______g a day.  What are tips for following this plan?  Meal planning  · At meals, divide your plate into four equal parts:  ? Fill one-half of your plate with vegetables and green salads.  ? Fill one-fourth of your plate with whole grains.  ? Fill one-fourth of your plate with low-fat (lean) protein foods.  · Eat fish that is high in omega-3 fats at least two times a week. This includes mackerel, tuna, sardines, and salmon.  · Eat foods that are high in fiber, such as whole grains, beans, apples, broccoli, carrots, peas, and barley.  General tips    · Work with your doctor to lose weight if you need to.  · Avoid:  ? Foods with added sugar.  ? Fried foods.  ? Foods with partially hydrogenated oils.  · Limit alcohol intake to no more than 1 drink a day for nonpregnant women and 2 drinks a day for men. One drink equals 12 oz of beer, 5 oz of wine, or 1½ oz of hard liquor.  Reading food labels  · Check food labels for:  ? Trans fats.  ? Partially hydrogenated oils.  ? Saturated fat (g) in each serving.  ? Cholesterol (mg) in each serving.  ? Fiber (g) in each serving.  · Choose foods with healthy fats, such as:  ? Monounsaturated fats.  ? Polyunsaturated fats.  ? Omega-3 fats.  · Choose grain products that have whole grains. Look for the word \"whole\" as the first word in the ingredient list.  Cooking  · Cook foods using low-fat methods. These include baking, boiling, grilling, and broiling.  · Eat more home-cooked foods. Eat at restaurants and buffets " less often.  · Avoid cooking using saturated fats, such as butter, cream, palm oil, palm kernel oil, and coconut oil.  Recommended foods    Fruits  · All fresh, canned (in natural juice), or frozen fruits.  Vegetables  · Fresh or frozen vegetables (raw, steamed, roasted, or grilled). Green salads.  Grains  · Whole grains, such as whole wheat or whole grain breads, crackers, cereals, and pasta. Unsweetened oatmeal, bulgur, barley, quinoa, or brown rice. Corn or whole wheat flour tortillas.  Meats and other protein foods  · Ground beef (85% or leaner), grass-fed beef, or beef trimmed of fat. Skinless chicken or turkey. Ground chicken or turkey. Pork trimmed of fat. All fish and seafood. Egg whites. Dried beans, peas, or lentils. Unsalted nuts or seeds. Unsalted canned beans. Nut butters without added sugar or oil.  Dairy  · Low-fat or nonfat dairy products, such as skim or 1% milk, 2% or reduced-fat cheeses, low-fat and fat-free ricotta or cottage cheese, or plain low-fat and nonfat yogurt.  Fats and oils  · Tub margarine without trans fats. Light or reduced-fat mayonnaise and salad dressings. Avocado. Olive, canola, sesame, or safflower oils.  The items listed above may not be a complete list of foods and beverages you can eat. Contact a dietitian for more information.  Foods to avoid  Fruits  · Canned fruit in heavy syrup. Fruit in cream or butter sauce. Fried fruit.  Vegetables  · Vegetables cooked in cheese, cream, or butter sauce. Fried vegetables.  Grains  · White bread. White pasta. White rice. Cornbread. Bagels, pastries, and croissants. Crackers and snack foods that contain trans fat and hydrogenated oils.  Meats and other protein foods  · Fatty cuts of meat. Ribs, chicken wings, renner, sausage, bologna, salami, chitterlings, fatback, hot dogs, bratwurst, and packaged lunch meats. Liver and organ meats. Whole eggs and egg yolks. Chicken and turkey with skin. Fried meat.  Dairy  · Whole or 2% milk, cream,  half-and-half, and cream cheese. Whole milk cheeses. Whole-fat or sweetened yogurt. Full-fat cheeses. Nondairy creamers and whipped toppings. Processed cheese, cheese spreads, and cheese curds.  Beverages  · Alcohol. Sugar-sweetened drinks such as sodas, lemonade, and fruit drinks.  Fats and oils  · Butter, stick margarine, lard, shortening, ghee, or renner fat. Coconut, palm kernel, and palm oils.  Sweets and desserts  · Corn syrup, sugars, honey, and molasses. Candy. Jam and jelly. Syrup. Sweetened cereals. Cookies, pies, cakes, donuts, muffins, and ice cream.  The items listed above may not be a complete list of foods and beverages you should avoid. Contact a dietitian for more information.  Summary  · Choosing the right foods helps keep your fat and cholesterol at normal levels. This can keep you from getting certain diseases.  · At meals, fill one-half of your plate with vegetables and green salads.  · Eat high-fiber foods, like whole grains, beans, apples, carrots, peas, and barley.  · Limit added sugar, saturated fats, alcohol, and fried foods.  This information is not intended to replace advice given to you by your health care provider. Make sure you discuss any questions you have with your health care provider.  Document Released: 06/18/2013 Document Revised: 08/21/2019 Document Reviewed: 09/04/2018  [employer] Patient Education © 2020 [employer]  ENDOSCOPY HOME CARE INSTRUCTIONS    GASTROSCOPY OR ERCP  1. Don't eat or drink anything for about an hour after the test. You can then resume your regular diet.  2. Don't drive or drink alcohol for 24 hours. The medication you received will make you too drowsy.  3. Don't take any coffee, tea, or aspirin products until after you see your doctor. These can harm the lining of your stomach.  4. If you begin to vomit bloody material, or develop black or bloody stools, call your doctor as soon as possible.  5. If you have any neck, chest, abdominal pain or temp of 100  degrees, call your doctor.  6. See your doctor as instructed  7. Additional instructions: Start with clear liquids then advance to low fat diet.   Take Prilosec 40mg twice a day until Monday (for abdominal pain)  Be aware that drug given during anesthesia makes oral birth control ineffective as reviewed by Dr Ellis  8. Prescriptions: none      Dr Kelly [phone]    You should call 911 if you develop problems with breathing or chest pain.  If any questions arise, call your doctor. If your doctor is not available, please feel free to call [phone]. You can also call the HEALTH HOTLINE open 24 hours/day, 7 days/week and speak to a nurse at [phone], or toll free [phone].    Depression / Suicide Risk    As you are discharged from this [facility] facility, it is important to learn how to keep safe from harming yourself.    Recognize the warning signs:  · Abrupt changes in personality, positive or negative- including increase in energy   · Giving away possessions  · Change in eating patterns- significant weight changes-  positive or negative  · Change in sleeping patterns- unable to sleep or sleeping all the time   · Unwillingness or inability to communicate  · Depression  · Unusual sadness, discouragement and loneliness  · Talk of wanting to die  · Neglect of personal appearance   · Rebelliousness- reckless behavior  · Withdrawal from people/activities they love  · Confusion- inability to concentrate     If you or a loved one observes any of these behaviors or has concerns about self-harm, here's what you can do:  · Talk about it- your feelings and reasons for harming yourself  · Remove any means that you might use to hurt yourself (examples: pills, rope, extension cords, firearm)  · Get professional help from the community (Mental Health, Substance Abuse, psychological counseling)  · Do not be alone:Call your Safe Contact- someone whom you trust who will be there for you.  · Call your local  CRISIS HOTLINE 430-4553 or 751-076-3325  · Call your local Children's Mobile Crisis Response Team [place] (769) 658-7656 or www.OptionEase  · Call the toll free National Suicide Prevention Hotlines   · National Suicide Prevention Lifeline 359-150-NLWB (0398)  · National [employer] Line Network 800-SUICIDE (186-5889)    I acknowledge receipt and understanding of these Home Care Instructions.

## 2020-12-09 NOTE — ANESTHESIA PREPROCEDURE EVALUATION
36 yo with no cardiopulmonary dz.  Discussed sugammadex with pt - BC pills are less effective for 10 days post op.    Relevant Problems   GI   (+) Gastroesophageal reflux disease without esophagitis       Physical Exam    Airway   Mallampati: II  TM distance: >3 FB  Neck ROM: full       Cardiovascular - normal exam  Rhythm: regular  Rate: normal  (-) murmur     Dental - normal exam           Pulmonary - normal exam  Breath sounds clear to auscultation     Abdominal    Neurological - normal exam                 Anesthesia Plan    ASA 3   ASA physical status 3 criteria: morbid obesity - BMI greater than or equal to 40    Plan - general       Airway plan will be ETT        Induction: intravenous    Postoperative Plan: Postoperative administration of opioids is intended.    Pertinent diagnostic labs and testing reviewed    Informed Consent:    Anesthetic plan and risks discussed with patient.    Use of blood products discussed with: patient whom consented to blood products.

## 2020-12-09 NOTE — OP REPORT
Date of Procedure: 12/9/2020   1:06 PM      Referring Physicians:   1. Margaret Ramos     Procedure performed:  1.ERCP  With removal of biliary stent   2.ERCP with removal of biliary calculi    ====================================  Anesthesiologist:  Sabi Ellis M.D.  Surgeon: Lee Kelly M.D.     -----------------------------------------------------------------  Preoperative diagnosis:   1. Abdominal pain   2. Elevated LFTs   3. Choledocholithiasis   4. Bile leak     Postoperative diagnosis:  1. Choledocholithiasis      ------------------------------------------------------------------    Impression:    Biliary cholangiogram:  The intrahepatic ducts are not dilated   The left and right main hepatic ducts are s;lightly dilated   The common hepatic duct measures 13 mm in size   Clips seen in the GB fossa   No extravasation of contrast seen out of the biliary tree   The CBD measures 12 mm in size   Multiple filling defects seen in the distal CBD     -S/p removal of the biliary stent using a cold snare   -S/p removal of 3 biliary calculi and moderate amount of sludge    Recommendations:  1. Start on clears and advance as tolerated to low fat diet   2. NO NSAIDs, aspirin, antiplatelets or anticoagulants x 3days   3.Follow up as needed       ====================================  Indication:     35-year-old obese female, who had cholecystectomy in June 2020. Soon after the surgery she had some right upper quadrant pain but this was thought to be related to surgery and subsided after that.  She was asymptomatic since then.  Last Thursday she started with the right upper quadrant abdominal pain 10 out of 10 in intensity worsening, associated with nausea, chills and fever.  She denies any vomiting or diarrhea or hematochezia.  The pain was initially in the epigastrium and radiating to the right upper quadrant and then to the back.  She could not attribute any initiating factors the pain is relieved with pain  medications from the emergency room however it is currently intermittent and located.      CT abdomen in the emergency room revealed a gallbladder fossa fluid collection 2.7 x 0.9 x 1.7 concerning for biliary leak or seroma.  Patient is currently on IV Zosyn  She underwent ERCP with biliary/ PD stent placement on 9/16/20 :    Biliary cholangiogram:  The intrahepatic ducts are not dilated   The left and right main hepatic ducts are not dilated   The common hepatic duct measures 12 mm in size   Clips seen in the GB fossa   Extravasation of contrast seen in the GB/ ? Distal to take off of cystic duct   The CBD measures 13 mm in size   Few small floating specks seen on cholangiogram      -S/p removal of biliary sludge   -S/p placement of a 10F x 9 cm straight plastic biliary stent  -S/p placement of a 4F x 3 cm plastic PD stent     She is here for ERCP with stent removal       -------------------------    Medications:  The patient was performed under general anesthesia with monitored anesthesia care.  Propofol sedation was administered under discretion of anesthesiology.  Please refer to their notes for more details.     Universal Precautions:  [place] Precautions were followed throughout the entire procedure.  The patient was continuously monitored on Blood pressure, pulse oximetry, and heart rate monitoring though out the procedure and afterwards. A time out was taken prior to the start of the procedure to identify the patient's name, date of birth, ID badge, and appropriate procedure site/procedure type.    Informed Consent:  Informed consent was obtained from the patient after the risks, benefits, and indications were explained to the patient in detail.  The alternatives to the procedure were explained as well.  Risks such as bleeding, infection, perforation, complications with sedation, and need for open surgery for complications were explained in detail prior to obtaining consent.  The patient was given  opportunity to ask questions.  If need be these same risks, benefits, indication, and complications were explained to the patients family member prior to obtaining consent for the procedure.    Description of procedure:    Patient was brought to ERCP fluoro/operating room and met by staff and physicians.  After procedure was confirmed and appropriate procedure was identified preparations were made for sedation.  A time out was taken to identify the patients name, procedure site/type, and ID badge.    The patient was then sedated per anesthesiology and intubated.  The patient was then placed on the fluoroscopy table on their abdomen.    An oral bite block was placed between the incisors.  When the patient was appropriately sedated the GIF Olympus side viewing Duodenal/ERCP endoscope was used to perform the procedure.    The Side viewing Duodenal/ERCP endoscope was passed through the oropharynx to the second duodenum and positioned to the ampulla.    Esophagus:    There were limited views of the esophagus using side viewing endoscope - these were normal in appearance.     Stomach:    Careful examination was made of the body and antrum of the stomach -using side viewing endoscope - these were normal in appearance.    Retroflexed views and photographs were obtained of the Fundus and cardia as well using side viewing endoscope - these were normal in appearance.   Duodenum:  The endoscope was passed through the pylorus into the duodenal bulb which was examined using side viewing endoscope - this was normal in appearance.    Further advancement allowed for examination of the 1st and 2nd portion of the duodenum.    A  film was captured    Views of the Ampulla were obtained and endoscopic photographs were captured.  The AMPULLA had evidence of sphincterotomy   A plastic biliary stent was seen extruding out of the ampulla   -S/p removal of the biliary stent using a cold snare       Selective cannulation of the biliary  system was performed using a balloon Retrieval catheter.  We were able to access the biliary system on first attempt at cannulation. Contrast was injected  A guidewire was advanced in to the biliary system. A .035 wire was used.  ---  Pancreatogram:  Not obtained. Pancreatic system intentionally not cannulated as it was not indicated.  ---  Biliary cholangiogram:  The intrahepatic ducts are not dilated   The left and right main hepatic ducts are slightly dilated   The common hepatic duct measures 13 mm in size   Clips seen in the GB fossa   No extravasation of contrast seen out of the biliary tree   The CBD measures 12 mm in size   Multiple filling defects seen in the distal CBD       Multiple fluoroscopic pictures were obtained to evaluate the bilary system.  After cholangiogram was obtained a guidewire was advanced to the intrahepatic ducts and maintained in position.   ----  The guidewire was maintained in the intrahepatic system.  The sphincterotome was removed.  The retrieval balloon catheter was placed over the guidewire and into the ductal system    A 9-15 mm balloon retrieval catheter was advanced to the bifurcation and inflated to 12m. An occlusion cholangiogram was obtained.  Balloon sweeps were performed of the CBD and the balloon catheter was used to remove stones/sludge from the biliary tree:  -S/p removal of 3 biliary calculi and moderate amount of sludge  ----------------------------------------------------------------------------------------------------------------------------------------------------------------  All sludge and biliary stones were removed from the bile ducts and the biliary system was cleared.  ------------  The endoscope was moved back to the stomach.  The insufflated air and bile products were suctioned.  The endoscope was slowly withdrawn.  No additional findings.  No acute complications.  The patient was extubated and taken to  PACU for recovery.

## 2020-12-09 NOTE — PROGRESS NOTES
"1358 To PACU from OR via gurney, side rails up x 2 for safety, lungs clear bilaterally, pt does not arouse to voice or touch, breathing easy and unlabored. Hypoactive BS x 4Q.   1410 Pt restless on gurney and c/o \"a lot of pain\", localized to RUQ and reports 8/10 burning with \"some\" nausea. Given IV Zofran and will give IV Fentanyl.   1425 Pt describes the pain as \"same as before\" and has had \"these episodes at home\" and unable to quantify her pain on scale but rated as continued to be \"a lot\" and requesting more pain medication. Given IV Fentanyl for comfort. Pt reports nausea resolved.   1440 Dr Kelly here to see patient in PACU, informed of c/o \"a lot\" of abdominal pain and ordered her to take Prilosec 40mg twice daily until Monday. Pt reports RUQ pain as 5/10 and requesting PRN pain medication for comfort.   1455 Pt reports pain as 3/10 and \"much more tolerable now\". Denies nausea; tolerating sips of water. Pt resting quietly on gurney.   1500 Pt meets criteria for discharge and reports ready for home.     "

## 2020-12-09 NOTE — ANESTHESIA QCDR
2019 [facility] Clinical Data Registry (for Quality Improvement)     Postoperative nausea/vomiting risk protocol (Adult = 18 yrs and Pediatric 3-17 yrs)- (430 and 463)  General inhalation anesthetic (NOT TIVA) with PONV risk factors: Yes  Provision of anti-emetic therapy with at least 2 different classes of agents: Yes   Patient DID NOT receive anti-emetic therapy and reason is documented in Medical Record:  N/A    Multimodal Pain Management- (477)  Non-emergent surgery AND patient age >= 18: Yes  Use of Multimodal Pain Management, two or more drugs and/or interventions, NOT including systemic opioids: No  Exception: Documented allergy to multiple classes of analgesics:        Smoking Abstinence (404)  Patient is current smoker (cigarette, pipe, e-cig, marijuanna): No  Elective Surgery:   Abstinence instructions provided prior to day of surgery:   Patient abstained from smoking on day of surgery:     Pre-Op Beta-Blocker in Isolated CABG (44)  Isolated CABG AND patient age >= 18: No  Beta-blocker admin within 24 hours of surgical incision:   Exception:of medical reason(s) for not administering beta blocker within 24 hours prior to surgical incision (e.g., not  indicated,other medical reason):     PACU assessment of acute postoperative pain prior to Anesthesia Care End- Applies to Patients Age = 18- (ABG7)  Initial PACU pain score is which of the following: < 7/10  Patient unable to report pain score: N/A    Post-anesthetic transfer of care checklist/protocol to PACU/ICU- (426 and 427)  Upon conclusion of case, patient transferred to which of the following locations: PACU/Non-ICU  Use of transfer checklist/protocol: Yes  Exclusion: Service Performed in Patient Hospital Room (and thus did not require transfer): N/A  Unplanned admission to ICU related to anesthesia service up through end of PACU care- (MD51)  Unplanned admission to ICU (not initially anticipated at anesthesia start time): No

## 2020-12-09 NOTE — ANESTHESIA POSTPROCEDURE EVALUATION
Patient: Kinza Juan    Procedure Summary     Date: 12/09/20 Room / Location:  ENDOSCOPIC ULTRASOUND ROOM / SURGERY [facility]    Anesthesia Start: 1313 Anesthesia Stop: 1359    Procedure: ERCP, DIAGNOSTIC Diagnosis:       Choledocholithiasis      (Choledocholithiasis [411189])    Surgeons: Lee Kelly M.D. Responsible Provider: Sabi Ellis M.D.    Anesthesia Type: general ASA Status: 3          Final Anesthesia Type: general  Last vitals  BP   Blood Pressure: 106/60    Temp   36.8 °C (98.2 °F)    Pulse   Pulse: 93   Resp   16    SpO2   97 %      Anesthesia Post Evaluation    Patient location during evaluation: PACU  Patient participation: complete - patient participated  Level of consciousness: awake and alert  Pain score: 3    Airway patency: patent  Anesthetic complications: no  Cardiovascular status: hemodynamically stable  Respiratory status: acceptable  Hydration status: euvolemic    PONV: none           Nurse Pain Score: 3 (NPRS)

## 2021-02-08 DIAGNOSIS — E66.01 OBESITY, MORBID, BMI 40.0-49.9 (HCC): ICD-10-CM

## 2021-02-08 DIAGNOSIS — E28.2 PCOS (POLYCYSTIC OVARIAN SYNDROME): ICD-10-CM

## 2021-03-01 ENCOUNTER — HOSPITAL ENCOUNTER (OUTPATIENT)
Dept: LAB | Facility: MEDICAL CENTER | Age: 36
End: 2021-03-01
Attending: INTERNAL MEDICINE
Payer: COMMERCIAL

## 2021-03-01 LAB
ALBUMIN SERPL BCP-MCNC: 3.6 G/DL (ref 3.2–4.9)
ALBUMIN/GLOB SERPL: 1.1 G/DL
ALP SERPL-CCNC: 105 U/L (ref 30–99)
ALT SERPL-CCNC: 26 U/L (ref 2–50)
AMYLASE SERPL-CCNC: 66 U/L (ref 20–103)
ANION GAP SERPL CALC-SCNC: 8 MMOL/L (ref 7–16)
AST SERPL-CCNC: 23 U/L (ref 12–45)
BASOPHILS # BLD AUTO: 0.4 % (ref 0–1.8)
BASOPHILS # BLD: 0.04 K/UL (ref 0–0.12)
BILIRUB SERPL-MCNC: 0.3 MG/DL (ref 0.1–1.5)
BUN SERPL-MCNC: 8 MG/DL (ref 8–22)
CALCIUM SERPL-MCNC: 9.1 MG/DL (ref 8.5–10.5)
CHLORIDE SERPL-SCNC: 100 MMOL/L (ref 96–112)
CO2 SERPL-SCNC: 27 MMOL/L (ref 20–33)
CREAT SERPL-MCNC: 0.58 MG/DL (ref 0.5–1.4)
EOSINOPHIL # BLD AUTO: 0.14 K/UL (ref 0–0.51)
EOSINOPHIL NFR BLD: 1.5 % (ref 0–6.9)
ERYTHROCYTE [DISTWIDTH] IN BLOOD BY AUTOMATED COUNT: 46.4 FL (ref 35.9–50)
ERYTHROCYTE [SEDIMENTATION RATE] IN BLOOD BY WESTERGREN METHOD: 16 MM/HOUR (ref 0–20)
GLOBULIN SER CALC-MCNC: 3.3 G/DL (ref 1.9–3.5)
GLUCOSE SERPL-MCNC: 84 MG/DL (ref 65–99)
HCT VFR BLD AUTO: 43.3 % (ref 37–47)
HGB BLD-MCNC: 13.5 G/DL (ref 12–16)
IMM GRANULOCYTES # BLD AUTO: 0.05 K/UL (ref 0–0.11)
IMM GRANULOCYTES NFR BLD AUTO: 0.5 % (ref 0–0.9)
LIPASE SERPL-CCNC: 33 U/L (ref 11–82)
LYMPHOCYTES # BLD AUTO: 3.05 K/UL (ref 1–4.8)
LYMPHOCYTES NFR BLD: 31.6 % (ref 22–41)
MCH RBC QN AUTO: 28.7 PG (ref 27–33)
MCHC RBC AUTO-ENTMCNC: 31.2 G/DL (ref 33.6–35)
MCV RBC AUTO: 92.1 FL (ref 81.4–97.8)
MONOCYTES # BLD AUTO: 0.46 K/UL (ref 0–0.85)
MONOCYTES NFR BLD AUTO: 4.8 % (ref 0–13.4)
NEUTROPHILS # BLD AUTO: 5.9 K/UL (ref 2–7.15)
NEUTROPHILS NFR BLD: 61.2 % (ref 44–72)
NRBC # BLD AUTO: 0 K/UL
NRBC BLD-RTO: 0 /100 WBC
PLATELET # BLD AUTO: 294 K/UL (ref 164–446)
PMV BLD AUTO: 11.2 FL (ref 9–12.9)
POTASSIUM SERPL-SCNC: 4 MMOL/L (ref 3.6–5.5)
PROT SERPL-MCNC: 6.9 G/DL (ref 6–8.2)
RBC # BLD AUTO: 4.7 M/UL (ref 4.2–5.4)
SODIUM SERPL-SCNC: 135 MMOL/L (ref 135–145)
WBC # BLD AUTO: 9.6 K/UL (ref 4.8–10.8)

## 2021-03-01 PROCEDURE — 83690 ASSAY OF LIPASE: CPT

## 2021-03-01 PROCEDURE — 36415 COLL VENOUS BLD VENIPUNCTURE: CPT

## 2021-03-01 PROCEDURE — 85025 COMPLETE CBC W/AUTO DIFF WBC: CPT

## 2021-03-01 PROCEDURE — 82150 ASSAY OF AMYLASE: CPT

## 2021-03-01 PROCEDURE — 85652 RBC SED RATE AUTOMATED: CPT

## 2021-03-01 PROCEDURE — 83516 IMMUNOASSAY NONANTIBODY: CPT

## 2021-03-01 PROCEDURE — 80053 COMPREHEN METABOLIC PANEL: CPT

## 2021-03-01 PROCEDURE — 82784 ASSAY IGA/IGD/IGG/IGM EACH: CPT

## 2021-03-02 ENCOUNTER — HOSPITAL ENCOUNTER (OUTPATIENT)
Facility: MEDICAL CENTER | Age: 36
End: 2021-03-02
Attending: INTERNAL MEDICINE
Payer: COMMERCIAL

## 2021-03-02 LAB — H PYLORI AG STL QL IA: NOT DETECTED

## 2021-03-02 PROCEDURE — 87338 HPYLORI STOOL AG IA: CPT

## 2021-03-02 PROCEDURE — 83630 LACTOFERRIN FECAL (QUAL): CPT

## 2021-03-02 PROCEDURE — 83993 ASSAY FOR CALPROTECTIN FECAL: CPT

## 2021-03-03 LAB
GLIADIN PEPTIDE+TTG IGA+IGG SER QL IA: 5 UNITS (ref 0–19)
IGA SERPL-MCNC: 194 MG/DL (ref 68–408)

## 2021-03-04 LAB — LACTOFERRIN STL QL IA: NEGATIVE

## 2021-03-05 LAB — CALPROTECTIN STL-MCNT: 52 UG/G

## 2021-03-18 ENCOUNTER — HOSPITAL ENCOUNTER (OUTPATIENT)
Dept: RADIOLOGY | Facility: MEDICAL CENTER | Age: 36
End: 2021-03-18
Attending: INTERNAL MEDICINE
Payer: COMMERCIAL

## 2021-03-18 DIAGNOSIS — R10.11 RUQ PAIN: ICD-10-CM

## 2021-03-18 DIAGNOSIS — R10.13 EPIGASTRIC PAIN: ICD-10-CM

## 2021-03-18 PROCEDURE — A9537 TC99M MEBROFENIN: HCPCS

## 2021-03-18 RX ORDER — SINCALIDE 5 UG/5ML
INJECTION, POWDER, LYOPHILIZED, FOR SOLUTION INTRAVENOUS
Status: DISPENSED
Start: 2021-03-18 | End: 2021-03-18

## 2021-03-25 ENCOUNTER — HOSPITAL ENCOUNTER (OUTPATIENT)
Dept: RADIOLOGY | Facility: MEDICAL CENTER | Age: 36
End: 2021-03-25
Attending: INTERNAL MEDICINE
Payer: COMMERCIAL

## 2021-03-25 DIAGNOSIS — R10.11 ABDOMINAL PAIN, RIGHT UPPER QUADRANT: ICD-10-CM

## 2021-03-25 DIAGNOSIS — R10.13 ABDOMINAL PAIN, EPIGASTRIC: ICD-10-CM

## 2021-03-25 PROCEDURE — 76700 US EXAM ABDOM COMPLETE: CPT

## 2021-04-01 ENCOUNTER — OFFICE VISIT (OUTPATIENT)
Dept: HEALTH INFORMATION MANAGEMENT | Facility: MEDICAL CENTER | Age: 36
End: 2021-04-01
Payer: COMMERCIAL

## 2021-04-01 VITALS
DIASTOLIC BLOOD PRESSURE: 66 MMHG | BODY MASS INDEX: 46.28 KG/M2 | SYSTOLIC BLOOD PRESSURE: 120 MMHG | OXYGEN SATURATION: 97 % | HEART RATE: 100 BPM | WEIGHT: 288 LBS | HEIGHT: 66 IN

## 2021-04-01 DIAGNOSIS — E28.2 PCOS (POLYCYSTIC OVARIAN SYNDROME): ICD-10-CM

## 2021-04-01 DIAGNOSIS — E78.00 HYPERCHOLESTEROLEMIA: ICD-10-CM

## 2021-04-01 DIAGNOSIS — R53.83 LACK OF ENERGY: ICD-10-CM

## 2021-04-01 DIAGNOSIS — R63.2 BINGE EATING: ICD-10-CM

## 2021-04-01 DIAGNOSIS — Z83.49 FAMILY HISTORY OF OBESITY: ICD-10-CM

## 2021-04-01 DIAGNOSIS — R63.5 WEIGHT GAIN: ICD-10-CM

## 2021-04-01 DIAGNOSIS — E66.01 OBESITY, MORBID, BMI 40.0-49.9 (HCC): ICD-10-CM

## 2021-04-01 DIAGNOSIS — K21.9 GASTROESOPHAGEAL REFLUX DISEASE WITHOUT ESOPHAGITIS: ICD-10-CM

## 2021-04-01 PROCEDURE — 97802 MEDICAL NUTRITION INDIV IN: CPT | Performed by: DIETITIAN, REGISTERED

## 2021-04-01 PROCEDURE — 93000 ELECTROCARDIOGRAM COMPLETE: CPT | Performed by: INTERNAL MEDICINE

## 2021-04-01 PROCEDURE — 99205 OFFICE O/P NEW HI 60 MIN: CPT | Performed by: INTERNAL MEDICINE

## 2021-04-01 ASSESSMENT — FIBROSIS 4 INDEX: FIB4 SCORE: 0.52

## 2021-04-01 ASSESSMENT — PATIENT HEALTH QUESTIONNAIRE - PHQ9: CLINICAL INTERPRETATION OF PHQ2 SCORE: 0

## 2021-04-01 NOTE — PROGRESS NOTES
"4/1/2021     Kinza Juan 35 y.o.        Time in/out: 1:10-1:35    Anthropometrics/Objective  Vitals 4/1/2021   WEIGHT 288   HEIGHT 5' 6\"   BMI 46.48 kg/m2     Stated Goal Weight: see MD note  See comprehensive patient history form for further information     Subjective:  Pt is here today for the initial screening visit for the medical weight management program.   - hx of cholecystectomy  - skips B often  - will try 1 MR a day  - large portions at night  - [place] snot snack much  - not much extra movement   - wants to feel more comfortable as overall goal     See HIP Medical Questionnaire in media for more detailed diet history     Drinks: diet coke, coffee with 1/2 and 1/2     Nutrition Diagnosis (PES Statement)  · Obesity related to excessive energy intake and inadequate energy expenditure as evidenced by BMI >30     Client history:  Condition(s) associated with a diagnosis or treatment / Pertinent Medical Hx: family history of DM2, s/p cholecystectomy, chronic abdominal pain, PCOS, weight gain, binge eating family history of obesity, lack of energy, hypercholesterolemia, GERD    Biochemical data, medical test and procedures  Lab Results   Component Value Date/Time    HBA1C 6.1 (H) 04/26/2016 04:29 PM     No results found for: POCGLUCOSE  Lab Results   Component Value Date/Time    CHOLSTRLTOT 123 06/20/2020 03:12 AM    LDL 63 06/20/2020 03:12 AM    HDL 29 (A) 06/20/2020 03:12 AM    TRIGLYCERIDE 156 (H) 06/20/2020 03:12 AM         Nutrition Intervention  Nutrition Prescription  Recommended Daily Kcals: 0820-9358 Kcal based on REE of 2023 (MSJ) *1400 per MD    Recommended Daily Protein: 100g or ~30% of kcals      Meal Plan Recommendation   Calorie controlled, high protein, low carb diet    with 1 meal replacements per day  1-2 snacks; 1 oz protein + non-starchy veggies or 1 fruit      Comprehensive Nutrition Education Instruction or training leading to in-depth nutrition related knowledge about:   Benefits " to following meal plan, combine carb, protein and fat at each meal, meal timing and spacing, portion control, sweets and alcohol in moderation.  Handouts provided regarding topics discussed:   - Meal Plan   - My Plate Planner    - Snack list with fruit   - Meal ideas   - MyFitnessPal How-To Guide    Monitoring & Evaluation Plan    Behavioral-Environmental:  Behavior: Keep a food journal and bring to next appointment   Physical activity: Did not discuss today . Pt instructed to avoid subtracting calories from physical activity on My Fitness Pal.     Food / Nutrient Intake:  Food intake: Follow meal plan as discussed. Avoid concentrated sweets and processed carbs. Use the plate method for portion control and macronutrient balance. Limit carbs/starch to up to 1 cup per meal. Snacks should be 1oz protein + ns veggies or fruit. Fruit as a snack once per day.     Fluid intake: Consume at least 64 oz water per day. Avoid all sweetened beverages. Limit coffee to 1 cup a day (ideally no cream or sugar). Limit or avoid alcohol as discussed with Dr. Covarrubias.     Physical Signs / Symptoms:  Weight loss towards BMI < 30   Weight change: loss of 1-2 lbs/day    Assessment Notes:  Today I oriented Kinza to Dr. Covarrubias's Medical Weight Management program. Encouraged a higher protein, lower carbohydrate, and calorie controlled meal plan consisting of 3 meals and 1-2 snacks per day with optional use of meal replacements. Encouraged patient to track their food/beverage intake on My Fitness Pal or utilize a written food journal.  We discussed how to build protein into each meal and snack, incorporating 1/2 plate non-starchy vegetable twice daily, optional 1/2 cup of complex carbohydrate at meals if desired, and avoiding refined carbohydrates and simple sugars. Reviewed snack guidelines to include 1 oz protein and optional non-starchy vegetable or 1 serving of fruit.      Kinza will be aiming for 7302-7688 kcal/d.  The pt  will also be looking at the macronutrient feature and aiming for 100g or less of carbohydrate and 100g or more of protein per day per Dr. Covarrubias recommendations (or 30% or less of CHO and 30% or more of protein from calories).     Kinza will work on not skipping meals, having MR for B and tracking via [facility] or MFP her intake as she has not tracked in the past. At next visit suggest reviewing food journal.    F/U: 4-6 weeks LIZETH/MD

## 2021-04-01 NOTE — PROGRESS NOTES
"Bariatric Medicine H&P  Chief Complaint   Patient presents with   • Weight Gain   • Obesity       Referred by:  Puneet Moreau A.PENRIQUE    History of Present Illness  Kinza Juan is a 35 y.o.  female who presents for weight management and to help address co-morbidities caused by overweight, as below.    The patient wants to have more confidence, increase her activity level but cannot due to overweight.  She has tried weight watchers in the past.  She went to Dr. Flowers office, but decided not to have bariatric surgery.    H/o Antiobesity medications: Phentermine x1 month  H/o Bariatric Surgery: Declined    Brief Diet History (see also RD notes):  AM: Banana more often skips  Lunch/Dinner: Fast food/large meals  Drinks: Diet Coke, coffee    Behavior-Related History  Binge eating screen: Positive  H/o abuse: Negative     Medical Dx:  Sleep apnea screen: Negative  GERD: Controlled with daily PPI  PCOS: Has not been on metformin    Exercise:   None     Review of Systems   Positive for lack of energy, normal menses, abdominal pain intermittently.  Denies excessive heartburn currently, joint pain, shortness of breath.  All other ROS were reviewed with patient today and are negative.      PMH/PSH:  I have reviewed the patient's medical, social and family history, allergies, and medications today.  Prior records reviewed.      Physical Exam:   /66 (BP Location: Left arm, Patient Position: Sitting, BP Cuff Size: Large adult long)   Pulse 100   Ht 1.676 m (5' 6\")   Wt (!) 131 kg (288 lb)   SpO2 97%   BMI 46.48 kg/m²   Waist Measurement   Waist: 50.75 inch/inches  Body fat % & REE:  see accompanying flow sheet    Constitutional: Oriented to person, place, and time and well-developed, well-nourished, and in no distress.    HENT: No facial plethora.  No Cushingoid features.  No scalloped tongue.  No dental erosions.  No swollen parotids.  Head: Normocephalic.   Eyes: EOM are normal. Pupils are equal, " round, and reactive to light. No periorbital edema.  No lateral thinning of eyebrows.  No vertical nystagmus.  Neck: Normal range of motion. Neck supple. No thyromegaly present. No buffalo hump.  Cardiovascular: Normal rate and regular rhythm.  No murmur heard.  Pulmonary/Chest: Effort normal and breath sounds normal. No wheezes.   Abdominal: Soft. Bowel sounds are normal.  Grade 1 pannus.  No ascites.  No hepatosplenomegaly.   Musculoskeletal: Normal range of motion. No edema.   Neurological: Alert and oriented to person, place, and time. Normal reflexes. No cranial nerve deficit. No muscle weakness.  Gait normal.   Skin: Warm and dry. Not diaphoretic. No hirsuitism.  No acanthosis nigricans.  Not excessively dry, scaly.  No acne.  No bruising/ecchymosis.  No hyperpigmentation.  No xanthomas or acrochordon.    Psychiatric: Mood, memory, affect and judgment normal.     Laboratory:   Prior labs reviewed.  EKG: Sinus rhythm, rate 92, borderline prolonged QT interval 0.477, no ST-T changes.  Ordered, performed in our office today, and reviewed by me today.    Dietitian Assessment: I have reviewed the Dietitian's assessment related to this encounter.       ASSESSMENT  35 y.o. female presents for intensive lifestyle intervention for treatment of obesity and co-morbid conditions.  Obesity Stage ([place])/Class 2/3    1. Weight gain     2. Gastroesophageal reflux disease without esophagitis     3. PCOS (polycystic ovarian syndrome)     4. Binge eating     5. Family history of obesity     6. Lack of energy  EKG - Clinic Performed   7. Hypercholesterolemia         The patient is frustrated by poor eating habits, wants to work on overall lifestyle change.  She is interested in antiobesity medication.  Weight loss will improve GERD, PCOS symptoms.  Menses currently normal.  Gradually increase activity level.  Monitor lipids as well.    PLAN  Weight Goal: 200 lb  Dietary intervention:     MR: 1 ND daily to start  High  Protein/Low Carb whole food meals and 2 snacks between meals daily  >100 g protein, <100 g total carbs daily, under 1400 kcal/d initial goal  Track daily intake with My Fitness Pal, bring to next visit  64+ oz water per day  Physical Activity: Start walking program, set goals this month, informational sheets given  Labs: N/A  Diagnostics:  EKG  Rx changes:   Discuss phentermine next visit  Behavior change:   Work on developing healthy overall eating and exercise habits  Surgical considerations: Has declined bariatric surgery, has been seen by Dr. Flowers  Follow-up: one month with MD, weekly RD classes    Face to face time spent 60 minutes,  with >50% of time devoted to one on one counseling on weight management issues, as documented above.      Patient's body mass index is 46.48 kg/m². Exercise and nutrition counseling were performed at this visit.      >>>>>>>>>>>>>

## 2021-04-29 ENCOUNTER — NURSE TRIAGE (OUTPATIENT)
Dept: HEALTH INFORMATION MANAGEMENT | Facility: OTHER | Age: 36
End: 2021-04-29

## 2021-04-29 ENCOUNTER — HOSPITAL ENCOUNTER (OUTPATIENT)
Facility: MEDICAL CENTER | Age: 36
End: 2021-04-29
Attending: PHYSICIAN ASSISTANT
Payer: COMMERCIAL

## 2021-04-29 ENCOUNTER — OFFICE VISIT (OUTPATIENT)
Dept: URGENT CARE | Facility: CLINIC | Age: 36
End: 2021-04-29
Payer: COMMERCIAL

## 2021-04-29 VITALS
RESPIRATION RATE: 16 BRPM | BODY MASS INDEX: 46.45 KG/M2 | TEMPERATURE: 98.1 F | WEIGHT: 289 LBS | HEIGHT: 66 IN | SYSTOLIC BLOOD PRESSURE: 118 MMHG | DIASTOLIC BLOOD PRESSURE: 78 MMHG | HEART RATE: 81 BPM | OXYGEN SATURATION: 98 %

## 2021-04-29 DIAGNOSIS — J98.8 VIRAL RESPIRATORY ILLNESS: ICD-10-CM

## 2021-04-29 DIAGNOSIS — Z20.822 SUSPECTED COVID-19 VIRUS INFECTION: ICD-10-CM

## 2021-04-29 DIAGNOSIS — J02.9 SORE THROAT: ICD-10-CM

## 2021-04-29 DIAGNOSIS — J02.0 STREP PHARYNGITIS: ICD-10-CM

## 2021-04-29 DIAGNOSIS — B97.89 VIRAL RESPIRATORY ILLNESS: ICD-10-CM

## 2021-04-29 LAB
COVID ORDER STATUS COVID19: NORMAL
INT CON NEG: NEGATIVE
INT CON POS: POSITIVE
S PYO AG THROAT QL: POSITIVE
SARS-COV-2 RNA RESP QL NAA+PROBE: NOTDETECTED
SPECIMEN SOURCE: NORMAL

## 2021-04-29 PROCEDURE — U0003 INFECTIOUS AGENT DETECTION BY NUCLEIC ACID (DNA OR RNA); SEVERE ACUTE RESPIRATORY SYNDROME CORONAVIRUS 2 (SARS-COV-2) (CORONAVIRUS DISEASE [COVID-19]), AMPLIFIED PROBE TECHNIQUE, MAKING USE OF HIGH THROUGHPUT TECHNOLOGIES AS DESCRIBED BY CMS-2020-01-R: HCPCS

## 2021-04-29 PROCEDURE — U0005 INFEC AGEN DETEC AMPLI PROBE: HCPCS

## 2021-04-29 PROCEDURE — 87880 STREP A ASSAY W/OPTIC: CPT | Performed by: PHYSICIAN ASSISTANT

## 2021-04-29 PROCEDURE — 99214 OFFICE O/P EST MOD 30 MIN: CPT | Performed by: PHYSICIAN ASSISTANT

## 2021-04-29 RX ORDER — AMOXICILLIN 500 MG/1
500 CAPSULE ORAL 2 TIMES DAILY
Qty: 20 CAPSULE | Refills: 0 | Status: SHIPPED | OUTPATIENT
Start: 2021-04-29 | End: 2021-05-09

## 2021-04-29 ASSESSMENT — ENCOUNTER SYMPTOMS
MYALGIAS: 0
COUGH: 0
FEVER: 0
DIARRHEA: 0
HEADACHES: 1
EYE REDNESS: 0
SHORTNESS OF BREATH: 0
TROUBLE SWALLOWING: 0
EYE DISCHARGE: 0
VOMITING: 0
SORE THROAT: 1

## 2021-04-29 ASSESSMENT — FIBROSIS 4 INDEX: FIB4 SCORE: 0.52

## 2021-04-29 NOTE — TELEPHONE ENCOUNTER
Regarding: Ringing in ears, sore throat   ----- Message from Ewelina Wong sent at 4/29/2021  7:44 AM PDT -----  Patient has ringing in her ears and a sore throat.

## 2021-04-29 NOTE — PROGRESS NOTES
Subjective:      Kinza Juan is a 35 y.o. female who presents with Ear Pain (heahache, sore throat, x2 days )            Pharyngitis   This is a new problem. Episode onset: x 2 days ago. The problem has been unchanged. Neither side of throat is experiencing more pain than the other. There has been no fever. The pain is mild. Associated symptoms include ear pain, headaches and a plugged ear sensation. Pertinent negatives include no congestion, coughing, diarrhea, drooling, ear discharge, shortness of breath, trouble swallowing or vomiting. She has had no exposure to strep. She has tried NSAIDs for the symptoms.     The patient reports no known exposure to COVID-19.  She reports no additional sick contacts.    PMH:  has a past medical history of GERD (gastroesophageal reflux disease), Heart burn, Infectious disease, and Pap smear (4/28/9).  MEDS:   Current Outpatient Medications:   •  tizanidine (ZANAFLEX) 4 MG Tab, Take 1 Tab by mouth at bedtime as needed., Disp: 30 Tab, Rfl: 5  •  norethindrone (MICRONOR) 0.35 MG tablet, Take 1 Tab by mouth every day., Disp: 28 Tab, Rfl: 12  •  omeprazole (PRILOSEC) 20 MG delayed-release capsule, TAKE 1 CAPSULE BY MOUTH ONCE DAILY BEFORE A MEAL FOR 30 DAYS, Disp: , Rfl:   ALLERGIES: No Known Allergies  SURGHX:   Past Surgical History:   Procedure Laterality Date   • PB ERCP,DIAGNOSTIC  12/9/2020    Procedure: ERCP, DIAGNOSTIC;  Surgeon: Lee Kelly M.D.;  Location: SURGERY [facility];  Service: Gastroenterology   • PB ERCP,DIAGNOSTIC N/A 9/16/2020    Procedure: ERCP (ENDOSCOPIC RETROGRADE CHOLANGIOPANCREATOGRAPHY);  Surgeon: Lee Kelly M.D.;  Location: SURGERY SAME DAY [facility];  Service: Gastroenterology   • JACQUI BY LAPAROSCOPY  6/20/2020    Procedure: CHOLECYSTECTOMY, LAPAROSCOPIC;  Surgeon: Iftikhar Flowers M.D.;  Location: SURGERY [facility];  Service: General   • PRIMARY C SECTION  3/24/2013    Performed by Briana Cano M.D. at LABOR AND DELIVERY   •  "ACL RECONSTRUCTION SCOPE  5/31/2012    Performed by CAITY DORSEY at SURGERY [facility] ORS   • MEDIAL MENISCECTOMY  5/31/2012    Performed by CAITY DORSEY at SURGERY [facility] ORS   • DENTAL EXTRACTION(S)  2005     SOCHX:  reports that she has never smoked. She has never used smokeless tobacco. She reports previous alcohol use. She reports previous drug use. Drugs: Marijuana and Oral.  FH: Family history was reviewed, no pertinent findings to report      Review of Systems   Constitutional: Negative for fever.   HENT: Positive for ear pain and sore throat. Negative for congestion, drooling, ear discharge and trouble swallowing.    Eyes: Negative for discharge and redness.   Respiratory: Negative for cough and shortness of breath.    Gastrointestinal: Negative for diarrhea and vomiting.   Musculoskeletal: Negative for myalgias.   Neurological: Positive for headaches.          Objective:     /78 (BP Location: Right arm, Patient Position: Sitting, BP Cuff Size: Adult long)   Pulse 81   Temp 36.7 °C (98.1 °F) (Temporal)   Resp 16   Ht 1.676 m (5' 6\")   Wt (!) 131 kg (289 lb)   SpO2 98%   BMI 46.65 kg/m²      Physical Exam  Constitutional:       General: She is not in acute distress.     Appearance: Normal appearance. She is not ill-appearing.   HENT:      Head: Normocephalic and atraumatic.      Right Ear: Tympanic membrane, ear canal and external ear normal.      Left Ear: Tympanic membrane, ear canal and external ear normal.      Nose: Nose normal.      Mouth/Throat:      Mouth: Mucous membranes are moist.      Pharynx: Oropharynx is clear. Uvula midline. Posterior oropharyngeal erythema present.      Tonsils: Tonsillar exudate present.   Eyes:      Extraocular Movements: Extraocular movements intact.      Conjunctiva/sclera: Conjunctivae normal.   Cardiovascular:      Rate and Rhythm: Normal rate and regular rhythm.      Heart sounds: Normal heart sounds.   Pulmonary:      Effort: Pulmonary " effort is normal. No respiratory distress.      Breath sounds: Normal breath sounds. No wheezing.   Musculoskeletal:         General: Normal range of motion.      Cervical back: Normal range of motion and neck supple.   Skin:     General: Skin is warm and dry.   Neurological:      Mental Status: She is alert and oriented to person, place, and time.            Progress:  POCT Rapid Strep: Positive     COVID-19 PCR - pending        Assessment/Plan:          1. Viral respiratory illness    2. Sore throat  - POCT Rapid Strep A    3. Strep pharyngitis  - amoxicillin (AMOXIL) 500 MG Cap; Take 1 capsule by mouth 2 times a day for 10 days.  Dispense: 20 capsule; Refill: 0    4. Suspected COVID-19 virus infection  - SARS-CoV-2 PCR (24 hour In-House): Collect NP swab in [facility]; Future    The patient's presenting symptoms and physical exam findings are consistent with a viral respiratory illness with an associated sore throat.  On physical exam, the patient had erythema to the posterior oropharynx with associated tonsillar exudates.  No tonsil hypertrophy was appreciated.  The patient's uvula was midline.  The remainder the patient's physical exam today in clinic was normal.  The patient's lungs were clear to auscultation without wheezing, and her pulse ox was within normal limits.  The patient appears in no acute distress.  The patient's vital signs are stable and within normal limits.  She is afebrile today in clinic.  The patient's POCT rapid strep test was positive, indicating her symptoms are likely secondary to strep pharyngitis.  Will prescribe patient amoxicillin for her acute strep pharyngitis.  Informed the patient she is contagious until she has been on antibiotics for 24 hours.  Advised the patient to change her toothbrush after she has been on antibiotics for the 24-hour period.  The patient is also concerned about possible COVID-19.  Based on patient's presenting symptoms, will test patient for COVID-19.  Advised  patient stay under self quarantine while awaiting test results.  Provided patient home isolation self quarantine instructions, as well as a work note.  Recommend OTC medications and supportive care for symptomatic management.  Recommend patient follow-up with her PCP as needed.  Discussed return precautions with patient, and she verbalized understanding.    Differential diagnoses, supportive care, and indications for immediate follow-up discussed with patient.   Instructed to return to clinic or nearest emergency department for any change in condition, further concerns, or worsening of symptoms.    OTC Tylenol or Motrin for fever/discomfort.  OTC cough/cold medication for symptomatic relief  OTC Supportive Care for Congestion - saline nasal spray or neti pot  OTC Supportive Care for Sore Throat - warm salt water gargles, sore throat lozenges, warm lemon water, and/or tea.  Drink plenty of fluids  Advised the patient to stay at home under self-isolation until symptoms have been present for at least 10 days and are improved, and there has been no fever for at least 72 hours without the use of medications and/or no vomiting or diarrhea for 48 hours.  --Provided the patient with home isolation and self quarantine instructions  Work note provided  Follow-up with PCP  Return to clinic or go to the ED if symptoms worsen or fail to improve, or if the patient should develop worsening/increasing cough, congestion, ear pain, sore throat, drooling, difficulty swallowing, change in voice, shortness of breath, wheezing, chest pain, fever/chills, and/or any concerning symptoms.    Discussed plan with the patient, and she agrees to the above.     I personally reviewed prior external notes and test results pertinent to today's visit.  I have independently reviewed and interpreted all diagnostics ordered during this urgent care visit.     Time spent evaluating this patient was at least 30 minutes and includes preparing for visit,  obtaining history, exam and evaluation, ordering labs/tests/procedures/medications, independent interpretation, and counseling/education.    Please note that this dictation was created using voice recognition software. I have made every reasonable attempt to correct obvious errors, but I expect that there may be errors of grammar and possibly content that I did not discover before finalizing the note.     This note was electronically signed by Elizabeth Lopez PA-C

## 2021-04-29 NOTE — PATIENT INSTRUCTIONS
INSTRUCTIONS FOR COVID-19 OR ANY OTHER INFECTIOUS RESPIRATORY ILLNESSES    The Centers for Disease Control and Prevention (CDC) states that early indications for COVID-19 include cough, shortness of breath, difficulty breathing, or at least two of the following symptoms: chills, shaking with chills, muscle pain, headache, sore throat, and loss of taste or smell. Symptoms can range from mild to severe and may appear up to two weeks after exposure to the virus.    The practice of self-isolation and quarantine helps protect the public and your family by  preventing exposure to people who have or may have a contagious disease. Please follow the prevention steps below as based on CDC guidelines:    WHEN TO STOP ISOLATION: Persons with COVID-19 or any other infectious respiratory illness who have symptoms and were advised to care for themselves at home may discontinue home isolation under the following conditions:  · At least 24 hours have passed since recovery defined as resolution of fever without the use of fever-reducing medications; AND,  · Improvement in respiratory symptoms (e.g., cough, shortness of breath); AND,  · At least 10 days have passed since symptoms first appeared and have had no subsequent illness.    MONITOR YOUR SYMPTOMS: If your illness is worsening, seek prompt medical attention. If you have a medical emergency and need to call 911, notify the dispatch personnel that you have, or are being evaluated for confirmed or suspected COVID-19 or another infectious respiratory illness. Wear a facemask if possible.    ACTIVITY RESTRICTION: restrict activities outside your home, except for getting medical care. Do not go to work, school, or public areas. Avoid using public transportation, ride-sharing, or taxis.    SCHEDULED MEDICAL APPOINTMENTS: Notify your provider that you have, or are being evaluated for, confirmed or suspected COVID-19 or another infectious respiratory. This will help the healthcare  provider’s office safely take care of you and keep other people from getting exposed or infected.    FACEMASKS, when to wear: Anytime you are away from your home or around other people or pets. If you are unable to wear one, maintain a minimum of 6 feet distancing from others.    LIVING ENVIRONMENT: Stay in a separate room from other people and pets. If possible, use a separate bathroom, have someone else care for your pets and avoid sharing household items. Any items used should be washed thoroughly with soap and water. Clean all “high-touch” surfaces every day. Use a household cleaning spray or wipe, according to the label instructions. High touch surfaces include (but are not limited to) counters, tabletops, doorknobs, bathroom fixtures, toilets, phones, keyboards, tablets, and bedside tables.     HAND WASHING: Frequently wash hands with soap and water for at least 20 seconds,  especially after blowing your nose, coughing, or sneezing; going to the bathroom; before and after interacting with pets; and before and after eating or preparing food. If hands are visibly dirty use soap and water. If soap and water are not available, use an alcohol-based hand  with at least 60% alcohol. Avoid touching your eyes, nose, and mouth with unwashed hands. Cover your coughs and sneezes with a tissue. Throw used tissues in a lined trash can. Immediately wash your hands.    ACTIVE/FACILITATED SELF-MONITORING: Follow instructions provided by your local health department or health professionals, as appropriate. When working with your local health department check their available hours.    [facility]   Phone Number   [facility] [phone]   [facility] [place] [phone]   [place] [address]   [place] [phone]     IF YOU HAVE CONFIRMED POSITIVE COVID-19:    Those who have completely recovered from COVID-19 may have immune-boosting antibodies in their plasma--called “convalescent plasma”--that could be  used to treat critically ill COVID19 patients.    [facility] is excited to begin working with [facility] on collecting convalescent plasma from  people who have recovered from COVID-19 as part of a program to treat patients infected with the virus. This FDA-approved “emergency investigational new drug” is a special blood product containing antibodies that may give patients an extra boost to fight the virus.    To be eligible to donate convalescent plasma, you must have a prior COVID-19 diagnosis documented by a laboratory test (or a positive test result for SARS-CoV-2 antibodies) and meet additional eligibility requirements.    If you are interested in donating convalescent plasma or have any additional questions, please contact the [facility] Convalescent Plasma  at [phone] or via e-mail at [email or web address].

## 2021-04-29 NOTE — LETTER
April 29, 2021         Patient: Kinza Juan   YOB: 1985   Date of Visit: 4/29/2021       To Whom it May Concern,     Your employee was seen in our clinic today.  A concern for COVID-19 has been identified and testing is in progress.      We are asking you to excuse absences while following self-isolation protocol per Center for Disease Control (CDC) guidelines.  Your employee will be able to access test results through our electronic delivery system called [employer].      If the results of testing are negative, and once there has been no fever (temperature >100.4 F) for at least 72 hours without treatment, and no vomiting or diarrhea for at least 48 hours, then return to work is approved.       If the results of testing are positive then your employee will be contacted by the [facility] or [facility] for further instructions on duration of self-isolation and return to work protocol. In general, this will also follow the CDC guidelines with a minimum of 10 days from the onset of symptoms and without fever, vomiting, or diarrhea as above.      In general, repeat testing is not necessary and not offered through our [facility].      This is the only note that will be provided from [facility] for this visit.  Your employee will require an appointment with a primary care provider if FMLA or disability forms are required.       Sincerely,      Elizabeth Lopez P.A.-C.  Electronically Signed

## 2021-05-09 NOTE — ANESTHESIA TIME REPORT
Anesthesia Start and Stop Event Times     Date Time Event    9/16/2020 0826 Ready for Procedure     0952 Anesthesia Start     1046 Anesthesia Stop        Responsible Staff  09/16/20    Name Role Begin End    Maxi Sierra M.D. Anesth 0952 1046        Preop Diagnosis (Free Text):  Pre-op Diagnosis     choledolithiasis        Preop Diagnosis (Codes):    Post op Diagnosis  Choledocholithiasis      Premium Reason  Non-Premium    Comments:                                                                       
No

## 2021-06-26 ENCOUNTER — HOSPITAL ENCOUNTER (EMERGENCY)
Facility: MEDICAL CENTER | Age: 36
End: 2021-06-26
Attending: EMERGENCY MEDICINE
Payer: COMMERCIAL

## 2021-06-26 ENCOUNTER — TELEPHONE (OUTPATIENT)
Dept: MEDICAL GROUP | Age: 36
End: 2021-06-26

## 2021-06-26 ENCOUNTER — APPOINTMENT (OUTPATIENT)
Dept: RADIOLOGY | Facility: MEDICAL CENTER | Age: 36
End: 2021-06-26
Attending: EMERGENCY MEDICINE
Payer: COMMERCIAL

## 2021-06-26 VITALS
HEIGHT: 66 IN | HEART RATE: 97 BPM | DIASTOLIC BLOOD PRESSURE: 71 MMHG | TEMPERATURE: 99.2 F | WEIGHT: 293 LBS | SYSTOLIC BLOOD PRESSURE: 109 MMHG | OXYGEN SATURATION: 98 % | RESPIRATION RATE: 20 BRPM | BODY MASS INDEX: 47.09 KG/M2

## 2021-06-26 DIAGNOSIS — O20.0 THREATENED MISCARRIAGE: ICD-10-CM

## 2021-06-26 LAB
ALBUMIN SERPL BCP-MCNC: 3.4 G/DL (ref 3.2–4.9)
ALBUMIN/GLOB SERPL: 1 G/DL
ALP SERPL-CCNC: 96 U/L (ref 30–99)
ALT SERPL-CCNC: 8 U/L (ref 2–50)
ANION GAP SERPL CALC-SCNC: 10 MMOL/L (ref 7–16)
APPEARANCE UR: CLEAR
AST SERPL-CCNC: 11 U/L (ref 12–45)
B-HCG SERPL-ACNC: ABNORMAL MIU/ML (ref 0–5)
BACTERIA #/AREA URNS HPF: NEGATIVE /HPF
BASOPHILS # BLD AUTO: 0.3 % (ref 0–1.8)
BASOPHILS # BLD: 0.05 K/UL (ref 0–0.12)
BILIRUB SERPL-MCNC: <0.2 MG/DL (ref 0.1–1.5)
BILIRUB UR QL STRIP.AUTO: NEGATIVE
BUN SERPL-MCNC: 13 MG/DL (ref 8–22)
CALCIUM SERPL-MCNC: 8.7 MG/DL (ref 8.5–10.5)
CHLORIDE SERPL-SCNC: 106 MMOL/L (ref 96–112)
CO2 SERPL-SCNC: 19 MMOL/L (ref 20–33)
COLOR UR: YELLOW
CREAT SERPL-MCNC: 0.65 MG/DL (ref 0.5–1.4)
EOSINOPHIL # BLD AUTO: 0.23 K/UL (ref 0–0.51)
EOSINOPHIL NFR BLD: 1.5 % (ref 0–6.9)
EPI CELLS #/AREA URNS HPF: ABNORMAL /HPF
ERYTHROCYTE [DISTWIDTH] IN BLOOD BY AUTOMATED COUNT: 46.3 FL (ref 35.9–50)
GLOBULIN SER CALC-MCNC: 3.3 G/DL (ref 1.9–3.5)
GLUCOSE SERPL-MCNC: 115 MG/DL (ref 65–99)
GLUCOSE UR STRIP.AUTO-MCNC: NEGATIVE MG/DL
HCT VFR BLD AUTO: 42.2 % (ref 37–47)
HGB BLD-MCNC: 13.4 G/DL (ref 12–16)
IMM GRANULOCYTES # BLD AUTO: 0.09 K/UL (ref 0–0.11)
IMM GRANULOCYTES NFR BLD AUTO: 0.6 % (ref 0–0.9)
KETONES UR STRIP.AUTO-MCNC: NEGATIVE MG/DL
LEUKOCYTE ESTERASE UR QL STRIP.AUTO: NEGATIVE
LYMPHOCYTES # BLD AUTO: 3.13 K/UL (ref 1–4.8)
LYMPHOCYTES NFR BLD: 20.1 % (ref 22–41)
MCH RBC QN AUTO: 29.3 PG (ref 27–33)
MCHC RBC AUTO-ENTMCNC: 31.8 G/DL (ref 33.6–35)
MCV RBC AUTO: 92.3 FL (ref 81.4–97.8)
MICRO URNS: ABNORMAL
MONOCYTES # BLD AUTO: 1.13 K/UL (ref 0–0.85)
MONOCYTES NFR BLD AUTO: 7.3 % (ref 0–13.4)
NEUTROPHILS # BLD AUTO: 10.94 K/UL (ref 2–7.15)
NEUTROPHILS NFR BLD: 70.2 % (ref 44–72)
NITRITE UR QL STRIP.AUTO: NEGATIVE
NRBC # BLD AUTO: 0 K/UL
NRBC BLD-RTO: 0 /100 WBC
NUMBER OF RH DOSES IND 8505RD: NORMAL
PH UR STRIP.AUTO: 5 [PH] (ref 5–8)
PLATELET # BLD AUTO: 309 K/UL (ref 164–446)
PMV BLD AUTO: 10.4 FL (ref 9–12.9)
POTASSIUM SERPL-SCNC: 3.5 MMOL/L (ref 3.6–5.5)
PROT SERPL-MCNC: 6.7 G/DL (ref 6–8.2)
PROT UR QL STRIP: NEGATIVE MG/DL
RBC # BLD AUTO: 4.57 M/UL (ref 4.2–5.4)
RBC # URNS HPF: ABNORMAL /HPF
RBC UR QL AUTO: ABNORMAL
RH BLD: NORMAL
SODIUM SERPL-SCNC: 135 MMOL/L (ref 135–145)
SP GR UR STRIP.AUTO: 1.03
UROBILINOGEN UR STRIP.AUTO-MCNC: 0.2 MG/DL
WBC # BLD AUTO: 15.6 K/UL (ref 4.8–10.8)
WBC #/AREA URNS HPF: ABNORMAL /HPF

## 2021-06-26 PROCEDURE — 81001 URINALYSIS AUTO W/SCOPE: CPT

## 2021-06-26 PROCEDURE — 84702 CHORIONIC GONADOTROPIN TEST: CPT

## 2021-06-26 PROCEDURE — 99284 EMERGENCY DEPT VISIT MOD MDM: CPT | Mod: EDC

## 2021-06-26 PROCEDURE — 80053 COMPREHEN METABOLIC PANEL: CPT

## 2021-06-26 PROCEDURE — 86901 BLOOD TYPING SEROLOGIC RH(D): CPT

## 2021-06-26 PROCEDURE — 76801 OB US < 14 WKS SINGLE FETUS: CPT

## 2021-06-26 PROCEDURE — 85025 COMPLETE CBC W/AUTO DIFF WBC: CPT

## 2021-06-26 ASSESSMENT — FIBROSIS 4 INDEX: FIB4 SCORE: 0.54

## 2021-06-27 NOTE — TELEPHONE ENCOUNTER
After hours phone call  Call on 2021 at 7:27 PM from Kinza Juan [phone] (home) [phone] (work) who reports PCP: TANISHA Gao.     Pt is , currently 7 week pregnant. She reports mild vaginal bleeding around 2pm today when she went to the bathroom. She then noticed passage of blood clots when she went to the bathroom again a few minutes prior to phone conversation. She otherwise has not other symptoms including pelvic pain. She is quite emotional as she has been struggling with getting 2nd pregnancy due to PCOS.     Plan: pt was advised to go to ER for evaluation. Pt agrees to plan.     Juliana Mahmood M.D.

## 2021-06-27 NOTE — ED PROVIDER NOTES
ED Provider Note    CHIEF COMPLAINT  Vaginal bleeding    HPI  Kinza Juan is a 36 y.o.  female who presents to the emergency department for the evaluation of vaginal bleeding.  The patient states that her last period was at the end of April.  She is taken several home pregnancy test which is tested positive.  She has an appointment to see her OB/GYN next week.  She states that today she is here developing lower abdominal cramping and back pain as well as vaginal bleeding.  She states that initially it was a little bit of spotting and then she passed some clots.  She states that it has since resolved.  She does admit to some nausea but has not vomited.  She states that her first pregnancy was uncomplicated.  She denies any headaches, syncope, seizure-like activity.  She has not had any runny nose, cough, congestion, or difficulty breathing.  She denies any chest pain or shortness of breath.  She denies any dysuria.    REVIEW OF SYSTEMS  See HPI for further details. All other systems are negative.     PAST MEDICAL HISTORY   has a past medical history of GERD (gastroesophageal reflux disease), Heart burn, Infectious disease, and Pap smear ().    SOCIAL HISTORY  Social History     Tobacco Use   • Smoking status: Never Smoker   • Smokeless tobacco: Never Used   Vaping Use   • Vaping Use: Never used   Substance and Sexual Activity   • Alcohol use: Not Currently   • Drug use: Not Currently     Types: Marijuana, Oral     Comment: occ   • Sexual activity: Yes     Partners: Male     Birth control/protection: I.U.D.     Comment: , with boyfriend       SURGICAL HISTORY   has a past surgical history that includes dental extraction(s) (); acl reconstruction scope (2012); medial meniscectomy (2012); primary c section (3/24/2013); elpidio by laparoscopy (2020); ercp,diagnostic (N/A, 2020); and ercp,diagnostic (2020).    CURRENT MEDICATIONS  Home Medications     Reviewed by  "Leila Barrett R.N. (Registered Nurse) on 06/26/21 at 1950  Med List Status: Partial   Medication Last Dose Status   norethindrone (MICRONOR) 0.35 MG tablet  Active   omeprazole (PRILOSEC) 20 MG delayed-release capsule  Active   tizanidine (ZANAFLEX) 4 MG Tab  Active                ALLERGIES  No Known Allergies    PHYSICAL EXAM  VITAL SIGNS: /59   Pulse 89   Temp 36.6 °C (97.9 °F) (Temporal)   Resp 18   Ht 1.676 m (5' 6\")   Wt (!) 137 kg (301 lb 13 oz)   SpO2 98%   BMI 48.71 kg/m²    Constitutional: Alert and in no apparent distress.  HENT: Normocephalic atraumatic. Bilateral external ears normal. Nose normal. Mucous membranes are moist.  Eyes: Pupils are equal and reactive. Conjunctiva normal. Non-icteric sclera.   Neck: Normal range of motion without tenderness. Supple. No meningeal signs.  Cardiovascular: Regular rate and rhythm. No murmurs, gallops or rubs.  Thorax & Lungs: Breath sounds are clear to auscultation bilaterally. No wheezing, rhonchi or rales.  Abdomen: Soft, nontender and nondistended. No peritoneal signs noted.  Pelvic: Chaperone - Darcy, ED RN.  Normal external genitalia.  Cervix is visualized and closed.  No active bleeding is noted.  Skin: Warm and dry. No rashes are noted.  Back: No bony tenderness, No CVA tenderness.   Extremities: 2+ peripheral pulses. Cap refill is less than 2 seconds. No edema, cyanosis, or clubbing.  Musculoskeletal: Good range of motion in all major joints. No tenderness to palpation or major deformities noted.   Neurologic: Alert and oriented ×3. The patient moves all 4 extremities and follows commands.  Psychiatric: Affect is anxious.  Judgment appears to be intact.    DIAGNOSTIC STUDIES / PROCEDURES    LABS  Results for orders placed or performed during the hospital encounter of 06/26/21   CBC WITH DIFFERENTIAL   Result Value Ref Range    WBC 15.6 (H) 4.8 - 10.8 K/uL    RBC 4.57 4.20 - 5.40 M/uL    Hemoglobin 13.4 12.0 - 16.0 g/dL    Hematocrit " 42.2 37.0 - 47.0 %    MCV 92.3 81.4 - 97.8 fL    MCH 29.3 27.0 - 33.0 pg    MCHC 31.8 (L) 33.6 - 35.0 g/dL    RDW 46.3 35.9 - 50.0 fL    Platelet Count 309 164 - 446 K/uL    MPV 10.4 9.0 - 12.9 fL    Neutrophils-Polys 70.20 44.00 - 72.00 %    Lymphocytes 20.10 (L) 22.00 - 41.00 %    Monocytes 7.30 0.00 - 13.40 %    Eosinophils 1.50 0.00 - 6.90 %    Basophils 0.30 0.00 - 1.80 %    Immature Granulocytes 0.60 0.00 - 0.90 %    Nucleated RBC 0.00 /100 WBC    Neutrophils (Absolute) 10.94 (H) 2.00 - 7.15 K/uL    Lymphs (Absolute) 3.13 1.00 - 4.80 K/uL    Monos (Absolute) 1.13 (H) 0.00 - 0.85 K/uL    Eos (Absolute) 0.23 0.00 - 0.51 K/uL    Baso (Absolute) 0.05 0.00 - 0.12 K/uL    Immature Granulocytes (abs) 0.09 0.00 - 0.11 K/uL    NRBC (Absolute) 0.00 K/uL   COMP METABOLIC PANEL   Result Value Ref Range    Sodium 135 135 - 145 mmol/L    Potassium 3.5 (L) 3.6 - 5.5 mmol/L    Chloride 106 96 - 112 mmol/L    Co2 19 (L) 20 - 33 mmol/L    Anion Gap 10.0 7.0 - 16.0    Glucose 115 (H) 65 - 99 mg/dL    Bun 13 8 - 22 mg/dL    Creatinine 0.65 0.50 - 1.40 mg/dL    Calcium 8.7 8.5 - 10.5 mg/dL    AST(SGOT) 11 (L) 12 - 45 U/L    ALT(SGPT) 8 2 - 50 U/L    Alkaline Phosphatase 96 30 - 99 U/L    Total Bilirubin <0.2 0.1 - 1.5 mg/dL    Albumin 3.4 3.2 - 4.9 g/dL    Total Protein 6.7 6.0 - 8.2 g/dL    Globulin 3.3 1.9 - 3.5 g/dL    A-G Ratio 1.0 g/dL   RH TYPE FOR RHOGAM FROM E.D.   Result Value Ref Range    Emergency Department Rh Typing POS     Number Of Rh Doses Indicated ZERO    HCG QUANTITATIVE   Result Value Ref Range    Bhcg 04875.0 (H) 0.0 - 5.0 mIU/mL   URINALYSIS,CULTURE IF INDICATED    Specimen: Urine, Clean Catch   Result Value Ref Range    Color Yellow     Character Clear     Specific Gravity 1.026 <1.035    Ph 5.0 5.0 - 8.0    Glucose Negative Negative mg/dL    Ketones Negative Negative mg/dL    Protein Negative Negative mg/dL    Bilirubin Negative Negative    Urobilinogen, Urine 0.2 Negative    Nitrite Negative Negative     Leukocyte Esterase Negative Negative    Occult Blood Moderate (A) Negative    Micro Urine Req Microscopic    URINE MICROSCOPIC (W/UA)   Result Value Ref Range    WBC 2-5 /hpf    RBC 5-10 (A) /hpf    Bacteria Negative None /hpf    Epithelial Cells Few /hpf   ESTIMATED GFR   Result Value Ref Range    GFR If African American >60 >60 mL/min/1.73 m 2    GFR If Non African American >60 >60 mL/min/1.73 m 2     RADIOLOGY  US-OB 1ST TRIMESTER WITH TRANSVAGINAL (COMBO)   Final Result      Viable single intrauterine gestation of an estimated 7 weeks 4 days.        COURSE & MEDICAL DECISION MAKING  Pertinent Labs & Imaging studies reviewed. (See chart for details)    This is a 36 y.o. female who presents to the emergency department for the evaluation of vaginal bleeding.  On initial evaluation, the patient appeared anxious but was otherwise in no acute distress.  Her vital signs were notable for a tachycardia and elevated blood pressure but I suspect this is secondary to her anxiety.  She was observed and these were rechecked and normal. Her abdominal exam was benign.  Pelvic exam was notable for a closed cervix with no active bleeding.  Beta hCG was obtained and elevated at 32,000.  Right blood cell count was slightly elevated at 15 but she had no history of fevers or foci of infection and I suspect this is likely reactive.  Urinalysis did not have any bacteria or white cells.  Rh was obtained and positive.  She does not require RhoGam at this time.  Ultrasound was obtained and revealed a single IUP with an appropriate fetal heart rate.  Estimated gestation is between 7 weeks and 4 days.  Patient's clinical presentation is most consistent with a threatened miscarriage.  I do believe she is stable for discharge at this time but encouraged her to follow-up with her OB/GYN in 48 hours for repeat beta-hCG and ultrasound.  She understands return here if she is unable to see the OB/GYN at that time.  She understands return sooner  should she develop worsening pain, bleeding, or syncope.    I verified that the patient was wearing a mask and I was wearing appropriate PPE every time I entered the room. The patient's mask was on the patient at all times during my encounter except for a brief view of the oropharynx.    FINAL IMPRESSION  1. Threatened miscarriage      PRESCRIPTIONS  New Prescriptions    No medications on file     FOLLOW UP  Lauri Brian M.D.  [address] [id number] [phone]    Call in 1 day  To schedule a follow up appointment in 48 hours for repeat blood work and ultrasound    [facility], Emergency Dept  [facility] [facility] [id number] [phone]  Go to   As needed    -DISCHARGE-  Electronically signed by: Diandra Matthews D.O., 6/26/2021 8:25 PM

## 2021-06-27 NOTE — ED NOTES
"Discharge instructions given to guardian re.   1. Threatened miscarriage         Discussed importance of follow up and monitoring at home.    Advised to follow up with Lauri Brian M.D.  [address]  [facility] [address]  [facility] [address] [phone]    Call in 1 day  To schedule a follow up appointment in 48 hours for repeat blood work and ultrasound    [facility], Emergency Dept  [address] [facility] [address] [phone]  Go to   As needed      Advised to return to ER if new or worsening symptoms present.  Guardian verbalized an understanding of the instructions presented, all questioned answered.      Discharge paperwork signed and a copy was give to pt/parent.   Pt awake, alert, and NAD.    /71   Pulse 97   Temp 37.3 °C (99.2 °F) (Temporal)   Resp 20   Ht 1.676 m (5' 6\")   Wt (!) 137 kg (301 lb 13 oz)   SpO2 98%   BMI 48.71 kg/m²    "

## 2021-06-27 NOTE — ED TRIAGE NOTES
"Chief Complaint   Patient presents with   • Vaginal Bleeding     Pt is 7 weeks pregnant, pt has her first doctors appointment to confirm on July 1st. Pt states around 2 pm she started to have some spotting, patient states before calling her doctor she started have more blood and clots. Before arriving to the ER, patient states the bleeding is more spotting at this time. GCS 15.        35 yo F to triage for above complaint. Pt states she is able 7 weeks pregnant, pt was going to have her doctors appointment to confirm on July 1st. Reports been having difficulty for the past year to have a second pregnancy. Hx of PCOS. States around 2pm today started to have vaginal bleeding some spotting. Before calling he doctor, bleeding increased with clots in urine. Patient states she was advised to come to the ER by her doctor. Report some slight abdominal/pelvic pain and feels like her abdomen is bloated. GCS 15. Protocol ordered.     Pt is alert and oriented, speaking in full sentences, follows commands and responds appropriately to questions. Resp are even and unlabored.      Pt placed in lobby. Pt educated on triage process. Pt encouraged to alert staff for any changes.     Patient and staff wearing appropriate PPE    /105   Pulse (!) 110   Temp 37.2 °C (98.9 °F) (Temporal)   Resp 18   Ht 1.676 m (5' 6\")   Wt (!) 137 kg (301 lb 13 oz)   SpO2 97%   BMI 48.71 kg/m²   "

## 2021-06-27 NOTE — ED NOTES
Patient returned from ultrasound.    PIV established to patient's left forearm.  Patient verified correct patient name and  on labeled specimen.  Blood collected and sent to lab.  This RN provided possible lab wait times.  IV is saline locked at this time.

## 2021-06-27 NOTE — ED NOTES
First interaction with patient.  Assumed care at this time.  Patient presents to the ER today for complaint of vaginal bleeding onset at approx 1400 today.  She states that bleeding was very light at first, but then after dinner she noticed that she was bleeding much heavier and was passing clots.  She states that she is no longer passing clots and that her bleeding is very light again.  She states that she is 7 weeks pregnant and sees OBGYN Dr. Robles on 2021 to confirm pregnancy.  She is .  She also reports intermittent abdominal cramping that radiates to her back.    Gown provided, patient instructed to changed prior to ERP evaluation.  NPO status explained by this RN.  Call light provided.  Chart up for ERP.

## 2021-06-27 NOTE — ED NOTES
Patient taken to ultrasound via wheelchair by ultrasound staff.  Patient leaves the department awake, alert, in no apparent distress.

## 2021-06-28 ENCOUNTER — TELEPHONE (OUTPATIENT)
Dept: OBGYN | Facility: CLINIC | Age: 36
End: 2021-06-28

## 2021-06-28 NOTE — TELEPHONE ENCOUNTER
Pt states went to ER 6/26/21 for Vaginal bleeding and per ER note pt needs HCG level labs done today but needs order. Has appt this Thursday and would like to know if it's okay to wait. Consulted with Dr. Lomas per Doctor pt may keep her appt for Thursday and will discuss further lab work. Pt understood no further questions asked

## 2021-07-01 ENCOUNTER — INITIAL PRENATAL (OUTPATIENT)
Dept: OBGYN | Facility: CLINIC | Age: 36
End: 2021-07-01
Payer: COMMERCIAL

## 2021-07-01 VITALS — BODY MASS INDEX: 48.26 KG/M2 | DIASTOLIC BLOOD PRESSURE: 62 MMHG | SYSTOLIC BLOOD PRESSURE: 100 MMHG | WEIGHT: 293 LBS

## 2021-07-01 DIAGNOSIS — N91.2 AMENORRHEA: ICD-10-CM

## 2021-07-01 DIAGNOSIS — Z98.891 HISTORY OF C-SECTION: ICD-10-CM

## 2021-07-01 PROCEDURE — 99213 OFFICE O/P EST LOW 20 MIN: CPT | Mod: 25 | Performed by: OBSTETRICS & GYNECOLOGY

## 2021-07-01 PROCEDURE — 76830 TRANSVAGINAL US NON-OB: CPT | Performed by: OBSTETRICS & GYNECOLOGY

## 2021-07-01 ASSESSMENT — FIBROSIS 4 INDEX: FIB4 SCORE: .4405115060789980532

## 2021-07-01 NOTE — PROGRESS NOTES
Pt here today for   Pt states had vaginal bleeding 06/26/21 and went to the ER. PT states was bleeding for 5 hrs, has not bled since then.   HCG levels 06/26/21- 56941.0  Reports +  Good # [phone]  Pharmacy Confirmed.

## 2021-07-01 NOTE — PROGRESS NOTES
Chief complaint: Amenorrhea    Kinza Juan,  36 y.o.  female who presents for evaluation of menorrhea and newly diagnosed pregnancy    Subjective : Patient presents to the office for absent menses.  History of prior  x1 due to arrest of dilation of 4 cm.  Patient seen emergency room on 2021 secondary to some spotting.  Ultrasound at that time showed intrauterine pregnancy at 7 weeks 4 days gestational age.  Nausea/Vomiting: No    Abdominal /pelvic cramping: No  Vaginal bleeding: No  Positive home UPT yes  Other symptoms: No    Pertinent positives documented in HPI and all other systems reviewed & are negative    OB History    Para Term  AB Living   2 1 1     1   SAB TAB Ectopic Molar Multiple Live Births             1      # Outcome Date GA Lbr Kwan/2nd Weight Sex Delivery Anes PTL Lv   2 Current            1 Term 13 39w6d  3.317 kg (7 lb 5 oz) M CS-Unspec EPI  UMA       Past Gyn history: last pap , within normal limits, hx STDs none    Past Medical History:   Diagnosis Date   • GERD (gastroesophageal reflux disease)     h pylori 2015   • Heart burn    • Infectious disease     to the flu in 2019   • Pap smear     Normal       Past Surgical History:   Procedure Laterality Date   • PB ERCP,DIAGNOSTIC  2020    Procedure: ERCP, DIAGNOSTIC;  Surgeon: Lee Kelly M.D.;  Location: SURGERY [facility];  Service: Gastroenterology   • PB ERCP,DIAGNOSTIC N/A 2020    Procedure: ERCP (ENDOSCOPIC RETROGRADE CHOLANGIOPANCREATOGRAPHY);  Surgeon: Lee Kelly M.D.;  Location: SURGERY [facility];  Service: Gastroenterology   • JACQUI BY LAPAROSCOPY  2020    Procedure: CHOLECYSTECTOMY, LAPAROSCOPIC;  Surgeon: Iftikhar Flowers M.D.;  Location: SURGERY [facility];  Service: General   • PRIMARY C SECTION  3/24/2013    Performed by Briana Cano M.D. at LABOR AND DELIVERY   • ACL RECONSTRUCTION SCOPE  2012    Performed by CAITY DORSEY at  SURGERY [facility] ORS   • MEDIAL MENISCECTOMY  5/31/2012    Performed by CAITY DORSEY at [facility] ORS   • DENTAL EXTRACTION(S)  2005       Meds: Prenatal vitamins, Zanaflex, omeprazole    Allergies: Patient has no known allergies.    Physical Exam:    /62   Wt (!) 136 kg (299 lb)   LMP 12/01/2020   BMI 48.26 kg/m²   Gen: well-appearing, well-hydrated, well-nourished  HEENT: normal;   PERRLA, EOMI, sclera clear  Lungs: Clear to auscultation  Heart: RRR No M  Abd: abdomen is soft without significant tenderness, masses, organomegaly or guarding  Pelvic: External genitalia normal  Ext: NT bilaterally, no cyanosis, clubbing or edema    Recent Results (from the past 336 hour(s))   URINALYSIS,CULTURE IF INDICATED    Collection Time: 06/26/21  8:00 PM    Specimen: Urine, Clean Catch   Result Value Ref Range    Color Yellow     Character Clear     Specific Gravity 1.026 <1.035    Ph 5.0 5.0 - 8.0    Glucose Negative Negative mg/dL    Ketones Negative Negative mg/dL    Protein Negative Negative mg/dL    Bilirubin Negative Negative    Urobilinogen, Urine 0.2 Negative    Nitrite Negative Negative    Leukocyte Esterase Negative Negative    Occult Blood Moderate (A) Negative    Micro Urine Req Microscopic    URINE MICROSCOPIC (W/UA)    Collection Time: 06/26/21  8:00 PM   Result Value Ref Range    WBC 2-5 /hpf    RBC 5-10 (A) /hpf    Bacteria Negative None /hpf    Epithelial Cells Few /hpf   CBC WITH DIFFERENTIAL    Collection Time: 06/26/21  8:30 PM   Result Value Ref Range    WBC 15.6 (H) 4.8 - 10.8 K/uL    RBC 4.57 4.20 - 5.40 M/uL    Hemoglobin 13.4 12.0 - 16.0 g/dL    Hematocrit 42.2 37.0 - 47.0 %    MCV 92.3 81.4 - 97.8 fL    MCH 29.3 27.0 - 33.0 pg    MCHC 31.8 (L) 33.6 - 35.0 g/dL    RDW 46.3 35.9 - 50.0 fL    Platelet Count 309 164 - 446 K/uL    MPV 10.4 9.0 - 12.9 fL    Neutrophils-Polys 70.20 44.00 - 72.00 %    Lymphocytes 20.10 (L) 22.00 - 41.00 %    Monocytes 7.30 0.00 - 13.40 %     Eosinophils 1.50 0.00 - 6.90 %    Basophils 0.30 0.00 - 1.80 %    Immature Granulocytes 0.60 0.00 - 0.90 %    Nucleated RBC 0.00 /100 WBC    Neutrophils (Absolute) 10.94 (H) 2.00 - 7.15 K/uL    Lymphs (Absolute) 3.13 1.00 - 4.80 K/uL    Monos (Absolute) 1.13 (H) 0.00 - 0.85 K/uL    Eos (Absolute) 0.23 0.00 - 0.51 K/uL    Baso (Absolute) 0.05 0.00 - 0.12 K/uL    Immature Granulocytes (abs) 0.09 0.00 - 0.11 K/uL    NRBC (Absolute) 0.00 K/uL   COMP METABOLIC PANEL    Collection Time: 06/26/21  8:30 PM   Result Value Ref Range    Sodium 135 135 - 145 mmol/L    Potassium 3.5 (L) 3.6 - 5.5 mmol/L    Chloride 106 96 - 112 mmol/L    Co2 19 (L) 20 - 33 mmol/L    Anion Gap 10.0 7.0 - 16.0    Glucose 115 (H) 65 - 99 mg/dL    Bun 13 8 - 22 mg/dL    Creatinine 0.65 0.50 - 1.40 mg/dL    Calcium 8.7 8.5 - 10.5 mg/dL    AST(SGOT) 11 (L) 12 - 45 U/L    ALT(SGPT) 8 2 - 50 U/L    Alkaline Phosphatase 96 30 - 99 U/L    Total Bilirubin <0.2 0.1 - 1.5 mg/dL    Albumin 3.4 3.2 - 4.9 g/dL    Total Protein 6.7 6.0 - 8.2 g/dL    Globulin 3.3 1.9 - 3.5 g/dL    A-G Ratio 1.0 g/dL   RH TYPE FOR RHOGAM FROM E.D.    Collection Time: 06/26/21  8:30 PM   Result Value Ref Range    Emergency Department Rh Typing POS     Number Of Rh Doses Indicated ZERO    HCG QUANTITATIVE    Collection Time: 06/26/21  8:30 PM   Result Value Ref Range    Bhcg 16940.0 (H) 0.0 - 5.0 mIU/mL   ESTIMATED GFR    Collection Time: 06/26/21  8:30 PM   Result Value Ref Range    GFR If African American >60 >60 mL/min/1.73 m 2    GFR If Non African American >60 >60 mL/min/1.73 m 2       Ultrasound:     Transvaginal dating US performed and per my read:    Indication: Dating    Findings: rivera intrauterine pregnancy @8 weeks and 2 days by CRL.   Positive gestational sac.  Positive yolk sac.   Positive fetal cardiac activity @165 BPM.   Right ovary 2.5 cm simple cyst. Left Ovary 2.5 cm simple cyst. Cervical length 3.9 cm.   No free fluid in the cul-de-sac.    Impression: viable  IUP @8 weeks and 2 days. EDC by US of 2022      Assessment:  36 y.o.   amenorrhea  Pregnancy exam/test positive    Plan:  4 weeks for new OB appt  Pap smear up-to-date  Normal pregnancy symptoms discussed  SAB/labor precautions educated  Order prenatal labs and any additional imaging needed at new OB visit  Drink at least 2 liters of water daily  Exercise 30 minutes daily  Call MD w/ questions or concerns    Discussed with patient the presence of 2 simple cysts.  We will continue to monitor.    Given age and BMI, increased risk of preeclampsia.  Discussed with patient the use of ASA 81 mg to decrease risk of preeclampsia.  We will begin between 14 and 16 weeks gestation    Bleeding precautions discussed with patient    Final due date 2022, consistent with today's ultrasound and prior ultrasound performed in the emergency room    Discussed with the patient trial of labor after  versus repeat  with tubal ligation as the patient desires sterilization.  She will talk with her spouse and let us know of her decision    All questions answered

## 2021-07-24 ENCOUNTER — OFFICE VISIT (OUTPATIENT)
Dept: URGENT CARE | Facility: CLINIC | Age: 36
End: 2021-07-24
Payer: COMMERCIAL

## 2021-07-24 VITALS
HEIGHT: 66 IN | OXYGEN SATURATION: 99 % | RESPIRATION RATE: 16 BRPM | HEART RATE: 90 BPM | DIASTOLIC BLOOD PRESSURE: 64 MMHG | SYSTOLIC BLOOD PRESSURE: 90 MMHG | BODY MASS INDEX: 47.09 KG/M2 | TEMPERATURE: 97.4 F | WEIGHT: 293 LBS

## 2021-07-24 DIAGNOSIS — J02.0 STREP PHARYNGITIS: ICD-10-CM

## 2021-07-24 LAB
INT CON NEG: NORMAL
INT CON POS: NORMAL
S PYO AG THROAT QL: POSITIVE

## 2021-07-24 PROCEDURE — 87880 STREP A ASSAY W/OPTIC: CPT | Performed by: PHYSICIAN ASSISTANT

## 2021-07-24 PROCEDURE — 99213 OFFICE O/P EST LOW 20 MIN: CPT | Performed by: PHYSICIAN ASSISTANT

## 2021-07-24 RX ORDER — AMOXICILLIN 500 MG/1
500 CAPSULE ORAL 2 TIMES DAILY
Qty: 20 CAPSULE | Refills: 0 | Status: SHIPPED | OUTPATIENT
Start: 2021-07-24 | End: 2021-08-03

## 2021-07-24 ASSESSMENT — ENCOUNTER SYMPTOMS
SHORTNESS OF BREATH: 0
ABDOMINAL PAIN: 0
MYALGIAS: 0
DIARRHEA: 0
CHILLS: 0
HEADACHES: 0
SORE THROAT: 1
VOMITING: 0
FEVER: 0
EYE PAIN: 0
CONSTIPATION: 0
NAUSEA: 0
COUGH: 0

## 2021-07-24 ASSESSMENT — FIBROSIS 4 INDEX: FIB4 SCORE: .4405115060789980532

## 2021-07-24 NOTE — PROGRESS NOTES
Subjective:   Kinza Juan is a 36 y.o. female who presents for Sore Throat (ear pain x 2 days ( coworker test positive for strep ) )      HPI:  Is a 36-year-old female who presents complaining of 2 to 3 days of mild sore throat with some mild right-sided ear pain, her close coworker tested positive for strep yesterday.  Patient notes that she is 12 weeks pregnant.  No difficulty with liquids or solids, some generalized malaise.    Review of Systems   Constitutional: Negative for chills and fever.   HENT: Positive for ear pain and sore throat. Negative for congestion.    Eyes: Negative for pain.   Respiratory: Negative for cough and shortness of breath.    Cardiovascular: Negative for chest pain.   Gastrointestinal: Negative for abdominal pain, constipation, diarrhea, nausea and vomiting.   Genitourinary: Negative for dysuria.   Musculoskeletal: Negative for myalgias.   Skin: Negative for rash.   Neurological: Negative for headaches.       Medications:    • amoxicillin Caps  • norethindrone  • omeprazole  • tizanidine Tabs    Allergies: Patient has no known allergies.    Problem List: Kinza Juan does not have any pertinent problems on file.    Surgical History:  Past Surgical History:   Procedure Laterality Date   • PB ERCP,DIAGNOSTIC  12/9/2020    Procedure: ERCP, DIAGNOSTIC;  Surgeon: Lee Kelly M.D.;  Location: SURGERY [facility];  Service: Gastroenterology   • PB ERCP,DIAGNOSTIC N/A 9/16/2020    Procedure: ERCP (ENDOSCOPIC RETROGRADE CHOLANGIOPANCREATOGRAPHY);  Surgeon: Lee Kelly M.D.;  Location: SURGERY [facility];  Service: Gastroenterology   • JACQUI BY LAPAROSCOPY  6/20/2020    Procedure: CHOLECYSTECTOMY, LAPAROSCOPIC;  Surgeon: Iftikhar Flowers M.D.;  Location: SURGERY [facility];  Service: General   • PRIMARY C SECTION  3/24/2013    Performed by Briana Cano M.D. at LABOR AND DELIVERY   • ACL RECONSTRUCTION SCOPE  5/31/2012    Performed by CAITY DORSEY at  "SURGERY [facility] ORS   • MEDIAL MENISCECTOMY  5/31/2012    Performed by CAITY DORSEY at SURGERY [facility] ORS   • DENTAL EXTRACTION(S)  2005       Past Social Hx: Kinza Juan  reports that she has never smoked. She has never used smokeless tobacco. She reports previous alcohol use. She reports previous drug use. Drugs: Marijuana and Oral.     Past Family Hx:  Kinza Juan family history includes Diabetes in her father, maternal grandfather, and paternal grandmother; Hypertension in her maternal grandmother; Stroke in her maternal grandmother.     Problem list, medications, and allergies reviewed by myself today in Epic.     Objective:     BP (!) 90/64 (BP Location: Left arm, Patient Position: Sitting, BP Cuff Size: Adult)   Pulse 90   Temp 36.3 °C (97.4 °F) (Temporal)   Resp 16   Ht 1.676 m (5' 6\")   Wt (!) 136 kg (300 lb 9.6 oz)   LMP 12/01/2020   SpO2 99%   BMI 48.52 kg/m²     Physical Exam  Vitals reviewed.   Constitutional:       Appearance: Normal appearance. She is not ill-appearing or toxic-appearing.   HENT:      Head: Normocephalic and atraumatic.      Right Ear: Tympanic membrane, ear canal and external ear normal.      Left Ear: Tympanic membrane, ear canal and external ear normal.      Nose: Nose normal.      Mouth/Throat:      Mouth: Mucous membranes are moist.      Pharynx: Oropharynx is clear. Posterior oropharyngeal erythema present. No oropharyngeal exudate.   Eyes:      Conjunctiva/sclera: Conjunctivae normal.   Cardiovascular:      Rate and Rhythm: Normal rate.   Pulmonary:      Effort: Pulmonary effort is normal.   Lymphadenopathy:      Cervical: Cervical adenopathy present.   Skin:     General: Skin is warm and dry.      Capillary Refill: Capillary refill takes less than 2 seconds.   Neurological:      Mental Status: She is alert and oriented to person, place, and time.         Assessment/Plan:     Diagnosis and associated orders:     1. Strep " pharyngitis  amoxicillin (AMOXIL) 500 MG Cap    POCT Rapid Strep A      Comments/MDM:     • Rapid strep positive  • Amoxicillin sent to pharmacy, category B, good option in pregnancy  • No signs of peritonsillar abscess or other more serious complication,  • Patient instructed return to clinic if she shows no improvement after 48 hours medication  • Discard/replace/sanitizing any oral hygiene products         Differential diagnosis, natural history, supportive care, and indications for immediate follow-up discussed.    Advised the patient to follow-up with the primary care physician for recheck, reevaluation, and consideration of further management.    Please note that this dictation was created using voice recognition software. I have made a reasonable attempt to correct obvious errors, but I expect that there are errors of grammar and possibly content that I did not discover before finalizing the note.    This note was electronically signed by Malcolm Augustin PA-C

## 2021-08-04 ENCOUNTER — INITIAL PRENATAL (OUTPATIENT)
Dept: OBGYN | Facility: CLINIC | Age: 36
End: 2021-08-04
Payer: COMMERCIAL

## 2021-08-04 ENCOUNTER — HOSPITAL ENCOUNTER (OUTPATIENT)
Facility: MEDICAL CENTER | Age: 36
End: 2021-08-04
Attending: OBSTETRICS & GYNECOLOGY
Payer: COMMERCIAL

## 2021-08-04 VITALS — WEIGHT: 293 LBS | SYSTOLIC BLOOD PRESSURE: 117 MMHG | DIASTOLIC BLOOD PRESSURE: 82 MMHG | BODY MASS INDEX: 47.78 KG/M2

## 2021-08-04 DIAGNOSIS — E66.01 OBESITY, MORBID, BMI 40.0-49.9 (HCC): ICD-10-CM

## 2021-08-04 DIAGNOSIS — O09.90 HIGH RISK PREGNANCY, ANTEPARTUM: ICD-10-CM

## 2021-08-04 DIAGNOSIS — Z98.891 HISTORY OF C-SECTION: ICD-10-CM

## 2021-08-04 PROBLEM — N91.2 AMENORRHEA: Status: RESOLVED | Noted: 2021-07-01 | Resolved: 2021-08-04

## 2021-08-04 PROBLEM — R63.5 WEIGHT GAIN: Status: RESOLVED | Noted: 2021-04-01 | Resolved: 2021-08-04

## 2021-08-04 PROBLEM — Z83.49 FAMILY HISTORY OF OBESITY: Status: RESOLVED | Noted: 2021-04-01 | Resolved: 2021-08-04

## 2021-08-04 PROBLEM — Z90.49 S/P CHOLECYSTECTOMY: Status: RESOLVED | Noted: 2020-06-23 | Resolved: 2021-08-04

## 2021-08-04 PROBLEM — G89.29 CHRONIC ABDOMINAL PAIN: Status: RESOLVED | Noted: 2020-11-04 | Resolved: 2021-08-04

## 2021-08-04 PROBLEM — R10.9 CHRONIC ABDOMINAL PAIN: Status: RESOLVED | Noted: 2020-11-04 | Resolved: 2021-08-04

## 2021-08-04 PROBLEM — R63.2 BINGE EATING: Status: RESOLVED | Noted: 2021-04-01 | Resolved: 2021-08-04

## 2021-08-04 PROBLEM — R53.83 LACK OF ENERGY: Status: RESOLVED | Noted: 2021-04-01 | Resolved: 2021-08-04

## 2021-08-04 LAB
FORWARD REASON: SPWHY: NORMAL
FORWARDED TO LAB: SPWHR: NORMAL
SPECIMEN SENT: SPWT1: NORMAL

## 2021-08-04 PROCEDURE — 59402 PR NEW OB HIGH RISK: CPT | Performed by: OBSTETRICS & GYNECOLOGY

## 2021-08-04 ASSESSMENT — EDINBURGH POSTNATAL DEPRESSION SCALE (EPDS)
I HAVE BEEN SO UNHAPPY THAT I HAVE BEEN CRYING: NO, NEVER
I HAVE FELT SCARED OR PANICKY FOR NO GOOD REASON: NO, NOT MUCH
TOTAL SCORE: 5
I HAVE LOOKED FORWARD WITH ENJOYMENT TO THINGS: AS MUCH AS I EVER DID
I HAVE BEEN ABLE TO LAUGH AND SEE THE FUNNY SIDE OF THINGS: AS MUCH AS I ALWAYS COULD
I HAVE FELT SAD OR MISERABLE: NOT VERY OFTEN
I HAVE BEEN ANXIOUS OR WORRIED FOR NO GOOD REASON: HARDLY EVER
THE THOUGHT OF HARMING MYSELF HAS OCCURRED TO ME: NEVER
THINGS HAVE BEEN GETTING ON TOP OF ME: NO, MOST OF THE TIME I HAVE COPED QUITE WELL
I HAVE BLAMED MYSELF UNNECESSARILY WHEN THINGS WENT WRONG: NOT VERY OFTEN
I HAVE BEEN SO UNHAPPY THAT I HAVE HAD DIFFICULTY SLEEPING: NOT AT ALL

## 2021-08-04 ASSESSMENT — FIBROSIS 4 INDEX: FIB4 SCORE: .4405115060789980532

## 2021-08-04 NOTE — PROGRESS NOTES
CC: New Ob visit     Subjective Ms. Kinza Juan  13w1d by 8w US presents for prenatal care. Today is her first prenatal visit at [facility]'s [address]. She denies any contractions/cramping, leakage of fluid, vaginal bleeding. She does not feel movement yet.     I have reviewed the patients' medical, surgical, gynecological, obstetrical, social, and family histories, medications and available lab results and pertinent notes are as follows:     OB History    Para Term  AB Living   2 1 1 0 0 1   SAB TAB Ectopic Molar Multiple Live Births   0 0 0 0 0 1   Hx of c/s for arrest for failed induction.  Was 4cm for 1.5 days    Past Gynecological History:  Denies fibroids, ovarian cysts, abnormal pap smears, STDs. She denies ever having surgery on her cervix, or ovaries in the past.  Last pap smear was 2 years ago, hx of abnl in past    Past Medical History:   Diagnosis Date   • GERD (gastroesophageal reflux disease)     h pylori 2015   • Heart burn    • Infectious disease     to the flu in 2019   • Pap smear     Normal     Past Surgical History:   Procedure Laterality Date   • PB ERCP,DIAGNOSTIC  2020    Procedure: ERCP, DIAGNOSTIC;  Surgeon: Lee Kelly M.D.;  Location: SURGERY [facility];  Service: Gastroenterology   • PB ERCP,DIAGNOSTIC N/A 2020    Procedure: ERCP (ENDOSCOPIC RETROGRADE CHOLANGIOPANCREATOGRAPHY);  Surgeon: Lee Kelly M.D.;  Location: SURGERY SAME DAY [facility];  Service: Gastroenterology   • JACQUI BY LAPAROSCOPY  2020    Procedure: CHOLECYSTECTOMY, LAPAROSCOPIC;  Surgeon: Iftikhar Flowers M.D.;  Location: [facility];  Service: General   • PRIMARY C SECTION  3/24/2013    Performed by Briana Cano M.D. at LABOR AND DELIVERY   • ACL RECONSTRUCTION SCOPE  2012    Performed by CAITY DORSEY at [facility]   • MEDIAL MENISCECTOMY  2012    Performed by CAITY DORSEY at [facility]   •  DENTAL EXTRACTION(S)  2005      Social History     Socioeconomic History   • Marital status:      Spouse name: Not on file   • Number of children: Not on file   • Years of education: Not on file   • Highest education level: Not on file   Occupational History   • Not on file   Tobacco Use   • Smoking status: Never Smoker   • Smokeless tobacco: Never Used   Vaping Use   • Vaping Use: Never used   Substance and Sexual Activity   • Alcohol use: Not Currently   • Drug use: Not Currently     Types: Marijuana, Oral     Comment: occ   • Sexual activity: Yes     Partners: Male     Birth control/protection: None     Comment: , with boyfriend   Other Topics Concern   •  Service No   • Blood Transfusions No   • Caffeine Concern No   • Occupational Exposure No   • Hobby Hazards No   • Sleep Concern No   • Stress Concern No   • Weight Concern Yes   • Special Diet No   • Back Care No   • Exercise No   • Bike Helmet No   • Seat Belt Yes   • Self-Exams No   Social History Narrative   • Not on file     Social Determinants of Health     Financial Resource Strain:    • Difficulty of Paying Living Expenses:    Food Insecurity:    • Worried About Running Out of Food in the Last Year:    • Ran Out of Food in the Last Year:    Transportation Needs:    • Lack of Transportation (Medical):    • Lack of Transportation (Non-Medical):    Physical Activity:    • Days of Exercise per Week:    • Minutes of Exercise per Session:    Stress:    • Feeling of Stress :    Social Connections:    • Frequency of Communication with Friends and Family:    • Frequency of Social Gatherings with Friends and Family:    • Attends Confucianist Services:    • Active Member of Clubs or Organizations:    • Attends Club or Organization Meetings:    • Marital Status:    Intimate Partner Violence:    • Fear of Current or Ex-Partner:    • Emotionally Abused:    • Physically Abused:    • Sexually Abused:      Family History:   Family History   Problem  Relation Age of Onset   • Diabetes Paternal Grandmother    • Diabetes Father    • Stroke Maternal Grandmother    • Hypertension Maternal Grandmother    • Diabetes Maternal Grandfather    • Cancer Neg Hx        Genetic Screening/Teratology Counseling- Includes patient, baby's father, or anyone in either family with:  Patient's age 35 years or older as of estimated date of delivery: Yes   Thalassemia (Italian, Greek, Mediterranean, or  background): MCV less than 80: No   Neural tube defect (Meningomyelocele, Spina bifida, or Anencephaly): No   Congenital heart defect: No   Down syndrome: No   Tan-Sachs (Ashkenazi Spiritism, Cajun, [place]): No   Canavan disease (Ashkenazi Spiritism): No   Familial dysautonomia (Ashkenazi Spiritism): No   Sickle cell disease or trait (): No   Hemophilia or other blood disorders: No   Muscular dystrophy: No    Cystic fibrosis: No   [place]'s chorea: No   Mental retardation/autism: No   Other inherited genetic or chromosomal disorder: No   Maternal metabolic disorder (eg. Type 1 diabetes, PKU): No   Patient or baby's father had child with birth defects not listed above: No   Recurrent pregnancy loss, or a stillbirth: No   Medications (including supplements, vitamins, herbs, or OTC drugs)/illicit/recreational drugs/alcohol since last menstrual period: No           Infection Prevention  1. High Risk For HIV: No 6. Rash Or Illness Since LMP: No   2. High Risk For Hepatitis B or C: No 7. History Of STD, GC, Chlamydia, HPV Syphilis: No   3. Live With Someone With TB Or Exposed To TB: No 8. Have a cat in the home?: No   4. Patient Or Partner Has A History Of Herpes: No    5. History of Chicken Pox: No    Comments: Planned pregnancy FOB Donta involved and supportive           Allergies: Patient has no known allergies.  Objective:/82   Wt (!) 134 kg (296 lb)      Objective:   Vitals:    08/04/21 0931   BP: 117/82     Prenatal Physical:  General Exam:  HEENT: normal     Heart: normal    Thyroid: normal    Lungs: normal    Lymph Nodes: normal    Breasts: normal    Neurological: normal    Abdomen: normal    Skin: normal    Extremities: normal    Pelvic Exam:  Vulva:  Normal  Vagina:  Normal  Cervix:  Normal  Uterus (wks):  13  Adnexa:  Normal  Rectum:  Normal   Morbid obesity    Lab:   Recent Results (from the past 672 hour(s))   POCT Rapid Strep A    Collection Time: 21  2:30 PM   Result Value Ref Range    Rapid Strep Screen positive     Internal Control Positive Valid     Internal Control Negative Valid        Ultrasound:  Reviewed initial scan and LYNN is by 8w US    Assessment:  --Normal Exam at 13+1 weeks  --morbid obesity  --high risk pregnancy  --hx of  section    Plan:  Reviewed the patients risk factors for this pregnancy and recommend the need for baseline preE labs, early glucola, baby aspirin, referral to [facility] for counseling and US  Genetic screening was discussed with literature on Prenatal Screening, Cystic Fibrosis, and SMA provided and the patient plans to do NIPT as CF and SMA was done in past pregnancy   Discuss importance of water intake, controlled caloric intake, eating 5 small meals throughout the day to maintain blood sugar and taking PNV  If has nausea, take Vitamin B6 25mg TID with doxylamine/Unisom 25mg at night  Discuss importance of exercise, as well as rest  Lab slip for Prenatal labs  (if not already completed)  Discuss policy for OB care at [facility]   Follow up in 4 weeks for next visit

## 2021-08-04 NOTE — PROGRESS NOTES
Pt. Here for NOB visit today.   #  First prenatal care  Pt. states  Pharmacy verified  Chaperone offered and provided

## 2021-08-06 ENCOUNTER — HOSPITAL ENCOUNTER (OUTPATIENT)
Facility: MEDICAL CENTER | Age: 36
End: 2021-08-06
Attending: PHYSICIAN ASSISTANT
Payer: COMMERCIAL

## 2021-08-06 ENCOUNTER — OFFICE VISIT (OUTPATIENT)
Dept: URGENT CARE | Facility: CLINIC | Age: 36
End: 2021-08-06
Payer: COMMERCIAL

## 2021-08-06 VITALS
BODY MASS INDEX: 47.09 KG/M2 | SYSTOLIC BLOOD PRESSURE: 120 MMHG | TEMPERATURE: 96.5 F | OXYGEN SATURATION: 97 % | HEIGHT: 66 IN | DIASTOLIC BLOOD PRESSURE: 58 MMHG | WEIGHT: 293 LBS | HEART RATE: 75 BPM | RESPIRATION RATE: 16 BRPM

## 2021-08-06 DIAGNOSIS — J02.0 STREP THROAT: ICD-10-CM

## 2021-08-06 DIAGNOSIS — Z87.09 HISTORY OF STREP SORE THROAT: ICD-10-CM

## 2021-08-06 DIAGNOSIS — Z3A.13 13 WEEKS GESTATION OF PREGNANCY: ICD-10-CM

## 2021-08-06 LAB
FORWARD REASON: SPWHY: NORMAL
FORWARDED TO LAB: SPWHR: NORMAL
SPECIMEN SENT: SPWT1: NORMAL

## 2021-08-06 PROCEDURE — 99214 OFFICE O/P EST MOD 30 MIN: CPT | Performed by: PHYSICIAN ASSISTANT

## 2021-08-06 PROCEDURE — 87070 CULTURE OTHR SPECIMN AEROBIC: CPT

## 2021-08-06 PROCEDURE — 87077 CULTURE AEROBIC IDENTIFY: CPT

## 2021-08-06 RX ORDER — CEFDINIR 300 MG/1
300 CAPSULE ORAL 2 TIMES DAILY
Qty: 14 CAPSULE | Refills: 0 | Status: SHIPPED | OUTPATIENT
Start: 2021-08-06 | End: 2021-08-13

## 2021-08-06 ASSESSMENT — FIBROSIS 4 INDEX: FIB4 SCORE: .4405115060789980532

## 2021-08-06 ASSESSMENT — ENCOUNTER SYMPTOMS
HOARSE VOICE: 1
HEADACHES: 0
TROUBLE SWALLOWING: 0
COUGH: 0
SWOLLEN GLANDS: 1

## 2021-08-06 NOTE — PROGRESS NOTES
Subjective:   Kinza Juan is a 36 y.o. female who presents for Sore Throat (possible strep, ear pain, x2 weeks )        Currently pregnant- 13 weeks.  Patient was treated in clinic for strep throat 2 weeks ago.  She completed full antibiotic course and changed her toothbrush after 3 days.  States that she was symptom free for approximately 3 days, but then symptoms recurred.  Current symptoms feel identical.    Pharyngitis   This is a new problem. The current episode started in the past 7 days. The problem has been gradually worsening. There has been no fever. The pain is moderate. Associated symptoms include ear pain, a hoarse voice, a plugged ear sensation and swollen glands. Pertinent negatives include no congestion, coughing, headaches or trouble swallowing. Associated symptoms comments: Pain with swallowing. She has had exposure to strep. She has tried nothing for the symptoms. The treatment provided no relief.     Review of Systems   HENT: Positive for ear pain and hoarse voice. Negative for congestion and trouble swallowing.    Respiratory: Negative for cough.    Neurological: Negative for headaches.       PMH:  has a past medical history of GERD (gastroesophageal reflux disease), Heart burn, Infectious disease, and Pap smear (4/28/9). She also has no past medical history of Addisons disease ([facility]), Adrenal disorder ([facility]), Allergy, Anemia, Anxiety, Arrhythmia, Arthritis, Asthma, Blood transfusion without reported diagnosis, Cancer ([facility]), Cataract, CHF (congestive heart failure) ([facility]), Clotting disorder ([facility]), COPD (chronic obstructive pulmonary disease) ([facility]), Cushings syndrome ([facility]), Depression, Diabetes ([facility]), Diabetic neuropathy ([facility]), Glaucoma, Goiter, Head ache, Heart attack ([facility]), Heart murmur, HIV (human immunodeficiency virus infection) ([facility]), Hypertension, IBD (inflammatory bowel disease), Kidney disease, Meningitis, Migraine, Muscle disorder, Osteoporosis, Parathyroid disorder ([facility]),  Pituitary disease (H[facility]), Pulmonary emphysema (HCC), Seizure (HCC), Sickle cell disease (HCC), Stroke (HCC), Substance abuse (HCC), Thyroid disease, Tuberculosis, or Urinary tract infection.  MEDS:   Current Outpatient Medications:   •  cefdinir (OMNICEF) 300 MG Cap, Take 1 capsule by mouth 2 times a day for 7 days., Disp: 14 capsule, Rfl: 0  •  tizanidine (ZANAFLEX) 4 MG Tab, Take 1 Tab by mouth at bedtime as needed. (Patient not taking: Reported on 7/24/2021), Disp: 30 Tab, Rfl: 5  •  norethindrone (MICRONOR) 0.35 MG tablet, Take 1 Tab by mouth every day. (Patient not taking: Reported on 7/24/2021), Disp: 28 Tab, Rfl: 12  •  omeprazole (PRILOSEC) 20 MG delayed-release capsule, TAKE 1 CAPSULE BY MOUTH ONCE DAILY BEFORE A MEAL FOR 30 DAYS (Patient not taking: Reported on 7/24/2021), Disp: , Rfl:   ALLERGIES: No Known Allergies  SURGHX:   Past Surgical History:   Procedure Laterality Date   • PB ERCP,DIAGNOSTIC  12/9/2020    Procedure: ERCP, DIAGNOSTIC;  Surgeon: Lee Kelly M.D.;  Location: SURGERY [facility];  Service: Gastroenterology   • PB ERCP,DIAGNOSTIC N/A 9/16/2020    Procedure: ERCP (ENDOSCOPIC RETROGRADE CHOLANGIOPANCREATOGRAPHY);  Surgeon: Lee Kelly M.D.;  Location: SURGERY [facility] DAY [facility];  Service: Gastroenterology   • JACQUI BY LAPAROSCOPY  6/20/2020    Procedure: CHOLECYSTECTOMY, LAPAROSCOPIC;  Surgeon: Iftikhar Flowers M.D.;  Location: [facility];  Service: General   • PRIMARY C SECTION  3/24/2013    Performed by Briana Cano M.D. at LABOR AND DELIVERY   • ACL RECONSTRUCTION SCOPE  5/31/2012    Performed by CAITY DORSEY at [facility]   • MEDIAL MENISCECTOMY  5/31/2012    Performed by CAITY DORSEY at [facility]   • DENTAL EXTRACTION(S)  2005     SOCHX:  reports that she has never smoked. She has never used smokeless tobacco. She reports previous alcohol use. She reports previous drug use. Drugs: Marijuana and Oral.  FH: Family history was  "reviewed, no pertinent findings to report   Objective:   /58 (BP Location: Left arm, Patient Position: Sitting, BP Cuff Size: Adult long)   Pulse 75   Temp 35.8 °C (96.5 °F) (Temporal)   Resp 16   Ht 1.676 m (5' 6\")   Wt (!) 135 kg (298 lb 3.2 oz)   LMP 12/01/2020   SpO2 97%   BMI 48.13 kg/m²   Physical Exam  Vitals reviewed.   Constitutional:       General: She is not in acute distress.     Appearance: Normal appearance. She is well-developed. She is not toxic-appearing.   HENT:      Head: Normocephalic and atraumatic.      Right Ear: Tympanic membrane, ear canal and external ear normal.      Left Ear: Tympanic membrane, ear canal and external ear normal.      Nose: Nose normal. No congestion or rhinorrhea.      Mouth/Throat:      Lips: Pink.      Mouth: Mucous membranes are moist.      Pharynx: Oropharynx is clear. Uvula midline. Posterior oropharyngeal erythema present. No oropharyngeal exudate.   Eyes:      General: Gaze aligned appropriately.   Cardiovascular:      Rate and Rhythm: Normal rate and regular rhythm.   Pulmonary:      Effort: Pulmonary effort is normal. No respiratory distress.      Breath sounds: No stridor.   Musculoskeletal:      Cervical back: Neck supple.   Lymphadenopathy:      Cervical: Cervical adenopathy present.      Right cervical: Superficial cervical adenopathy present.      Left cervical: Superficial cervical adenopathy present.   Skin:     General: Skin is warm and dry.      Capillary Refill: Capillary refill takes less than 2 seconds.   Neurological:      Mental Status: She is alert and oriented to person, place, and time.      Comments: CN2-12 grossly intact   Psychiatric:         Speech: Speech normal.         Behavior: Behavior normal.           Assessment/Plan:   1. Strep throat  - CULTURE THROAT; Future  - cefdinir (OMNICEF) 300 MG Cap; Take 1 capsule by mouth 2 times a day for 7 days.  Dispense: 14 capsule; Refill: 0    2. History of strep sore throat  - CULTURE " THROAT; Future    3. 13 weeks gestation of pregnancy    Point of care strep testing was positive.  It is unclear if patient was reinfected or prior infection never fully resolved.  I will treat her with Omnicef this time.  I will also obtain a throat culture.  I will contact patient with results and adjust treatment plan as indicated.  Tylenol as needed for pain.  Salt water gargles and hot tea.    Differential diagnosis, natural history, supportive care, and indications for immediate follow-up discussed.

## 2021-08-09 LAB
BACTERIA SPEC RESP CULT: NORMAL
SIGNIFICANT IND 70042: NORMAL
SITE SITE: NORMAL
SOURCE SOURCE: NORMAL

## 2021-08-12 LAB
C TRACH RRNA CVX QL NAA+PROBE: NEGATIVE
CYTOLOGIST CVX/VAG CYTO: NORMAL
CYTOLOGY CVX/VAG DOC CYTO: NORMAL
CYTOLOGY CVX/VAG DOC THIN PREP: NORMAL
DX ICD CODE: NORMAL
HPV I/H RISK 4 DNA CVX QL PROBE+SIG AMP: NEGATIVE
N GONORRHOEA RRNA CVX QL NAA+PROBE: NEGATIVE
NOTE  190109: NORMAL
OTHER STN SPEC: NORMAL
STAT OF ADQ CVX/VAG CYTO-IMP: NORMAL

## 2021-08-13 ENCOUNTER — TELEPHONE (OUTPATIENT)
Dept: OBGYN | Facility: CLINIC | Age: 36
End: 2021-08-13

## 2021-08-13 NOTE — TELEPHONE ENCOUNTER
Pt left a voicemail in regards to her NIPT results. I called an pt and advised we have not received the results its can take 7 to 10 business days. Pt states she understands.    Pritesh

## 2021-08-24 ENCOUNTER — ROUTINE PRENATAL (OUTPATIENT)
Dept: OBGYN | Facility: CLINIC | Age: 36
End: 2021-08-24
Payer: COMMERCIAL

## 2021-08-24 VITALS — BODY MASS INDEX: 47.94 KG/M2 | WEIGHT: 293 LBS | DIASTOLIC BLOOD PRESSURE: 74 MMHG | SYSTOLIC BLOOD PRESSURE: 92 MMHG

## 2021-08-24 DIAGNOSIS — O09.92 HIGH-RISK PREGNANCY SUPERVISION, SECOND TRIMESTER: ICD-10-CM

## 2021-08-24 DIAGNOSIS — O99.212 OBESITY COMPLICATING PREGNANCY IN SECOND TRIMESTER: ICD-10-CM

## 2021-08-24 DIAGNOSIS — O99.810 ABNORMAL GLUCOSE TOLERANCE TEST (GTT) DURING PREGNANCY, ANTEPARTUM: ICD-10-CM

## 2021-08-24 DIAGNOSIS — Z98.891 HX OF CESAREAN SECTION: ICD-10-CM

## 2021-08-24 PROCEDURE — 90040 PR PRENATAL FOLLOW UP: CPT | Performed by: OBSTETRICS & GYNECOLOGY

## 2021-08-24 ASSESSMENT — FIBROSIS 4 INDEX: FIB4 SCORE: .4405115060789980532

## 2021-08-24 NOTE — PROGRESS NOTES
S: Pt presents for routine OB follow up.  Patient is doing well except for some tiredness  No contractions, vaginal bleeding, or leaking fluids.  Reports no fetal movement.  Has anatomy ultrasound scheduled  Questions answered.    O: BP (!) 92/74   Wt (!) 135 kg (297 lb)   LMP 2020   BMI 47.94 kg/m²   Patients' weight gain, fluid intake and exercise level discussed.  Vitals, fundal height , fetal position, and FHR reviewed on flowsheet    Lab: Prenatal lab results were reviewed with patient from [facility]      A/P:  36 y.o.  at 16w0d presents for routine obstetric follow-up.  Doing well  Size equals dates.  Anatomy ultrasound is pending.  Labs were normal except for early 1 hour GCT was abnormal and 3-hour GTT was ordered.  Encounter Diagnoses   Name Primary?   • High-risk pregnancy supervision, second trimester    • Hx of  section    • Abnormal glucose tolerance test (GTT) during pregnancy, antepartum          1.  Continue prenatal vitamins.  2.  Begin kick counts at 28 weeks.  3.  Exercise at least 30 minutes daily.  4.  Drink at least 2 Liters of water daily  5.  Follow-up in 4 weeks.

## 2021-09-23 ENCOUNTER — TELEPHONE (OUTPATIENT)
Dept: OBGYN | Facility: CLINIC | Age: 36
End: 2021-09-23

## 2021-09-23 RX ORDER — LANCETS 30 GAUGE
EACH MISCELLANEOUS
Qty: 100 EACH | Refills: 5 | Status: CANCELLED | OUTPATIENT
Start: 2021-09-23

## 2021-09-23 NOTE — TELEPHONE ENCOUNTER
----- Message -----   From: Dino Lombardo M.D.   Sent: 9/23/2021   8:30 AM PDT   To: Abbe Wheatley Ass't   Subject: RE: Edit                                         Patient glucose is abnormal and she has gestational diabetic.  Please let Angely know so she can schedule diabetic class with patient.     Pt notified of Dx of GDM and needs to go to GDM class and f/u at [facility] with MD. GDM class scheduled for 9/29/2021 at 1330, explained class is 2hrs long and they need to bring glucose meter, test strips and lancets to the class and GDM f/u at [facility] on 9/30/2021 since pt was already schedule and there is no opennings the following week. Address to GDM class given to pt. Pt aware to bring in her log book and meter to GDM f/u with MD. Pt agreed and verbalized understanding.    Rx for GDM supplies pended for provider to sign.

## 2021-09-24 RX ORDER — LANCETS 30 GAUGE
EACH MISCELLANEOUS
Qty: 100 EACH | Refills: 0 | Status: SHIPPED | OUTPATIENT
Start: 2021-09-24 | End: 2022-02-23

## 2021-09-24 RX ORDER — GLUCOSAMINE HCL/CHONDROITIN SU 500-400 MG
CAPSULE ORAL
Qty: 100 EACH | Refills: 0 | Status: SHIPPED | OUTPATIENT
Start: 2021-09-24 | End: 2022-02-23

## 2021-09-29 ENCOUNTER — NON-PROVIDER VISIT (OUTPATIENT)
Dept: OBGYN | Facility: CLINIC | Age: 36
End: 2021-09-29
Payer: COMMERCIAL

## 2021-09-29 VITALS — BODY MASS INDEX: 47.09 KG/M2 | HEIGHT: 66 IN | WEIGHT: 293 LBS

## 2021-09-29 DIAGNOSIS — O99.810 ABNORMAL GLUCOSE TOLERANCE TEST (GTT) DURING PREGNANCY, ANTEPARTUM: ICD-10-CM

## 2021-09-29 PROCEDURE — G0109 DIAB MANAGE TRN IND/GROUP: HCPCS | Performed by: OBSTETRICS & GYNECOLOGY

## 2021-09-29 ASSESSMENT — FIBROSIS 4 INDEX: FIB4 SCORE: .4405115060789980532

## 2021-09-29 NOTE — PROGRESS NOTES
Kinza came to the Gestational Diabetes program.  She states her father has Type 2 Diabetes.  She denies any problems at this time with her pregnancy.  She brought in the One Touch Ultra meter and was shown how to use it.  She was able to do a return demonstration without difficulty. She will keep walking and stay well hydrated with water. Her meal plan is scanned into Media and her Doctor Letters with what was taught can be found under the Letters tab.  The pt received a 2000 calorie meal plan  consisting of 3 meals and 3 snacks (see media for copy of meal plan).   Pt was educated on carbohydrate containing foods vs non carbohydrate containing foods, the importance of small frequent meals, limiting carbohydrate to 1 serving in the morning, no fruit before noon/12pm, and avoiding all concentrated sweets for the remainder of her pregnancy. Explained the importance of not going >4hrs between eating during the day and no longer than 10hours overnight. Patient agrees to follow the meal plan and guidelines provided.

## 2021-09-29 NOTE — LETTER
September 29, 2021                   Re: Kinza Juan     1985         [id number]           Dear :  [facility]   [facility] Providers    On 9/29/2021, your patient Kinza Juan, received 2 hours of nurse and nutrition training from the [facility] at [facility] for management of her gestational diabetes.  Her Estimated Date of Delivery: 2/8/22.  We taught the following subjects:    Introduction to gestational diabetes, benefits and responsibilities of patient, physiology of diabetes and the diease process, benefits of blood glucose monitoring and record keeping, medication action and possible side effects, hypoglycemia, sick day management, exercise, stress reduction and travel with diabetes.       Nurse assessment / Education:    Comments:       Weight:Weight: (!) 135 kg (298 lb)         Complaints:no      Pathophysiology of diabetes in pregnancy    Discuss  potential maternal and fetal complications in pregnancy with diabetes.     Importance of blood glucose monitoring   Proper testing technique using a One Touch Ultra 2 meter.    At 2:10 pm, the meter read 113, which was 3.5 hours after eating.  Testing: fasting and one hour after meals,  expected ranges and rationale for strict control.     Ketone test today:no       Recognition and treatment of hypoglycemia.     Insulin taught: No  Insulin briefly dicussed at this time.    Should patient require insulin later in pregnancy, she would need further education.               Reviewed fetal kick counts and other tests to determine fetal well-being  Discuss benefits and risks of exercise in pregnancy  Discuss when to call Doctor  Discuss sick day care  Importance of wearing diabetes identification      Patient/caregiver appeared to understand the content as demonstrated by appropriate questions.     Kinza Juan was encouraged to discuss this further with you.    Hopefully this will help in your management of her care.  If we  can be of further assistance, please feel free to call.    Thank you for the referral.    Sincerely,    Fariha Mayfield RN CDE  Certified Diabetes Educator

## 2021-09-30 ENCOUNTER — ROUTINE PRENATAL (OUTPATIENT)
Dept: OBGYN | Facility: CLINIC | Age: 36
End: 2021-09-30
Payer: COMMERCIAL

## 2021-09-30 VITALS — WEIGHT: 293 LBS | BODY MASS INDEX: 48.1 KG/M2 | SYSTOLIC BLOOD PRESSURE: 119 MMHG | DIASTOLIC BLOOD PRESSURE: 75 MMHG

## 2021-09-30 DIAGNOSIS — O24.410 DIET CONTROLLED GESTATIONAL DIABETES MELLITUS (GDM) IN SECOND TRIMESTER: ICD-10-CM

## 2021-09-30 PROCEDURE — 90471 IMMUNIZATION ADMIN: CPT | Performed by: OBSTETRICS & GYNECOLOGY

## 2021-09-30 PROCEDURE — 90040 PR PRENATAL FOLLOW UP: CPT | Performed by: OBSTETRICS & GYNECOLOGY

## 2021-09-30 PROCEDURE — 90686 IIV4 VACC NO PRSV 0.5 ML IM: CPT | Performed by: OBSTETRICS & GYNECOLOGY

## 2021-09-30 RX ORDER — LANCETS 30 GAUGE
EACH MISCELLANEOUS
Qty: 100 EACH | Refills: 3 | Status: SHIPPED | OUTPATIENT
Start: 2021-09-30 | End: 2022-02-23

## 2021-09-30 ASSESSMENT — FIBROSIS 4 INDEX: FIB4 SCORE: .4405115060789980532

## 2021-09-30 NOTE — PROGRESS NOTES
S: No contractions, leaking fluid, vaginal bleeding.  She is feeling movement.  She is anxious about testing glucose levels.    O:/75   Wt (!) 135 kg (298 lb)    She has only been checking her glucose levels for 1 day.  Fasting was elevated at 106, breakfast 133, lunch 130.  Continue diabetic diet.    The patient states she saw a high risk pregnancy center 2 weeks ago for her fetal survey.  We will request this report.    A/P: IUP at 21-2/7 weeks with gestational diabetes.  Continue checking fasting and 1 hour postprandial levels.  Patient instructed to bring log to next visit in 1 week.  Lancets and test strips refilled per patient request.  
wait time explained

## 2021-10-08 ENCOUNTER — ROUTINE PRENATAL (OUTPATIENT)
Dept: OBGYN | Facility: CLINIC | Age: 36
End: 2021-10-08
Payer: COMMERCIAL

## 2021-10-08 VITALS — DIASTOLIC BLOOD PRESSURE: 54 MMHG | WEIGHT: 293 LBS | SYSTOLIC BLOOD PRESSURE: 95 MMHG | BODY MASS INDEX: 48.1 KG/M2

## 2021-10-08 DIAGNOSIS — O24.919 DIABETES MELLITUS DURING PREGNANCY, ANTEPARTUM, UNSPECIFIED DIABETES MELLITUS TYPE: ICD-10-CM

## 2021-10-08 PROCEDURE — 90040 PR PRENATAL FOLLOW UP: CPT | Performed by: OBSTETRICS & GYNECOLOGY

## 2021-10-08 RX ORDER — IBUPROFEN 200 MG
1 TABLET ORAL
Qty: 200 EACH | Refills: 0 | Status: SHIPPED | OUTPATIENT
Start: 2021-10-08 | End: 2021-10-21 | Stop reason: SDUPTHER

## 2021-10-08 RX ORDER — BLOOD PRESSURE TEST KIT
1 KIT MISCELLANEOUS
Qty: 120 EACH | Refills: 0 | Status: SHIPPED | OUTPATIENT
Start: 2021-10-08 | End: 2022-02-23

## 2021-10-08 ASSESSMENT — FIBROSIS 4 INDEX: FIB4 SCORE: .4405115060789980532

## 2021-10-08 NOTE — PROGRESS NOTES
Blood sugars reviewed.  Fastings persistently above 100 in the 103-106 range.  Most postprandials are in range.  Patient states frustration over the fasting levels.  States that she has adjusted her snack from apples and peanut butter to cottage cheese to string cheese however she is unable to get her levels to under 90.    Discussed that at this point would be starting insulin at bedtime.  Patient voices understanding and agrees.  Nurse sent for teaching and NPH 5 units at bedtime sent to the pharmacy.

## 2021-10-08 NOTE — PROGRESS NOTES
Insulin instructions given to pt on 10/8/2021. Pt will start on 5 units of NPH, QHS. Pt instructed on how to draw up insulin, site selection and rotation, self injection technique, proper storage of disposal of syringes. Pt demonstrated back self injection technique successfully. Advised to follow really close her meal plan. Pt informed to place insulin in refrigerator, after opening insulin is good for 31days. Advised to write opened date on vial and discard after 31 days. Instruction booklet given. Will call back in case of any questions.

## 2021-10-21 ENCOUNTER — ROUTINE PRENATAL (OUTPATIENT)
Dept: OBGYN | Facility: CLINIC | Age: 36
End: 2021-10-21
Payer: COMMERCIAL

## 2021-10-21 VITALS — DIASTOLIC BLOOD PRESSURE: 76 MMHG | SYSTOLIC BLOOD PRESSURE: 112 MMHG | WEIGHT: 293 LBS | BODY MASS INDEX: 47.66 KG/M2

## 2021-10-21 DIAGNOSIS — O24.414 INSULIN CONTROLLED GESTATIONAL DIABETES MELLITUS (GDM) IN SECOND TRIMESTER: ICD-10-CM

## 2021-10-21 PROCEDURE — 90040 PR PRENATAL FOLLOW UP: CPT | Performed by: OBSTETRICS & GYNECOLOGY

## 2021-10-21 RX ORDER — SYRINGE-NEEDLE,INSULIN,0.5 ML 30 G X1/2"
10 SYRINGE, EMPTY DISPOSABLE MISCELLANEOUS
Qty: 100 EACH | Refills: 0 | Status: SHIPPED | OUTPATIENT
Start: 2021-10-21 | End: 2022-02-23

## 2021-10-21 ASSESSMENT — FIBROSIS 4 INDEX: FIB4 SCORE: .4405115060789980532

## 2021-10-21 NOTE — PROGRESS NOTES
Increasing NPH to 10 nightly as fastings are still elevated. Most postprandials WNL.     Leaning more towards a CS because she wants a tubal ligation.

## 2021-11-03 ENCOUNTER — ROUTINE PRENATAL (OUTPATIENT)
Dept: OBGYN | Facility: CLINIC | Age: 36
End: 2021-11-03
Payer: COMMERCIAL

## 2021-11-03 VITALS — WEIGHT: 293 LBS | BODY MASS INDEX: 47.81 KG/M2 | DIASTOLIC BLOOD PRESSURE: 69 MMHG | SYSTOLIC BLOOD PRESSURE: 123 MMHG

## 2021-11-03 DIAGNOSIS — Z34.92 SECOND TRIMESTER PREGNANCY: ICD-10-CM

## 2021-11-03 PROCEDURE — 90040 PR PRENATAL FOLLOW UP: CPT | Performed by: OBSTETRICS & GYNECOLOGY

## 2021-11-03 ASSESSMENT — FIBROSIS 4 INDEX: FIB4 SCORE: .4405115060789980532

## 2021-11-03 NOTE — PROGRESS NOTES
50% of fastings elevated in the last week so increased NPH to 15 nightly.   Leaning towards a repeat  section and wants a tubal ligation. Wants a tubal ligation with her .     Marginal cord insertion- doing growth scans with Oki starting at 28 weeks.

## 2021-11-16 ENCOUNTER — ROUTINE PRENATAL (OUTPATIENT)
Dept: OBGYN | Facility: CLINIC | Age: 36
End: 2021-11-16
Payer: COMMERCIAL

## 2021-11-16 VITALS — DIASTOLIC BLOOD PRESSURE: 80 MMHG | BODY MASS INDEX: 47.45 KG/M2 | SYSTOLIC BLOOD PRESSURE: 134 MMHG | WEIGHT: 293 LBS

## 2021-11-16 DIAGNOSIS — O09.523 MULTIGRAVIDA OF ADVANCED MATERNAL AGE IN THIRD TRIMESTER: ICD-10-CM

## 2021-11-16 PROBLEM — O09.529 ADVANCED MATERNAL AGE IN MULTIGRAVIDA: Status: ACTIVE | Noted: 2021-11-16

## 2021-11-16 PROCEDURE — 90715 TDAP VACCINE 7 YRS/> IM: CPT | Performed by: NURSE PRACTITIONER

## 2021-11-16 PROCEDURE — 90040 PR PRENATAL FOLLOW UP: CPT | Performed by: NURSE PRACTITIONER

## 2021-11-16 PROCEDURE — 90471 IMMUNIZATION ADMIN: CPT | Performed by: NURSE PRACTITIONER

## 2021-11-16 ASSESSMENT — FIBROSIS 4 INDEX: FIB4 SCORE: .4405115060789980532

## 2021-11-16 NOTE — PROGRESS NOTES
SUBJECTIVE:  Pt is a 36 y.o.   at 28w0d  gestation. Presents today for follow-up prenatal care. Reports no issues at this time.  Reports good fetal movement. Denies regular cramping/contractions, bleeding or leaking of fluid. Denies dysuria, headaches, N/V. Generally feels well today.     OBJECTIVE:  - See prenatal vitals flow  -   Vitals:    21 1546   BP: 134/80   Weight: (!) 133 kg (294 lb)                 ASSESSMENT:   - IUP at 28w0d    - S=D   -   Patient Active Problem List    Diagnosis Date Noted   • Advanced maternal age in multigravida 2021   • Diabetes mellitus during pregnancy, antepartum 10/08/2021   • Hx of  section 2021   • Hypercholesterolemia 2021   • PCOS (polycystic ovarian syndrome) 2020         PLAN:  - S/sx pregnancy and labor warning signs vs general discomforts discussed  - Fetal movements and/or kick counts reviewed   - Adequate hydration reinforced  - Nutrition/exercise/vitamin education; continue PNV  - Reviewed BS logs with Vonda, she recommends pt continue 15 of NPH nightly and more diet modifications due to some elevated post-prandial levels   - S/p flu vacc; desires tdap vacc today   - Reviewed COVID-19 vaccination recommendations: pt reports she got it  - Anticipatory guidance given  - RTC in 2 weeks for follow-up prenatal care

## 2021-11-16 NOTE — PROGRESS NOTES
GDM Class A2  15 NPH QHS  + fetal movement  Tdap given today  Denies any VB, LOF or UC's  [phone] (home) [phone] (work)  Pharmacy confirmed  Diabetic supplies No need   Dr. Ziegler 11/19/21 for Growth US

## 2021-11-16 NOTE — LETTER
"Count Your Baby's Movements  Another step to a healthy delivery    A Epic Dress Re Test             Dept: [phone]    How Many Weeks Pregnant? Unknown    Date to Begin Counting: Today              How to use this chart    One way for your physician to keep track of your baby's health is by knowing how often the baby moves (or \"kicks\") in your womb.  You can help your physician to do this by using this chart every day.    Every day, you should see how many hours it takes for your baby to move 10 times.  Start in the morning, as soon as you get up.    · First, write down the time your baby moves until you get to 10.  · Check off one box every time your baby moves until you get to 10.  · Write down the time you finished counting in the last column.  · Total how long it took to count up all 10 movements.  · Finally, fill in the box that shows how long this took.  After counting 10 movements, you no longer have to count any more that day.  The next morning, just start counting again as soon as you get up.    What should you call a \"movement\"?  It is hard to say, because it will feel different from one mother to another and from one pregnancy to the next.  The important thing is that you count the movements the same way throughout your pregnancy.  If you have more questions, you should ask your physician.    Count carefully every day!  SAMPLE:  Week 28    How many hours did it take to feel 10 movements?       Start  Time     1     2     3     4     5     6     7     8     9     10   Finish Time   Mon 8:20 ·  ·  ·  ·  ·  ·  ·  ·  ·  ·  11:40   Tue Wed Thu Fri               Sat               Sun                 IMPORTANT: You should contact your physician if it takes more than two hours for you to feel 10 movements.  Each morning, write down the time and start to count the movements of your baby.  Keep track by checking off one box every time you feel one movement.  When you " "have felt 10 \"kicks\", write down the time you finished counting in the last column.  Then fill in the   box (over the check josé manuel) for the number of hours it took.  Be sure to read the complete instructions on the previous page.            "

## 2021-12-03 ENCOUNTER — ROUTINE PRENATAL (OUTPATIENT)
Dept: OBGYN | Facility: CLINIC | Age: 36
End: 2021-12-03
Payer: COMMERCIAL

## 2021-12-03 VITALS — BODY MASS INDEX: 47.03 KG/M2 | SYSTOLIC BLOOD PRESSURE: 113 MMHG | WEIGHT: 291.4 LBS | DIASTOLIC BLOOD PRESSURE: 60 MMHG

## 2021-12-03 DIAGNOSIS — O24.419 GDM, CLASS A2: ICD-10-CM

## 2021-12-03 PROCEDURE — 90040 PR PRENATAL FOLLOW UP: CPT | Performed by: OBSTETRICS & GYNECOLOGY

## 2021-12-03 ASSESSMENT — FIBROSIS 4 INDEX: FIB4 SCORE: .4405115060789980532

## 2021-12-04 NOTE — PROGRESS NOTES
Diabetic prenatal visit;    30w3d  Patient Active Problem List    Diagnosis Date Noted   • Advanced maternal age in multigravida 2021   • Diabetes mellitus during pregnancy, antepartum 10/08/2021   • Hx of  section 2021   • Hypercholesterolemia 2021   • PCOS (polycystic ovarian syndrome) 2020       The patient presents for prenatal care. She is doing well. Denies contractions leakage of fluid or vaginal bleeding. Having good fetal movement    Vitals:    21 1515   BP: 113/60   Weight: (!) 132 kg (291 lb 6.4 oz)           Fasting sugars; 81-97  One hour Postprandial sugars;    2 values above this probably not characteristic of her normal sugars        Size equals dates, normal fetal heart rate    Patient complains of lesion on right side of mons pubis which started to spontaneously drain yesterday    5 mm defect with folliculitis spontaneously draining no induration minimal drainage-likely early hidradenitis suppurativa      Continue ADA diet      New insulin regimen;   Morning; 0  Dinnertime; 0  Bedtime; 18 units NPH    Patient is scheduled for follow-up ultrasound for fetal growth with MFM    NSTs twice weekly starting at 32 weeks if class AII GDM or worse      Delivery by 39 weeks if class AII GDM or worse      Followup; 1 weeks

## 2021-12-08 ENCOUNTER — OFFICE VISIT (OUTPATIENT)
Dept: URGENT CARE | Facility: CLINIC | Age: 36
End: 2021-12-08
Payer: COMMERCIAL

## 2021-12-08 ENCOUNTER — HOSPITAL ENCOUNTER (OUTPATIENT)
Facility: MEDICAL CENTER | Age: 36
End: 2021-12-08
Attending: PHYSICIAN ASSISTANT
Payer: COMMERCIAL

## 2021-12-08 VITALS
SYSTOLIC BLOOD PRESSURE: 140 MMHG | BODY MASS INDEX: 47.09 KG/M2 | DIASTOLIC BLOOD PRESSURE: 80 MMHG | OXYGEN SATURATION: 97 % | RESPIRATION RATE: 18 BRPM | WEIGHT: 293 LBS | TEMPERATURE: 97.7 F | HEART RATE: 86 BPM | HEIGHT: 66 IN

## 2021-12-08 DIAGNOSIS — J02.9 PHARYNGITIS, UNSPECIFIED ETIOLOGY: ICD-10-CM

## 2021-12-08 LAB
COVID ORDER STATUS COVID19: NORMAL
INT CON NEG: NEGATIVE
INT CON POS: POSITIVE
S PYO AG THROAT QL: NEGATIVE

## 2021-12-08 PROCEDURE — U0003 INFECTIOUS AGENT DETECTION BY NUCLEIC ACID (DNA OR RNA); SEVERE ACUTE RESPIRATORY SYNDROME CORONAVIRUS 2 (SARS-COV-2) (CORONAVIRUS DISEASE [COVID-19]), AMPLIFIED PROBE TECHNIQUE, MAKING USE OF HIGH THROUGHPUT TECHNOLOGIES AS DESCRIBED BY CMS-2020-01-R: HCPCS

## 2021-12-08 PROCEDURE — U0005 INFEC AGEN DETEC AMPLI PROBE: HCPCS

## 2021-12-08 PROCEDURE — 99213 OFFICE O/P EST LOW 20 MIN: CPT | Performed by: PHYSICIAN ASSISTANT

## 2021-12-08 PROCEDURE — 87880 STREP A ASSAY W/OPTIC: CPT | Performed by: PHYSICIAN ASSISTANT

## 2021-12-08 PROCEDURE — 87070 CULTURE OTHR SPECIMN AEROBIC: CPT

## 2021-12-08 RX ORDER — OMEPRAZOLE 20 MG/1
TABLET, DELAYED RELEASE ORAL
Status: ON HOLD | COMMUNITY
End: 2022-02-03

## 2021-12-08 RX ORDER — OMEPRAZOLE 40 MG/1
CAPSULE, DELAYED RELEASE ORAL
Status: ON HOLD | COMMUNITY
Start: 2021-09-22 | End: 2022-02-03

## 2021-12-08 RX ORDER — BLOOD-GLUCOSE METER
EACH MISCELLANEOUS
COMMUNITY
Start: 2021-09-24 | End: 2022-02-23

## 2021-12-08 ASSESSMENT — ENCOUNTER SYMPTOMS
FEVER: 0
SWOLLEN GLANDS: 1
NAUSEA: 0
MYALGIAS: 0
COUGH: 1
VOMITING: 0
WHEEZING: 0
HEADACHES: 0
SHORTNESS OF BREATH: 0
SORE THROAT: 1
TROUBLE SWALLOWING: 0
HOARSE VOICE: 0
DIARRHEA: 0
CHILLS: 0
SPUTUM PRODUCTION: 0

## 2021-12-08 ASSESSMENT — FIBROSIS 4 INDEX: FIB4 SCORE: .4405115060789980532

## 2021-12-09 LAB
SARS-COV-2 RNA RESP QL NAA+PROBE: NOTDETECTED
SPECIMEN SOURCE: NORMAL

## 2021-12-09 NOTE — PROGRESS NOTES
Subjective     Kinza Juan is a 36 y.o. female who presents with Pharyngitis (bilateral ear pressure, (Son strep +) (Pregnancy 31 weeks))            Pharyngitis   This is a new problem. The current episode started in the past 7 days. The problem has been unchanged. There has been no fever. The pain is moderate. Associated symptoms include coughing (mild cough ) and swollen glands. Pertinent negatives include no congestion, diarrhea, ear discharge, ear pain, headaches, hoarse voice, shortness of breath, trouble swallowing or vomiting. Associated symptoms comments: Bilateral ear itching. She has had exposure to strep. Exposure to: son has strep. She has tried nothing for the symptoms.     The patient is fully vaccinated against COVID x 3.   She denies known exposure.  She is 31 weeks pregnant.    Past Medical History:   Diagnosis Date   • Diabetes mellitus during pregnancy, antepartum 10/8/2021   • GERD (gastroesophageal reflux disease)     h pylori 12/2015     Past Surgical History:   Procedure Laterality Date   • PB ERCP,DIAGNOSTIC  12/9/2020    Procedure: ERCP, DIAGNOSTIC;  Surgeon: Lee Kelly M.D.;  Location: [facility];  Service: Gastroenterology   • PB ERCP,DIAGNOSTIC N/A 9/16/2020    Procedure: ERCP (ENDOSCOPIC RETROGRADE CHOLANGIOPANCREATOGRAPHY);  Surgeon: Lee Kelly M.D.;  Location: SURGERY SAME DAY [facility];  Service: Gastroenterology   • JACQUI BY LAPAROSCOPY  6/20/2020    Procedure: CHOLECYSTECTOMY, LAPAROSCOPIC;  Surgeon: Iftikhar Flowers M.D.;  Location: [facility];  Service: General   • PRIMARY C SECTION  3/24/2013    Performed by Briana Cano M.D. at LABOR AND DELIVERY   • ACL RECONSTRUCTION SCOPE  5/31/2012    Performed by CAITY DORSEY at [facility]   • MEDIAL MENISCECTOMY  5/31/2012    Performed by CAITY DORSEY at [facility]   • DENTAL EXTRACTION(S)  2005       Family History   Problem Relation Age of Onset   • Diabetes  "Paternal Grandmother    • Diabetes Father    • Stroke Maternal Grandmother    • Hypertension Maternal Grandmother    • Diabetes Maternal Grandfather    • Cancer Neg Hx        /No Known Allergies    Medications, Allergies, and current problem list reviewed today in Epic      Review of Systems   Constitutional: Positive for malaise/fatigue. Negative for chills and fever.   HENT: Positive for sore throat. Negative for congestion, ear discharge, ear pain, hoarse voice and trouble swallowing.         Ear itching bilaterally    Respiratory: Positive for cough (mild cough ). Negative for sputum production, shortness of breath and wheezing.    Cardiovascular: Negative for chest pain and leg swelling.   Gastrointestinal: Negative for diarrhea, nausea and vomiting.   Musculoskeletal: Negative for myalgias.   Skin: Negative for rash.   Neurological: Negative for headaches.     All other systems reviewed and are negative.            Objective     /80   Pulse 86   Temp 36.5 °C (97.7 °F)   Resp 18   Ht 1.676 m (5' 6\")   Wt (!) 135 kg (298 lb)   LMP 12/01/2020   SpO2 97%   BMI 48.10 kg/m²      Physical Exam  Constitutional:       General: She is not in acute distress.     Appearance: She is not ill-appearing.   HENT:      Head: Normocephalic and atraumatic.      Right Ear: Tympanic membrane, ear canal and external ear normal.      Left Ear: Tympanic membrane, ear canal and external ear normal.      Nose: Nose normal.      Mouth/Throat:      Mouth: Mucous membranes are moist.      Pharynx: Posterior oropharyngeal erythema present. No oropharyngeal exudate.   Eyes:      Conjunctiva/sclera: Conjunctivae normal.   Cardiovascular:      Rate and Rhythm: Normal rate and regular rhythm.      Heart sounds: Normal heart sounds.   Pulmonary:      Effort: Pulmonary effort is normal. No respiratory distress.      Breath sounds: Normal breath sounds. No wheezing, rhonchi or rales.   Lymphadenopathy:      Cervical: Cervical " adenopathy present.   Skin:     General: Skin is warm and dry.      Findings: No rash.   Neurological:      General: No focal deficit present.      Mental Status: She is alert and oriented to person, place, and time.   Psychiatric:         Mood and Affect: Mood normal.         Behavior: Behavior normal.         Thought Content: Thought content normal.         Judgment: Judgment normal.                             Assessment & Plan        1. Pharyngitis, unspecified etiology  CULTURE THROAT    SARS-CoV-2 PCR (24 hour In-House): Collect NP swab in VTM    POCT Rapid Strep A       poct strep- negative  COVID PCR pending.  Isolation guidelines, conservative measures and ER precautions discussed.     Culture throat. Change treatment plan accordingly based on results.    Differential diagnoses, Supportive care, and indications for immediate follow-up discussed with patient.   Pathogenesis of diagnosis discussed including typical length and natural progression.   Instructed to return to clinic or nearest emergency department for any change in condition, further concerns, or worsening of symptoms.    The patient demonstrated a good understanding and agreed with the treatment plan.    Hilary Moran P.A.-C.

## 2021-12-10 ENCOUNTER — ROUTINE PRENATAL (OUTPATIENT)
Dept: OBGYN | Facility: CLINIC | Age: 36
End: 2021-12-10
Payer: COMMERCIAL

## 2021-12-10 VITALS — BODY MASS INDEX: 47.89 KG/M2 | WEIGHT: 293 LBS | SYSTOLIC BLOOD PRESSURE: 107 MMHG | DIASTOLIC BLOOD PRESSURE: 69 MMHG

## 2021-12-10 DIAGNOSIS — O24.419 GDM, CLASS A2: ICD-10-CM

## 2021-12-10 PROCEDURE — 90040 PR PRENATAL FOLLOW UP: CPT | Performed by: OBSTETRICS & GYNECOLOGY

## 2021-12-10 ASSESSMENT — FIBROSIS 4 INDEX: FIB4 SCORE: .4405115060789980532

## 2021-12-10 NOTE — PROGRESS NOTES
Diabetic prenatal visit;    31w3d  Patient Active Problem List    Diagnosis Date Noted   • Advanced maternal age in multigravida 2021   • Diabetes mellitus during pregnancy, antepartum 10/08/2021   • Hx of  section 2021   • Hypercholesterolemia 2021   • PCOS (polycystic ovarian syndrome) 2020       The patient presents for prenatal care. She is doing well. Denies contractions leakage of fluid or vaginal bleeding. Having good fetal movement      Patient states she is having decreased fetal movement      Blood pressure 107/69, weight 210 pounds      Fasting sugars; 90s to low 100s  One hour Postprandial sugars; rarely above 130        Size equals dates, normal fetal heart rate      Continue ADA diet      New insulin regimen;   Morning; 0  Dinnertime; 0  Bedtime; 22 units NPH    NST-reactive    NSTs twice weekly starting at 32 weeks if class AII GDM or worse      Delivery by 39 weeks if class AII GDM or worse      Followup; 1 weeks

## 2021-12-13 ENCOUNTER — ROUTINE PRENATAL (OUTPATIENT)
Dept: OBGYN | Facility: CLINIC | Age: 36
End: 2021-12-13
Payer: COMMERCIAL

## 2021-12-13 DIAGNOSIS — O09.523 MULTIGRAVIDA OF ADVANCED MATERNAL AGE IN THIRD TRIMESTER: ICD-10-CM

## 2021-12-13 DIAGNOSIS — O24.419 GDM, CLASS A2: ICD-10-CM

## 2021-12-13 LAB
NST ACOUSTIC STIMULATION: NORMAL
NST ACTION NECESSARY: NORMAL
NST ASSESSMENT: NORMAL
NST BASELINE: NORMAL
NST INDICATIONS: NORMAL
NST OTHER DATA: NORMAL
NST READ BY: NORMAL
NST RETURN: NORMAL
NST UTERINE ACTIVITY: NORMAL

## 2021-12-13 PROCEDURE — 59025 FETAL NON-STRESS TEST: CPT | Performed by: OBSTETRICS & GYNECOLOGY

## 2021-12-16 ENCOUNTER — TELEPHONE (OUTPATIENT)
Dept: OBGYN | Facility: CLINIC | Age: 36
End: 2021-12-16

## 2021-12-16 ENCOUNTER — APPOINTMENT (OUTPATIENT)
Dept: OBGYN | Facility: CLINIC | Age: 36
End: 2021-12-16
Payer: COMMERCIAL

## 2021-12-16 DIAGNOSIS — O24.419 GDM, CLASS A2: ICD-10-CM

## 2021-12-16 NOTE — TELEPHONE ENCOUNTER
Pt came in for NST today, OB appt was cancelled due to provider being out of the office. Pt was told she would receive a call to be r/s but has not received a call yet. Pt did not bring blood sugar log today.  I apologized to pt. Consulted with Dr. Brian who recommends pt to send us copy of sugars and should be fine to be seen on 12/22/21. Pt informed and will send us her BS log today.

## 2021-12-20 ENCOUNTER — ROUTINE PRENATAL (OUTPATIENT)
Dept: OBGYN | Facility: CLINIC | Age: 36
End: 2021-12-20
Payer: COMMERCIAL

## 2021-12-20 DIAGNOSIS — O24.419 GDM, CLASS A2: ICD-10-CM

## 2021-12-20 DIAGNOSIS — O09.523 MULTIGRAVIDA OF ADVANCED MATERNAL AGE IN THIRD TRIMESTER: ICD-10-CM

## 2021-12-20 PROCEDURE — 59025 FETAL NON-STRESS TEST: CPT | Performed by: ADVANCED PRACTICE MIDWIFE

## 2021-12-22 ENCOUNTER — ROUTINE PRENATAL (OUTPATIENT)
Dept: OBGYN | Facility: CLINIC | Age: 36
End: 2021-12-22
Payer: COMMERCIAL

## 2021-12-22 VITALS — WEIGHT: 293 LBS | SYSTOLIC BLOOD PRESSURE: 107 MMHG | DIASTOLIC BLOOD PRESSURE: 52 MMHG | BODY MASS INDEX: 48.26 KG/M2

## 2021-12-22 DIAGNOSIS — O24.419 GDM, CLASS A2: ICD-10-CM

## 2021-12-22 PROCEDURE — 90040 PR PRENATAL FOLLOW UP: CPT | Performed by: OBSTETRICS & GYNECOLOGY

## 2021-12-22 ASSESSMENT — FIBROSIS 4 INDEX: FIB4 SCORE: .4405115060789980532

## 2021-12-22 NOTE — PROGRESS NOTES
Diabetic prenatal visit;    33w1d  Patient Active Problem List    Diagnosis Date Noted   • Advanced maternal age in multigravida 2021   • Diabetes mellitus during pregnancy, antepartum 10/08/2021   • Hx of  section 2021   • Hypercholesterolemia 2021   • PCOS (polycystic ovarian syndrome) 2020       The patient presents for prenatal care. She is doing well. Denies contractions leakage of fluid or vaginal bleeding. Having good fetal movement    Vitals:    21 1103   BP: 111/91   Weight: (!) 136 kg (299 lb)     Reviewed blood pressure 107/52    Fasting sugars; 80s to low 100s  One hour Postprandial sugars; under 140        Size equals dates, normal fetal heart rate    Patient had follow-up growth scan with Dr. Ziegler consult note ultrasound results are pending      Continue ADA diet      New insulin regimen;   Morning; 0  Dinnertime; 0  Bedtime; 24 units NPH    NSTs twice weekly starting at 32 weeks if class AII GDM or worse      Delivery by 39 weeks if class AII GDM or worse      Followup; 1 weeks

## 2021-12-23 ENCOUNTER — ROUTINE PRENATAL (OUTPATIENT)
Dept: OBGYN | Facility: CLINIC | Age: 36
End: 2021-12-23
Payer: COMMERCIAL

## 2021-12-23 DIAGNOSIS — O09.523 MULTIGRAVIDA OF ADVANCED MATERNAL AGE IN THIRD TRIMESTER: ICD-10-CM

## 2021-12-23 DIAGNOSIS — O24.919 DIABETES MELLITUS DURING PREGNANCY, ANTEPARTUM, UNSPECIFIED DIABETES MELLITUS TYPE: ICD-10-CM

## 2021-12-23 PROCEDURE — 99999 PR NO CHARGE: CPT | Performed by: OBSTETRICS & GYNECOLOGY

## 2021-12-23 NOTE — PROGRESS NOTES
Kinza Kimarena, a  at 33w2d with an LYNN of 2022, by Ultrasound, was seen at [facility] for a nonstress test.    Nonstress Test  Reason for NST: Other (Comment) (a advanced maternal age)  Variability: Moderate  Decelerations: None  Accelerations: Yes  Acoustic Stimulator: No  Baseline: 140  Uterine Irritability: No  Contractions: Not present  Nonstress Test Interpretation  Comments: As per my read, reactive NST category 1

## 2021-12-27 ENCOUNTER — ROUTINE PRENATAL (OUTPATIENT)
Dept: OBGYN | Facility: CLINIC | Age: 36
End: 2021-12-27
Payer: COMMERCIAL

## 2021-12-27 DIAGNOSIS — O24.919 DIABETES MELLITUS DURING PREGNANCY, ANTEPARTUM, UNSPECIFIED DIABETES MELLITUS TYPE: ICD-10-CM

## 2021-12-27 DIAGNOSIS — O09.523 MULTIGRAVIDA OF ADVANCED MATERNAL AGE IN THIRD TRIMESTER: ICD-10-CM

## 2021-12-27 PROCEDURE — 99999 PR NO CHARGE: CPT | Performed by: ADVANCED PRACTICE MIDWIFE

## 2021-12-27 NOTE — PROGRESS NOTES
NST  Indication: AMA, GDMA2  Baseline 140  Pos accels, no decels  Moderate variability  No contractions.

## 2021-12-29 ENCOUNTER — ROUTINE PRENATAL (OUTPATIENT)
Dept: OBGYN | Facility: CLINIC | Age: 36
End: 2021-12-29
Payer: COMMERCIAL

## 2021-12-29 ENCOUNTER — APPOINTMENT (OUTPATIENT)
Dept: PEDIATRICS | Facility: MEDICAL CENTER | Age: 36
End: 2021-12-29
Payer: COMMERCIAL

## 2021-12-29 VITALS — WEIGHT: 293 LBS | DIASTOLIC BLOOD PRESSURE: 56 MMHG | BODY MASS INDEX: 48.1 KG/M2 | SYSTOLIC BLOOD PRESSURE: 123 MMHG

## 2021-12-29 DIAGNOSIS — O24.414 INSULIN CONTROLLED GESTATIONAL DIABETES MELLITUS (GDM) IN SECOND TRIMESTER: ICD-10-CM

## 2021-12-29 DIAGNOSIS — O24.419 GDM, CLASS A2: ICD-10-CM

## 2021-12-29 DIAGNOSIS — O09.893 SUPERVISION OF OTHER HIGH RISK PREGNANCIES, THIRD TRIMESTER: Primary | ICD-10-CM

## 2021-12-29 LAB
NST ACOUSTIC STIMULATION: NORMAL
NST ACTION NECESSARY: NORMAL
NST ASSESSMENT: REACTIVE
NST BASELINE: 135
NST INDICATIONS: NORMAL
NST OTHER DATA: NORMAL
NST READ BY: NORMAL
NST RETURN: NORMAL
NST UTERINE ACTIVITY: NORMAL

## 2021-12-29 PROCEDURE — 0502F SUBSEQUENT PRENATAL CARE: CPT | Performed by: NURSE PRACTITIONER

## 2021-12-29 RX ORDER — BREAST PUMP
1 EACH MISCELLANEOUS PRN
Qty: 1 EACH | Refills: 0 | Status: SHIPPED | OUTPATIENT
Start: 2021-12-29 | End: 2023-05-02

## 2021-12-29 ASSESSMENT — FIBROSIS 4 INDEX: FIB4 SCORE: .4405115060789980532

## 2021-12-30 ENCOUNTER — APPOINTMENT (OUTPATIENT)
Dept: OBGYN | Facility: CLINIC | Age: 36
End: 2021-12-30
Payer: COMMERCIAL

## 2021-12-30 NOTE — PROGRESS NOTES
No complaints today  States she feels some cramping, sporadic in nature, and some pelvic pressure. Discussed normalcy of this  NST today- reactive  GDM log reviewed with 3/7 elevated fasting BS, and 5 fasting PP BS  She was increased to 24 units last week  She states she isn't following diet as closely as she should, but is trying.  Reviewed results with Dr Khalil via phone- recommend increasing NPH to 26 units QHS  Reviewed these recommendations, and encouraged her to follow strict diet  Will do GBS at 36 weeks, and plan for delivery based on BS control. Will discuss with MD at next visit  All caught up with other labs and US  Reviewed recent Oki scan- no new recommendations  Labor precautions reviewed, all questions answered  RX for breast pump today    Yeimy RIVERAM, APRN

## 2022-01-02 ENCOUNTER — HOSPITAL ENCOUNTER (OUTPATIENT)
Facility: MEDICAL CENTER | Age: 37
End: 2022-01-02
Attending: NURSE PRACTITIONER
Payer: COMMERCIAL

## 2022-01-02 ENCOUNTER — OFFICE VISIT (OUTPATIENT)
Dept: URGENT CARE | Facility: CLINIC | Age: 37
End: 2022-01-02
Payer: COMMERCIAL

## 2022-01-02 ENCOUNTER — HOSPITAL ENCOUNTER (OUTPATIENT)
Facility: MEDICAL CENTER | Age: 37
End: 2022-01-02
Attending: NURSE PRACTITIONER

## 2022-01-02 VITALS
TEMPERATURE: 97.8 F | DIASTOLIC BLOOD PRESSURE: 68 MMHG | BODY MASS INDEX: 47.09 KG/M2 | WEIGHT: 293 LBS | HEART RATE: 114 BPM | HEIGHT: 66 IN | SYSTOLIC BLOOD PRESSURE: 112 MMHG | RESPIRATION RATE: 16 BRPM | OXYGEN SATURATION: 98 %

## 2022-01-02 DIAGNOSIS — Z20.822 SUSPECTED COVID-19 VIRUS INFECTION: ICD-10-CM

## 2022-01-02 DIAGNOSIS — J02.9 PHARYNGITIS, UNSPECIFIED ETIOLOGY: ICD-10-CM

## 2022-01-02 DIAGNOSIS — Z20.818 EXPOSURE TO GROUP A STREPTOCOCCUS: ICD-10-CM

## 2022-01-02 LAB
INT CON NEG: NEGATIVE
INT CON POS: POSITIVE
S PYO AG THROAT QL: NEGATIVE

## 2022-01-02 PROCEDURE — 87070 CULTURE OTHR SPECIMN AEROBIC: CPT

## 2022-01-02 PROCEDURE — 0240U HCHG SARS-COV-2 COVID-19 NFCT DS RESP RNA 3 TRGT MIC: CPT

## 2022-01-02 PROCEDURE — 87880 STREP A ASSAY W/OPTIC: CPT | Performed by: NURSE PRACTITIONER

## 2022-01-02 PROCEDURE — 99213 OFFICE O/P EST LOW 20 MIN: CPT | Mod: CS | Performed by: NURSE PRACTITIONER

## 2022-01-02 RX ORDER — AMOXICILLIN 500 MG/1
500 CAPSULE ORAL 2 TIMES DAILY
Qty: 20 CAPSULE | Refills: 0 | Status: SHIPPED | OUTPATIENT
Start: 2022-01-02 | End: 2022-01-12

## 2022-01-02 ASSESSMENT — ENCOUNTER SYMPTOMS
VOMITING: 0
SORE THROAT: 1
HEADACHES: 1
MYALGIAS: 1
DIARRHEA: 0
NAUSEA: 0
FEVER: 0
CHILLS: 0
COUGH: 0
SHORTNESS OF BREATH: 0
ABDOMINAL PAIN: 0

## 2022-01-02 ASSESSMENT — FIBROSIS 4 INDEX: FIB4 SCORE: .4405115060789980532

## 2022-01-02 NOTE — PROGRESS NOTES
Subjective:     Kinza Juan is a 36 y.o. female who presents for Pharyngitis (sore throat with ear pain - exposed to + strep (34 weeks pregnant))      Pharyngitis   Associated symptoms include congestion and headaches. Pertinent negatives include no abdominal pain, coughing, diarrhea, shortness of breath or vomiting.     Pt presents for evaluation of a new problem. Kinza is a pleasant 36-year-old female who presents to urgent care today with complaints of a sore throat and ear pain x1 day.  She notes her son developed symptoms 5 days ago and tested positive for strep throat today.  She is concerned that she has contracted strep from her son.  She denies any fever, chills, nausea/vomiting or diarrhea.  She does have mild body aches and headache.  She has not used any medication for the relief of her symptoms.  She is 34 weeks pregnant.  She denies any known Covid exposure but would also like to be tested for Covid as well today.  She is fully vaccinated against COVID.    Review of Systems   Constitutional: Positive for malaise/fatigue. Negative for chills and fever.   HENT: Positive for congestion and sore throat.    Respiratory: Negative for cough and shortness of breath.    Gastrointestinal: Negative for abdominal pain, diarrhea, nausea and vomiting.   Musculoskeletal: Positive for myalgias.   Neurological: Positive for headaches.       PMH:   Past Medical History:   Diagnosis Date   • Diabetes mellitus during pregnancy, antepartum 10/8/2021   • GERD (gastroesophageal reflux disease)     h pylori 12/2015     ALLERGIES: No Known Allergies  SURGHX:   Past Surgical History:   Procedure Laterality Date   • DE ERCP,DIAGNOSTIC  12/9/2020    Procedure: ERCP, DIAGNOSTIC;  Surgeon: Lee Kelly M.D.;  Location: SURGERY [facility];  Service: Gastroenterology   • DE ERCP,DIAGNOSTIC N/A 9/16/2020    Procedure: ERCP (ENDOSCOPIC RETROGRADE CHOLANGIOPANCREATOGRAPHY);  Surgeon: Lee Kelly M.D.;  Location:  SURGERY SAME DAY [facility];  Service: Gastroenterology   • JACQUI BY LAPAROSCOPY  6/20/2020    Procedure: CHOLECYSTECTOMY, LAPAROSCOPIC;  Surgeon: Iftikhar Flowers M.D.;  Location: SURGERY [facility];  Service: General   • PRIMARY C SECTION  3/24/2013    Performed by Briana Cano M.D. at LABOR AND DELIVERY   • RECONSTRUCTION, KNEE, ACL, ARTHROSCOPIC  5/31/2012    Performed by CAITY DORSEY at SURGERY [facility]   • MENISCECTOMY, KNEE, MEDIAL  5/31/2012    Performed by CAITY DORSEY at SURGERY [facility]   • DENTAL EXTRACTION(S)  2005     SOCHX:   Social History     Socioeconomic History   • Marital status:      Spouse name: Not on file   • Number of children: Not on file   • Years of education: Not on file   • Highest education level: Not on file   Occupational History   • Not on file   Tobacco Use   • Smoking status: Never Smoker   • Smokeless tobacco: Never Used   Vaping Use   • Vaping Use: Never used   Substance and Sexual Activity   • Alcohol use: Not Currently   • Drug use: Not Currently     Types: Marijuana, Oral     Comment: occ   • Sexual activity: Yes     Partners: Male     Birth control/protection: None     Comment: , with boyfriend   Other Topics Concern   •  Service No   • Blood Transfusions No   • Caffeine Concern No   • Occupational Exposure No   • Hobby Hazards No   • Sleep Concern No   • Stress Concern No   • Weight Concern Yes   • Special Diet No   • Back Care No   • Exercise No   • Bike Helmet No   • Seat Belt Yes   • Self-Exams No   Social History Narrative   • Not on file     Social Determinants of Health     Financial Resource Strain:    • Difficulty of Paying Living Expenses: Not on file   Food Insecurity:    • Worried About Running Out of Food in the Last Year: Not on file   • Ran Out of Food in the Last Year: Not on file   Transportation Needs:    • Lack of Transportation (Medical): Not on file   • Lack of Transportation (Non-Medical): Not on file  "  Physical Activity:    • Days of Exercise per Week: Not on file   • Minutes of Exercise per Session: Not on file   Stress:    • Feeling of Stress : Not on file   Social Connections:    • Frequency of Communication with Friends and Family: Not on file   • Frequency of Social Gatherings with Friends and Family: Not on file   • Attends Rastafari Services: Not on file   • Active Member of Clubs or Organizations: Not on file   • Attends Club or Organization Meetings: Not on file   • Marital Status: Not on file   Intimate Partner Violence:    • Fear of Current or Ex-Partner: Not on file   • Emotionally Abused: Not on file   • Physically Abused: Not on file   • Sexually Abused: Not on file   Housing Stability:    • Unable to Pay for Housing in the Last Year: Not on file   • Number of Places Lived in the Last Year: Not on file   • Unstable Housing in the Last Year: Not on file     FH:   Family History   Problem Relation Age of Onset   • Diabetes Paternal Grandmother    • Diabetes Father    • Stroke Maternal Grandmother    • Hypertension Maternal Grandmother    • Diabetes Maternal Grandfather    • Cancer Neg Hx          Objective:   /68 (BP Location: Left arm, Patient Position: Sitting, BP Cuff Size: Adult long)   Pulse (!) 114   Temp 36.6 °C (97.8 °F) (Temporal)   Resp 16   Ht 1.676 m (5' 6\")   Wt (!) 137 kg (302 lb)   LMP 12/01/2020   SpO2 98%   Breastfeeding No   BMI 48.74 kg/m²     Physical Exam  Vitals and nursing note reviewed.   Constitutional:       General: She is not in acute distress.     Appearance: Normal appearance. She is not ill-appearing.   HENT:      Head: Normocephalic and atraumatic.      Right Ear: Tympanic membrane, ear canal and external ear normal. There is no impacted cerumen.      Left Ear: Tympanic membrane, ear canal and external ear normal.      Nose: No congestion or rhinorrhea.      Mouth/Throat:      Mouth: Mucous membranes are moist.      Pharynx: Uvula midline. Pharyngeal " swelling, oropharyngeal exudate and posterior oropharyngeal erythema present. No uvula swelling.      Tonsils: Tonsillar exudate present. No tonsillar abscesses. 1+ on the right. 1+ on the left.   Eyes:      General:         Right eye: No discharge.         Left eye: No discharge.      Extraocular Movements: Extraocular movements intact.      Pupils: Pupils are equal, round, and reactive to light.   Cardiovascular:      Rate and Rhythm: Regular rhythm. Tachycardia present.      Pulses: Normal pulses.      Heart sounds: Normal heart sounds.   Pulmonary:      Effort: Pulmonary effort is normal.      Breath sounds: Normal breath sounds.   Abdominal:      General: Abdomen is flat. Bowel sounds are normal.      Palpations: Abdomen is soft.      Tenderness: There is no abdominal tenderness. There is no right CVA tenderness or left CVA tenderness.   Musculoskeletal:         General: Normal range of motion.      Cervical back: Normal range of motion and neck supple. Tenderness present.   Lymphadenopathy:      Cervical: Cervical adenopathy present.   Skin:     General: Skin is warm and dry.      Capillary Refill: Capillary refill takes less than 2 seconds.   Neurological:      General: No focal deficit present.      Mental Status: She is alert and oriented to person, place, and time. Mental status is at baseline.   Psychiatric:         Mood and Affect: Mood normal.         Behavior: Behavior normal.         Thought Content: Thought content normal.         Judgment: Judgment normal.       POCT strep: Negative  Assessment/Plan:   Assessment    1. Exposure to group A Streptococcus  amoxicillin (AMOXIL) 500 MG Cap    POCT Rapid Strep A   2. Suspected COVID-19 virus infection  CoV-2 and Flu A/B by PCR (24 hour In-House): Collect NP swab in VTM   3. Pharyngitis, unspecified etiology  amoxicillin (AMOXIL) 500 MG Cap    POCT Rapid Strep A     She was empirically treated for strep throat despite negative test in clinic today due to  her symptoms and positive exposure from her young son.  Throat culture ordered for further evaluation.  If throat culture is negative I will have her stop antibiotic. Pt was tested for COVID today. I will call with results. Upon entering the room PPE was worn throughout the duration of his visit for both provider and patient. Mask of patient briefly removed for examination of oropharynx. PT was educated to remain in self isolation for at least 10 days from onset of symptoms followed by an additional 24-hour period of being symptom-free without the use of symptom altering medication.      AVS handout given and reviewed with patient. Pt educated on red flags and when to seek treatment back in ER or UC.

## 2022-01-03 ENCOUNTER — ROUTINE PRENATAL (OUTPATIENT)
Dept: OBGYN | Facility: CLINIC | Age: 37
End: 2022-01-03
Payer: COMMERCIAL

## 2022-01-03 DIAGNOSIS — Z20.818 EXPOSURE TO GROUP A STREPTOCOCCUS: ICD-10-CM

## 2022-01-03 DIAGNOSIS — J02.9 PHARYNGITIS, UNSPECIFIED ETIOLOGY: ICD-10-CM

## 2022-01-03 DIAGNOSIS — O24.414 INSULIN CONTROLLED GESTATIONAL DIABETES MELLITUS (GDM) DURING PREGNANCY, ANTEPARTUM: ICD-10-CM

## 2022-01-03 LAB
FLUAV RNA SPEC QL NAA+PROBE: NEGATIVE
FLUBV RNA SPEC QL NAA+PROBE: NEGATIVE
SARS-COV-2 RNA RESP QL NAA+PROBE: DETECTED
SPECIMEN SOURCE: ABNORMAL

## 2022-01-03 PROCEDURE — 99999 PR NO CHARGE: CPT | Performed by: OBSTETRICS & GYNECOLOGY

## 2022-01-03 PROCEDURE — 59025 FETAL NON-STRESS TEST: CPT | Performed by: OBSTETRICS & GYNECOLOGY

## 2022-01-06 ENCOUNTER — ROUTINE PRENATAL (OUTPATIENT)
Dept: OBGYN | Facility: CLINIC | Age: 37
End: 2022-01-06
Payer: COMMERCIAL

## 2022-01-06 DIAGNOSIS — U07.1 COVID: ICD-10-CM

## 2022-01-06 DIAGNOSIS — O09.523 MULTIGRAVIDA OF ADVANCED MATERNAL AGE IN THIRD TRIMESTER: ICD-10-CM

## 2022-01-06 PROCEDURE — 59025 FETAL NON-STRESS TEST: CPT | Performed by: OBSTETRICS & GYNECOLOGY

## 2022-01-07 ENCOUNTER — ROUTINE PRENATAL (OUTPATIENT)
Dept: OBGYN | Facility: CLINIC | Age: 37
End: 2022-01-07
Payer: COMMERCIAL

## 2022-01-07 VITALS — WEIGHT: 293 LBS | SYSTOLIC BLOOD PRESSURE: 111 MMHG | BODY MASS INDEX: 48.1 KG/M2 | DIASTOLIC BLOOD PRESSURE: 71 MMHG

## 2022-01-07 DIAGNOSIS — O09.523 MULTIGRAVIDA OF ADVANCED MATERNAL AGE IN THIRD TRIMESTER: ICD-10-CM

## 2022-01-07 DIAGNOSIS — O24.414 INSULIN CONTROLLED GESTATIONAL DIABETES MELLITUS (GDM) DURING PREGNANCY, ANTEPARTUM: ICD-10-CM

## 2022-01-07 DIAGNOSIS — Z98.891 HX OF CESAREAN SECTION: ICD-10-CM

## 2022-01-07 DIAGNOSIS — O09.893 SUPERVISION OF OTHER HIGH RISK PREGNANCIES, THIRD TRIMESTER: ICD-10-CM

## 2022-01-07 PROCEDURE — 90040 PR PRENATAL FOLLOW UP: CPT | Performed by: OBSTETRICS & GYNECOLOGY

## 2022-01-07 ASSESSMENT — FIBROSIS 4 INDEX: FIB4 SCORE: .4405115060789980532

## 2022-01-07 NOTE — NON-PROVIDER
Pt here today for OB follow up  Pt states julio cesar and lower pelvic pressure   Reports +FM  Good # [phone]   Pharmacy Confirmed.

## 2022-01-07 NOTE — PROGRESS NOTES
Chief complaint: Return visit    S: Pt presents for routine OB follow up. Good fetal movement.  No contractions, vaginal bleeding, or leakage of fluid.     Patient recently diagnosed with COVID.    Questions answered.    O: LMP 2020   Patients' weight gain, fluid intake and exercise level discussed.  Vitals, fundal height , fetal position, and FHR reviewed on flowsheet    Lab:  Recent Results (from the past 336 hour(s))   POCT Fetal Nonstress Test    Collection Time: 21  3:47 PM   Result Value Ref Range    NST Indications GDM A2     NST Baseline 135     NST Uterine Activity none     NST Acoustic Stimulation n/a     NST Assessment reactive     NST Action Necessary none     NST Other Data cont 2x wkly NST     NST Return as sched     NST Read By MARCIA Funes CNM    POCT Rapid Strep A    Collection Time: 22  3:04 PM   Result Value Ref Range    Rapid Strep Screen Negative     Internal Control Positive Positive     Internal Control Negative Negative    CoV-2 and Flu A/B by PCR (24 hour In-House): Collect NP swab in VTM    Collection Time: 22  3:14 PM    Specimen: Nasopharyngeal; Respirate   Result Value Ref Range    Influenza virus A RNA Negative Negative    Influenza virus B, PCR Negative Negative    SARS-CoV-2 by PCR DETECTED (AA)     SARS-CoV-2 Source Nasal Swab    CULTURE THROAT    Collection Time: 22  6:10 PM    Specimen: Throat   Result Value Ref Range    Significant Indicator NEG     Source THRT     Site -     Culture Result       Moderate growth usual upper respiratory martin  No group A beta Streptococcus isolated.         A/P:  36 y.o.  at 35w3d presents for routine obstetric follow-up.  Size equals dates and/or scan    1.  Continue prenatal vitamins.  2.  Fetal kick counts.  3.  Exercise at least 30 minutes daily.  4.  Drink at least 2L of water daily  5.  Labor precautions educated.  6.  Follow-up in 1 weeks.  7.  GBS next visit    Latest ultrasound shows growth at 45th  percentile.    NST reactive today    Plan for repeat  with sterilization on .  Request sent

## 2022-01-11 ENCOUNTER — ROUTINE PRENATAL (OUTPATIENT)
Dept: OBGYN | Facility: CLINIC | Age: 37
End: 2022-01-11
Payer: COMMERCIAL

## 2022-01-11 DIAGNOSIS — O24.414 INSULIN CONTROLLED GESTATIONAL DIABETES MELLITUS (GDM) DURING PREGNANCY, ANTEPARTUM: Primary | ICD-10-CM

## 2022-01-11 PROCEDURE — 90040 PR PRENATAL FOLLOW UP: CPT | Performed by: OBSTETRICS & GYNECOLOGY

## 2022-01-14 ENCOUNTER — HOSPITAL ENCOUNTER (OUTPATIENT)
Dept: RADIOLOGY | Facility: MEDICAL CENTER | Age: 37
End: 2022-01-14
Attending: OBSTETRICS & GYNECOLOGY
Payer: COMMERCIAL

## 2022-01-14 ENCOUNTER — ROUTINE PRENATAL (OUTPATIENT)
Dept: OBGYN | Facility: CLINIC | Age: 37
End: 2022-01-14
Payer: COMMERCIAL

## 2022-01-14 ENCOUNTER — HOSPITAL ENCOUNTER (OUTPATIENT)
Facility: MEDICAL CENTER | Age: 37
End: 2022-01-14
Attending: OBSTETRICS & GYNECOLOGY

## 2022-01-14 ENCOUNTER — APPOINTMENT (OUTPATIENT)
Dept: OBGYN | Facility: CLINIC | Age: 37
End: 2022-01-14
Payer: COMMERCIAL

## 2022-01-14 VITALS — SYSTOLIC BLOOD PRESSURE: 112 MMHG | DIASTOLIC BLOOD PRESSURE: 72 MMHG | WEIGHT: 293 LBS | BODY MASS INDEX: 48.26 KG/M2

## 2022-01-14 DIAGNOSIS — O24.414 INSULIN CONTROLLED GESTATIONAL DIABETES MELLITUS (GDM) DURING PREGNANCY, ANTEPARTUM: ICD-10-CM

## 2022-01-14 DIAGNOSIS — O09.893 SUPERVISION OF OTHER HIGH RISK PREGNANCIES, THIRD TRIMESTER: ICD-10-CM

## 2022-01-14 DIAGNOSIS — Z98.891 HX OF CESAREAN SECTION: ICD-10-CM

## 2022-01-14 DIAGNOSIS — O09.523 MULTIGRAVIDA OF ADVANCED MATERNAL AGE IN THIRD TRIMESTER: ICD-10-CM

## 2022-01-14 PROCEDURE — 76816 OB US FOLLOW-UP PER FETUS: CPT

## 2022-01-14 PROCEDURE — 87081 CULTURE SCREEN ONLY: CPT

## 2022-01-14 PROCEDURE — 90040 PR PRENATAL FOLLOW UP: CPT | Mod: 25 | Performed by: OBSTETRICS & GYNECOLOGY

## 2022-01-14 PROCEDURE — 87150 DNA/RNA AMPLIFIED PROBE: CPT

## 2022-01-14 PROCEDURE — 59025 FETAL NON-STRESS TEST: CPT | Performed by: OBSTETRICS & GYNECOLOGY

## 2022-01-14 ASSESSMENT — FIBROSIS 4 INDEX: FIB4 SCORE: .4405115060789980532

## 2022-01-14 NOTE — PROGRESS NOTES
Chief complaint: Return visit    S: Pt presents for routine OB follow up. Good fetal movement.  No contractions, vaginal bleeding, or leakage of fluid.    Questions answered.    O: /72   Wt (!) 136 kg (299 lb)   LMP 2020   BMI 48.26 kg/m²   Patients' weight gain, fluid intake and exercise level discussed.  Vitals, fundal height , fetal position, and FHR reviewed on flowsheet    Lab:  Recent Results (from the past 336 hour(s))   POCT Rapid Strep A    Collection Time: 22  3:04 PM   Result Value Ref Range    Rapid Strep Screen Negative     Internal Control Positive Positive     Internal Control Negative Negative    CoV-2 and Flu A/B by PCR (24 hour In-House): Collect NP swab in VTM    Collection Time: 22  3:14 PM    Specimen: Nasopharyngeal; Respirate   Result Value Ref Range    Influenza virus A RNA Negative Negative    Influenza virus B, PCR Negative Negative    SARS-CoV-2 by PCR DETECTED (AA)     SARS-CoV-2 Source Nasal Swab    CULTURE THROAT    Collection Time: 22  6:10 PM    Specimen: Throat   Result Value Ref Range    Significant Indicator NEG     Source THRT     Site -     Culture Result       Moderate growth usual upper respiratory martin  No group A beta Streptococcus isolated.       Review of sugar logs show elevated levels, especially in mid afternoon.  We will increase to 15 units in the morning and continue with 26 units at night    NST reactive today      A/P:  36 y.o.  at 36w3d presents for routine obstetric follow-up.  Size equals dates and/or scan    1.  Continue prenatal vitamins.  2.  Fetal kick counts.  3.  Exercise at least 30 minutes daily.  4.  Drink at least 2L of water daily  5.  Labor precautions educated.  6.  Follow-up in 1 weeks.  7.  GBS today    Plan for repeat  on .  Request sent    Follow-up growth scan ordered

## 2022-01-16 LAB — GP B STREP DNA SPEC QL NAA+PROBE: NEGATIVE

## 2022-01-18 ENCOUNTER — ROUTINE PRENATAL (OUTPATIENT)
Dept: OBGYN | Facility: CLINIC | Age: 37
End: 2022-01-18
Payer: COMMERCIAL

## 2022-01-18 VITALS — DIASTOLIC BLOOD PRESSURE: 68 MMHG | SYSTOLIC BLOOD PRESSURE: 112 MMHG

## 2022-01-18 DIAGNOSIS — O24.414 INSULIN CONTROLLED GESTATIONAL DIABETES MELLITUS (GDM) DURING PREGNANCY, ANTEPARTUM: ICD-10-CM

## 2022-01-18 PROCEDURE — 59025 FETAL NON-STRESS TEST: CPT | Performed by: OBSTETRICS & GYNECOLOGY

## 2022-01-21 ENCOUNTER — APPOINTMENT (OUTPATIENT)
Dept: OBGYN | Facility: CLINIC | Age: 37
End: 2022-01-21
Payer: COMMERCIAL

## 2022-01-21 ENCOUNTER — ROUTINE PRENATAL (OUTPATIENT)
Dept: OBGYN | Facility: CLINIC | Age: 37
End: 2022-01-21
Payer: COMMERCIAL

## 2022-01-21 VITALS — DIASTOLIC BLOOD PRESSURE: 59 MMHG | SYSTOLIC BLOOD PRESSURE: 112 MMHG | WEIGHT: 293 LBS | BODY MASS INDEX: 48.15 KG/M2

## 2022-01-21 DIAGNOSIS — Z98.891 HX OF CESAREAN SECTION: ICD-10-CM

## 2022-01-21 DIAGNOSIS — O09.523 MULTIGRAVIDA OF ADVANCED MATERNAL AGE IN THIRD TRIMESTER: ICD-10-CM

## 2022-01-21 DIAGNOSIS — O24.419 GDM, CLASS A2: Primary | ICD-10-CM

## 2022-01-21 DIAGNOSIS — O24.414 INSULIN CONTROLLED GESTATIONAL DIABETES MELLITUS (GDM) DURING PREGNANCY, ANTEPARTUM: ICD-10-CM

## 2022-01-21 PROCEDURE — 0502F SUBSEQUENT PRENATAL CARE: CPT | Performed by: OBSTETRICS & GYNECOLOGY

## 2022-01-21 ASSESSMENT — FIBROSIS 4 INDEX: FIB4 SCORE: .4405115060789980532

## 2022-01-24 ENCOUNTER — ROUTINE PRENATAL (OUTPATIENT)
Dept: OBGYN | Facility: CLINIC | Age: 37
End: 2022-01-24
Payer: COMMERCIAL

## 2022-01-24 VITALS — SYSTOLIC BLOOD PRESSURE: 91 MMHG | DIASTOLIC BLOOD PRESSURE: 61 MMHG

## 2022-01-24 DIAGNOSIS — O09.523 MULTIGRAVIDA OF ADVANCED MATERNAL AGE IN THIRD TRIMESTER: ICD-10-CM

## 2022-01-24 DIAGNOSIS — O24.419 GDM, CLASS A2: Primary | ICD-10-CM

## 2022-01-24 PROCEDURE — 59025 FETAL NON-STRESS TEST: CPT | Performed by: OBSTETRICS & GYNECOLOGY

## 2022-01-24 NOTE — PROCEDURES
NST:  Baseline 140 with moderate variability, accelerations present, no decelerations noted.  Reactive NST.  No uterine activity noted.  Indication GDM A2.

## 2022-01-26 ENCOUNTER — PRE-ADMISSION TESTING (OUTPATIENT)
Dept: ADMISSIONS | Facility: MEDICAL CENTER | Age: 37
End: 2022-01-26
Attending: OBSTETRICS & GYNECOLOGY
Payer: COMMERCIAL

## 2022-01-26 ENCOUNTER — ROUTINE PRENATAL (OUTPATIENT)
Dept: OBGYN | Facility: CLINIC | Age: 37
End: 2022-01-26
Payer: COMMERCIAL

## 2022-01-26 VITALS — WEIGHT: 293 LBS | BODY MASS INDEX: 49.1 KG/M2 | DIASTOLIC BLOOD PRESSURE: 77 MMHG | SYSTOLIC BLOOD PRESSURE: 103 MMHG

## 2022-01-26 DIAGNOSIS — O24.414 INSULIN CONTROLLED GESTATIONAL DIABETES MELLITUS (GDM) IN THIRD TRIMESTER: ICD-10-CM

## 2022-01-26 DIAGNOSIS — Z98.891 HX OF CESAREAN SECTION: ICD-10-CM

## 2022-01-26 DIAGNOSIS — O09.523 MULTIGRAVIDA OF ADVANCED MATERNAL AGE IN THIRD TRIMESTER: ICD-10-CM

## 2022-01-26 DIAGNOSIS — O09.893 SUPERVISION OF OTHER HIGH RISK PREGNANCIES, THIRD TRIMESTER: Primary | ICD-10-CM

## 2022-01-26 PROBLEM — Z30.09 STERILIZATION CONSULT: Status: ACTIVE | Noted: 2022-01-26

## 2022-01-26 PROCEDURE — 0502F SUBSEQUENT PRENATAL CARE: CPT | Performed by: OBSTETRICS & GYNECOLOGY

## 2022-01-26 ASSESSMENT — FIBROSIS 4 INDEX: FIB4 SCORE: .4405115060789980532

## 2022-01-26 NOTE — PROGRESS NOTES
OB Visit Note - 38w1d     MEDICAL DECISION MAKING:  Kinza Juan is a 36 y.o. female  at 38w1d    Today's visit addressed:   1. Class A2 DM   - BS variable control. Currently on NPH 20 AM and 30 qhs.  Mostly controlled but values that are high are scattered so no good way to adjust insulin.  No changes for this last week before delivery.   - getting NSTs twice weekly   - last growth scan : EFW 51% but AC 92% and HC/femur 4%.  Suspect body habitus makes US measurements less reliable.  Encouraged pt not to worry about discrepancies at this time and growth can be determined over time by peds.    Pregnancy complicated by:  Patient Active Problem List   Diagnosis   • PCOS (polycystic ovarian syndrome)   • Hypercholesterolemia   • Hx of  section   • Insulin controlled gestational diabetes mellitus (GDM) in third trimester   • Advanced maternal age in multigravida   • Supervision of other high risk pregnancies, third trimester   • desires perm sterilization         SUBJECTIVE:  Kinza Juan is a 36 y.o.,  at 38w1d who presents for pregnancy follow-up. . She states the baby is moving well.  Denies VB, LOF, CTX       OBJECTIVE:  Vital Signs: /77   Wt (!) 138 kg (304 lb 3.2 oz)   LMP 2020   BMI 49.10 kg/m²     Abdomen: soft, non-tender, gravid, no rashes, fetal heart tones are present,   Extremities: no edema     Patient # High Bridget Targets   Fasting , 120  <90   1 hr PP   130-140               No change to insulin regimen  Reviewed specifics for repeat  by me next week    Isabell Connolly D.O.

## 2022-01-28 ENCOUNTER — ROUTINE PRENATAL (OUTPATIENT)
Dept: OBGYN | Facility: CLINIC | Age: 37
End: 2022-01-28
Payer: COMMERCIAL

## 2022-01-28 DIAGNOSIS — O09.523 MULTIGRAVIDA OF ADVANCED MATERNAL AGE IN THIRD TRIMESTER: ICD-10-CM

## 2022-01-28 DIAGNOSIS — O24.414 INSULIN CONTROLLED GESTATIONAL DIABETES MELLITUS (GDM) IN THIRD TRIMESTER: Primary | ICD-10-CM

## 2022-01-28 PROCEDURE — 59025 FETAL NON-STRESS TEST: CPT | Performed by: OBSTETRICS & GYNECOLOGY

## 2022-02-01 ENCOUNTER — HOSPITAL ENCOUNTER (INPATIENT)
Facility: MEDICAL CENTER | Age: 37
LOS: 2 days | End: 2022-02-03
Attending: OBSTETRICS & GYNECOLOGY | Admitting: OBSTETRICS & GYNECOLOGY
Payer: COMMERCIAL

## 2022-02-01 ENCOUNTER — ANESTHESIA EVENT (OUTPATIENT)
Dept: OBGYN | Facility: MEDICAL CENTER | Age: 37
End: 2022-02-01
Payer: COMMERCIAL

## 2022-02-01 ENCOUNTER — ANESTHESIA (OUTPATIENT)
Dept: OBGYN | Facility: MEDICAL CENTER | Age: 37
End: 2022-02-01
Payer: COMMERCIAL

## 2022-02-01 LAB
BASOPHILS # BLD AUTO: 0.5 % (ref 0–1.8)
BASOPHILS # BLD: 0.06 K/UL (ref 0–0.12)
EOSINOPHIL # BLD AUTO: 0.25 K/UL (ref 0–0.51)
EOSINOPHIL NFR BLD: 2 % (ref 0–6.9)
ERYTHROCYTE [DISTWIDTH] IN BLOOD BY AUTOMATED COUNT: 49.4 FL (ref 35.9–50)
HCT VFR BLD AUTO: 37.6 % (ref 37–47)
HGB BLD-MCNC: 12 G/DL (ref 12–16)
HOLDING TUBE BB 8507: NORMAL
IMM GRANULOCYTES # BLD AUTO: 0.2 K/UL (ref 0–0.11)
IMM GRANULOCYTES NFR BLD AUTO: 1.6 % (ref 0–0.9)
LYMPHOCYTES # BLD AUTO: 2.15 K/UL (ref 1–4.8)
LYMPHOCYTES NFR BLD: 16.9 % (ref 22–41)
MCH RBC QN AUTO: 28.1 PG (ref 27–33)
MCHC RBC AUTO-ENTMCNC: 31.9 G/DL (ref 33.6–35)
MCV RBC AUTO: 88.1 FL (ref 81.4–97.8)
MONOCYTES # BLD AUTO: 0.81 K/UL (ref 0–0.85)
MONOCYTES NFR BLD AUTO: 6.4 % (ref 0–13.4)
NEUTROPHILS # BLD AUTO: 9.25 K/UL (ref 2–7.15)
NEUTROPHILS NFR BLD: 72.6 % (ref 44–72)
NRBC # BLD AUTO: 0 K/UL
NRBC BLD-RTO: 0 /100 WBC
PLATELET # BLD AUTO: 299 K/UL (ref 164–446)
PMV BLD AUTO: 10.6 FL (ref 9–12.9)
RBC # BLD AUTO: 4.27 M/UL (ref 4.2–5.4)
SARS-COV+SARS-COV-2 AG RESP QL IA.RAPID: NOTDETECTED
SPECIMEN SOURCE: NORMAL
WBC # BLD AUTO: 12.7 K/UL (ref 4.8–10.8)

## 2022-02-01 PROCEDURE — 59510 CESAREAN DELIVERY: CPT | Performed by: OBSTETRICS & GYNECOLOGY

## 2022-02-01 PROCEDURE — 700111 HCHG RX REV CODE 636 W/ 250 OVERRIDE (IP): Performed by: INTERNAL MEDICINE

## 2022-02-01 PROCEDURE — 160029 HCHG SURGERY MINUTES - 1ST 30 MINS LEVEL 4: Performed by: OBSTETRICS & GYNECOLOGY

## 2022-02-01 PROCEDURE — A9270 NON-COVERED ITEM OR SERVICE: HCPCS | Performed by: INTERNAL MEDICINE

## 2022-02-01 PROCEDURE — 700105 HCHG RX REV CODE 258: Performed by: INTERNAL MEDICINE

## 2022-02-01 PROCEDURE — 59514 CESAREAN DELIVERY ONLY: CPT | Mod: 80

## 2022-02-01 PROCEDURE — 700102 HCHG RX REV CODE 250 W/ 637 OVERRIDE(OP): Performed by: INTERNAL MEDICINE

## 2022-02-01 PROCEDURE — 160035 HCHG PACU - 1ST 60 MINS PHASE I: Performed by: OBSTETRICS & GYNECOLOGY

## 2022-02-01 PROCEDURE — 58611 LIGATE OVIDUCT(S) ADD-ON: CPT | Performed by: OBSTETRICS & GYNECOLOGY

## 2022-02-01 PROCEDURE — 770002 HCHG ROOM/CARE - OB PRIVATE (112)

## 2022-02-01 PROCEDURE — 87426 SARSCOV CORONAVIRUS AG IA: CPT

## 2022-02-01 PROCEDURE — 0UB70ZZ EXCISION OF BILATERAL FALLOPIAN TUBES, OPEN APPROACH: ICD-10-PCS | Performed by: OBSTETRICS & GYNECOLOGY

## 2022-02-01 PROCEDURE — 700101 HCHG RX REV CODE 250: Performed by: INTERNAL MEDICINE

## 2022-02-01 PROCEDURE — 88302 TISSUE EXAM BY PATHOLOGIST: CPT | Mod: 59

## 2022-02-01 PROCEDURE — 58611 LIGATE OVIDUCT(S) ADD-ON: CPT | Mod: 80

## 2022-02-01 PROCEDURE — 160048 HCHG OR STATISTICAL LEVEL 1-5: Performed by: OBSTETRICS & GYNECOLOGY

## 2022-02-01 PROCEDURE — 85025 COMPLETE CBC W/AUTO DIFF WBC: CPT

## 2022-02-01 PROCEDURE — 160002 HCHG RECOVERY MINUTES (STAT): Performed by: OBSTETRICS & GYNECOLOGY

## 2022-02-01 PROCEDURE — 700111 HCHG RX REV CODE 636 W/ 250 OVERRIDE (IP): Performed by: STUDENT IN AN ORGANIZED HEALTH CARE EDUCATION/TRAINING PROGRAM

## 2022-02-01 PROCEDURE — 36415 COLL VENOUS BLD VENIPUNCTURE: CPT

## 2022-02-01 PROCEDURE — 160009 HCHG ANES TIME/MIN: Performed by: OBSTETRICS & GYNECOLOGY

## 2022-02-01 PROCEDURE — 160041 HCHG SURGERY MINUTES - EA ADDL 1 MIN LEVEL 4: Performed by: OBSTETRICS & GYNECOLOGY

## 2022-02-01 RX ORDER — OXYTOCIN 10 [USP'U]/ML
INJECTION, SOLUTION INTRAMUSCULAR; INTRAVENOUS PRN
Status: DISCONTINUED | OUTPATIENT
Start: 2022-02-01 | End: 2022-02-01 | Stop reason: SURG

## 2022-02-01 RX ORDER — SODIUM CHLORIDE, SODIUM GLUCONATE, SODIUM ACETATE, POTASSIUM CHLORIDE AND MAGNESIUM CHLORIDE 526; 502; 368; 37; 30 MG/100ML; MG/100ML; MG/100ML; MG/100ML; MG/100ML
INJECTION, SOLUTION INTRAVENOUS
Status: DISCONTINUED | OUTPATIENT
Start: 2022-02-01 | End: 2022-02-01 | Stop reason: SURG

## 2022-02-01 RX ORDER — MEPERIDINE HYDROCHLORIDE 25 MG/ML
12.5 INJECTION INTRAMUSCULAR; INTRAVENOUS; SUBCUTANEOUS
Status: DISCONTINUED | OUTPATIENT
Start: 2022-02-01 | End: 2022-02-01 | Stop reason: HOSPADM

## 2022-02-01 RX ORDER — IBUPROFEN 800 MG/1
800 TABLET ORAL EVERY 8 HOURS
Status: DISCONTINUED | OUTPATIENT
Start: 2022-02-02 | End: 2022-02-02

## 2022-02-01 RX ORDER — DIPHENHYDRAMINE HYDROCHLORIDE 50 MG/ML
25 INJECTION INTRAMUSCULAR; INTRAVENOUS EVERY 6 HOURS PRN
Status: ACTIVE | OUTPATIENT
Start: 2022-02-01 | End: 2022-02-02

## 2022-02-01 RX ORDER — OXYCODONE HCL 5 MG/5 ML
10 SOLUTION, ORAL ORAL
Status: DISCONTINUED | OUTPATIENT
Start: 2022-02-01 | End: 2022-02-01 | Stop reason: HOSPADM

## 2022-02-01 RX ORDER — DOCUSATE SODIUM 100 MG/1
100 CAPSULE, LIQUID FILLED ORAL 2 TIMES DAILY PRN
Status: DISCONTINUED | OUTPATIENT
Start: 2022-02-01 | End: 2022-02-03 | Stop reason: HOSPADM

## 2022-02-01 RX ORDER — CITRIC ACID/SODIUM CITRATE 334-500MG
30 SOLUTION, ORAL ORAL ONCE
Status: COMPLETED | OUTPATIENT
Start: 2022-02-01 | End: 2022-02-01

## 2022-02-01 RX ORDER — ACETAMINOPHEN 500 MG
1000 TABLET ORAL EVERY 6 HOURS
Status: DISCONTINUED | OUTPATIENT
Start: 2022-02-02 | End: 2022-02-02

## 2022-02-01 RX ORDER — HYDROMORPHONE HYDROCHLORIDE 1 MG/ML
0.1 INJECTION, SOLUTION INTRAMUSCULAR; INTRAVENOUS; SUBCUTANEOUS
Status: DISCONTINUED | OUTPATIENT
Start: 2022-02-01 | End: 2022-02-01 | Stop reason: HOSPADM

## 2022-02-01 RX ORDER — KETOROLAC TROMETHAMINE 30 MG/ML
30 INJECTION, SOLUTION INTRAMUSCULAR; INTRAVENOUS EVERY 6 HOURS
Status: DISPENSED | OUTPATIENT
Start: 2022-02-01 | End: 2022-02-02

## 2022-02-01 RX ORDER — DIPHENHYDRAMINE HCL 25 MG
25 TABLET ORAL EVERY 6 HOURS PRN
Status: DISCONTINUED | OUTPATIENT
Start: 2022-02-02 | End: 2022-02-03 | Stop reason: HOSPADM

## 2022-02-01 RX ORDER — OXYCODONE HYDROCHLORIDE 5 MG/1
10 TABLET ORAL EVERY 4 HOURS PRN
Status: ACTIVE | OUTPATIENT
Start: 2022-02-01 | End: 2022-02-02

## 2022-02-01 RX ORDER — HYDRALAZINE HYDROCHLORIDE 20 MG/ML
5 INJECTION INTRAMUSCULAR; INTRAVENOUS
Status: DISCONTINUED | OUTPATIENT
Start: 2022-02-01 | End: 2022-02-01 | Stop reason: HOSPADM

## 2022-02-01 RX ORDER — LABETALOL HYDROCHLORIDE 5 MG/ML
5 INJECTION, SOLUTION INTRAVENOUS
Status: DISCONTINUED | OUTPATIENT
Start: 2022-02-01 | End: 2022-02-01 | Stop reason: HOSPADM

## 2022-02-01 RX ORDER — SODIUM CHLORIDE, SODIUM GLUCONATE, SODIUM ACETATE, POTASSIUM CHLORIDE AND MAGNESIUM CHLORIDE 526; 502; 368; 37; 30 MG/100ML; MG/100ML; MG/100ML; MG/100ML; MG/100ML
1500 INJECTION, SOLUTION INTRAVENOUS ONCE
Status: COMPLETED | OUTPATIENT
Start: 2022-02-01 | End: 2022-02-01

## 2022-02-01 RX ORDER — OXYCODONE HCL 5 MG/5 ML
5 SOLUTION, ORAL ORAL
Status: DISCONTINUED | OUTPATIENT
Start: 2022-02-01 | End: 2022-02-01 | Stop reason: HOSPADM

## 2022-02-01 RX ORDER — DIPHENHYDRAMINE HYDROCHLORIDE 50 MG/ML
12.5 INJECTION INTRAMUSCULAR; INTRAVENOUS
Status: DISCONTINUED | OUTPATIENT
Start: 2022-02-01 | End: 2022-02-01 | Stop reason: HOSPADM

## 2022-02-01 RX ORDER — ONDANSETRON 2 MG/ML
INJECTION INTRAMUSCULAR; INTRAVENOUS PRN
Status: DISCONTINUED | OUTPATIENT
Start: 2022-02-01 | End: 2022-02-01 | Stop reason: SURG

## 2022-02-01 RX ORDER — CEFAZOLIN SODIUM 1 G/3ML
2 INJECTION, POWDER, FOR SOLUTION INTRAMUSCULAR; INTRAVENOUS ONCE
Status: COMPLETED | OUTPATIENT
Start: 2022-02-01 | End: 2022-02-01

## 2022-02-01 RX ORDER — ACETAMINOPHEN 500 MG
1000 TABLET ORAL EVERY 6 HOURS
Status: COMPLETED | OUTPATIENT
Start: 2022-02-01 | End: 2022-02-02

## 2022-02-01 RX ORDER — OXYCODONE HYDROCHLORIDE 5 MG/1
5 TABLET ORAL EVERY 4 HOURS PRN
Status: ACTIVE | OUTPATIENT
Start: 2022-02-01 | End: 2022-02-02

## 2022-02-01 RX ORDER — HYDROMORPHONE HYDROCHLORIDE 1 MG/ML
0.2 INJECTION, SOLUTION INTRAMUSCULAR; INTRAVENOUS; SUBCUTANEOUS
Status: ACTIVE | OUTPATIENT
Start: 2022-02-01 | End: 2022-02-02

## 2022-02-01 RX ORDER — ONDANSETRON 4 MG/1
4 TABLET, ORALLY DISINTEGRATING ORAL EVERY 6 HOURS PRN
Status: DISCONTINUED | OUTPATIENT
Start: 2022-02-02 | End: 2022-02-03 | Stop reason: HOSPADM

## 2022-02-01 RX ORDER — METOCLOPRAMIDE HYDROCHLORIDE 5 MG/ML
10 INJECTION INTRAMUSCULAR; INTRAVENOUS ONCE
Status: COMPLETED | OUTPATIENT
Start: 2022-02-01 | End: 2022-02-01

## 2022-02-01 RX ORDER — SCOLOPAMINE TRANSDERMAL SYSTEM 1 MG/1
PATCH, EXTENDED RELEASE TRANSDERMAL PRN
Status: DISCONTINUED | OUTPATIENT
Start: 2022-02-01 | End: 2022-02-01 | Stop reason: SURG

## 2022-02-01 RX ORDER — ONDANSETRON 2 MG/ML
4 INJECTION INTRAMUSCULAR; INTRAVENOUS EVERY 6 HOURS PRN
Status: ACTIVE | OUTPATIENT
Start: 2022-02-01 | End: 2022-02-02

## 2022-02-01 RX ORDER — ACETAMINOPHEN 500 MG
1000 TABLET ORAL EVERY 6 HOURS PRN
Status: DISCONTINUED | OUTPATIENT
Start: 2022-02-05 | End: 2022-02-02

## 2022-02-01 RX ORDER — HYDROMORPHONE HYDROCHLORIDE 1 MG/ML
0.4 INJECTION, SOLUTION INTRAMUSCULAR; INTRAVENOUS; SUBCUTANEOUS
Status: DISCONTINUED | OUTPATIENT
Start: 2022-02-01 | End: 2022-02-01 | Stop reason: HOSPADM

## 2022-02-01 RX ORDER — DIPHENHYDRAMINE HYDROCHLORIDE 50 MG/ML
25 INJECTION INTRAMUSCULAR; INTRAVENOUS EVERY 6 HOURS PRN
Status: DISCONTINUED | OUTPATIENT
Start: 2022-02-02 | End: 2022-02-03 | Stop reason: HOSPADM

## 2022-02-01 RX ORDER — SODIUM CHLORIDE, SODIUM LACTATE, POTASSIUM CHLORIDE, CALCIUM CHLORIDE 600; 310; 30; 20 MG/100ML; MG/100ML; MG/100ML; MG/100ML
INJECTION, SOLUTION INTRAVENOUS CONTINUOUS
Status: DISCONTINUED | OUTPATIENT
Start: 2022-02-01 | End: 2022-02-03 | Stop reason: HOSPADM

## 2022-02-01 RX ORDER — OXYCODONE HYDROCHLORIDE 5 MG/1
10 TABLET ORAL EVERY 4 HOURS PRN
Status: DISCONTINUED | OUTPATIENT
Start: 2022-02-02 | End: 2022-02-03 | Stop reason: HOSPADM

## 2022-02-01 RX ORDER — HYDROMORPHONE HYDROCHLORIDE 1 MG/ML
0.4 INJECTION, SOLUTION INTRAMUSCULAR; INTRAVENOUS; SUBCUTANEOUS
Status: ACTIVE | OUTPATIENT
Start: 2022-02-01 | End: 2022-02-02

## 2022-02-01 RX ORDER — IBUPROFEN 800 MG/1
800 TABLET ORAL EVERY 8 HOURS PRN
Status: DISCONTINUED | OUTPATIENT
Start: 2022-02-05 | End: 2022-02-02

## 2022-02-01 RX ORDER — MORPHINE SULFATE 0.5 MG/ML
INJECTION, SOLUTION EPIDURAL; INTRATHECAL; INTRAVENOUS
Status: COMPLETED | OUTPATIENT
Start: 2022-02-01 | End: 2022-02-01

## 2022-02-01 RX ORDER — SODIUM CHLORIDE, SODIUM LACTATE, POTASSIUM CHLORIDE, CALCIUM CHLORIDE 600; 310; 30; 20 MG/100ML; MG/100ML; MG/100ML; MG/100ML
INJECTION, SOLUTION INTRAVENOUS PRN
Status: DISCONTINUED | OUTPATIENT
Start: 2022-02-01 | End: 2022-02-03 | Stop reason: HOSPADM

## 2022-02-01 RX ORDER — ONDANSETRON 2 MG/ML
4 INJECTION INTRAMUSCULAR; INTRAVENOUS
Status: DISCONTINUED | OUTPATIENT
Start: 2022-02-01 | End: 2022-02-01 | Stop reason: HOSPADM

## 2022-02-01 RX ORDER — DIPHENHYDRAMINE HYDROCHLORIDE 50 MG/ML
12.5 INJECTION INTRAMUSCULAR; INTRAVENOUS EVERY 6 HOURS PRN
Status: ACTIVE | OUTPATIENT
Start: 2022-02-01 | End: 2022-02-02

## 2022-02-01 RX ORDER — SODIUM CHLORIDE, SODIUM LACTATE, POTASSIUM CHLORIDE, CALCIUM CHLORIDE 600; 310; 30; 20 MG/100ML; MG/100ML; MG/100ML; MG/100ML
INJECTION, SOLUTION INTRAVENOUS ONCE
Status: ACTIVE | OUTPATIENT
Start: 2022-02-01 | End: 2022-02-02

## 2022-02-01 RX ORDER — OXYTOCIN 10 [USP'U]/ML
10 INJECTION, SOLUTION INTRAMUSCULAR; INTRAVENOUS
Status: DISCONTINUED | OUTPATIENT
Start: 2022-02-01 | End: 2022-02-03 | Stop reason: HOSPADM

## 2022-02-01 RX ORDER — BUPIVACAINE HYDROCHLORIDE 7.5 MG/ML
INJECTION, SOLUTION INTRASPINAL
Status: COMPLETED | OUTPATIENT
Start: 2022-02-01 | End: 2022-02-01

## 2022-02-01 RX ORDER — VITAMIN A ACETATE, BETA CAROTENE, ASCORBIC ACID, CHOLECALCIFEROL, .ALPHA.-TOCOPHEROL ACETATE, DL-, THIAMINE MONONITRATE, RIBOFLAVIN, NIACINAMIDE, PYRIDOXINE HYDROCHLORIDE, FOLIC ACID, CYANOCOBALAMIN, CALCIUM CARBONATE, FERROUS FUMARATE, ZINC OXIDE, CUPRIC OXIDE 3080; 12; 120; 400; 1; 1.84; 3; 20; 22; 920; 25; 200; 27; 10; 2 [IU]/1; UG/1; MG/1; [IU]/1; MG/1; MG/1; MG/1; MG/1; MG/1; [IU]/1; MG/1; MG/1; MG/1; MG/1; MG/1
1 TABLET, FILM COATED ORAL
Status: DISCONTINUED | OUTPATIENT
Start: 2022-02-01 | End: 2022-02-03 | Stop reason: HOSPADM

## 2022-02-01 RX ORDER — KETOROLAC TROMETHAMINE 30 MG/ML
INJECTION, SOLUTION INTRAMUSCULAR; INTRAVENOUS PRN
Status: DISCONTINUED | OUTPATIENT
Start: 2022-02-01 | End: 2022-02-01 | Stop reason: SURG

## 2022-02-01 RX ORDER — DEXAMETHASONE SODIUM PHOSPHATE 4 MG/ML
INJECTION, SOLUTION INTRA-ARTICULAR; INTRALESIONAL; INTRAMUSCULAR; INTRAVENOUS; SOFT TISSUE PRN
Status: DISCONTINUED | OUTPATIENT
Start: 2022-02-01 | End: 2022-02-01 | Stop reason: SURG

## 2022-02-01 RX ORDER — HALOPERIDOL 5 MG/ML
1 INJECTION INTRAMUSCULAR
Status: DISCONTINUED | OUTPATIENT
Start: 2022-02-01 | End: 2022-02-01 | Stop reason: HOSPADM

## 2022-02-01 RX ORDER — ONDANSETRON 2 MG/ML
4 INJECTION INTRAMUSCULAR; INTRAVENOUS EVERY 6 HOURS PRN
Status: DISCONTINUED | OUTPATIENT
Start: 2022-02-02 | End: 2022-02-03 | Stop reason: HOSPADM

## 2022-02-01 RX ORDER — HYDROMORPHONE HYDROCHLORIDE 1 MG/ML
0.2 INJECTION, SOLUTION INTRAMUSCULAR; INTRAVENOUS; SUBCUTANEOUS
Status: DISCONTINUED | OUTPATIENT
Start: 2022-02-01 | End: 2022-02-01 | Stop reason: HOSPADM

## 2022-02-01 RX ORDER — OXYCODONE HYDROCHLORIDE 5 MG/1
5 TABLET ORAL EVERY 4 HOURS PRN
Status: DISCONTINUED | OUTPATIENT
Start: 2022-02-02 | End: 2022-02-03 | Stop reason: HOSPADM

## 2022-02-01 RX ADMIN — GLYCOPYRROLATE 0.2 MG: 0.2 INJECTION INTRAMUSCULAR; INTRAVENOUS at 15:10

## 2022-02-01 RX ADMIN — FAMOTIDINE 20 MG: 10 INJECTION INTRAVENOUS at 14:15

## 2022-02-01 RX ADMIN — SODIUM CHLORIDE, SODIUM GLUCONATE, SODIUM ACETATE, POTASSIUM CHLORIDE AND MAGNESIUM CHLORIDE 1500 ML: 526; 502; 368; 37; 30 INJECTION, SOLUTION INTRAVENOUS at 13:00

## 2022-02-01 RX ADMIN — ONDANSETRON 4 MG: 2 INJECTION INTRAMUSCULAR; INTRAVENOUS at 14:40

## 2022-02-01 RX ADMIN — BUPIVACAINE HYDROCHLORIDE IN DEXTROSE 1.5 ML: 7.5 INJECTION, SOLUTION SUBARACHNOID at 14:45

## 2022-02-01 RX ADMIN — SODIUM CHLORIDE, SODIUM GLUCONATE, SODIUM ACETATE, POTASSIUM CHLORIDE AND MAGNESIUM CHLORIDE: 526; 502; 368; 37; 30 INJECTION, SOLUTION INTRAVENOUS at 14:34

## 2022-02-01 RX ADMIN — KETOROLAC TROMETHAMINE 30 MG: 30 INJECTION, SOLUTION INTRAMUSCULAR at 23:13

## 2022-02-01 RX ADMIN — GLYCOPYRROLATE 0.2 MG: 0.2 INJECTION INTRAMUSCULAR; INTRAVENOUS at 15:01

## 2022-02-01 RX ADMIN — OXYTOCIN 1000 ML: 10 INJECTION, SOLUTION INTRAMUSCULAR; INTRAVENOUS at 15:39

## 2022-02-01 RX ADMIN — PHENYLEPHRINE HYDROCHLORIDE 50 MCG/MIN: 10 INJECTION INTRAVENOUS at 14:45

## 2022-02-01 RX ADMIN — MORPHINE SULFATE 150 MCG: 0.5 INJECTION, SOLUTION EPIDURAL; INTRATHECAL; INTRAVENOUS at 14:45

## 2022-02-01 RX ADMIN — FENTANYL CITRATE 15 MCG: 50 INJECTION, SOLUTION INTRAMUSCULAR; INTRAVENOUS at 14:45

## 2022-02-01 RX ADMIN — OXYTOCIN 20 UNITS: 10 INJECTION, SOLUTION INTRAMUSCULAR; INTRAVENOUS at 15:16

## 2022-02-01 RX ADMIN — SODIUM CITRATE AND CITRIC ACID MONOHYDRATE 30 ML: 500; 334 SOLUTION ORAL at 14:14

## 2022-02-01 RX ADMIN — CEFAZOLIN 3 G: 330 INJECTION, POWDER, FOR SOLUTION INTRAMUSCULAR; INTRAVENOUS at 14:50

## 2022-02-01 RX ADMIN — KETOROLAC TROMETHAMINE 30 MG: 30 INJECTION, SOLUTION INTRAMUSCULAR at 15:53

## 2022-02-01 RX ADMIN — SCOPALAMINE 1 PATCH: 1 PATCH, EXTENDED RELEASE TRANSDERMAL at 15:19

## 2022-02-01 RX ADMIN — DEXAMETHASONE SODIUM PHOSPHATE 8 MG: 4 INJECTION, SOLUTION INTRA-ARTICULAR; INTRALESIONAL; INTRAMUSCULAR; INTRAVENOUS; SOFT TISSUE at 14:50

## 2022-02-01 RX ADMIN — ACETAMINOPHEN 1000 MG: 500 TABLET ORAL at 18:14

## 2022-02-01 RX ADMIN — METOCLOPRAMIDE 10 MG: 5 INJECTION, SOLUTION INTRAMUSCULAR; INTRAVENOUS at 14:14

## 2022-02-01 ASSESSMENT — EDINBURGH POSTNATAL DEPRESSION SCALE (EPDS)
I HAVE BEEN ANXIOUS OR WORRIED FOR NO GOOD REASON: HARDLY EVER
THE THOUGHT OF HARMING MYSELF HAS OCCURRED TO ME: NEVER
I HAVE BEEN ABLE TO LAUGH AND SEE THE FUNNY SIDE OF THINGS: AS MUCH AS I ALWAYS COULD
I HAVE BEEN SO UNHAPPY THAT I HAVE BEEN CRYING: NO, NEVER
I HAVE LOOKED FORWARD WITH ENJOYMENT TO THINGS: AS MUCH AS I EVER DID
I HAVE BEEN SO UNHAPPY THAT I HAVE HAD DIFFICULTY SLEEPING: NOT AT ALL
I HAVE FELT SCARED OR PANICKY FOR NO GOOD REASON: NO, NOT MUCH
I HAVE FELT SAD OR MISERABLE: NO, NOT AT ALL
I HAVE BLAMED MYSELF UNNECESSARILY WHEN THINGS WENT WRONG: NOT VERY OFTEN
THINGS HAVE BEEN GETTING ON TOP OF ME: NO, MOST OF THE TIME I HAVE COPED QUITE WELL

## 2022-02-01 ASSESSMENT — LIFESTYLE VARIABLES
ALCOHOL_USE: NO
EVER_SMOKED: NEVER

## 2022-02-01 ASSESSMENT — PAIN DESCRIPTION - PAIN TYPE
TYPE: SURGICAL PAIN

## 2022-02-01 ASSESSMENT — FIBROSIS 4 INDEX: FIB4 SCORE: .4405115060789980532

## 2022-02-01 NOTE — ANESTHESIA PREPROCEDURE EVALUATION
Case: [id number] Date/Time: 22 1345    Procedure:  SECTION, REPEAT, WITH SALPINGECTOMY (Bilateral )    Pre-op diagnosis:       PREVIOUS  SECTION, PERMANENT STERILIZATION      39 WEEKS    Location: [facility] OR 01 / SURGERY LABOR AND DELIVERY    Surgeons: Isabell Connolly D.O.      Patient presents for , hx of GDM on insulin     Anesthesia: No problems with Anesthesia.  Resp: No asthma or COPD history.  Cards: No pre-eclampsia, or known cardiac abnormalities.  Heme: No known bleeding disorders, platelets reviewed.    Risks of procedure discussed including: infection, bleeding, nerve damage, and post-dural puncture headache.       Relevant Problems   OB   (positive) Hx of  section   (positive) Insulin controlled gestational diabetes mellitus (GDM) in third trimester       Physical Exam    Airway   Mallampati: II  TM distance: >3 FB  Neck ROM: full       Cardiovascular - normal exam  Rhythm: regular  Rate: normal  (-) murmur     Dental - normal exam           Pulmonary - normal exam  Breath sounds clear to auscultation     Abdominal    Neurological - normal exam                 Anesthesia Plan    ASA 3   ASA physical status 3 criteria: morbid obesity - BMI greater than or equal to 40    Plan - spinal   Neuraxial block will be primary anesthetic                Postoperative Plan: Postoperative administration of opioids is intended.    Pertinent diagnostic labs and testing reviewed    Informed Consent:    Anesthetic plan and risks discussed with patient.

## 2022-02-01 NOTE — ANESTHESIA PROCEDURE NOTES
Spinal Block    Date/Time: 2/1/2022 2:45 PM  Performed by: Minesh Simms M.D.  Authorized by: Minesh Simms M.D.     Start Time:  2/1/2022 2:45 PM  End Time:  2/1/2022 2:56 PM  Reason for Block: primary anesthetic    patient identified, IV checked, site marked, risks and benefits discussed, surgical consent, monitors and equipment checked, pre-op evaluation and timeout performed    Patient Position:  Sitting  Prep: ChloraPrep, patient draped and sterile technique    Monitoring:  Blood pressure, continuous pulse oximetry and heart rate  Approach:  Midline  Location:  L3-4  Injection Technique:  Single-shot  Skin infiltration:  Lidocaine  Strength:  1%  Dose:  3ml  Needle Type:  Pencan  Needle Gauge:  25 G  CSF flowing pre/post injection:  Yes  Sensory Level:  T4

## 2022-02-01 NOTE — H&P
OB H&P:    HPI:  Ms. Kinza Juan is a 36 y.o.  @ 39w0d by 8 week ultrasound presenting for scheduled repeat  section with permanent sterilization. She has a history of prior  for arrest of labor.   Pregnancy complicated by insulin controlled GDM in third trimester for which she is on 30 units insulin nightly and 20 units insulin every morning and AMA for which she has been taking 81mg ASA daily for.   She has a past medical history of PCOS, heartburn, and hypercholesterolemia. She takes omeprazole, ASA, and prenatal vitamin daily.    Contractions: No   Loss of fluid: No   Vaginal bleeding: No   Fetal movement: present      PNC with RW    PNL:  Rh+, RI, HIV neg, TrepAb neg, HBsAg NR, GC/CT neg/neg  Glucola: 3hr; 89-->194-->165-->110  GBS -      ROS:  Const: denies fevers, general concerns  CV/resp: reports no concerns  GI: denies abd pain, GI concerns  : see HPI  Neuro: denies HA/vision changes    OB History    Para Term  AB Living   2 1 1     1   SAB IAB Ectopic Molar Multiple Live Births             1      # Outcome Date GA Lbr Kwan/2nd Weight Sex Delivery Anes PTL Lv   2 Current            1 Term 13 39w6d  3.317 kg (7 lb 5 oz) M CS-Unspec EPI  U[facility]      Birth Comments: Arrest of labor failed induction       GYN: denies STIs, no cervical procedures    Past Medical History:   Diagnosis Date   • Diabetes mellitus during pregnancy, antepartum 10/8/2021   • GERD (gastroesophageal reflux disease)     h pylori 2015       Past Surgical History:   Procedure Laterality Date   • ME ERCP,DIAGNOSTIC  2020    Procedure: ERCP, DIAGNOSTIC;  Surgeon: Lee Kelly M.D.;  Location: SURGERY [facility];  Service: Gastroenterology   • ME ERCP,DIAGNOSTIC N/A 2020    Procedure: ERCP (ENDOSCOPIC RETROGRADE CHOLANGIOPANCREATOGRAPHY);  Surgeon: Lee Kelly M.D.;  Location: SURGERY SAME DAY [facility];  Service: Gastroenterology   • JACQUI BY LAPAROSCOPY  2020  "   Procedure: CHOLECYSTECTOMY, LAPAROSCOPIC;  Surgeon: Iftikhar Flowers M.D.;  Location: SURGERY [facility];  Service: General   • PRIMARY C SECTION  3/24/2013    Performed by Briana Cano M.D. at LABOR AND DELIVERY   • RECONSTRUCTION, KNEE, ACL, ARTHROSCOPIC  5/31/2012    Performed by CAITY DORSEY at SURGERY [facility]   • MENISCECTOMY, KNEE, MEDIAL  5/31/2012    Performed by CAITY DORSEY at [facility]   • DENTAL EXTRACTION(S)  2005       No current facility-administered medications on file prior to encounter.     Current Outpatient Medications on File Prior to Encounter   Medication Sig Dispense Refill   • insulin NPH (HUMULIN/NOVOLIN) 100 UNIT/ML Suspension Inject 24 Units under the skin at bedtime. 10 mL 1   • Misc. Devices (BREAST PUMP) Misc 1 Device as needed. 1 Each 0   • omeprazole (PRILOSEC) 40 MG delayed-release capsule TAKE 1 CAPSULE BY MOUTH TWICE DAILY 30 MINUTES BEFORE BREAKFAST AND 30 MINUTES BEFORE DINNER MEAL     • omeprazole (ACID REDUCER) 20 MG tablet  (Patient not taking: Reported on 1/26/2022)     • Blood Glucose Monitoring Suppl (ONE TOUCH ULTRA 2) w/Device Kit USE AS DIRECTED AS RECOMMENDED BY PROVIDER TO TEST BLOOD SUGAR 4 TIMES DAILY.     • Insulin Syringe-Needle U-100 (B-D INS SYR ULTRAFINE .5CC/30G) 30G X 1/2\" 0.5 ML Misc 10 Units at bedtime. 100 Each 0   • Prenatal Vit-Fe Fumarate-FA (PRENATAL 1+1 PO) Take  by mouth.     • aspirin EC (ECOTRIN) 81 MG Tablet Delayed Response Take 81 mg by mouth every day.     • Alcohol Swabs Pads 1 Each at bedtime. 120 Each 0   • Lancets Check glucose 4 times daily 100 Each 3   • glucose blood strip Check blood glucose 4 times daily 100 Strip 3   • Blood Glucose Meter Kit Test blood sugar as recommended by provider. Per patient formulary supply the kit. Test blood sugar 4 times/day 1 Kit 0   • Blood Glucose Test Strips Use one per formulary 4 times/day 100 Strip 0   • Lancets Use one dispensed per formulary 4 times/day 100 Each 0 " "  • Alcohol Swabs Wipe site with prep pad prior to injection. 100 Each 0   • tizanidine (ZANAFLEX) 4 MG Tab Take 1 Tab by mouth at bedtime as needed. (Patient not taking: Reported on 2022) 30 Tab 5   • norethindrone (MICRONOR) 0.35 MG tablet Take 1 Tab by mouth every day. (Patient not taking: Reported on 2022) 28 Tab 12   • omeprazole (PRILOSEC) 20 MG delayed-release capsule TAKE 1 CAPSULE BY MOUTH ONCE DAILY BEFORE A MEAL FOR 30 DAYS (Patient not taking: Reported on 2022)         Family History   Problem Relation Age of Onset   • Diabetes Paternal Grandmother    • Diabetes Father    • Stroke Maternal Grandmother    • Hypertension Maternal Grandmother    • Diabetes Maternal Grandfather    • Cancer Neg Hx        Social History     Tobacco Use   • Smoking status: Never Smoker   • Smokeless tobacco: Never Used   Vaping Use   • Vaping Use: Never used   Substance Use Topics   • Alcohol use: Not Currently   • Drug use: Not Currently     Types: Marijuana, Oral     Comment: occ         PE:  Vitals:    22 1200   Weight: (!) 137 kg (302 lb)   Height: 1.676 m (5' 6\")     gen: AAO, NAD  abd: soft, gravid, NT,  Ext: NT, no edema    FHT: 130/moderate variability/+ accels/ - decels  Roni: no regular contractions    A/P: 36 y.o.  @ 39w0d by 8 week ultrasound presenting for repeat  section and desires permanent sterilization. Pregnancy complicated by insulin controlled GD on 30 units insulin nightly and 20 units insulin every morning and is also taking 81mg ASA daily for AMA. Her lose dose of insulin was last night. Patient has history of heartburn is on omeprazole daily.   -admit to labor and delivery  -continuous fetal monitoring        Heavenly Hercules M.D.      "

## 2022-02-02 LAB
ERYTHROCYTE [DISTWIDTH] IN BLOOD BY AUTOMATED COUNT: 48.7 FL (ref 35.9–50)
HCT VFR BLD AUTO: 31.6 % (ref 37–47)
HGB BLD-MCNC: 10 G/DL (ref 12–16)
MCH RBC QN AUTO: 27.7 PG (ref 27–33)
MCHC RBC AUTO-ENTMCNC: 31.6 G/DL (ref 33.6–35)
MCV RBC AUTO: 87.5 FL (ref 81.4–97.8)
PATHOLOGY CONSULT NOTE: NORMAL
PLATELET # BLD AUTO: 280 K/UL (ref 164–446)
PMV BLD AUTO: 10.5 FL (ref 9–12.9)
RBC # BLD AUTO: 3.61 M/UL (ref 4.2–5.4)
WBC # BLD AUTO: 17.6 K/UL (ref 4.8–10.8)

## 2022-02-02 PROCEDURE — 85027 COMPLETE CBC AUTOMATED: CPT

## 2022-02-02 PROCEDURE — 770002 HCHG ROOM/CARE - OB PRIVATE (112)

## 2022-02-02 PROCEDURE — 700102 HCHG RX REV CODE 250 W/ 637 OVERRIDE(OP): Performed by: INTERNAL MEDICINE

## 2022-02-02 PROCEDURE — 700102 HCHG RX REV CODE 250 W/ 637 OVERRIDE(OP): Performed by: STUDENT IN AN ORGANIZED HEALTH CARE EDUCATION/TRAINING PROGRAM

## 2022-02-02 PROCEDURE — A9270 NON-COVERED ITEM OR SERVICE: HCPCS | Performed by: INTERNAL MEDICINE

## 2022-02-02 PROCEDURE — 36415 COLL VENOUS BLD VENIPUNCTURE: CPT

## 2022-02-02 PROCEDURE — A9270 NON-COVERED ITEM OR SERVICE: HCPCS | Performed by: STUDENT IN AN ORGANIZED HEALTH CARE EDUCATION/TRAINING PROGRAM

## 2022-02-02 PROCEDURE — A9270 NON-COVERED ITEM OR SERVICE: HCPCS | Performed by: PHYSICIAN ASSISTANT

## 2022-02-02 PROCEDURE — 700102 HCHG RX REV CODE 250 W/ 637 OVERRIDE(OP): Performed by: PHYSICIAN ASSISTANT

## 2022-02-02 PROCEDURE — 700111 HCHG RX REV CODE 636 W/ 250 OVERRIDE (IP): Performed by: INTERNAL MEDICINE

## 2022-02-02 RX ORDER — IBUPROFEN 800 MG/1
800 TABLET ORAL EVERY 8 HOURS PRN
Status: DISCONTINUED | OUTPATIENT
Start: 2022-02-05 | End: 2022-02-03 | Stop reason: HOSPADM

## 2022-02-02 RX ORDER — FERROUS SULFATE 325(65) MG
325 TABLET ORAL
Status: DISCONTINUED | OUTPATIENT
Start: 2022-02-02 | End: 2022-02-03 | Stop reason: HOSPADM

## 2022-02-02 RX ORDER — ACETAMINOPHEN 500 MG
1000 TABLET ORAL EVERY 6 HOURS PRN
Status: DISCONTINUED | OUTPATIENT
Start: 2022-02-05 | End: 2022-02-03 | Stop reason: HOSPADM

## 2022-02-02 RX ORDER — ACETAMINOPHEN 500 MG
1000 TABLET ORAL EVERY 6 HOURS
Status: DISCONTINUED | OUTPATIENT
Start: 2022-02-02 | End: 2022-02-03 | Stop reason: HOSPADM

## 2022-02-02 RX ORDER — IBUPROFEN 800 MG/1
800 TABLET ORAL EVERY 8 HOURS
Status: DISCONTINUED | OUTPATIENT
Start: 2022-02-02 | End: 2022-02-03 | Stop reason: HOSPADM

## 2022-02-02 RX ADMIN — ACETAMINOPHEN 1000 MG: 500 TABLET ORAL at 12:09

## 2022-02-02 RX ADMIN — DOCUSATE SODIUM 100 MG: 100 CAPSULE, LIQUID FILLED ORAL at 20:13

## 2022-02-02 RX ADMIN — PRENATAL WITH FERROUS FUM AND FOLIC ACID 1 TABLET: 3080; 920; 120; 400; 22; 1.84; 3; 20; 10; 1; 12; 200; 27; 25; 2 TABLET ORAL at 12:09

## 2022-02-02 RX ADMIN — ACETAMINOPHEN 1000 MG: 500 TABLET ORAL at 06:07

## 2022-02-02 RX ADMIN — KETOROLAC TROMETHAMINE 30 MG: 30 INJECTION, SOLUTION INTRAMUSCULAR at 06:06

## 2022-02-02 RX ADMIN — IBUPROFEN 800 MG: 800 TABLET, FILM COATED ORAL at 20:14

## 2022-02-02 RX ADMIN — ACETAMINOPHEN 1000 MG: 500 TABLET ORAL at 18:34

## 2022-02-02 RX ADMIN — FERROUS SULFATE TAB 325 MG (65 MG ELEMENTAL FE) 325 MG: 325 (65 FE) TAB at 12:09

## 2022-02-02 RX ADMIN — ACETAMINOPHEN 1000 MG: 500 TABLET ORAL at 00:11

## 2022-02-02 RX ADMIN — KETOROLAC TROMETHAMINE 30 MG: 30 INJECTION, SOLUTION INTRAMUSCULAR at 12:09

## 2022-02-02 ASSESSMENT — PAIN DESCRIPTION - PAIN TYPE
TYPE: SURGICAL PAIN
TYPE: SURGICAL PAIN
TYPE: SURGICAL PAIN;ACUTE PAIN

## 2022-02-02 ASSESSMENT — PATIENT HEALTH QUESTIONNAIRE - PHQ9
SUM OF ALL RESPONSES TO PHQ9 QUESTIONS 1 AND 2: 0
1. LITTLE INTEREST OR PLEASURE IN DOING THINGS: NOT AT ALL
2. FEELING DOWN, DEPRESSED, IRRITABLE, OR HOPELESS: NOT AT ALL

## 2022-02-02 NOTE — ANESTHESIA TIME REPORT
Anesthesia Start and Stop Event Times     Date Time Event    2022 1429 Ready for Procedure     1434 Anesthesia Start     1613 Anesthesia Stop        Responsible Staff  22    Name Role Begin End    Minesh Simms M.D. Anesth 1434 1613        Preop Diagnosis (Free Text):  Pre-op Diagnosis     IUP 39.0, PREVIOUS  SECTION, PERMANENT STERILIZATION        Preop Diagnosis (Codes):  Diagnosis Information     Diagnosis Code(s):  delivery delivered [O82]        Premium Reason  A. 3PM - 7AM    Comments:

## 2022-02-02 NOTE — PROGRESS NOTES
1300 - Assumed care of pt. RN finished prepping pt for R C/S with Bilateral Salp.   1434 - Pt back into OR.   1515 - Del of viable infant female. Apgars 8/9.  1608 - Pt into PACU. Pain well controlled, fundus firm, bleeding scant.   1715 - Pt up to postpartum. Report to Caitlin CARMEN.

## 2022-02-02 NOTE — PROGRESS NOTES
Assessment complete. Fundus firm, lochia light. VSS. Tolerating diet. Pain controlled with schedule medications per mar. Will offer pain medications as they become available. FOB at bedside, bonding with pt/baby. POC discussed with pt. Encouraged to call with needs. Call light in place.

## 2022-02-02 NOTE — PROGRESS NOTES
1732 -Patient transferred from labor and delivery. Two RN verification of infant and parent armbands. Report received from KERMIT Cuellar. Patient oriented to unit, call light, emergency light, and infant security. Assessment completed, fundus firm at one finger width below u, lochia light. Patient has hawk in place draining clear yellow urine. Patient requesting intervention for pain at this time. Pitocin infusing at 125 ml/hr. Patient encouraged to call with needs. Rounding in place. Bed is locked and in lowest position, call light within reach.

## 2022-02-02 NOTE — PROGRESS NOTES
0700-- Received report from KERMIT Saleh, Infant at bedside in open crib no signs of distress.  Pt resting in bed. Discussed pain management for the day.  No further needs at the time.  Call light within reach, bed locked and in lowest position.  Rounding in place.    0800-- Assessment completed, VSS, Pt declines PRN pain medication at this time.  Discussed plan of care for the day that pt is comfortable with.  All questions answered at this time.  Will continue to monitor.

## 2022-02-02 NOTE — OP REPORT
Pre-operative Diagnosis:   1. IUP at 39w0d  2. Prior  x1, desires repeat  3. Desires permanent sterilization  Post-operative Diagnosis: same   Procedure:   1. Repeat  Section via Pfannenstiel incision  2. Bilateral salpingectomy  Surgeon(s): Mohsen  Assistant(s): MISSY Alonso  Anesthesia: Spinal  Findings: LV female infant, Apgars 8/9, weight 3400g; normal uterus, tubes and ovaries bilaterally  Complications: none  Specimens: fallopian tubes  UOP: 100 mL  EBL: 500 mL  IVF: 2000mL    Indications:   Pt is a 36 y.o.  at 39w0d here for scheduled repeat  and bilateral tubal ligation. The risks, benefits and alternatives of  section were discussed with the patient, including but not limited to infection, severe loss of blood, injury to bowel or bladder, fistula formation, injury to blood vessels, hysterectomy, injury to fetus, possible need for transfusion which carries a small risk for HIV or hepatitis, pelvic pain, adhesive disease or scar tissue. The patient was again counseled on permanent sterilization, noting that the procedure is permanent and there is a very small risk of failure.  All questions were answered and patient consented to the procedure.      Procedure:  The patient was taken to the operating room where spinal anesthesia was placed and found to be adequate. She was prepped and draped in the normal sterile fashion in the dorsal supine position with a leftward tilt.    A Pfanenstiel skin incision was made with the scalpel and carried through to the underlying layer of fascia.  The fascia was then incised in the midline and the incision was extended laterally. The rectus muscles were  and the peritoneum entered. The peritoneal incision was then extended superiorly and inferiorly with good visualization of the bladder.    The Hnag retractor was inserted and the vesicouterine peritoneum identified, grasped with pick ups and entered sharply with  Metzenbaum scissors.  The incision was extended laterally and the bladder flap created digitally.  The lower uterine segment incised in a low transverse fashion with the scalpel. The uterine incision was extended bluntly with cephalad-caudad traction.    The infant was delivered atraumatically; the nose and mouth were suctioned and the cord was clamped and cut. The infant was handed off to the waiting staff. The placenta was removed, and the uterus was cleared of all clots and debris.  The uterine incision was repaired with 0-monocryl in two layers.    The gutters were cleared of all clots and debris using copious irrigation. The uterus was then re-inspected to ensure hemostasis as were all subfascial tissues.  Attention was then turned to the fallopian tubes. The tube was grasped and elevated with two Jachin clamps.  The mini Ligasure was then used across the tubo-ovarian ligament and along the mesosalpinx to the tubal cornua where where the tube was ligated and removed.  The procedure was repeated on the contralateral side and the two  fallopian tubes were passed off and sent to Pathology.  The site was inspected for hemostasis bilaterally.    The rectus muscle was re-approximated with 0-monocryl in a mattress fashion.  The fascia was re-approximated with 0-vicryl in a running fashion. The subcutaneous tissue was re-approximated using 3-0 vicryl in a running fashion. The skin was closed with 4-0 vicryl.    The patient tolerated the procedure well. Sponge, lap and needle counts were correct times three. The patient was taken to recovery in stable condition.    Isabell Connolly D.O.

## 2022-02-02 NOTE — CARE PLAN
The patient is Stable - Low risk of patient condition declining or worsening    Shift Goals  Clinical Goals: Pt will be oriented to room and unit policies.    Progress made toward(s) clinical / shift goals:  Pt has been oriented to room and unit policies including masks and visitor policy.  Bed locked in lowest position.  Pt educated on call light and emergency light.  Call light within reach of Pt.      Patient is not progressing towards the following goals: N/A

## 2022-02-02 NOTE — PROGRESS NOTES
Post Partum Progress Note    Name:   Kinza Juan   Date/Time:  2022 - 7:08 AM  Chief Admitting Dx:  Labor and delivery indication for care or intervention [O75.9]  Labor and delivery, indication for care [O75.9]  Delivery Type:   for repeat  Post-Op/Post Partum Days #:  1    Subjective:  Abdominal pain: yes  Ambulating:   yes  Tolerating liquids:  yes  Tolerating food:  yes common adult  Flatus:   no  BM:    no  Bleeding:   with a small amount of bleeding  Voiding:   yes  Dizziness:   no  Feeding:   breast    Vitals:    22 0200 22 0300 22 0400 22 0606   BP: (!) 97/58   108/65   Pulse: 73 65 60 66   Resp: 18 17 18 19   Temp: 37.1 °C (98.7 °F)   36.4 °C (97.6 °F)   TempSrc: Temporal   Temporal   SpO2: 96% 96% 96% 94%   Weight:       Height:           Exam:  Breast: Tenderness no and Engorged no  Abdomen: Abdomen soft, non-tender. BS normal. No masses,  No organomegaly  Fundal Tenderness:  no  Fundus Firm: yes  Incision: no evidence of infection, separation or keloid formation. Bandage in place with some minimal drainage noted.  Below umbilicus: yes  Perineum: perineum intact  Lochia: mild  Extremities: trace extremities, peripheral pulses and reflexes normal    Meds:  Current Facility-Administered Medications   Medication Dose   • lactated ringers (LR) infusion     • LR infusion     • oxytocin (PITOCIN) infusion (for postpartum)  2,000 mL/hr    Followed by   • oxytocin (PITOCIN) infusion (for postpartum)  125 mL/hr   • oxytocin (PITOCIN) injection 10 Units  10 Units   • acetaminophen (TYLENOL) tablet 1,000 mg  1,000 mg   • ketorolac (TORADOL) injection 30 mg  30 mg   • oxyCODONE immediate-release (ROXICODONE) tablet 5 mg  5 mg   • oxyCODONE immediate-release (ROXICODONE) tablet 10 mg  10 mg   • HYDROmorphone (Dilaudid) injection 0.2 mg  0.2 mg   • HYDROmorphone (Dilaudid) injection 0.4 mg  0.4 mg   • ePHEDrine injection 10 mg  10 mg   • ondansetron (ZOFRAN) syringe/vial  injection 4 mg  4 mg   • diphenhydrAMINE (BENADRYL) injection 12.5 mg  12.5 mg   • diphenhydrAMINE (BENADRYL) injection 12.5 mg  12.5 mg    Or   • diphenhydrAMINE (BENADRYL) injection 25 mg  25 mg    Or   • naloxone (NARCAN) 0.4 mg in NS 1,000 mL infusion  0.4 mg   • lactated ringers infusion     • ibuprofen (MOTRIN) tablet 800 mg  800 mg    Followed by   • [START ON 2022] ibuprofen (MOTRIN) tablet 800 mg  800 mg   • acetaminophen (TYLENOL) tablet 1,000 mg  1,000 mg    Followed by   • [START ON 2022] acetaminophen (TYLENOL) tablet 1,000 mg  1,000 mg   • oxyCODONE immediate-release (ROXICODONE) tablet 5 mg  5 mg   • oxyCODONE immediate-release (ROXICODONE) tablet 10 mg  10 mg   • ondansetron (ZOFRAN) syringe/vial injection 4 mg  4 mg    Or   • ondansetron (ZOFRAN ODT) dispertab 4 mg  4 mg   • diphenhydrAMINE (BENADRYL) tablet/capsule 25 mg  25 mg    Or   • diphenhydrAMINE (BENADRYL) injection 25 mg  25 mg   • docusate sodium (COLACE) capsule 100 mg  100 mg   • tetanus-dipth-acell pertussis (Tdap) inj 0.5 mL  0.5 mL   • measles, mumps and rubella vaccine (MMR) injection 0.5 mL  0.5 mL   • prenatal plus vitamin (STUARTNATAL 1+1) 27-1 MG tablet 1 Tablet  1 Tablet       Labs:   Recent Labs     22  1230 22  0114   WBC 12.7* 17.6*   RBC 4.27 3.61*   HEMOGLOBIN 12.0 10.0*   HEMATOCRIT 37.6 31.6*   MCV 88.1 87.5   MCH 28.1 27.7   MCHC 31.9* 31.6*   RDW 49.4 48.7   PLATELETCT 299 280   MPV 10.6 10.5       Assessment:  Chief Admitting Dx:  Labor and delivery indication for care or intervention [O75.9]  Labor and delivery, indication for care [O75.9]  Delivery Type:   for repeat  Tubal Ligation:  yes    Plan:  Continue routine post partum care. Advance care, encourage ambulation, pain control, start PO FeSO4 for asymptomatic anemia. Anticipate d/c home tomorrow or next day, POD#2-3.     JERMAINE Olivo.

## 2022-02-02 NOTE — ANESTHESIA POSTPROCEDURE EVALUATION
Patient: Kinza Juan    Procedure Summary     Date: 22 Room / Location: [facility] OR 01 / SURGERY LABOR AND DELIVERY    Anesthesia Start: 1434 Anesthesia Stop:     Procedure:  SECTION, REPEAT, WITH SALPINGECTOMY (Bilateral Abdomen) Diagnosis:        delivery delivered      (IUP 39.0, Repeat  Section with Bilateral Salpingectomy)    Surgeons: Isabell Connolly D.O. Responsible Provider: Minesh Simms M.D.    Anesthesia Type: spinal ASA Status: 3          Final Anesthesia Type: spinal  Last vitals  BP   Blood Pressure: 123/66    Temp   36.1 °C (97 °F)    Pulse   85   Resp   16    SpO2          Anesthesia Post Evaluation    Patient location during evaluation: PACU  Patient participation: complete - patient participated  Level of consciousness: awake and alert    Airway patency: patent  Anesthetic complications: no  Cardiovascular status: hemodynamically stable  Respiratory status: acceptable  Hydration status: euvolemic    PONV: none    patient able to participate, but full recovery from regional anesthesia has not occurred and is not expected within the stipulated timeframe for the completion of the evaluation      No complications documented.     Nurse Pain Score: 3 (NPRS)

## 2022-02-03 ENCOUNTER — PHARMACY VISIT (OUTPATIENT)
Dept: PHARMACY | Facility: MEDICAL CENTER | Age: 37
End: 2022-02-03
Payer: COMMERCIAL

## 2022-02-03 VITALS
HEART RATE: 62 BPM | DIASTOLIC BLOOD PRESSURE: 52 MMHG | OXYGEN SATURATION: 99 % | BODY MASS INDEX: 47.09 KG/M2 | WEIGHT: 293 LBS | HEIGHT: 66 IN | TEMPERATURE: 97.1 F | RESPIRATION RATE: 17 BRPM | SYSTOLIC BLOOD PRESSURE: 99 MMHG

## 2022-02-03 PROCEDURE — RXMED WILLOW AMBULATORY MEDICATION CHARGE: Performed by: NURSE PRACTITIONER

## 2022-02-03 PROCEDURE — A9270 NON-COVERED ITEM OR SERVICE: HCPCS | Performed by: STUDENT IN AN ORGANIZED HEALTH CARE EDUCATION/TRAINING PROGRAM

## 2022-02-03 PROCEDURE — 700102 HCHG RX REV CODE 250 W/ 637 OVERRIDE(OP): Performed by: PHYSICIAN ASSISTANT

## 2022-02-03 PROCEDURE — 700102 HCHG RX REV CODE 250 W/ 637 OVERRIDE(OP): Performed by: STUDENT IN AN ORGANIZED HEALTH CARE EDUCATION/TRAINING PROGRAM

## 2022-02-03 PROCEDURE — A9270 NON-COVERED ITEM OR SERVICE: HCPCS | Performed by: PHYSICIAN ASSISTANT

## 2022-02-03 RX ORDER — ACETAMINOPHEN 500 MG
1000 TABLET ORAL EVERY 6 HOURS
Qty: 675 EACH | Refills: 0 | Status: SHIPPED | OUTPATIENT
Start: 2022-02-03 | End: 2022-02-23

## 2022-02-03 RX ORDER — PSEUDOEPHEDRINE HCL 30 MG
100 TABLET ORAL 2 TIMES DAILY PRN
Qty: 60 CAPSULE | Refills: 0 | Status: SHIPPED | OUTPATIENT
Start: 2022-02-03 | End: 2022-02-23

## 2022-02-03 RX ORDER — FERROUS SULFATE 325(65) MG
325 TABLET ORAL
Qty: 90 EACH | Refills: 2 | Status: SHIPPED | OUTPATIENT
Start: 2022-02-03 | End: 2022-02-25

## 2022-02-03 RX ORDER — IBUPROFEN 800 MG/1
800 TABLET ORAL EVERY 8 HOURS
Qty: 30 TABLET | Refills: 0 | Status: SHIPPED | OUTPATIENT
Start: 2022-02-03 | End: 2022-02-25

## 2022-02-03 RX ADMIN — ACETAMINOPHEN 1000 MG: 500 TABLET ORAL at 05:47

## 2022-02-03 RX ADMIN — ACETAMINOPHEN 1000 MG: 500 TABLET ORAL at 01:00

## 2022-02-03 RX ADMIN — ACETAMINOPHEN 1000 MG: 500 TABLET ORAL at 12:06

## 2022-02-03 RX ADMIN — FERROUS SULFATE TAB 325 MG (65 MG ELEMENTAL FE) 325 MG: 325 (65 FE) TAB at 08:31

## 2022-02-03 RX ADMIN — IBUPROFEN 800 MG: 800 TABLET, FILM COATED ORAL at 05:47

## 2022-02-03 RX ADMIN — DOCUSATE SODIUM 100 MG: 100 CAPSULE, LIQUID FILLED ORAL at 05:47

## 2022-02-03 RX ADMIN — PRENATAL WITH FERROUS FUM AND FOLIC ACID 1 TABLET: 3080; 920; 120; 400; 22; 1.84; 3; 20; 10; 1; 12; 200; 27; 25; 2 TABLET ORAL at 08:31

## 2022-02-03 ASSESSMENT — PAIN DESCRIPTION - PAIN TYPE: TYPE: ACUTE PAIN

## 2022-02-03 NOTE — DISCHARGE INSTRUCTIONS
PATIENT DISCHARGE EDUCATION INSTRUCTION SHEET  REASONS TO CALL YOUR OBSTETRICIAN  · Persistent fever, shaking, chills (Temperature higher than 100.4) may indicate you have an infection  · Heavy bleeding: soaking more than 1 pad per hour; Passing clots an egg-sized clot or bigger may mean you have an postpartum hemorrhage  · Foul odor from vagina or bad smelling or discolored discharge or blood  · Breast infection (Mastitis symptoms); breast pain, chills, fever, redness or red streaks, may feel flu like symptoms  · Urinary pain, burning or frequency  · Incision that is not healing, increased redness, swelling, tenderness or pain, or any pus from episiotomy or  site may mean you have an infection  · Redness, swelling, warmth, or painful to touch in the calf area of your leg may mean you have a blood clot  · Severe or intensified depression, thoughts or feelings of wanting to hurt yourself or someone else   · Pain in chest, obstructed breathing or shortness of breath (trouble catching your breath) may mean you are having a postpartum complication. Call your provider immediately   · Headache that does not get better, even after taking medicine, a bad headache with vision changes or pain in the upper right area of your belly may mean you have high blood pressure or post birth preeclampsia. Call your provider immediately    HAND WASHING  All family and friends should wash their hands:  · Before and after holding the baby  · Before feeding the baby  · After using the restroom or changing the baby's diaper    WOUND CARE  Ask your physician for additional care instructions. In general:  ·  Incision:  · May shower and pat incision dry   · Keep the incision clean and dry  · There should not be any opening or pus from the incision  · Continue to walk at home 3 times a day   · Do NOT lift anything heavier than your baby (over 10 pounds)  · Encourage family to help participate in care of the  to allow  rest and mom time to heal  · Episiotomy/Laceration  · May use marti-spray bottle, witch hazel pads and dermaplast spray for comfort  · Use marti-spray bottle after urinating to cleanse perineal area  · To prevent burning during urination spray marti-water bottle on labial area   · Pat perineal area dry until episiotomy/laceration is healed  · Continue to use marti-bottle until bleeding stops as needed  · If have a 2nd degree laceration or greater, a Sitz bath can offer relief from soreness, burning, and inflammation   · Sitz Bath   · Sit in 6 inches of warm water and soak laceration as needed until the laceration heals    VAGINAL CARE AND BLEEDING  · Nothing inside vagina for 6 weeks:   · No sexual intercourse, tampons or douching  · Bleeding may continue for 2-4 weeks. Amount and color may vary  · Soaking 1 pad or more in an hour for several hours is considered heavy bleeding  · Passing large egg sized blood clots can be concerning  · If you feel like you have heavy bleeding or are having increasing amount of blood clots call your Obstetrician immediately  · If you begin feeling faint upon standing, feeling sick to your stomach, have clammy skin, a really fast heartbeat, have chills, start feeling confused, dizzy, sleepy or weak, or feeling like you're going to faint call your Obstetrician immediately    HYPERTENSION   Preeclampsia or gestational hypertension are types of high blood pressure that only pregnant women can get. It is important for you to be aware of symptoms to seek early intervention and treatment. If you have any of these symptoms immediately call your Obstetrician    · Vision changes or blurred vision   · Severe headache or pain that is unrelieved with medication and will not go away  · Persistent pain in upper abdomen or shoulder   · Increased swelling of face, feet, or hands  · Difficulty breathing or shortness of breath at rest  · Urinating less than usual    URINATION AND BOWEL MOVEMENTS  · Eating  "more fiber (bran cereal, fruits, and vegetables) and drinking plenty of fluids will help to avoid constipation  · Urinary frequency and urgency after childbirth is normal  · If you experience any urinary pain, burning or frequency call your provider    BIRTH CONTROL  · It is possible to become pregnant at any time after delivery and while breastfeeding  · Plan to discuss a method of birth control with your physician at your post delivery follow up visit    POSTPARTUM BLUES  During the first few days after birth, you may experience a sense of the \"blues\" which may include impatience, irritability or even crying. These feelings come and go quickly. However, as many as 1 in 10 women experience emotional symptoms known as postpartum depression.     POSTPARTUM DEPRESSION    May start as early as the second or third day after delivery or take several weeks or months to develop. Symptoms of \"blues\" are present, but are more intense: Crying spells; loss of appetite; feelings of hopelessness or loss of control; fear of touching the baby; over concern or no concern at all about the baby; little or no concern about your own appearance/caring for yourself; and/or inability to sleep or excessive sleeping. Contact your Obstetrician if you are experiencing any of these symptoms     PREVENTING SHAKEN BABY  If you are angry or stressed, PUT THE BABY IN THE CRIB, step away, take some deep breaths, and wait until you are calm to care for the baby. DO NOT SHAKE THE BABY. You are not alone, call a supporter for help.  · [facility] 24/7 crisis call line (593-111-5309) or (1-349.672.9741)  · You can also text them, text \"ANSWER\" (487166)      "

## 2022-02-03 NOTE — LACTATION NOTE
This note was copied from a baby's chart.  Follow up consult:    Upon entering room, MOB in bed with baby in football hold and swaddled.    As per MOB, started pumping and supplement with formula overnight with concerns for low supply, however discouraged about minimal  pumped milk.  Stated she was unable to latch infant so has given mostly bottles throughout night.  MOB states she has been following the supplemental guidelines that were given last night.       infant started to waken during consult and with permission assisted with latch in football hold at breast.  Baby unswaddled and removed onesie.  After several attempts and repositioning of baby and mob, optimal latch achieved with rhythmic suck noted. One swallow heard.   Discussed importance of staying skin to skin allowing MOB to notice feeding cues earlier and ideally breastfeed more frequently.  Discussed how baby might prefer the fast flow of bottles and become frustrated at breast and reviewed paced bottle feeding to help support switching between breast and bottle.     Reviewed pump settings should be 80/60 decreasing to 60 at 2min, suction to comfort, pump for 15min followed by 2min hand expression.  Repeat every 3hrs, after infant has .  Offered to assist with flange fit and pump session, MOB declined at this time. Breastmilk storage, cleaning supplies and pump rental information given. Pt states she has a [employer] electric pump at home.     [place] [facility] [place] hand out as [place].     MOB and baby to be d/c today, offered to assist with another latch and pump assessment prior to d/c if able.

## 2022-02-03 NOTE — PROGRESS NOTES
2000 Assessment completed. Lochia light, fundus firm. Plan of care reviewed. Declines pain intervention at this time, will call if pain intervention needed.

## 2022-02-03 NOTE — PROGRESS NOTES
Bedside report received from Pat NORTON RN. Patient care assumed. Chart, prenatal labs, and orders reviewed  Patient assessment complete and WDL. Fundus firm and lochia scant. Patient ambulating to bathroom and voiding without difficulty. Patient denies any dizziness/lightheadedness or calf pain/tenderness. Patient also denies any chest pain, difficulty breathing or SOB, respiratory symptoms, or any recent ill contacts. Plan of care discussed with patient for the day including infant feeding every 2-3 hours or on demand, pain management, and ambulation in halls. Pain medication plan discussed with patient; patient states she will call if PRN pain medication is wanted. All questions/concerns addressed at this time. Call light within reach, encouraged to call with needs. Will continue with routine postpartum cares and proceed with discharge home.

## 2022-02-03 NOTE — CARE PLAN
The patient is Stable - Low risk of patient condition declining or worsening    Shift Goals  Clinical Goals: Patient will maintain stable VS; Lochia WDL; Pain WDL    Progress made toward(s) clinical / shift goals:    Problem: Risk for Excess Fluid Volume  Goal: Patient will demonstrate pulse, blood pressure and neurologic signs within expected ranges and without any respiratory complications  Outcome: Progressing     Problem: Knowledge Deficit - Standard  Goal: Patient and family/care givers will demonstrate understanding of plan of care, disease process/condition, diagnostic tests and medications  Outcome: Progressing     Problem: Pain - Standard  Goal: Alleviation of pain or a reduction in pain to the patient’s comfort goal  Outcome: Progressing     Problem: Knowledge Deficit - Postpartum  Goal: Patient will verbalize and demonstrate understanding of self and infant care  Outcome: Progressing     Problem: Psychosocial - Postpartum  Goal: Patient will verbalize and demonstrate effective bonding and parenting behavior  Outcome: Progressing     Problem: Altered Physiologic Condition  Goal: Patient physiologically stable as evidenced by normal lochia, palpable uterine involution and vitals within normal limits  Outcome: Progressing     Problem: Infection - Postpartum  Goal: Postpartum patient will be free of signs and symptoms of infection  Outcome: Progressing     Problem: Respiratory/Oxygenation Function Post-Surgical  Goal: Patient will achieve/maintain normal respiratory rate/effort  Outcome: Progressing     Problem: Bowel Elimination - Post Surgical  Goal: Patient will resume regular bowel sounds and function with no discomfort or distention  Outcome: Progressing     Problem: Early Mobilization - Post Surgery  Goal: Early mobilization post surgery  Outcome: Progressing

## 2022-02-03 NOTE — DISCHARGE PLANNING
Meds-to-Beds: Discharge prescription orders listed below delivered to patient's bedside. RN Caitlin notified. Patient counseled. Patient elected to have co-payment billed to patient account.      Current Outpatient Medications   Medication Sig Dispense Refill   • docusate sodium 100 MG Cap Take 1 capsule by mouth 2 times a day as needed for Constipation. 60 Capsule 0   • ferrous sulfate 325 (65 Fe) MG tablet Take 1 Tablet by mouth every morning with breakfast. 90 Each 2   • ibuprofen (MOTRIN) 800 MG Tab Take 1 Tablet by mouth every 8 hours. Indications: Joint Damage causing Pain and Loss of Function 30 Tablet 0      Mira Alarcon, PharmD

## 2022-02-03 NOTE — CARE PLAN
The patient is Stable - Low risk of patient condition declining or worsening    Shift Goals  Clinical Goals: Pt pain will be well controlled with prescribed medications.    Progress made toward(s) clinical / shift goals:  Pt pain has been well controlled with prescribed medications.  Pt knows to call for breakthrough pain.  Call light within reach of Pt.  Rounding in place.    Patient is not progressing towards the following goals: N/A

## 2022-02-03 NOTE — DISCHARGE SUMMARY
Discharge Summary:      Kinza Juan    Admit Date:   2022  Discharge Date:  2/3/2022     Admitting diagnosis:  Labor and delivery indication for care or intervention [O75.9]  Labor and delivery, indication for care [O75.9]  Discharge Diagnosis: Status post  for repeat.  Pregnancy Complications: gestational diabetes  Tubal Ligation:  yes        History:  Past Medical History:   Diagnosis Date   • Diabetes mellitus during pregnancy, antepartum 10/8/2021   • GERD (gastroesophageal reflux disease)     h pylori 2015     OB History    Para Term  AB Living   2 2 2     2   SAB IAB Ectopic Molar Multiple Live Births           0 2      # Outcome Date GA Lbr Kwan/2nd Weight Sex Delivery Anes PTL Lv   2 Term 22 39w0d  3.4 kg (7 lb 7.9 oz) F CS-LTranv Spinal N UMA   1 Term 13 39w6d  3.317 kg (7 lb 5 oz) M CS-Unspec EPI  UMA      Birth Comments: Arrest of labor failed induction        Patient has no known allergies.  Patient Active Problem List    Diagnosis Date Noted   • Labor and delivery indication for care or intervention 2022   • Labor and delivery, indication for care 2022   • desires perm sterilization 2022   • Supervision of other high risk pregnancies, third trimester 2021   • Advanced maternal age in multigravida 2021   • Insulin controlled gestational diabetes mellitus (GDM) in third trimester 10/08/2021   • Hx of  section 2021   • Hypercholesterolemia 2021   • PCOS (polycystic ovarian syndrome) 2020        Hospital Course:   36 y.o. , now para 2, was admitted with the above mentioned diagnosis, underwent Repeat  repeat,  for repeat. Patient postpartum course was unremarkable, with progressive advancement in diet , ambulation and toleration of oral analgesia. Patient without complaints today and desires discharge.      Vitals:    22 1400 22 1750 22 2200 22 0200    BP: (!) 99/65 (!) 99/60 (!) 96/56 (!) 99/53   Pulse: 66 61 64 67   Resp: 18 18 18 18   Temp: 37.2 °C (98.9 °F) 36.3 °C (97.3 °F) 36.3 °C (97.4 °F) 36.6 °C (97.9 °F)   TempSrc: Temporal Temporal Temporal Temporal   SpO2: 98% 97% 95% 96%   Weight:       Height:           Current Facility-Administered Medications   Medication Dose   • ferrous sulfate tablet 325 mg  325 mg   • acetaminophen (TYLENOL) tablet 1,000 mg  1,000 mg    Followed by   • [START ON 2/5/2022] acetaminophen (TYLENOL) tablet 1,000 mg  1,000 mg   • ibuprofen (MOTRIN) tablet 800 mg  800 mg    Followed by   • [START ON 2/5/2022] ibuprofen (MOTRIN) tablet 800 mg  800 mg   • lactated ringers (LR) infusion     • oxytocin (PITOCIN) infusion (for postpartum)  125 mL/hr   • oxytocin (PITOCIN) injection 10 Units  10 Units   • lactated ringers infusion     • oxyCODONE immediate-release (ROXICODONE) tablet 5 mg  5 mg   • oxyCODONE immediate-release (ROXICODONE) tablet 10 mg  10 mg   • ondansetron (ZOFRAN) syringe/vial injection 4 mg  4 mg    Or   • ondansetron (ZOFRAN ODT) dispertab 4 mg  4 mg   • diphenhydrAMINE (BENADRYL) tablet/capsule 25 mg  25 mg    Or   • diphenhydrAMINE (BENADRYL) injection 25 mg  25 mg   • docusate sodium (COLACE) capsule 100 mg  100 mg   • tetanus-dipth-acell pertussis (Tdap) inj 0.5 mL  0.5 mL   • measles, mumps and rubella vaccine (MMR) injection 0.5 mL  0.5 mL   • prenatal plus vitamin (STUARTNATAL 1+1) 27-1 MG tablet 1 Tablet  1 Tablet       Exam:  Breast Exam: negative  Abdomen: Abdomen soft, non-tender. BS normal. No masses,  No organomegaly  Fundus Non Tender: yes  Incision: Mepilex dsg in place, brownish breakthrough 1cm long x2cm wide noted   Perineum: perineum intact  Extremity: extremities, peripheral pulses and reflexes normal     Labs:  Recent Labs     02/01/22  1230 02/02/22  0114   WBC 12.7* 17.6*   RBC 4.27 3.61*   HEMOGLOBIN 12.0 10.0*   HEMATOCRIT 37.6 31.6*   MCV 88.1 87.5   MCH 28.1 27.7   MCHC 31.9* 31.6*   RDW  49.4 48.7   PLATELETCT 299 280   MPV 10.6 10.5        Activity:   Discharge to home  Pelvic Rest x 6 weeks    Assessment:  normal postpartum course  Discharge Assessment: No areas of skin breakdown/redness; surgical incision intact/healing, Taking in adequate diet and fluids, no heavy bleeding or foul smelling discharge, no redness or severe pain of breasts, voiding without difficulty     Follow up: .[facility] in 1 week for incision check.     Call or come to ED for: heavy vaginal bleeding, fever >100.4, severe abdominal pain, severe headache, chest pain, shortness of breath,  N/V, incisional drainage, or other concerns     Discharge Meds:   Current Outpatient Medications   Medication Sig Dispense Refill   • acetaminophen (TYLENOL) 500 MG Tab Take 2 Tablets by mouth every 6 hours. 675 Each 0   • docusate sodium 100 MG Cap Take 100 mg by mouth 2 times a day as needed for Constipation. 60 Capsule 0   • ferrous sulfate 325 (65 Fe) MG tablet Take 1 Tablet by mouth every morning with breakfast. 90 Each 2   • ibuprofen (MOTRIN) 800 MG Tab Take 1 Tablet by mouth every 8 hours. Indications: Joint Damage causing Pain and Loss of Function 30 Tablet 0       TIERRA Price

## 2022-02-03 NOTE — CARE PLAN
The patient is Stable - Low risk of patient condition declining or worsening    Shift Goals  Clinical Goals: pain management, light lochia, stable vs    Progress made toward(s) clinical / shift goals:  lochia is scant, pain well managed with scheduled medication and vs have remained stable.    Patient is not progressing towards the following goals: N/A

## 2022-02-08 ENCOUNTER — POST PARTUM (OUTPATIENT)
Dept: OBGYN | Facility: CLINIC | Age: 37
End: 2022-02-08
Payer: COMMERCIAL

## 2022-02-08 VITALS — WEIGHT: 281 LBS | BODY MASS INDEX: 45.35 KG/M2 | DIASTOLIC BLOOD PRESSURE: 72 MMHG | SYSTOLIC BLOOD PRESSURE: 130 MMHG

## 2022-02-08 DIAGNOSIS — Z98.891 HX OF CESAREAN SECTION: ICD-10-CM

## 2022-02-08 DIAGNOSIS — Z48.89 POSTOPERATIVE VISIT: ICD-10-CM

## 2022-02-08 PROCEDURE — 99024 POSTOP FOLLOW-UP VISIT: CPT | Performed by: OBSTETRICS & GYNECOLOGY

## 2022-02-08 RX ORDER — CITALOPRAM HYDROBROMIDE 10 MG/1
10 TABLET ORAL DAILY
Qty: 30 TABLET | Refills: 6 | Status: SHIPPED | OUTPATIENT
Start: 2022-02-08 | End: 2022-02-23 | Stop reason: SDUPTHER

## 2022-02-08 ASSESSMENT — EDINBURGH POSTNATAL DEPRESSION SCALE (EPDS)
I HAVE LOOKED FORWARD WITH ENJOYMENT TO THINGS: DEFINITELY LESS THAN I USED TO
THINGS HAVE BEEN GETTING ON TOP OF ME: YES, SOMETIMES I HAVEN'T BEEN COPING AS WELL AS USUAL
TOTAL SCORE: 19
I HAVE FELT SAD OR MISERABLE: YES, QUITE OFTEN
I HAVE BLAMED MYSELF UNNECESSARILY WHEN THINGS WENT WRONG: YES, SOME OF THE TIME
I HAVE BEEN SO UNHAPPY THAT I HAVE HAD DIFFICULTY SLEEPING: YES, SOMETIMES
I HAVE BEEN SO UNHAPPY THAT I HAVE BEEN CRYING: YES, QUITE OFTEN
THE THOUGHT OF HARMING MYSELF HAS OCCURRED TO ME: NEVER
I HAVE BEEN ANXIOUS OR WORRIED FOR NO GOOD REASON: YES, VERY OFTEN
I HAVE FELT SCARED OR PANICKY FOR NO GOOD REASON: YES, QUITE A LOT
I HAVE BEEN ABLE TO LAUGH AND SEE THE FUNNY SIDE OF THINGS: NOT QUITE SO MUCH NOW

## 2022-02-08 ASSESSMENT — FIBROSIS 4 INDEX: FIB4 SCORE: 0.49

## 2022-02-08 NOTE — PROGRESS NOTES
"Chief complaint: Postoperative visit    SUBJECTIVE:  Kinza Juan presents to the clinic one weeks following repeat low transverse     Patient recovering physically well, but her postpartum depression scale was elevated at 19. She does report that she feels sad a lot of the time, cries easily, has difficulty sleeping and has no appetite. She is also has significant anxiety regarding the baby as well. She denies any SI or HI    Patient also complaining of tinnitus for the last 2 weeks or so.    Eating a regular diet without difficulty.   Bowel movement are Normal.       .   Patient Denies Incisional pain, drainage or redness    OBJECTIVE:  /72   Wt (!) 127 kg (281 lb)   LMP 2020   BMI 45.35 kg/m²   Current Outpatient Medications on File Prior to Visit   Medication Sig Dispense Refill   • ferrous sulfate 325 (65 Fe) MG tablet Take 1 Tablet by mouth every morning with breakfast. 90 Each 2   • ibuprofen (MOTRIN) 800 MG Tab Take 1 Tablet by mouth every 8 hours. Indications: Joint Damage causing Pain and Loss of Function 30 Tablet 0   • Misc. Devices (BREAST PUMP) Misc 1 Device as needed. 1 Each 0   • Prenatal Vit-Fe Fumarate-FA (PRENATAL 1+1 PO) Take  by mouth.     • acetaminophen (TYLENOL) 500 MG Tab Take 2 Tablets by mouth every 6 hours. (Patient not taking: Reported on 2022) 675 Each 0   • docusate sodium 100 MG Cap Take 1 capsule by mouth 2 times a day as needed for Constipation. (Patient not taking: Reported on 2022) 60 Capsule 0   • Blood Glucose Monitoring Suppl (ONE TOUCH ULTRA 2) w/Device Kit USE AS DIRECTED AS RECOMMENDED BY PROVIDER TO TEST BLOOD SUGAR 4 TIMES DAILY. (Patient not taking: Reported on 2022)     • Insulin Syringe-Needle U-100 (B-D INS SYR ULTRAFINE .5CC/30G) 30G X 1/2\" 0.5 ML Misc 10 Units at bedtime. (Patient not taking: Reported on 2022) 100 Each 0   • Alcohol Swabs Pads 1 Each at bedtime. (Patient not taking: Reported on 2022) 120 Each 0 "   • Lancets Check glucose 4 times daily (Patient not taking: Reported on 2/8/2022) 100 Each 3   • Blood Glucose Meter Kit Test blood sugar as recommended by provider. Per patient formulary supply the kit. Test blood sugar 4 times/day (Patient not taking: Reported on 2/8/2022) 1 Kit 0   • Blood Glucose Test Strips Use one per formulary 4 times/day (Patient not taking: Reported on 2/8/2022) 100 Strip 0   • Lancets Use one dispensed per formulary 4 times/day (Patient not taking: Reported on 2/8/2022) 100 Each 0   • Alcohol Swabs Wipe site with prep pad prior to injection. (Patient not taking: Reported on 2/8/2022) 100 Each 0     No current facility-administered medications on file prior to visit.       Constitutional:  alert, healthy, no distress.  Abdomen:  soft, bowel sounds active, non-tender, non-distended.  Incision:  healing well, no drainage, no erythema, no hernia, no seroma, no swelling, no dehiscence, incision well approximated.    IMPRESSION: Doing well postoperatively.  Pt is to increase activities as tolerated.    Lab:   Recent Results (from the past 1008 hour(s))   POCT Fetal Nonstress Test    Collection Time: 12/29/21  3:47 PM   Result Value Ref Range    NST Indications GDM A2     NST Baseline 135     NST Uterine Activity none     NST Acoustic Stimulation n/a     NST Assessment reactive     NST Action Necessary none     NST Other Data cont 2x wkly NST     NST Return as sched     NST Read By ELLA Nunn CNM, MARCIA    POCT Rapid Strep A    Collection Time: 01/02/22  3:04 PM   Result Value Ref Range    Rapid Strep Screen Negative     Internal Control Positive Positive     Internal Control Negative Negative    CoV-2 and Flu A/B by PCR (24 hour In-House): Collect NP swab in [facility]    Collection Time: 01/02/22  3:14 PM    Specimen: Nasopharyngeal; Respirate   Result Value Ref Range    Influenza virus A RNA Negative Negative    Influenza virus B, PCR Negative Negative    SARS-CoV-2 by PCR DETECTED (AA)     SARS-CoV-2  Source Nasal Swab    CULTURE THROAT    Collection Time: 01/02/22  6:10 PM    Specimen: Throat   Result Value Ref Range    Significant Indicator NEG     Source THRT     Site -     Culture Result       Moderate growth usual upper respiratory martin  No group A beta Streptococcus isolated.     GRP B STREP, BY PCR (ZACARIAS BROTH)    Collection Time: 01/14/22 11:15 AM    Specimen: Genital   Result Value Ref Range    Strep Gp B DNA PCR Negative Negative   POCT Fetal Nonstress Test    Collection Time: 01/18/22  8:54 AM   Result Value Ref Range    NST Indications      NST Baseline      NST Uterine Activity      NST Acoustic Stimulation      NST Assessment      NST Action Necessary      NST Other Data      NST Return      NST Read By     POCT Fetal Nonstress Test    Collection Time: 01/21/22 10:51 AM   Result Value Ref Range    NST Indications      NST Baseline      NST Uterine Activity      NST Acoustic Stimulation      NST Assessment      NST Action Necessary      NST Other Data      NST Return      NST Read By     POCT Fetal Nonstress Test    Collection Time: 01/28/22  2:42 PM   Result Value Ref Range    NST Indications      NST Baseline      NST Uterine Activity      NST Acoustic Stimulation      NST Assessment      NST Action Necessary      NST Other Data      NST Return      NST Read By     SARS-COV Antigen JONES: Collect dry Nasal swab    Collection Time: 02/01/22 12:30 PM   Result Value Ref Range    SARS-CoV-2 Source Nasal Swab     SARS-COV ANTIGEN JONES NotDetected NotDetected   CBC WITH DIFFERENTIAL    Collection Time: 02/01/22 12:30 PM   Result Value Ref Range    WBC 12.7 (H) 4.8 - 10.8 K/uL    RBC 4.27 4.20 - 5.40 M/uL    Hemoglobin 12.0 12.0 - 16.0 g/dL    Hematocrit 37.6 37.0 - 47.0 %    MCV 88.1 81.4 - 97.8 fL    MCH 28.1 27.0 - 33.0 pg    MCHC 31.9 (L) 33.6 - 35.0 g/dL    RDW 49.4 35.9 - 50.0 fL    Platelet Count 299 164 - 446 K/uL    MPV 10.6 9.0 - 12.9 fL    Neutrophils-Polys 72.60 (H) 44.00 - 72.00 %    Lymphocytes  16.90 (L) 22.00 - 41.00 %    Monocytes 6.40 0.00 - 13.40 %    Eosinophils 2.00 0.00 - 6.90 %    Basophils 0.50 0.00 - 1.80 %    Immature Granulocytes 1.60 (H) 0.00 - 0.90 %    Nucleated RBC 0.00 /100 WBC    Neutrophils (Absolute) 9.25 (H) 2.00 - 7.15 K/uL    Lymphs (Absolute) 2.15 1.00 - 4.80 K/uL    Monos (Absolute) 0.81 0.00 - 0.85 K/uL    Eos (Absolute) 0.25 0.00 - 0.51 K/uL    Baso (Absolute) 0.06 0.00 - 0.12 K/uL    Immature Granulocytes (abs) 0.20 (H) 0.00 - 0.11 K/uL    NRBC (Absolute) 0.00 K/uL   HOLD BLOOD BANK SPECIMEN (NOT TESTED)    Collection Time: 02/01/22 12:30 PM   Result Value Ref Range    Holding Tube - Bb DONE    CBC without differential- Once in 8 hours post delivery    Collection Time: 02/02/22  1:14 AM   Result Value Ref Range    WBC 17.6 (H) 4.8 - 10.8 K/uL    RBC 3.61 (L) 4.20 - 5.40 M/uL    Hemoglobin 10.0 (L) 12.0 - 16.0 g/dL    Hematocrit 31.6 (L) 37.0 - 47.0 %    MCV 87.5 81.4 - 97.8 fL    MCH 27.7 27.0 - 33.0 pg    MCHC 31.6 (L) 33.6 - 35.0 g/dL    RDW 48.7 35.9 - 50.0 fL    Platelet Count 280 164 - 446 K/uL    MPV 10.5 9.0 - 12.9 fL   Histology Request    Collection Time: 02/02/22  9:32 AM   Result Value Ref Range    Pathology Request Sent to Histo        PLAN:  Continue any current medications.  (See Med List for details.)  Return to clinic: in 2 week(s).    Discussed with patient elevated EPDS scale. High degree of concern for postpartum depression. Discussed with patient that this is not an uncommon finding a new mothers and that there is no shame in asking for help. At this time, discussed with patient both medical management as well as counseling. Referral for counseling has been sent. Given her significant anxiety component to it, will start patient on Celexa at this time and follow-up in 2 weeks.    Precautions were discussed with the patient.    All questions answered

## 2022-02-08 NOTE — NON-PROVIDER
Pt here today for postpartum exam.  Delivery Date 2/1/2022  Operation Date: 2/1/2022  Currently: Breast and bottle feeding   Good ph:[phone]

## 2022-02-13 ENCOUNTER — OFFICE VISIT (OUTPATIENT)
Dept: URGENT CARE | Facility: CLINIC | Age: 37
End: 2022-02-13
Payer: COMMERCIAL

## 2022-02-13 ENCOUNTER — HOSPITAL ENCOUNTER (OUTPATIENT)
Facility: MEDICAL CENTER | Age: 37
End: 2022-02-13
Attending: PHYSICIAN ASSISTANT
Payer: COMMERCIAL

## 2022-02-13 VITALS
DIASTOLIC BLOOD PRESSURE: 72 MMHG | SYSTOLIC BLOOD PRESSURE: 100 MMHG | WEIGHT: 274.6 LBS | RESPIRATION RATE: 14 BRPM | HEIGHT: 66 IN | BODY MASS INDEX: 44.13 KG/M2 | TEMPERATURE: 97 F | HEART RATE: 84 BPM | OXYGEN SATURATION: 97 %

## 2022-02-13 DIAGNOSIS — J06.9 VIRAL URI WITH COUGH: ICD-10-CM

## 2022-02-13 LAB
COVID ORDER STATUS COVID19: NORMAL
INT CON NEG: NEGATIVE
INT CON POS: POSITIVE
S PYO AG THROAT QL: NEGATIVE
SARS-COV-2 RNA RESP QL NAA+PROBE: NOTDETECTED
SPECIMEN SOURCE: NORMAL

## 2022-02-13 PROCEDURE — U0005 INFEC AGEN DETEC AMPLI PROBE: HCPCS

## 2022-02-13 PROCEDURE — 99213 OFFICE O/P EST LOW 20 MIN: CPT | Performed by: PHYSICIAN ASSISTANT

## 2022-02-13 PROCEDURE — U0003 INFECTIOUS AGENT DETECTION BY NUCLEIC ACID (DNA OR RNA); SEVERE ACUTE RESPIRATORY SYNDROME CORONAVIRUS 2 (SARS-COV-2) (CORONAVIRUS DISEASE [COVID-19]), AMPLIFIED PROBE TECHNIQUE, MAKING USE OF HIGH THROUGHPUT TECHNOLOGIES AS DESCRIBED BY CMS-2020-01-R: HCPCS

## 2022-02-13 PROCEDURE — 87880 STREP A ASSAY W/OPTIC: CPT | Performed by: PHYSICIAN ASSISTANT

## 2022-02-13 ASSESSMENT — FIBROSIS 4 INDEX: FIB4 SCORE: 0.49

## 2022-02-13 NOTE — LETTER
February 13, 2022     Your employee/student was seen in our clinic today.  A concern for COVID-19 has been identified and testing is in progress. We are asking you to excuse absences while following self-isolation protocol per Center for Disease Control (CDC) guidelines.  Your employee will be able to access test results through our electronic delivery system called [employer].    If the results of testing are positive, your employee should follow the CDC guidelines.  These are isolation for a minimum of 5 days and at least 24 hours have passed since your last fever without the use of fever-reducing medications and all other symptoms have improved.  In addition, it is recommended you wear a mask for an additional 5 days. The health department may contact you and provide further directions regarding self-isolation and return to work.    Negative result without close contact*:  If your employee was tested due to their symptoms without close contact to someone with COVID-19 and their test is negative: your employee should not return to work or regular activities until 24 hours after symptoms fully improve. (For example, if patient feels back to normal on Tuesday, should remain isolated through Wednesday).     Negative with close contact*:  If your employee was tested due to close contact to someone, they should self isolate for 5 days after their exposure.    Negative with positive household member  If your employee was due to a positive household member they should still quarantine for a period of 5 days.  The 5 day period begins once the household member is isolated. If unable to quarantine (for example mom from infant), the CDC advises an additional 5-day quarantine period from the COVID-19 household member.  In general, repeat testing is not necessary and not offered through our [facility].    This is the only note that will be provided from [facility] for this visit.  Your employee will require an  appointment with a primary care provider if FMLA or disability forms are required.     Sincerely,    Silvio Sellers P.A.-C.

## 2022-02-13 NOTE — PROGRESS NOTES
Subjective:   Kinza Juan is a 36 y.o. female who presents for Sore Throat (x 10 days with congestion, mild cough, bilateral ear pain, headache.  Denies fever or chills. Covid 19 vaccinated with booster. )      HPI  Patient presents to clinic with URI symptoms onset 9 days ago.  She complains of nasal congestion, runny nose, cough, sore throat, fatigue, intermittent headaches and occasional ear pain.  She is requesting to be tested for COVID-19 and strep. Denies any fever, chills, chest pain, SOB, abdominal pain, vomiting, diarrhea. COVId vaccine up to date.       Medications:    • acetaminophen Tabs  • Alcohol Swabs  • Alcohol Swabs Pads  • B-D INS SYR ULTRAFINE .5CC/30G Misc  • Blood Glucose Meter Kit  • Blood Glucose Test Strips  • Breast Pump Misc  • citalopram  • docusate sodium Caps  • ferrous sulfate  • ibuprofen Tabs  • Lancets  • ONE TOUCH ULTRA 2 Kit  • PRENATAL 1+1 PO    Allergies: Patient has no known allergies.    Problem List: Kinza Juan does not have any pertinent problems on file.    Surgical History:  Past Surgical History:   Procedure Laterality Date   • REPEAT C SECTOIN WITH SALPINGECTOMY Bilateral 2022    Procedure:  SECTION, REPEAT, WITH SALPINGECTOMY;  Surgeon: Isabell Connolly D.O.;  Location: SURGERY [facility];  Service: Obstetrics   • [place] ERCP,DIAGNOSTIC  2020    Procedure: ERCP, DIAGNOSTIC;  Surgeon: Lee Kelly M.D.;  Location: SURGERY [facility];  Service: Gastroenterology   • [place] ERCP,DIAGNOSTIC N/A 2020    Procedure: ERCP (ENDOSCOPIC RETROGRADE CHOLANGIOPANCREATOGRAPHY);  Surgeon: Lee Kelly M.D.;  Location: SURGERY SAME DAY [facility];  Service: Gastroenterology   • JACQUI BY LAPAROSCOPY  2020    Procedure: CHOLECYSTECTOMY, LAPAROSCOPIC;  Surgeon: Iftikhar Flowers M.D.;  Location: SURGERY [facility];  Service: General   • PRIMARY C SECTION  3/24/2013    Performed by Briana Cano M.D. at LABOR AND DELIVERY   •  "RECONSTRUCTION, KNEE, ACL, ARTHROSCOPIC  5/31/2012    Performed by CAITY DORSEY at SURGERY [facility]   • MENISCECTOMY, KNEE, MEDIAL  5/31/2012    Performed by CAITY DORSEY at SURGERY [facility]   • DENTAL EXTRACTION(S)  2005       Past Social Hx: Kinza Juan  reports that she has never smoked. She has never used smokeless tobacco. She reports previous alcohol use. She reports previous drug use. Drugs: Marijuana and Oral.     Past Family Hx:  Kinza Juan family history includes Diabetes in her father, maternal grandfather, and paternal grandmother; Hypertension in her maternal grandmother; Stroke in her maternal grandmother.     Problem list, medications, and allergies reviewed by myself today in Epic.     Objective:     /72   Pulse 84   Temp 36.1 °C (97 °F) (Temporal)   Resp 14   Ht 1.676 m (5' 6\")   Wt 125 kg (274 lb 9.6 oz)   LMP 12/01/2020   SpO2 97%   BMI 44.32 kg/m²     Physical Exam  Vitals reviewed.   Constitutional:       General: She is not in acute distress.     Appearance: Normal appearance. She is not ill-appearing or toxic-appearing.   HENT:      Head: Normocephalic.      Right Ear: Tympanic membrane normal.      Left Ear: Tympanic membrane normal.      Nose: Nose normal.      Mouth/Throat:      Mouth: Mucous membranes are moist.      Pharynx: Uvula midline. Posterior oropharyngeal erythema (mild ) present. No pharyngeal swelling, oropharyngeal exudate or uvula swelling.      Tonsils: No tonsillar exudate or tonsillar abscesses.   Eyes:      Conjunctiva/sclera: Conjunctivae normal.      Pupils: Pupils are equal, round, and reactive to light.   Cardiovascular:      Rate and Rhythm: Normal rate and regular rhythm.      Heart sounds: Normal heart sounds.   Pulmonary:      Effort: Pulmonary effort is normal. No respiratory distress.      Breath sounds: Normal breath sounds. No wheezing, rhonchi or rales.   Musculoskeletal:      Cervical back: Neck " supple.   Lymphadenopathy:      Cervical: No cervical adenopathy.   Skin:     General: Skin is warm and dry.   Neurological:      General: No focal deficit present.      Mental Status: She is alert and oriented to person, place, and time.   Psychiatric:         Mood and Affect: Mood normal.         Behavior: Behavior normal.         Strep A: negative.     Diagnosis and associated orders:     1. Viral URI with cough  SARS-CoV-2 PCR (24 hour In-House): Collect NP swab in VTM    POCT Rapid Strep A        Comments/MDM:     The patient's presenting symptoms and exam findings most likely are due to a viral etiology.   Test for COVID-19 via PCR. Result will be reviewed by myself. We will call/message back for results and appropriate further instructions. Instructed to sign up for [employer] if they have not already. Result will be automatically released to [employer] application for patient review. I will be sending a message with Next Step Instructions to [employer] soon after resulted.   Symptomatic and supportive care:   Plenty of oral hydration and rest   Over the counter cough suppressant as directed.  Tylenol or ibuprofen for pain and fever as directed.   Warm salt water gargles for sore throat, soft foods, cool liquids.   Saline nasal spray and Flonase as a decongestant.   Infection control measures at home. Stay away from people, Hand washing, covering sneeze/cough, disinfect surfaces.   Remain home from work, school, and other populated environments. Work note provided with information of quarantine measures per CDC guidelines.   Overall, the patient is well-appearing. They are not hypoxic, afebrile, and a normal pulmonary exam.       I personally reviewed prior external notes and test results pertinent to today's visit. Red flags discussed and indications to present to the Emergency Department. Supportive care, natural history, differential diagnoses, and indications for immediate follow-up discussed. Patient expresses  understanding and agrees to plan. Patient denies any other questions or concerns.     Follow-up with the primary care physician for recheck, reevaluation, and consideration of further management.    Please note that this dictation was created using voice recognition software. I have made a reasonable attempt to correct obvious errors, but I expect that there are errors of grammar and possibly content that I did not discover before finalizing the note.    This note was electronically signed by Silvio Sellers PA-C

## 2022-02-22 PROBLEM — Z86.32 HISTORY OF GESTATIONAL DIABETES MELLITUS (GDM): Status: ACTIVE | Noted: 2022-02-22

## 2022-02-23 ENCOUNTER — GYNECOLOGY VISIT (OUTPATIENT)
Dept: OBGYN | Facility: CLINIC | Age: 37
End: 2022-02-23
Payer: COMMERCIAL

## 2022-02-23 VITALS
HEIGHT: 66 IN | DIASTOLIC BLOOD PRESSURE: 73 MMHG | WEIGHT: 274 LBS | BODY MASS INDEX: 44.03 KG/M2 | SYSTOLIC BLOOD PRESSURE: 126 MMHG

## 2022-02-23 DIAGNOSIS — Z86.32 HISTORY OF GESTATIONAL DIABETES MELLITUS (GDM): ICD-10-CM

## 2022-02-23 DIAGNOSIS — Z98.891 HX OF CESAREAN SECTION: ICD-10-CM

## 2022-02-23 PROCEDURE — 0503F POSTPARTUM CARE VISIT: CPT | Performed by: NURSE PRACTITIONER

## 2022-02-23 RX ORDER — CITALOPRAM HYDROBROMIDE 10 MG/1
10 TABLET ORAL DAILY
Qty: 30 TABLET | Refills: 3 | Status: SHIPPED | OUTPATIENT
Start: 2022-02-23 | End: 2022-03-17

## 2022-02-23 ASSESSMENT — ENCOUNTER SYMPTOMS
PSYCHIATRIC NEGATIVE: 1
CARDIOVASCULAR NEGATIVE: 1
NEUROLOGICAL NEGATIVE: 1
RESPIRATORY NEGATIVE: 1
CONSTITUTIONAL NEGATIVE: 1
EYES NEGATIVE: 1
MUSCULOSKELETAL NEGATIVE: 1
GASTROINTESTINAL NEGATIVE: 1

## 2022-02-23 ASSESSMENT — FIBROSIS 4 INDEX: FIB4 SCORE: 0.49

## 2022-02-23 NOTE — PROGRESS NOTES
Subjective     Kinza Juan is a 36 y.o. female who presents with Gynecologic Exam (C/S check)            S   37 y/o now  s/p repeat c/s with BTL on 22 of baby without complications. No laceration. Now 3+ weeks postpartum. Prenatal course significant for no issues. Postpartum course without any complications. Feeling well and happy with baby, denies any severe mood swings or s/sx of postpartum depression and her anxiety has improved with the Celexa. She reports she has mostly good days and has a very good support system. Some days she worries about baby and that what she is doing is not correct as a mother but reports previous to the Celexa she was thinking in a very worst-case scenario way and this has improved. She reports there is a 30 person wait list at [facility] and so she will be getting called when they can get her scheduled. She denies any SI/HI.     Baby is doing well, pumping breast milk and doing formula feeding.  Reports her breast milk supply is decreasing.  Has not resumed sexual activity.  Mother reports no issues with bowel or bladder routine, continued regular diet. Bleeding since birth has subsided to a spotting. Denies incisional pain, drainage or redness. No vaginal pain/odor/itching, fever, headaches, dizziness/SOB or dysuria. S/p BTL for contraception.     O  See PE: Physical exam today with no abnormal findings  Vital signs WNL: BP and weight as notated  PAP: NILM     A  Reassuring exam of lactating postpartum woman s/p repeat c/s on 22     P  - Refill on pt's celexa until pt get in with mental health provider   - 2 hr GTT ordered   - Resumption of sexual activity: safe sex precautions given  - Counseling on nutrition, adequate hydration, and exercise   - Kegel Exercises for pelvic floor/prevention of urinary incontinence   - Continue PNV for breastfeeding mother  - Counseling on PAP guidelines re: next PAP due   - Warning s/sx of postpartum  "infection, depression, preeclampsia   - RTC PRN           Review of Systems   Constitutional: Negative.    HENT: Negative.    Eyes: Negative.    Respiratory: Negative.    Cardiovascular: Negative.    Gastrointestinal: Negative.    Genitourinary: Negative.    Musculoskeletal: Negative.    Skin: Negative.    Neurological: Negative.    Endo/Heme/Allergies: Negative.    Psychiatric/Behavioral: Negative.               Objective     /73   Ht 1.676 m (5' 6\")   Wt 124 kg (274 lb)   LMP 12/01/2020   BMI 44.22 kg/m²      Physical Exam  Constitutional:       Appearance: Normal appearance.   HENT:      Head: Normocephalic and atraumatic.      Nose: Nose normal.      Mouth/Throat:      Mouth: Mucous membranes are moist.   Cardiovascular:      Rate and Rhythm: Normal rate and regular rhythm.      Pulses: Normal pulses.      Heart sounds: Normal heart sounds.   Pulmonary:      Effort: Pulmonary effort is normal.      Breath sounds: Normal breath sounds.   Abdominal:      General: Abdomen is flat.   Musculoskeletal:      Cervical back: Normal range of motion and neck supple.   Skin:     Capillary Refill: Capillary refill takes less than 2 seconds.   Neurological:      Mental Status: She is alert and oriented to person, place, and time.   Psychiatric:         Mood and Affect: Mood normal.         Behavior: Behavior normal.         Thought Content: Thought content normal.         Judgment: Judgment normal.                             Assessment & Plan        1. History of gestational diabetes mellitus (GDM)    - GLUCOSE TOLERANCE 2 HOUR; Future                "

## 2022-02-23 NOTE — NON-PROVIDER
Patient here for C Section check.  C Section done on 2/1/22  Patient is pumping and supplementing with formula  Vaginal bleeding is starting to lighten   PP visit to be scheduled today  [phone] (home) [phone] (work)  Pharmacy verified

## 2022-02-25 ENCOUNTER — OFFICE VISIT (OUTPATIENT)
Dept: MEDICAL GROUP | Facility: MEDICAL CENTER | Age: 37
End: 2022-02-25
Payer: COMMERCIAL

## 2022-02-25 VITALS
OXYGEN SATURATION: 96 % | HEIGHT: 66 IN | HEART RATE: 74 BPM | WEIGHT: 274 LBS | SYSTOLIC BLOOD PRESSURE: 124 MMHG | RESPIRATION RATE: 16 BRPM | BODY MASS INDEX: 44.03 KG/M2 | DIASTOLIC BLOOD PRESSURE: 70 MMHG | TEMPERATURE: 97.3 F

## 2022-02-25 DIAGNOSIS — Z90.49 HISTORY OF CHOLECYSTECTOMY: ICD-10-CM

## 2022-02-25 DIAGNOSIS — L73.9 FOLLICULITIS: ICD-10-CM

## 2022-02-25 DIAGNOSIS — Z98.891 HX OF CESAREAN SECTION: ICD-10-CM

## 2022-02-25 DIAGNOSIS — F41.9 ANXIETY: ICD-10-CM

## 2022-02-25 PROCEDURE — 99213 OFFICE O/P EST LOW 20 MIN: CPT | Performed by: FAMILY MEDICINE

## 2022-02-25 ASSESSMENT — FIBROSIS 4 INDEX: FIB4 SCORE: 0.49

## 2022-02-25 NOTE — PROGRESS NOTES
"  Subjective:     Kinza Juan is a 36 y.o. female presenting to [facility] care:    1.  Boils: For the past several months, she would have boils in the thigh/groin area.  They would start off quite small and red, but grow to painful boils.  Her last episode was in December.  She plans to see dermatology next month.  Has questions about possible hidradenitis suppurativa.  Is wondering about wound care and prevention.    2.  History of a cholecystectomy: Has a history of a cholecystectomy, but continues to have intermittent right upper quadrant pain.  She reports getting an ERCP, had residual stones and sludge removed.  Was seeing GI.  She overall feels improved, is not concerned about this at this time    3.  Anxiety: Recently had a  section, was developing increasing anxiety after the delivery.  Denies depression.  She has started the Celexa 10 mg, which she feels like it is helping.  She also has an appointment with a therapist soon.      Current Outpatient Medications:   •  citalopram (CELEXA) 10 MG tablet, Take 1 Tablet by mouth every day., Disp: 30 Tablet, Rfl: 3  •  Misc. Devices (BREAST PUMP) Misc, 1 Device as needed., Disp: 1 Each, Rfl: 0  •  Prenatal Vit-Fe Fumarate-FA (PRENATAL 1+1 PO), Take  by mouth., Disp: , Rfl:     Objective:     Vitals: /70   Pulse 74   Temp 36.3 °C (97.3 °F)   Resp 16   Ht 1.676 m (5' 6\")   Wt 124 kg (274 lb)   LMP 2020   SpO2 96%   BMI 44.22 kg/m²   General: Alert  HEENT: Normocephalic.  Heart: Regular rate and rhythm.  S1 and S2 normal.  No murmurs appreciated.  Respiratory: Normal respiratory effort.  Clear to auscultation bilaterally.  Abdomen: Non-distended, soft  Extremities: No leg edema.    Assessment/Plan:     Kinza was seen today for [facility].    Diagnoses and all orders for this visit:    Folliculitis  Chronic, stable.  We discussed possible suppurativa hidradenitis.  We discussed skin hygiene, topical antibiotics that she can " try, weight loss, keeping the area dry, wearing breathable clothing, etc.    History of cholecystectomy  Chronic, stable, will continue to monitor    Hx of  section  Anxiety  Chronic, stable.  She will let us know if she needs refills of Celexa.          Return in about 6 months (around 2022) for routine follow up.

## 2022-03-07 ENCOUNTER — APPOINTMENT (RX ONLY)
Dept: URBAN - METROPOLITAN AREA CLINIC 4 | Facility: CLINIC | Age: 37
Setting detail: DERMATOLOGY
End: 2022-03-07

## 2022-03-07 DIAGNOSIS — L73.9 FOLLICULAR DISORDER, UNSPECIFIED: ICD-10-CM

## 2022-03-07 DIAGNOSIS — L738 OTHER SPECIFIED DISEASES OF HAIR AND HAIR FOLLICLES: ICD-10-CM

## 2022-03-07 DIAGNOSIS — L663 OTHER SPECIFIED DISEASES OF HAIR AND HAIR FOLLICLES: ICD-10-CM

## 2022-03-07 PROBLEM — L02.426 FURUNCLE OF LEFT LOWER LIMB: Status: ACTIVE | Noted: 2022-03-07

## 2022-03-07 PROBLEM — L02.425 FURUNCLE OF RIGHT LOWER LIMB: Status: ACTIVE | Noted: 2022-03-07

## 2022-03-07 PROCEDURE — 99204 OFFICE O/P NEW MOD 45 MIN: CPT

## 2022-03-07 PROCEDURE — ? PRESCRIPTION

## 2022-03-07 PROCEDURE — ? COUNSELING

## 2022-03-07 PROCEDURE — ? DIAGNOSIS COMMENT

## 2022-03-07 RX ORDER — CLINDAMYCIN PHOSPHATE 10 MG/ML
SOLUTION TOPICAL
Qty: 60 | Refills: 6 | Status: ERX | COMMUNITY
Start: 2022-03-07

## 2022-03-07 RX ADMIN — CLINDAMYCIN PHOSPHATE: 10 SOLUTION TOPICAL at 00:00

## 2022-03-07 ASSESSMENT — LOCATION SIMPLE DESCRIPTION DERM
LOCATION SIMPLE: RIGHT THIGH
LOCATION SIMPLE: LEFT THIGH

## 2022-03-07 ASSESSMENT — LOCATION DETAILED DESCRIPTION DERM
LOCATION DETAILED: RIGHT ANTERIOR MEDIAL PROXIMAL THIGH
LOCATION DETAILED: LEFT ANTERIOR PROXIMAL THIGH

## 2022-03-07 ASSESSMENT — LOCATION ZONE DERM: LOCATION ZONE: LEG

## 2022-03-07 NOTE — PROCEDURE: DIAGNOSIS COMMENT
Render Risk Assessment In Note?: no
Comment: Scarring present. No active lesions. HS considered by history. Patient will call if lesions recur or worsen. Recommend Hibiclens, BPO wash, and topical clindamycin. Recommend reducing friction. Discussed laser hair removal
Detail Level: Detailed

## 2022-03-16 ENCOUNTER — PATIENT MESSAGE (OUTPATIENT)
Dept: MEDICAL GROUP | Facility: MEDICAL CENTER | Age: 37
End: 2022-03-16
Payer: COMMERCIAL

## 2022-03-17 RX ORDER — CITALOPRAM 20 MG/1
20 TABLET ORAL DAILY
Qty: 90 TABLET | Refills: 0 | Status: SHIPPED | OUTPATIENT
Start: 2022-03-17 | End: 2022-06-23 | Stop reason: SDUPTHER

## 2022-03-22 ENCOUNTER — PATIENT MESSAGE (OUTPATIENT)
Dept: OBGYN | Facility: CLINIC | Age: 37
End: 2022-03-22
Payer: COMMERCIAL

## 2022-03-23 ENCOUNTER — POST PARTUM (OUTPATIENT)
Dept: OBGYN | Facility: CLINIC | Age: 37
End: 2022-03-23
Payer: COMMERCIAL

## 2022-03-23 VITALS — DIASTOLIC BLOOD PRESSURE: 59 MMHG | SYSTOLIC BLOOD PRESSURE: 111 MMHG | WEIGHT: 257 LBS | BODY MASS INDEX: 41.48 KG/M2

## 2022-03-23 ASSESSMENT — FIBROSIS 4 INDEX: FIB4 SCORE: 0.49

## 2022-03-23 NOTE — TELEPHONE ENCOUNTER
0926 22  Pt reached out via [facility] [employer] in regards to a painful and smelling  incision. The pt was called and her identity was verified. Pt was scheduled for an incision check later today per Theodore. Pt verbalized understanding.  Jean Pierre

## 2022-03-23 NOTE — PROGRESS NOTES
Pt presents for Incision check due to concerns about odor . Incision is C/I with a small area of redness on lt side of pfannestial incision where the skin has just stayed damp due to panus. Education given to keep incision as dry as possible, using pad inside of her pants/panty's. She will return tc if any changes in sx. Support given.

## 2022-06-23 RX ORDER — CITALOPRAM 20 MG/1
20 TABLET ORAL DAILY
Qty: 90 TABLET | Refills: 0 | Status: SHIPPED | OUTPATIENT
Start: 2022-06-23 | End: 2022-07-05 | Stop reason: SDUPTHER

## 2022-07-05 RX ORDER — CITALOPRAM 20 MG/1
20 TABLET ORAL DAILY
Qty: 90 TABLET | Refills: 3 | Status: SHIPPED | OUTPATIENT
Start: 2022-07-05 | End: 2022-07-06 | Stop reason: SDUPTHER

## 2022-07-07 RX ORDER — CITALOPRAM 20 MG/1
20 TABLET ORAL DAILY
Qty: 90 TABLET | Refills: 3 | Status: SHIPPED | OUTPATIENT
Start: 2022-07-07 | End: 2022-08-02 | Stop reason: SDUPTHER

## 2022-07-07 RX ORDER — CLINDAMYCIN PHOSPHATE 10 MG/ML
SOLUTION TOPICAL
Qty: 60 | Refills: 6 | Status: ERX

## 2022-07-23 NOTE — PROGRESS NOTES
Chief complaint: Return visit    S: Pt presents for routine OB follow up. Good fetal movement.  No contractions, vaginal bleeding, or leakage of fluid.    Questions answered.    O: /59   Wt (!) 135 kg (298 lb 4.8 oz)   LMP 2020   BMI 48.15 kg/m²   Patients' weight gain, fluid intake and exercise level discussed.  Vitals, fundal height , fetal position, and FHR reviewed on flowsheet    Lab:  Recent Results (from the past 336 hour(s))   GRP B STREP, BY PCR (ZACARIAS BROTH)    Collection Time: 22 11:15 AM    Specimen: Genital   Result Value Ref Range    Strep Gp B DNA PCR Negative Negative   POCT Fetal Nonstress Test    Collection Time: 22  8:54 AM   Result Value Ref Range    NST Indications      NST Baseline      NST Uterine Activity      NST Acoustic Stimulation      NST Assessment      NST Action Necessary      NST Other Data      NST Return      NST Read By     POCT Fetal Nonstress Test    Collection Time: 22 10:51 AM   Result Value Ref Range    NST Indications      NST Baseline      NST Uterine Activity      NST Acoustic Stimulation      NST Assessment      NST Action Necessary      NST Other Data      NST Return      NST Read By       Patient has elevated fasting as well as postprandial levels.  We will increase to NPH 20 in the morning and 30 units in the evening    A/P:  36 y.o.  at 37w3d presents for routine obstetric follow-up.  Size equals dates and/or scan    1.  Continue prenatal vitamins.  2.  Fetal kick counts.  3.  Exercise at least 30 minutes daily.  4.  Drink at least 2L of water daily  5.  Labor precautions educated.  6.  Follow-up in 1 weeks.  7.  GBS negative     planned for    CKD (chronic kidney disease)

## 2022-08-02 ENCOUNTER — OFFICE VISIT (OUTPATIENT)
Dept: MEDICAL GROUP | Facility: MEDICAL CENTER | Age: 37
End: 2022-08-02
Payer: COMMERCIAL

## 2022-08-02 VITALS
DIASTOLIC BLOOD PRESSURE: 72 MMHG | RESPIRATION RATE: 16 BRPM | TEMPERATURE: 97.6 F | OXYGEN SATURATION: 96 % | SYSTOLIC BLOOD PRESSURE: 122 MMHG | HEIGHT: 66 IN | BODY MASS INDEX: 47.09 KG/M2 | HEART RATE: 84 BPM | WEIGHT: 293 LBS

## 2022-08-02 DIAGNOSIS — E66.01 MORBID OBESITY WITH BMI OF 45.0-49.9, ADULT (HCC): ICD-10-CM

## 2022-08-02 DIAGNOSIS — F51.02 ADJUSTMENT INSOMNIA: ICD-10-CM

## 2022-08-02 DIAGNOSIS — J02.9 SORE THROAT: ICD-10-CM

## 2022-08-02 DIAGNOSIS — Z13.6 SCREENING FOR CARDIOVASCULAR CONDITION: ICD-10-CM

## 2022-08-02 DIAGNOSIS — Z86.32 HISTORY OF GESTATIONAL DIABETES MELLITUS (GDM): ICD-10-CM

## 2022-08-02 PROCEDURE — 99214 OFFICE O/P EST MOD 30 MIN: CPT | Performed by: FAMILY MEDICINE

## 2022-08-02 RX ORDER — CITALOPRAM 20 MG/1
20 TABLET ORAL DAILY
Qty: 90 TABLET | Refills: 3 | Status: SHIPPED | OUTPATIENT
Start: 2022-08-02 | End: 2023-05-02

## 2022-08-02 RX ORDER — OMEPRAZOLE 20 MG/1
20 CAPSULE, DELAYED RELEASE ORAL DAILY
Qty: 90 CAPSULE | Refills: 3 | Status: SHIPPED | OUTPATIENT
Start: 2022-08-02 | End: 2023-10-03 | Stop reason: SDUPTHER

## 2022-08-02 RX ORDER — ORAL SEMAGLUTIDE 3 MG/1
3 TABLET ORAL DAILY
Qty: 30 TABLET | Refills: 0 | Status: SHIPPED | OUTPATIENT
Start: 2022-08-02 | End: 2022-09-01 | Stop reason: SDUPTHER

## 2022-08-02 ASSESSMENT — FIBROSIS 4 INDEX: FIB4 SCORE: 0.5

## 2022-08-02 NOTE — PROGRESS NOTES
"  Subjective:     Kinza Juan is a 37 y.o. female presenting for a follow up          Current Outpatient Medications:   •  omeprazole (PRILOSEC) 20 MG delayed-release capsule, Take 1 Capsule by mouth every day., Disp: 90 Capsule, Rfl: 3  •  Semaglutide (RYBELSUS) 3 MG Tab, Take 3 mg by mouth every day., Disp: 30 Tablet, Rfl: 0  •  citalopram (CELEXA) 20 MG Tab, Take 1 Tablet by mouth every day., Disp: 90 Tablet, Rfl: 3  •  Misc. Devices (BREAST PUMP) Misc, 1 Device as needed., Disp: 1 Each, Rfl: 0  •  Prenatal Vit-Fe Fumarate-FA (PRENATAL 1+1 PO), Take  by mouth., Disp: , Rfl:     Objective:     Vitals: /72   Pulse 84   Temp 36.4 °C (97.6 °F)   Resp 16   Ht 1.676 m (5' 6\")   Wt (!) 140 kg (308 lb)   SpO2 96%   BMI 49.71 kg/m²   General: Alert  HEENT: Normocephalic.     Assessment/Plan:     Kinza was seen today for follow-up.    Diagnoses and all orders for this visit:    Morbid obesity with BMI of 45.0-49.9, adult (HCC)  Chronic, worsening.  We discussed a healthy diet, regular exercise, regular sleep.  We discussed counting calories and reducing her caloric intake by 10% every week.  We also discussed medication options, will try Rybelsus.  Follow-up in a month  -     CBC WITHOUT DIFFERENTIAL; Future  -     Comp Metabolic Panel; Future  -     TSH WITH REFLEX TO FT4; Future  -     Patient identified as having weight management issue.  Appropriate orders and counseling given.  -     HEMOGLOBIN A1C; Future  -     omeprazole (PRILOSEC) 20 MG delayed-release capsule; Take 1 Capsule by mouth every day.  -     Semaglutide (RYBELSUS) 3 MG Tab; Take 3 mg by mouth every day.    History of gestational diabetes mellitus (GDM)  Chronic, stable, continue to monitor  -     CBC WITHOUT DIFFERENTIAL; Future  -     Comp Metabolic Panel; Future  -     TSH WITH REFLEX TO FT4; Future  -     HEMOGLOBIN A1C; Future    Adjustment insomnia  Chronic, stable.  Offered some tips to help with her 6-month-old sleep " throughout the night    Sore throat  Suspect reflux.  We will try omeprazole    Screening for cardiovascular condition  -     Lipid Profile; Future    Postpartum depression  Chronic, stable, continue Celexa  -     citalopram (CELEXA) 20 MG Tab; Take 1 Tablet by mouth every day.          Return in about 4 weeks (around 8/30/2022) for Med check.

## 2022-09-20 ENCOUNTER — OFFICE VISIT (OUTPATIENT)
Dept: MEDICAL GROUP | Facility: MEDICAL CENTER | Age: 37
End: 2022-09-20
Payer: COMMERCIAL

## 2022-09-20 VITALS
HEIGHT: 66 IN | SYSTOLIC BLOOD PRESSURE: 124 MMHG | DIASTOLIC BLOOD PRESSURE: 70 MMHG | WEIGHT: 293 LBS | BODY MASS INDEX: 47.09 KG/M2 | HEART RATE: 74 BPM | RESPIRATION RATE: 16 BRPM | OXYGEN SATURATION: 95 % | TEMPERATURE: 97.9 F

## 2022-09-20 DIAGNOSIS — K21.9 GASTROESOPHAGEAL REFLUX DISEASE, UNSPECIFIED WHETHER ESOPHAGITIS PRESENT: ICD-10-CM

## 2022-09-20 DIAGNOSIS — J02.9 SORE THROAT: ICD-10-CM

## 2022-09-20 PROBLEM — E66.01 MORBID OBESITY WITH BMI OF 45.0-49.9, ADULT (HCC): Status: RESOLVED | Noted: 2022-08-02 | Resolved: 2022-09-20

## 2022-09-20 LAB
INT CON NEG: NEGATIVE
INT CON POS: POSITIVE
S PYO AG THROAT QL: NORMAL

## 2022-09-20 PROCEDURE — 87880 STREP A ASSAY W/OPTIC: CPT | Performed by: FAMILY MEDICINE

## 2022-09-20 PROCEDURE — 99214 OFFICE O/P EST MOD 30 MIN: CPT | Performed by: FAMILY MEDICINE

## 2022-09-20 RX ORDER — OMEPRAZOLE 20 MG/1
20 CAPSULE, DELAYED RELEASE ORAL DAILY
COMMUNITY
End: 2022-09-20

## 2022-09-20 RX ORDER — TOPIRAMATE 25 MG/1
25 TABLET ORAL 2 TIMES DAILY
Qty: 180 TABLET | Refills: 0 | Status: SHIPPED | OUTPATIENT
Start: 2022-09-20 | End: 2023-05-02

## 2022-09-20 ASSESSMENT — FIBROSIS 4 INDEX: FIB4 SCORE: 0.5

## 2022-09-20 NOTE — PROGRESS NOTES
"  Subjective:     Kinza Juan is a 37 y.o. female presenting to discuss a sore throat.  Has intermittent sore throat that last for about a week or so at a time for the past 7 months.  Associated with occasional ear pain, white spots in her throat.  Denies any fevers, drainage, cough, other associated symptoms.  Her symptoms usually resolve on its own.  She does continue to take omeprazole 20 mg once a day, which does help with her chronic reflux.  Also, she reports that her insurance would not pay for Rybelsus.  Is interested in trying a different medication.  She is also interested in a referral to bariatric surgery.        Current Outpatient Medications:     topiramate (TOPAMAX) 25 MG Tab, Take 1 Tablet by mouth 2 times a day., Disp: 180 Tablet, Rfl: 0    omeprazole (PRILOSEC) 20 MG delayed-release capsule, Take 1 Capsule by mouth every day., Disp: 90 Capsule, Rfl: 3    citalopram (CELEXA) 20 MG Tab, Take 1 Tablet by mouth every day., Disp: 90 Tablet, Rfl: 3    Misc. Devices (BREAST PUMP) Misc, 1 Device as needed., Disp: 1 Each, Rfl: 0    Objective:     Vitals: /70   Pulse 74   Temp 36.6 °C (97.9 °F)   Resp 16   Ht 1.676 m (5' 6\")   Wt (!) 142 kg (312 lb)   SpO2 95%   BMI 50.36 kg/m²   General: Alert  HEENT: Normocephalic. Oropharynx non-erythematous, mucous membranes moist.    Rapid strep: Negative    Assessment/Plan:     Kinza was seen today for other.    Diagnoses and all orders for this visit:    Gastroesophageal reflux disease, unspecified whether esophagitis present  Sore throat  Chronic, stable, we discussed increasing her omeprazole to 20 mg twice a day for 2 weeks to see if this helps.  Will also check for H. pylori.  If the omeprazole does not help, we discussed trying Zyrtec and Flonase  -     POCT Rapid Strep A  -     H.PYLORI STOOL ANTIGEN; Future    BMI 50.0-59.9, adult (HCC)  Chronic, worsening.  Since Rybelsus was not covered, will try Topamax.  Referral to bariatric " surgery also placed.  Follow-up in 1 month  -     topiramate (TOPAMAX) 25 MG Tab; Take 1 Tablet by mouth 2 times a day.  -     Referral to Bariatric Surgery        Return in about 4 weeks (around 10/18/2022) for Med check.

## 2022-10-18 ENCOUNTER — APPOINTMENT (OUTPATIENT)
Dept: MEDICAL GROUP | Facility: MEDICAL CENTER | Age: 37
End: 2022-10-18
Payer: COMMERCIAL

## 2023-01-16 ENCOUNTER — HOSPITAL ENCOUNTER (OUTPATIENT)
Dept: LAB | Facility: MEDICAL CENTER | Age: 38
End: 2023-01-16
Attending: FAMILY MEDICINE
Payer: COMMERCIAL

## 2023-01-16 DIAGNOSIS — E66.01 MORBID OBESITY WITH BMI OF 45.0-49.9, ADULT (HCC): ICD-10-CM

## 2023-01-16 DIAGNOSIS — Z13.6 SCREENING FOR CARDIOVASCULAR CONDITION: ICD-10-CM

## 2023-01-16 DIAGNOSIS — Z86.32 HISTORY OF GESTATIONAL DIABETES MELLITUS (GDM): ICD-10-CM

## 2023-01-16 LAB
ALBUMIN SERPL BCP-MCNC: 3.8 G/DL (ref 3.2–4.9)
ALBUMIN/GLOB SERPL: 1.2 G/DL
ALP SERPL-CCNC: 109 U/L (ref 30–99)
ALT SERPL-CCNC: 19 U/L (ref 2–50)
ANION GAP SERPL CALC-SCNC: 9 MMOL/L (ref 7–16)
AST SERPL-CCNC: 18 U/L (ref 12–45)
BILIRUB SERPL-MCNC: 0.4 MG/DL (ref 0.1–1.5)
BUN SERPL-MCNC: 12 MG/DL (ref 8–22)
CALCIUM ALBUM COR SERPL-MCNC: 9.2 MG/DL (ref 8.5–10.5)
CALCIUM SERPL-MCNC: 9 MG/DL (ref 8.5–10.5)
CHLORIDE SERPL-SCNC: 103 MMOL/L (ref 96–112)
CHOLEST SERPL-MCNC: 161 MG/DL (ref 100–199)
CO2 SERPL-SCNC: 26 MMOL/L (ref 20–33)
CREAT SERPL-MCNC: 0.6 MG/DL (ref 0.5–1.4)
ERYTHROCYTE [DISTWIDTH] IN BLOOD BY AUTOMATED COUNT: 46.8 FL (ref 35.9–50)
EST. AVERAGE GLUCOSE BLD GHB EST-MCNC: 131 MG/DL
GFR SERPLBLD CREATININE-BSD FMLA CKD-EPI: 119 ML/MIN/1.73 M 2
GLOBULIN SER CALC-MCNC: 3.3 G/DL (ref 1.9–3.5)
GLUCOSE SERPL-MCNC: 101 MG/DL (ref 65–99)
HBA1C MFR BLD: 6.2 % (ref 4–5.6)
HCT VFR BLD AUTO: 42 % (ref 37–47)
HDLC SERPL-MCNC: 41 MG/DL
HGB BLD-MCNC: 13.3 G/DL (ref 12–16)
LDLC SERPL CALC-MCNC: 79 MG/DL
MCH RBC QN AUTO: 27.7 PG (ref 27–33)
MCHC RBC AUTO-ENTMCNC: 31.7 G/DL (ref 33.6–35)
MCV RBC AUTO: 87.3 FL (ref 81.4–97.8)
PLATELET # BLD AUTO: 333 K/UL (ref 164–446)
PMV BLD AUTO: 11.1 FL (ref 9–12.9)
POTASSIUM SERPL-SCNC: 4 MMOL/L (ref 3.6–5.5)
PROT SERPL-MCNC: 7.1 G/DL (ref 6–8.2)
RBC # BLD AUTO: 4.81 M/UL (ref 4.2–5.4)
SODIUM SERPL-SCNC: 138 MMOL/L (ref 135–145)
TRIGL SERPL-MCNC: 206 MG/DL (ref 0–149)
TSH SERPL DL<=0.005 MIU/L-ACNC: 1.43 UIU/ML (ref 0.38–5.33)
WBC # BLD AUTO: 12.7 K/UL (ref 4.8–10.8)

## 2023-01-16 PROCEDURE — 80061 LIPID PANEL: CPT

## 2023-01-16 PROCEDURE — 83036 HEMOGLOBIN GLYCOSYLATED A1C: CPT

## 2023-01-16 PROCEDURE — 84443 ASSAY THYROID STIM HORMONE: CPT

## 2023-01-16 PROCEDURE — 80053 COMPREHEN METABOLIC PANEL: CPT

## 2023-01-16 PROCEDURE — 36415 COLL VENOUS BLD VENIPUNCTURE: CPT

## 2023-01-16 PROCEDURE — 85027 COMPLETE CBC AUTOMATED: CPT

## 2023-05-02 ENCOUNTER — HOSPITAL ENCOUNTER (OUTPATIENT)
Facility: MEDICAL CENTER | Age: 38
End: 2023-05-02
Attending: NURSE PRACTITIONER
Payer: COMMERCIAL

## 2023-05-02 ENCOUNTER — OFFICE VISIT (OUTPATIENT)
Dept: URGENT CARE | Facility: CLINIC | Age: 38
End: 2023-05-02
Payer: COMMERCIAL

## 2023-05-02 VITALS
TEMPERATURE: 98 F | DIASTOLIC BLOOD PRESSURE: 78 MMHG | OXYGEN SATURATION: 97 % | HEART RATE: 71 BPM | HEIGHT: 66 IN | SYSTOLIC BLOOD PRESSURE: 120 MMHG | WEIGHT: 293 LBS | BODY MASS INDEX: 47.09 KG/M2 | RESPIRATION RATE: 16 BRPM

## 2023-05-02 DIAGNOSIS — J02.0 PHARYNGITIS DUE TO STREPTOCOCCUS SPECIES: ICD-10-CM

## 2023-05-02 LAB
INT CON NEG: NORMAL
INT CON POS: NORMAL
S PYO AG THROAT QL: POSITIVE

## 2023-05-02 PROCEDURE — 87880 STREP A ASSAY W/OPTIC: CPT | Performed by: NURSE PRACTITIONER

## 2023-05-02 PROCEDURE — 0241U HCHG SARS-COV-2 COVID-19 NFCT DS RESP RNA 4 TRGT MIC: CPT

## 2023-05-02 PROCEDURE — 99213 OFFICE O/P EST LOW 20 MIN: CPT | Performed by: NURSE PRACTITIONER

## 2023-05-02 RX ORDER — AMOXICILLIN 500 MG/1
500 CAPSULE ORAL 2 TIMES DAILY
Qty: 20 CAPSULE | Refills: 0 | Status: SHIPPED | OUTPATIENT
Start: 2023-05-02 | End: 2023-05-12

## 2023-05-02 RX ORDER — AMOXICILLIN 500 MG/1
500 CAPSULE ORAL 2 TIMES DAILY
Qty: 20 CAPSULE | Refills: 0 | Status: SHIPPED | OUTPATIENT
Start: 2023-05-02 | End: 2023-05-02 | Stop reason: SDUPTHER

## 2023-05-02 ASSESSMENT — ENCOUNTER SYMPTOMS
WHEEZING: 0
VOMITING: 0
FEVER: 1
COUGH: 0
CHILLS: 1
HEADACHES: 0
NAUSEA: 0
RHINORRHEA: 1
SORE THROAT: 1

## 2023-05-02 ASSESSMENT — FIBROSIS 4 INDEX: FIB4 SCORE: 0.45

## 2023-05-03 NOTE — PROGRESS NOTES
Subjective:   Kinza Juan is a 37 y.o. female who presents for Pharyngitis (Sore throat, congestion, fever(100 per patient) X 5 days)      URI   This is a new problem. Episode onset: 5 days. The problem has been gradually worsening. There has been no fever. Associated symptoms include congestion, rhinorrhea and a sore throat. Pertinent negatives include no coughing, ear pain, headaches, nausea, plugged ear sensation, vomiting or wheezing. She has tried acetaminophen for the symptoms. The treatment provided no relief.     Review of Systems   Constitutional:  Positive for chills, fever and malaise/fatigue.   HENT:  Positive for congestion, rhinorrhea and sore throat. Negative for ear pain.    Respiratory:  Negative for cough and wheezing.    Gastrointestinal:  Negative for nausea and vomiting.   Neurological:  Negative for headaches.     Medications:    omeprazole    Allergies: Patient has no known allergies.    Problem List: Kinza Juan does not have any pertinent problems on file.    Surgical History:  Past Surgical History:   Procedure Laterality Date    REPEAT C SECTOIN WITH SALPINGECTOMY Bilateral 2022    Procedure:  SECTION, REPEAT, WITH SALPINGECTOMY;  Surgeon: Isabell Connolly D.O.;  Location: [facility];  Service: Obstetrics    OR ERCP,DIAGNOSTIC  2020    Procedure: ERCP, DIAGNOSTIC;  Surgeon: Lee Kelly M.D.;  Location: SURGERY [facility];  Service: Gastroenterology    OR ERCP,DIAGNOSTIC N/A 2020    Procedure: ERCP (ENDOSCOPIC RETROGRADE CHOLANGIOPANCREATOGRAPHY);  Surgeon: Lee Kelly M.D.;  Location: SURGERY SAME DAY [facility];  Service: Gastroenterology    JACQUI BY LAPAROSCOPY  2020    Procedure: CHOLECYSTECTOMY, LAPAROSCOPIC;  Surgeon: Iftikhar Flowers M.D.;  Location: SURGERY [facility];  Service: General    PRIMARY C SECTION  3/24/2013    Performed by Briana Cano M.D. at LABOR AND DELIVERY    RECONSTRUCTION, KNEE, ACL,  "ARTHROSCOPIC  5/31/2012    Performed by CAITY DORSEY at SURGERY [facility]    MENISCECTOMY, KNEE, MEDIAL  5/31/2012    Performed by CAITY DORSEY at SURGERY [facility] ORS    DENTAL EXTRACTION(S)  2005       Past Social Hx: Kinza Juan  reports that she has never smoked. She has never used smokeless tobacco. She reports that she does not currently use alcohol. She reports that she does not currently use drugs after having used the following drugs: Marijuana and Oral.     Past Family Hx:  Kinza Juan family history includes Diabetes in her father, maternal grandfather, and paternal grandmother; Hypertension in her maternal grandmother; Stroke in her maternal grandmother.     Problem list, medications, and allergies reviewed by myself today in Epic.     Objective:     /78 (BP Location: Right arm, Patient Position: Sitting, BP Cuff Size: Large adult long)   Pulse 71   Temp 36.7 °C (98 °F) (Temporal)   Resp 16   Ht 1.676 m (5' 6\")   Wt (!) 141 kg (311 lb 11.2 oz)   LMP 04/10/2023 (Approximate)   SpO2 97%   Breastfeeding No   BMI 50.31 kg/m²     Physical Exam  Vitals and nursing note reviewed.   Constitutional:       General: She is not in acute distress.     Appearance: She is well-developed.   HENT:      Head: Normocephalic and atraumatic.      Right Ear: Tympanic membrane and external ear normal.      Left Ear: Tympanic membrane and external ear normal.      Nose: Nose normal.      Right Sinus: No maxillary sinus tenderness or frontal sinus tenderness.      Left Sinus: No maxillary sinus tenderness or frontal sinus tenderness.      Mouth/Throat:      Mouth: Mucous membranes are moist.      Pharynx: Uvula midline. Posterior oropharyngeal erythema present.      Tonsils: No tonsillar exudate or tonsillar abscesses.   Eyes:      General:         Right eye: No discharge.         Left eye: No discharge.      Conjunctiva/sclera: Conjunctivae normal.   Cardiovascular:      " Rate and Rhythm: Normal rate.   Pulmonary:      Effort: Pulmonary effort is normal. No respiratory distress.      Breath sounds: Normal breath sounds.   Abdominal:      General: There is no distension.   Musculoskeletal:         General: Normal range of motion.   Lymphadenopathy:      Head:      Right side of head: Tonsillar adenopathy present.      Left side of head: Tonsillar adenopathy present.      Cervical: No cervical adenopathy.   Skin:     General: Skin is warm and dry.      Findings: No rash.   Neurological:      General: No focal deficit present.      Mental Status: She is alert and oriented to person, place, and time. Mental status is at baseline.      Gait: Gait (gait at baseline) normal.   Psychiatric:         Judgment: Judgment normal.       Assessment/Plan:     Diagnosis and associated orders:     1. Pharyngitis due to Streptococcus species  POCT CoV-2, Flu A/B, RSV by PCR    amoxicillin (AMOXIL) 500 MG Cap    POCT Rapid Strep A    DISCONTINUED: amoxicillin (AMOXIL) 500 MG Cap    CANCELED: POCT GROUP A STREP, PCR           Comments/MDM:     POSITIVE Strep A.  Recommend warm salt water gargles, soft foods, cool liquids, ibuprofen and Tylenol for any pain or fevers.   Return to the urgent care if symptoms are not improving or any worsening symptoms or concerns. Present to the emergency room or call 911 if any severe swelling of the throat, difficulty swallowing, difficulty breathing, wheezing, or any other severe concerns. Differential diagnosis, natural history, supportive care, and indications for immediate follow-up discussed.                Please note that this dictation was created using voice recognition software. I have made a reasonable attempt to correct obvious errors, but I expect that there are errors of grammar and possibly content that I did not discover before finalizing the note.    This note was electronically signed by Juancarlos MORENO

## 2023-05-04 LAB
AMBIGUOUS DTTM AMBI4: NORMAL
FLUAV RNA SPEC QL NAA+PROBE: NEGATIVE
FLUBV RNA SPEC QL NAA+PROBE: NEGATIVE
RSV RNA SPEC QL NAA+PROBE: NEGATIVE
SARS-COV-2 RNA RESP QL NAA+PROBE: NOTDETECTED
SPECIMEN SOURCE: NORMAL

## 2023-10-03 ENCOUNTER — OFFICE VISIT (OUTPATIENT)
Dept: MEDICAL GROUP | Facility: MEDICAL CENTER | Age: 38
End: 2023-10-03
Payer: COMMERCIAL

## 2023-10-03 VITALS
WEIGHT: 293 LBS | OXYGEN SATURATION: 97 % | TEMPERATURE: 98.4 F | HEART RATE: 98 BPM | DIASTOLIC BLOOD PRESSURE: 74 MMHG | SYSTOLIC BLOOD PRESSURE: 118 MMHG | HEIGHT: 66 IN | RESPIRATION RATE: 16 BRPM | BODY MASS INDEX: 47.09 KG/M2

## 2023-10-03 DIAGNOSIS — Z23 NEED FOR VACCINATION: ICD-10-CM

## 2023-10-03 DIAGNOSIS — E66.01 MORBID OBESITY WITH BMI OF 45.0-49.9, ADULT (HCC): ICD-10-CM

## 2023-10-03 DIAGNOSIS — R07.89 CHEST DISCOMFORT: ICD-10-CM

## 2023-10-03 DIAGNOSIS — Z11.59 NEED FOR HEPATITIS C SCREENING TEST: ICD-10-CM

## 2023-10-03 DIAGNOSIS — Z00.00 PE (PHYSICAL EXAM), ANNUAL: ICD-10-CM

## 2023-10-03 DIAGNOSIS — K21.9 GASTROESOPHAGEAL REFLUX DISEASE WITHOUT ESOPHAGITIS: ICD-10-CM

## 2023-10-03 PROCEDURE — 90471 IMMUNIZATION ADMIN: CPT | Performed by: NURSE PRACTITIONER

## 2023-10-03 PROCEDURE — 3078F DIAST BP <80 MM HG: CPT | Performed by: NURSE PRACTITIONER

## 2023-10-03 PROCEDURE — 3074F SYST BP LT 130 MM HG: CPT | Performed by: NURSE PRACTITIONER

## 2023-10-03 PROCEDURE — 99214 OFFICE O/P EST MOD 30 MIN: CPT | Mod: 25 | Performed by: NURSE PRACTITIONER

## 2023-10-03 PROCEDURE — 90686 IIV4 VACC NO PRSV 0.5 ML IM: CPT | Performed by: NURSE PRACTITIONER

## 2023-10-03 RX ORDER — OMEPRAZOLE 20 MG/1
20 CAPSULE, DELAYED RELEASE ORAL 2 TIMES DAILY
Qty: 180 CAPSULE | Refills: 1 | Status: SHIPPED | OUTPATIENT
Start: 2023-10-03 | End: 2023-12-27 | Stop reason: SDUPTHER

## 2023-10-03 SDOH — ECONOMIC STABILITY: FOOD INSECURITY: WITHIN THE PAST 12 MONTHS, THE FOOD YOU BOUGHT JUST DIDN'T LAST AND YOU DIDN'T HAVE MONEY TO GET MORE.: NEVER TRUE

## 2023-10-03 SDOH — HEALTH STABILITY: PHYSICAL HEALTH: ON AVERAGE, HOW MANY DAYS PER WEEK DO YOU ENGAGE IN MODERATE TO STRENUOUS EXERCISE (LIKE A BRISK WALK)?: 0 DAYS

## 2023-10-03 SDOH — HEALTH STABILITY: MENTAL HEALTH
STRESS IS WHEN SOMEONE FEELS TENSE, NERVOUS, ANXIOUS, OR CAN'T SLEEP AT NIGHT BECAUSE THEIR MIND IS TROUBLED. HOW STRESSED ARE YOU?: ONLY A LITTLE

## 2023-10-03 SDOH — ECONOMIC STABILITY: INCOME INSECURITY: IN THE LAST 12 MONTHS, WAS THERE A TIME WHEN YOU WERE NOT ABLE TO PAY THE MORTGAGE OR RENT ON TIME?: NO

## 2023-10-03 SDOH — ECONOMIC STABILITY: HOUSING INSECURITY
IN THE LAST 12 MONTHS, WAS THERE A TIME WHEN YOU DID NOT HAVE A STEADY PLACE TO SLEEP OR SLEPT IN A SHELTER (INCLUDING NOW)?: NO

## 2023-10-03 SDOH — ECONOMIC STABILITY: TRANSPORTATION INSECURITY
IN THE PAST 12 MONTHS, HAS LACK OF TRANSPORTATION KEPT YOU FROM MEETINGS, WORK, OR FROM GETTING THINGS NEEDED FOR DAILY LIVING?: NO

## 2023-10-03 SDOH — ECONOMIC STABILITY: HOUSING INSECURITY: IN THE LAST 12 MONTHS, HOW MANY PLACES HAVE YOU LIVED?: 1

## 2023-10-03 SDOH — ECONOMIC STABILITY: INCOME INSECURITY: HOW HARD IS IT FOR YOU TO PAY FOR THE VERY BASICS LIKE FOOD, HOUSING, MEDICAL CARE, AND HEATING?: NOT VERY HARD

## 2023-10-03 SDOH — ECONOMIC STABILITY: TRANSPORTATION INSECURITY
IN THE PAST 12 MONTHS, HAS THE LACK OF TRANSPORTATION KEPT YOU FROM MEDICAL APPOINTMENTS OR FROM GETTING MEDICATIONS?: NO

## 2023-10-03 SDOH — HEALTH STABILITY: PHYSICAL HEALTH: ON AVERAGE, HOW MANY MINUTES DO YOU ENGAGE IN EXERCISE AT THIS LEVEL?: 0 MIN

## 2023-10-03 SDOH — ECONOMIC STABILITY: FOOD INSECURITY: WITHIN THE PAST 12 MONTHS, YOU WORRIED THAT YOUR FOOD WOULD RUN OUT BEFORE YOU GOT MONEY TO BUY MORE.: NEVER TRUE

## 2023-10-03 SDOH — ECONOMIC STABILITY: TRANSPORTATION INSECURITY
IN THE PAST 12 MONTHS, HAS LACK OF RELIABLE TRANSPORTATION KEPT YOU FROM MEDICAL APPOINTMENTS, MEETINGS, WORK OR FROM GETTING THINGS NEEDED FOR DAILY LIVING?: NO

## 2023-10-03 ASSESSMENT — SOCIAL DETERMINANTS OF HEALTH (SDOH)
WITHIN THE PAST 12 MONTHS, YOU WORRIED THAT YOUR FOOD WOULD RUN OUT BEFORE YOU GOT THE MONEY TO BUY MORE: NEVER TRUE
HOW OFTEN DO YOU GET TOGETHER WITH FRIENDS OR RELATIVES?: MORE THAN THREE TIMES A WEEK
DO YOU BELONG TO ANY CLUBS OR ORGANIZATIONS SUCH AS CHURCH GROUPS UNIONS, FRATERNAL OR ATHLETIC GROUPS, OR SCHOOL GROUPS?: NO
IN A TYPICAL WEEK, HOW MANY TIMES DO YOU TALK ON THE PHONE WITH FAMILY, FRIENDS, OR NEIGHBORS?: MORE THAN THREE TIMES A WEEK
HOW OFTEN DO YOU ATTEND CHURCH OR RELIGIOUS SERVICES?: 1 TO 4 TIMES PER YEAR
HOW OFTEN DO YOU ATTEND CHURCH OR RELIGIOUS SERVICES?: 1 TO 4 TIMES PER YEAR
HOW OFTEN DO YOU GET TOGETHER WITH FRIENDS OR RELATIVES?: MORE THAN THREE TIMES A WEEK
HOW MANY DRINKS CONTAINING ALCOHOL DO YOU HAVE ON A TYPICAL DAY WHEN YOU ARE DRINKING: 1 OR 2
HOW OFTEN DO YOU ATTENT MEETINGS OF THE CLUB OR ORGANIZATION YOU BELONG TO?: NEVER
HOW HARD IS IT FOR YOU TO PAY FOR THE VERY BASICS LIKE FOOD, HOUSING, MEDICAL CARE, AND HEATING?: NOT VERY HARD
HOW OFTEN DO YOU ATTENT MEETINGS OF THE CLUB OR ORGANIZATION YOU BELONG TO?: NEVER
IN A TYPICAL WEEK, HOW MANY TIMES DO YOU TALK ON THE PHONE WITH FAMILY, FRIENDS, OR NEIGHBORS?: MORE THAN THREE TIMES A WEEK
HOW OFTEN DO YOU HAVE A DRINK CONTAINING ALCOHOL: 2-4 TIMES A MONTH
HOW OFTEN DO YOU HAVE SIX OR MORE DRINKS ON ONE OCCASION: NEVER
DO YOU BELONG TO ANY CLUBS OR ORGANIZATIONS SUCH AS CHURCH GROUPS UNIONS, FRATERNAL OR ATHLETIC GROUPS, OR SCHOOL GROUPS?: NO

## 2023-10-03 ASSESSMENT — LIFESTYLE VARIABLES
HOW OFTEN DO YOU HAVE SIX OR MORE DRINKS ON ONE OCCASION: NEVER
SKIP TO QUESTIONS 9-10: 1
HOW MANY STANDARD DRINKS CONTAINING ALCOHOL DO YOU HAVE ON A TYPICAL DAY: 1 OR 2
HOW OFTEN DO YOU HAVE A DRINK CONTAINING ALCOHOL: 2-4 TIMES A MONTH
AUDIT-C TOTAL SCORE: 2

## 2023-10-03 ASSESSMENT — PATIENT HEALTH QUESTIONNAIRE - PHQ9: CLINICAL INTERPRETATION OF PHQ2 SCORE: 0

## 2023-10-03 ASSESSMENT — FIBROSIS 4 INDEX: FIB4 SCORE: 0.46

## 2023-10-03 NOTE — ASSESSMENT & PLAN NOTE
New problem. Chest discomfort x 1 mos, a few episodes per wk. Denies SOB, dizziness. No CP today. No FH of MI.  Hx of acid reflex, well controlled.

## 2023-10-03 NOTE — ASSESSMENT & PLAN NOTE
New to me.   Chronic problem.   This is a life long issue since 14 yo.  Current weight: 314 lbs  Goal weight: 200 lbs  Admits to issues with portion control as well as consuming large amount of starches.  Has tried phentermine with helped.   No exercise  Stress controlled, sleeps well.  Pt requesting a referral to Bariatric surgery Dr. Flowers

## 2023-10-03 NOTE — PROGRESS NOTES
Chief Complaint   Patient presents with    Establish Care    Chest Pain     Re occurring only last a few minutes     Requesting Labs    Weight Loss     Surgery     Medication Refill       HISTORY OF PRESENT ILLNESS: Patient is a 38 y.o. female, established patient who presents today to discuss medical problems as listed below:    Health Maintenance:  COMPLETED       Allergies: Patient has no known allergies.    Current Outpatient Medications   Medication Sig Dispense Refill    omeprazole (PRILOSEC) 20 MG delayed-release capsule Take 1 Capsule by mouth 2 times a day. 180 Capsule 1     No current facility-administered medications for this visit.       Allergies, past medical history, past surgical history, family history, social history reviewed and updated.    Review of Systems:     - Constitutional: Negative for fever, chills, unexpected weight change, and fatigue/generalized weakness.     - HEENT: Negative for headaches, vision changes, hearing changes, ear pain, ear discharge, rhinorrhea, sinus congestion, sore throat, and neck pain.      - Respiratory: Negative for cough, sputum production, chest congestion, dyspnea, wheezing, and crackles.      - Cardiovascular: Negative for chest pain, palpitations, orthopnea, and bilateral lower extremity edema.     - Gastrointestinal: Negative for heartburn, nausea, vomiting, abdominal pain, hematochezia, melena, diarrhea, constipation, and greasy/foul-smelling stools.     - Genitourinary: Negative for dysuria, polyuria, hematuria, pyuria, urinary urgency, and urinary incontinence.    - Musculoskeletal: Negative for myalgias, back pain, and joint pain.     - Skin: Negative for rash, itching, cyanotic skin color change.     - Neurological: Negative for dizziness, tingling, tremors, focal sensory deficit, focal weakness and headaches.     - Endo/Heme/Allergies: Does not bruise/bleed easily.     - Psychiatric/Behavioral: Negative for depression, suicidal/homicidal ideation and  "memory loss.      All other systems reviewed and are negative    Exam:    /74   Pulse 98   Temp 36.9 °C (98.4 °F) (Temporal)   Resp 16   Ht 1.676 m (5' 6\")   Wt (!) 143 kg (314 lb 6.4 oz)   SpO2 97%   BMI 50.75 kg/m²  Body mass index is 50.75 kg/m².    Physical Exam:  Constitutional: Well-developed and well-nourished. Not diaphoretic. No distress.   Skin: Skin is warm and dry. No rash noted.  Head: Atraumatic without lesions.  Eyes: Conjunctivae and extraocular motions are normal. Pupils are equal, round, and reactive to light. No scleral icterus.   Ears:  External ears unremarkable. Tympanic membranes clear and intact.  Nose: Nares patent. Septum midline. Turbinates without erythema nor edema. No discharge.   Mouth/Throat: Dentition is normal. Tongue normal. Oropharynx is clear and moist. Posterior pharynx without erythema or exudates.  Neck: Supple, trachea midline. Normal range of motion. No thyromegaly present. No lymphadenopathy--cervical or supraclavicular.  Cardiovascular: Regular rate and rhythm, S1 and S2 without murmur, rubs, or gallops.    Chest: Effort normal. Clear to auscultation throughout. No adventitious sounds. No CVA tenderness.  Abdomen: Soft, non tender, and without distention. Active bowel sounds in all four quadrants. No rebound, guarding, masses or HSM.  : Negative for dysuria, polyuria, hematuria, pyuria, urinary urgency, and urinary incontinence.  Extremities: No cyanosis, clubbing, erythema, nor edema. Distal pulses intact and symmetric.   Musculoskeletal: All major joints AROM full in all directions without pain.  Neurological: Alert and oriented x 3. DTRs 2+/3 and symmetric. No cranial nerve deficit. 5/5 myotomes. Sensation intact. Negative Rhomberg.  Psychiatric:  Behavior, mood, and affect are appropriate.  MA/nursing note and vitals reviewed.    Assessment/Plan:  Gastroesophageal reflux disease without esophagitis  New to me. Chronic problem, weill controlled on " Omepazole 40 mg. Needing refills today. Admits to consuming trigger foods.     BMI 50.0-59.9, adult (HCC)  New to me.   Chronic problem.   This is a life long issue since 14 yo.  Current weight: 314 lbs  Goal weight: 200 lbs  Admits to issues with portion control as well as consuming large amount of starches.  Has tried phentermine with helped.   No exercise  Stress controlled, sleeps well.  Pt requesting a referral to Bariatric surgery Dr. Flowers      Chest discomfort  New problem. Chest discomfort x 1 mos, a few episodes per wk. Denies SOB, dizziness. No CP today. No FH of MI.  Hx of acid reflex, well controlled.     1. Need for vaccination  - INFLUENZA VACCINE QUAD INJ (PF)    2. Gastroesophageal reflux disease without esophagitis  Stable on current regimen.  Continue.  Refills given     3. Morbid obesity with BMI of 45.0-49.9, adult (HCC)  Uncontrolled, stable. Discussed the importance of diet and exercise.   - omeprazole (PRILOSEC) 20 MG delayed-release capsule; Take 1 Capsule by mouth 2 times a day.  Dispense: 180 Capsule; Refill: 1    4. BMI 50.0-59.9, adult (HCC)  - Referral to Bariatric Surgery    5. PE (physical exam), annual  - CBC WITHOUT DIFFERENTIAL; Future  - Comp Metabolic Panel; Future  - Lipid Profile; Future  - TSH WITH REFLEX TO FT4; Future  - VITAMIN D,25 HYDROXY (DEFICIENCY); Future  - HEMOGLOBIN A1C; Future    6. Chest discomfort  Uncontrolled,stable. ED precautions disucssed  - HOLTER - Cardiology Performed (48HR); Future    7. Need for hepatitis C screening test  - HEP C VIRUS ANTIBODY; Future      Discussed with patient possible alternative diagnoses, patient is to take all medications as prescribed.      If symptoms persist FU w/PCP, if symptoms worsen go to emergency room.      If experiencing any side effects from prescribed medications report to the office immediately or go to emergency room.     Reviewed indication, dosage, usage and potential adverse effects of prescribed medications.       Reviewed risks and benefits of treatment plan. Patient verbalizes understanding of all instruction and verbally agrees to plan.     Discussed plan with the patient, and patient agrees to the above.      I personally reviewed prior external notes and test results pertinent to today's visit.      No follow-ups on file. 6 wks

## 2023-10-03 NOTE — ASSESSMENT & PLAN NOTE
New to me. Chronic problem, weill controlled on Omepazole 40 mg. Needing refills today. Admits to consuming trigger foods.

## 2023-10-10 ENCOUNTER — OFFICE VISIT (OUTPATIENT)
Dept: URGENT CARE | Facility: CLINIC | Age: 38
End: 2023-10-10
Payer: COMMERCIAL

## 2023-10-10 ENCOUNTER — NON-PROVIDER VISIT (OUTPATIENT)
Dept: CARDIOLOGY | Facility: MEDICAL CENTER | Age: 38
End: 2023-10-10
Attending: NURSE PRACTITIONER
Payer: COMMERCIAL

## 2023-10-10 VITALS
DIASTOLIC BLOOD PRESSURE: 79 MMHG | HEIGHT: 66 IN | SYSTOLIC BLOOD PRESSURE: 130 MMHG | RESPIRATION RATE: 16 BRPM | HEART RATE: 90 BPM | WEIGHT: 293 LBS | BODY MASS INDEX: 47.09 KG/M2 | TEMPERATURE: 97.7 F | OXYGEN SATURATION: 97 %

## 2023-10-10 DIAGNOSIS — I49.1 PREMATURE ATRIAL CONTRACTIONS: ICD-10-CM

## 2023-10-10 DIAGNOSIS — I49.3 PVCS (PREMATURE VENTRICULAR CONTRACTIONS): ICD-10-CM

## 2023-10-10 DIAGNOSIS — R07.89 CHEST WALL PAIN: ICD-10-CM

## 2023-10-10 DIAGNOSIS — R07.89 CHEST DISCOMFORT: ICD-10-CM

## 2023-10-10 PROCEDURE — 3078F DIAST BP <80 MM HG: CPT | Performed by: NURSE PRACTITIONER

## 2023-10-10 PROCEDURE — 3075F SYST BP GE 130 - 139MM HG: CPT | Performed by: NURSE PRACTITIONER

## 2023-10-10 PROCEDURE — 93246 EXT ECG>7D<15D RECORDING: CPT

## 2023-10-10 PROCEDURE — 99213 OFFICE O/P EST LOW 20 MIN: CPT | Performed by: NURSE PRACTITIONER

## 2023-10-10 ASSESSMENT — ENCOUNTER SYMPTOMS
DIZZINESS: 0
PND: 0
VOMITING: 0
BACK PAIN: 0
SHORTNESS OF BREATH: 0
FEVER: 0
MYALGIAS: 0
PALPITATIONS: 0
ORTHOPNEA: 0
CLAUDICATION: 0
CHILLS: 0
EYE PAIN: 0
NAUSEA: 0
SORE THROAT: 0

## 2023-10-10 ASSESSMENT — FIBROSIS 4 INDEX: FIB4 SCORE: 0.46

## 2023-10-10 NOTE — PROGRESS NOTES
Subjective:   Kinza Juan is a 38 y.o. female who presents for Chest Pain (Patient states chest pain that comes and goes. Pt requesting EKG.)      HPI  Patient is a 58-year-old female presents urgent care for evaluation of chest pain that has been ongoing for the past couple weeks that is intermittent in nature.  Patient states that it does wax and wane.  Her primary care provider did order a Holter monitor in which she has been wearing for couple days however supposed to wear for 14 days and has not seen any results.  She is concerned that she may be having some abnormal cardiac rhythm and requesting an EKG on today's exam.  Patient is scheduled to go to [place] for work next week and does not feel comfortable traveling.  She has no shortness of breath, no palpitations, no dizziness, denies any rashes, fevers, chills, cough.  Patient denies any injury to the chest wall.  Review of Systems   Constitutional:  Negative for chills and fever.   HENT:  Negative for sore throat.    Eyes:  Negative for pain.   Respiratory:  Negative for shortness of breath.    Cardiovascular:  Positive for chest pain. Negative for palpitations, orthopnea, claudication, leg swelling and PND.   Gastrointestinal:  Negative for nausea and vomiting.   Genitourinary:  Negative for hematuria.   Musculoskeletal:  Negative for back pain and myalgias.   Skin:  Negative for rash.   Neurological:  Negative for dizziness.       Medications:    omeprazole    Allergies: Patient has no known allergies.    Problem List: Kinza Juan does not have any pertinent problems on file.    Surgical History:  Past Surgical History:   Procedure Laterality Date    REPEAT C SECTOIN WITH SALPINGECTOMY Bilateral 2022    Procedure:  SECTION, REPEAT, WITH SALPINGECTOMY;  Surgeon: Isabell Connolly D.O.;  Location: SURGERY [facility];  Service: Obstetrics    [place] ERCP,DIAGNOSTIC  2020    Procedure: ERCP, DIAGNOSTIC;  Surgeon:  "Lee Kelly M.D.;  Location: SURGERY [facility];  Service: Gastroenterology    [place] ERCP,DIAGNOSTIC N/A 9/16/2020    Procedure: ERCP (ENDOSCOPIC RETROGRADE CHOLANGIOPANCREATOGRAPHY);  Surgeon: Lee Kelly M.D.;  Location: SURGERY SAME DAY [facility];  Service: Gastroenterology    JACQUI BY LAPAROSCOPY  6/20/2020    Procedure: CHOLECYSTECTOMY, LAPAROSCOPIC;  Surgeon: Iftikhar Flowers M.D.;  Location: SURGERY [facility];  Service: General    PRIMARY C SECTION  3/24/2013    Performed by Briana Cano M.D. at LABOR AND DELIVERY    RECONSTRUCTION, KNEE, ACL, ARTHROSCOPIC  5/31/2012    Performed by CAITY DORSEY at [facility]    MENISCECTOMY, KNEE, MEDIAL  5/31/2012    Performed by CAITY DORSEY at [facility]    DENTAL EXTRACTION(S)  2005       Past Social Hx: Kinza Juan  reports that she has never smoked. She has never used smokeless tobacco. She reports that she does not currently use alcohol. She reports that she does not currently use drugs after having used the following drugs: Marijuana and Oral.     Past Family Hx:  Kinza Juan family history includes Diabetes in her father, maternal grandfather, and paternal grandmother; Hypertension in her maternal grandmother; Stroke in her maternal grandmother.     Problem list, medications, and allergies reviewed by myself today in Epic.     Objective:     /79 (BP Location: Left arm, Patient Position: Sitting, BP Cuff Size: Large adult)   Pulse 90   Temp 36.5 °C (97.7 °F) (Temporal)   Resp 16   Ht 1.676 m (5' 6\")   Wt (!) 143 kg (315 lb)   SpO2 97%   BMI 50.84 kg/m²     Physical Exam  Vitals and nursing note reviewed.   Constitutional:       General: She is not in acute distress.     Appearance: She is well-developed.   HENT:      Head: Normocephalic and atraumatic.      Right Ear: External ear normal.      Left Ear: External ear normal.      Nose: Nose normal.      Mouth/Throat:      Mouth: Mucous " membranes are moist.   Eyes:      Conjunctiva/sclera: Conjunctivae normal.   Cardiovascular:      Rate and Rhythm: Normal rate.      Pulses: Normal pulses.      Heart sounds: Normal heart sounds, S1 normal and S2 normal.   Pulmonary:      Effort: Pulmonary effort is normal. No respiratory distress.      Breath sounds: Normal breath sounds.   Chest:      Chest wall: Tenderness present. No swelling, crepitus or edema.       Abdominal:      General: There is no distension.   Musculoskeletal:         General: Normal range of motion.   Skin:     General: Skin is warm and dry.      Findings: No rash.   Neurological:      General: No focal deficit present.      Mental Status: She is alert and oriented to person, place, and time. Mental status is at baseline.      Gait: Gait (gait at baseline) normal.   Psychiatric:         Judgment: Judgment normal.         Assessment/Plan:     Diagnosis and associated orders:     1. Chest wall pain  EKG - Clinic Performed             Comments/MDM:     EKG interpreted by provider    Rate: Normal  Rhythm: Regular Sinus   QRS Complex: Narrow   Axis: Normal  Interval/Conduction: Normal  Enlargement: None    Ischemia:      ST Segments: no acute change      T Waves: no acute change      Q Waves: none    Comparison: NONE    Clinical Impression: no acute changes and normal EKG, see scanned sheet      I personally reviewed prior external notes and prior test results pertinent to today's visit.   EKG with no acute ischemia, normal sinus rhythm, vital signs are stable, physical exam reassuring as pain is reproducible, likely musculoskeletal reassured patient of clinical findings.  She does have a cardiac monitor for 2 weeks in place she will follow-updiscussed management options, risks and benefits, and alternatives to treatment plan agreed upon.   Red flags discussed and indications to immediately call 911 or present to the Emergency Department.   Supportive care, differential diagnoses, and  indications for immediate follow-up discussed with patient.    Patient expresses understanding and agrees to plan. Patient denies any other questions or concerns.                Please note that this dictation was created using voice recognition software. I have made a reasonable attempt to correct obvious errors, but I expect that there are errors of grammar and possibly content that I did not discover before finalizing the note.    This note was electronically signed by Juancarlos KENNEDY.

## 2023-10-11 ENCOUNTER — DOCUMENTATION (OUTPATIENT)
Dept: HEALTH INFORMATION MANAGEMENT | Facility: OTHER | Age: 38
End: 2023-10-11
Payer: COMMERCIAL

## 2023-10-12 ENCOUNTER — APPOINTMENT (OUTPATIENT)
Dept: MEDICAL GROUP | Facility: MEDICAL CENTER | Age: 38
End: 2023-10-12
Payer: COMMERCIAL

## 2023-11-06 ENCOUNTER — TELEPHONE (OUTPATIENT)
Dept: CARDIOLOGY | Facility: MEDICAL CENTER | Age: 38
End: 2023-11-06
Payer: COMMERCIAL

## 2023-11-07 PROCEDURE — 93248 EXT ECG>7D<15D REV&INTERPJ: CPT | Performed by: STUDENT IN AN ORGANIZED HEALTH CARE EDUCATION/TRAINING PROGRAM

## 2023-11-15 ENCOUNTER — APPOINTMENT (OUTPATIENT)
Dept: MEDICAL GROUP | Facility: MEDICAL CENTER | Age: 38
End: 2023-11-15
Payer: COMMERCIAL

## 2023-12-21 ENCOUNTER — APPOINTMENT (OUTPATIENT)
Dept: MEDICAL GROUP | Facility: MEDICAL CENTER | Age: 38
End: 2023-12-21
Payer: COMMERCIAL

## 2023-12-27 ENCOUNTER — APPOINTMENT (OUTPATIENT)
Dept: MEDICAL GROUP | Facility: MEDICAL CENTER | Age: 38
End: 2023-12-27
Payer: COMMERCIAL

## 2023-12-27 DIAGNOSIS — E66.01 MORBID OBESITY WITH BMI OF 45.0-49.9, ADULT (HCC): ICD-10-CM

## 2023-12-27 RX ORDER — OMEPRAZOLE 20 MG/1
20 CAPSULE, DELAYED RELEASE ORAL 2 TIMES DAILY
Qty: 180 CAPSULE | Refills: 1 | Status: SHIPPED | OUTPATIENT
Start: 2023-12-27 | End: 2024-02-09 | Stop reason: SDUPTHER

## 2023-12-29 ENCOUNTER — OFFICE VISIT (OUTPATIENT)
Dept: URGENT CARE | Facility: CLINIC | Age: 38
End: 2023-12-29
Payer: COMMERCIAL

## 2023-12-29 VITALS
DIASTOLIC BLOOD PRESSURE: 80 MMHG | HEART RATE: 76 BPM | BODY MASS INDEX: 47.09 KG/M2 | SYSTOLIC BLOOD PRESSURE: 116 MMHG | WEIGHT: 293 LBS | TEMPERATURE: 98 F | HEIGHT: 66 IN | OXYGEN SATURATION: 96 % | RESPIRATION RATE: 16 BRPM

## 2023-12-29 DIAGNOSIS — H66.002 ACUTE SUPPURATIVE OTITIS MEDIA OF LEFT EAR WITHOUT SPONTANEOUS RUPTURE OF TYMPANIC MEMBRANE, RECURRENCE NOT SPECIFIED: ICD-10-CM

## 2023-12-29 DIAGNOSIS — J02.9 PHARYNGITIS, UNSPECIFIED ETIOLOGY: ICD-10-CM

## 2023-12-29 LAB — S PYO DNA SPEC NAA+PROBE: NOT DETECTED

## 2023-12-29 PROCEDURE — 87651 STREP A DNA AMP PROBE: CPT | Performed by: FAMILY MEDICINE

## 2023-12-29 PROCEDURE — 99213 OFFICE O/P EST LOW 20 MIN: CPT | Performed by: FAMILY MEDICINE

## 2023-12-29 PROCEDURE — 3079F DIAST BP 80-89 MM HG: CPT | Performed by: FAMILY MEDICINE

## 2023-12-29 PROCEDURE — 3074F SYST BP LT 130 MM HG: CPT | Performed by: FAMILY MEDICINE

## 2023-12-29 RX ORDER — AMOXICILLIN 875 MG/1
875 TABLET, COATED ORAL 2 TIMES DAILY
Qty: 14 TABLET | Refills: 0 | Status: SHIPPED | OUTPATIENT
Start: 2023-12-29 | End: 2024-01-05

## 2023-12-29 ASSESSMENT — ENCOUNTER SYMPTOMS
WEIGHT LOSS: 0
VOMITING: 0
MYALGIAS: 0
EYE DISCHARGE: 0
NAUSEA: 0
EYE REDNESS: 0

## 2023-12-29 ASSESSMENT — FIBROSIS 4 INDEX: FIB4 SCORE: 0.46

## 2023-12-29 NOTE — PROGRESS NOTES
"Rossy Juan is a 38 y.o. female who presents with Otalgia and Pharyngitis (All symptoms started Tuesday )            3 days left earache. ST. No fever. No cough. No recent swimming. No trauma. Tolerating fluids with normal urine output. Exposed to strep throat at [place]. No other aggravating or alleviating factors.          Review of Systems   Constitutional:  Negative for malaise/fatigue and weight loss.   Eyes:  Negative for discharge and redness.   Gastrointestinal:  Negative for nausea and vomiting.   Musculoskeletal:  Negative for joint pain and myalgias.   Skin:  Negative for itching and rash.              Objective     /80 (BP Location: Left arm, Patient Position: Sitting)   Pulse 76   Temp 36.7 °C (98 °F) (Temporal)   Resp 16   Ht 1.676 m (5' 6\")   Wt (!) 144 kg (317 lb)   SpO2 96%   BMI 51.17 kg/m²      Physical Exam  Constitutional:       General: She is not in acute distress.     Appearance: She is well-developed.   HENT:      Head: Normocephalic and atraumatic.      Right Ear: Tympanic membrane normal.      Ears:      Comments: Left TM red and bulging  Eyes:      Conjunctiva/sclera: Conjunctivae normal.   Cardiovascular:      Rate and Rhythm: Normal rate and regular rhythm.      Heart sounds: Normal heart sounds. No murmur heard.  Pulmonary:      Effort: Pulmonary effort is normal.      Breath sounds: Normal breath sounds. No wheezing.   Skin:     General: Skin is warm and dry.      Findings: No rash.   Neurological:      Mental Status: She is alert.                             Assessment & Plan      Poct pcr strep negative    1. Pharyngitis, unspecified etiology  POCT CEPHEID GROUP A STREP - PCR      2. Acute suppurative otitis media of left ear without spontaneous rupture of tympanic membrane, recurrence not specified  amoxicillin (AMOXIL) 875 MG tablet        Differential diagnosis, natural history, supportive care, and indications for immediate follow-up " were discussed.

## 2024-01-11 ENCOUNTER — APPOINTMENT (OUTPATIENT)
Dept: MEDICAL GROUP | Facility: MEDICAL CENTER | Age: 39
End: 2024-01-11
Payer: COMMERCIAL

## 2024-01-19 ENCOUNTER — OFFICE VISIT (OUTPATIENT)
Dept: URGENT CARE | Facility: PHYSICIAN GROUP | Age: 39
End: 2024-01-19
Payer: COMMERCIAL

## 2024-01-19 VITALS
WEIGHT: 293 LBS | HEIGHT: 66 IN | HEART RATE: 77 BPM | SYSTOLIC BLOOD PRESSURE: 132 MMHG | OXYGEN SATURATION: 96 % | TEMPERATURE: 98.6 F | DIASTOLIC BLOOD PRESSURE: 74 MMHG | RESPIRATION RATE: 16 BRPM | BODY MASS INDEX: 47.09 KG/M2

## 2024-01-19 DIAGNOSIS — R09.81 NASAL CONGESTION WITH RHINORRHEA: ICD-10-CM

## 2024-01-19 DIAGNOSIS — R50.9 LOW GRADE FEVER: ICD-10-CM

## 2024-01-19 DIAGNOSIS — J06.9 VIRAL URI WITH COUGH: ICD-10-CM

## 2024-01-19 DIAGNOSIS — J34.89 NASAL CONGESTION WITH RHINORRHEA: ICD-10-CM

## 2024-01-19 DIAGNOSIS — R05.1 ACUTE COUGH: ICD-10-CM

## 2024-01-19 LAB
FLUAV RNA SPEC QL NAA+PROBE: NEGATIVE
FLUBV RNA SPEC QL NAA+PROBE: NEGATIVE
RSV RNA SPEC QL NAA+PROBE: NEGATIVE
SARS-COV-2 RNA RESP QL NAA+PROBE: NEGATIVE

## 2024-01-19 PROCEDURE — 3078F DIAST BP <80 MM HG: CPT | Performed by: NURSE PRACTITIONER

## 2024-01-19 PROCEDURE — 0241U POCT CEPHEID COV-2, FLU A/B, RSV - PCR: CPT | Performed by: NURSE PRACTITIONER

## 2024-01-19 PROCEDURE — 99213 OFFICE O/P EST LOW 20 MIN: CPT | Performed by: NURSE PRACTITIONER

## 2024-01-19 PROCEDURE — 3075F SYST BP GE 130 - 139MM HG: CPT | Performed by: NURSE PRACTITIONER

## 2024-01-19 ASSESSMENT — ENCOUNTER SYMPTOMS
COUGH: 1
GASTROINTESTINAL NEGATIVE: 1
HEADACHES: 1
WHEEZING: 0
CARDIOVASCULAR NEGATIVE: 1
SHORTNESS OF BREATH: 0
MYALGIAS: 1
SORE THROAT: 1
CHILLS: 1
FEVER: 1

## 2024-01-19 ASSESSMENT — FIBROSIS 4 INDEX: FIB4 SCORE: 0.46

## 2024-01-19 NOTE — PROGRESS NOTES
Subjective     Kinza Juan is a 38 y.o. female who presents with Cough (Since Monday has been having a cough, sore throat, head ache, body aches, fatigue, low grade fevers. Chills, took a covid test was negative )            Cough  Associated symptoms include chills, a fever, headaches, myalgias and a sore throat. Pertinent negatives include no shortness of breath or wheezing.    has been experiencing cold-like symptoms x 4 days.  Experiencing nasal congestion, postnasal drainage, sore throat, cough, headache, body aches and fatigue.   has had low-grade fever up to 99.  Did take at home COVID test which was negative.  Denies nausea, vomiting, shortness of breath or wheezing with cough.    PMH:  has a past medical history of COVID-19, GERD (gastroesophageal reflux disease), and Postpartum depression (2022).  MEDS:   Current Outpatient Medications:     omeprazole (PRILOSEC) 20 MG delayed-release capsule, Take 1 Capsule by mouth 2 times a day., Disp: 180 Capsule, Rfl: 1  ALLERGIES: No Known Allergies  SURGHX:   Past Surgical History:   Procedure Laterality Date    REPEAT C SECTOIN WITH SALPINGECTOMY Bilateral 2022    Procedure:  SECTION, REPEAT, WITH SALPINGECTOMY;  Surgeon: Isabell Connolly D.O.;  Location: SURGERY LAB[facility] DELIVERY;  Service: Obstetrics    [place] ERCP,DIAGNOSTIC  2020    Procedure: ERCP, DIAGNOSTIC;  Surgeon: Lee Kelly M.D.;  Location: SURGERY [facility];  Service: Gastroenterology    [place] ERCP,DIAGNOSTIC N/A 2020    Procedure: ERCP (ENDOSCOPIC RETROGRADE CHOLANGIOPANCREATOGRAPHY);  Surgeon: Lee Kelly M.D.;  Location: SURGERY SAME DAY [facility];  Service: Gastroenterology    JACQUI BY LAPAROSCOPY  2020    Procedure: CHOLECYSTECTOMY, LAPAROSCOPIC;  Surgeon: Iftikhar Flowers M.D.;  Location: SURGERY [facility];  Service: General    PRIMARY C SECTION  3/24/2013    Performed by Briana Cano M.D. at LABOR AND DELIVERY    RECONSTRUCTION,  "KNEE, ACL, ARTHROSCOPIC  5/31/2012    Performed by CAITY DORSEY at SURGERY [facility]    MENISCECTOMY, KNEE, MEDIAL  5/31/2012    Performed by CAITY DORSEY at SURGERY [facility]    DENTAL EXTRACTION(S)  2005     SOCHX:  reports that she has never smoked. She has never used smokeless tobacco. She reports that she does not currently use alcohol. She reports that she does not currently use drugs after having used the following drugs: Marijuana and Oral.  FH: Family history was reviewed, no pertinent findings to report      Review of Systems   Constitutional:  Positive for chills, fever and malaise/fatigue.   HENT:  Positive for congestion and sore throat.    Respiratory:  Positive for cough. Negative for shortness of breath and wheezing.    Cardiovascular: Negative.    Gastrointestinal: Negative.    Musculoskeletal:  Positive for myalgias.   Neurological:  Positive for headaches.   All other systems reviewed and are negative.             Objective     /74 (BP Location: Right arm, Patient Position: Sitting, BP Cuff Size: Large adult long)   Pulse 77   Temp 37 °C (98.6 °F) (Temporal)   Resp 16   Ht 1.676 m (5' 6\")   Wt (!) 144 kg (317 lb 12.8 oz)   SpO2 96%   BMI 51.29 kg/m²      Physical Exam  Vitals reviewed.   Constitutional:       General: She is awake. She is not in acute distress.     Appearance: Normal appearance. She is well-developed. She is not ill-appearing, toxic-appearing or diaphoretic.   HENT:      Head: Normocephalic.      Right Ear: Ear canal and external ear normal. A middle ear effusion is present.      Left Ear: Ear canal and external ear normal. A middle ear effusion is present.      Nose: Congestion and rhinorrhea present.      Mouth/Throat:      Lips: Pink.      Mouth: Mucous membranes are dry.      Pharynx: Oropharynx is clear. Uvula midline.   Eyes:      Conjunctiva/sclera: Conjunctivae normal.   Cardiovascular:      Rate and Rhythm: Normal rate.   Pulmonary:      " Effort: Pulmonary effort is normal.      Breath sounds: Normal breath sounds and air entry.   Musculoskeletal:         General: Normal range of motion.      Cervical back: Normal range of motion and neck supple.   Skin:     General: Skin is warm and dry.   Neurological:      Mental Status: She is alert and oriented to person, place, and time.   Psychiatric:         Attention and Perception: Attention normal.         Mood and Affect: Mood normal.         Speech: Speech normal.         Behavior: Behavior normal. Behavior is cooperative.                             Assessment & Plan        1. Nasal congestion with rhinorrhea    - POCT CoV-2, Flu A/B, RSV by PCR    2. Acute cough    - POCT CoV-2, Flu A/B, RSV by PCR    3. Low grade fever    - POCT CoV-2, Flu A/B, RSV by PCR    4. Viral URI with cough    -Maintain hydration/water intake  -May use over the counter Ibuprofen/Tylenol as needed for fever  -May use humidifier/vaporizer at home as needed for dry cough/nasal passages  -Gargle salt water or throat lozenges as needed for throat irritation/dry cough  -Get rest  -May use daily longer acting antihistamine as needed (no decongestant) for any post nasal drainage   -May use saline nasal spray/Flonase as needed to flush any nasal congestion/post nasal drainage   -Monitor for fevers, worse cough, phlegm, shortness of breath, wheeze, chest tightness- need re-evaluation

## 2024-02-06 ENCOUNTER — HOSPITAL ENCOUNTER (OUTPATIENT)
Dept: RADIOLOGY | Facility: MEDICAL CENTER | Age: 39
End: 2024-02-06
Attending: COLON & RECTAL SURGERY
Payer: COMMERCIAL

## 2024-02-06 ENCOUNTER — HOSPITAL ENCOUNTER (OUTPATIENT)
Dept: LAB | Facility: MEDICAL CENTER | Age: 39
End: 2024-02-06
Attending: COLON & RECTAL SURGERY
Payer: COMMERCIAL

## 2024-02-06 ENCOUNTER — HOSPITAL ENCOUNTER (OUTPATIENT)
Dept: CARDIOLOGY | Facility: MEDICAL CENTER | Age: 39
End: 2024-02-06
Attending: COLON & RECTAL SURGERY
Payer: COMMERCIAL

## 2024-02-06 ENCOUNTER — HOSPITAL ENCOUNTER (OUTPATIENT)
Dept: LAB | Facility: MEDICAL CENTER | Age: 39
End: 2024-02-06
Attending: NURSE PRACTITIONER
Payer: COMMERCIAL

## 2024-02-06 DIAGNOSIS — Z01.818 ENCOUNTER FOR OTHER PREPROCEDURAL EXAMINATION: ICD-10-CM

## 2024-02-06 DIAGNOSIS — Z00.00 PE (PHYSICAL EXAM), ANNUAL: ICD-10-CM

## 2024-02-06 DIAGNOSIS — K21.9 GASTROESOPHAGEAL REFLUX DISEASE, UNSPECIFIED WHETHER ESOPHAGITIS PRESENT: ICD-10-CM

## 2024-02-06 DIAGNOSIS — Z11.59 NEED FOR HEPATITIS C SCREENING TEST: ICD-10-CM

## 2024-02-06 DIAGNOSIS — E66.01 MORBID OBESITY (HCC): ICD-10-CM

## 2024-02-06 LAB
25(OH)D3 SERPL-MCNC: 15 NG/ML (ref 30–100)
ALBUMIN SERPL BCP-MCNC: 4 G/DL (ref 3.2–4.9)
ALBUMIN/GLOB SERPL: 1.2 G/DL
ALP SERPL-CCNC: 120 U/L (ref 30–99)
ALT SERPL-CCNC: 33 U/L (ref 2–50)
ANION GAP SERPL CALC-SCNC: 11 MMOL/L (ref 7–16)
AST SERPL-CCNC: 33 U/L (ref 12–45)
BILIRUB SERPL-MCNC: 0.2 MG/DL (ref 0.1–1.5)
BUN SERPL-MCNC: 16 MG/DL (ref 8–22)
CALCIUM ALBUM COR SERPL-MCNC: 8.8 MG/DL (ref 8.5–10.5)
CALCIUM SERPL-MCNC: 8.8 MG/DL (ref 8.5–10.5)
CHLORIDE SERPL-SCNC: 104 MMOL/L (ref 96–112)
CHOLEST SERPL-MCNC: 166 MG/DL (ref 100–199)
CO2 SERPL-SCNC: 25 MMOL/L (ref 20–33)
CREAT SERPL-MCNC: 0.57 MG/DL (ref 0.5–1.4)
EKG IMPRESSION: NORMAL
ERYTHROCYTE [DISTWIDTH] IN BLOOD BY AUTOMATED COUNT: 45.9 FL (ref 35.9–50)
EST. AVERAGE GLUCOSE BLD GHB EST-MCNC: 140 MG/DL
EST. AVERAGE GLUCOSE BLD GHB EST-MCNC: 143 MG/DL
GFR SERPLBLD CREATININE-BSD FMLA CKD-EPI: 119 ML/MIN/1.73 M 2
GLOBULIN SER CALC-MCNC: 3.3 G/DL (ref 1.9–3.5)
GLUCOSE SERPL-MCNC: 114 MG/DL (ref 65–99)
HBA1C MFR BLD: 6.5 % (ref 4–5.6)
HBA1C MFR BLD: 6.6 % (ref 4–5.6)
HCT VFR BLD AUTO: 43.6 % (ref 37–47)
HCV AB SER QL: NORMAL
HDLC SERPL-MCNC: 38 MG/DL
HGB BLD-MCNC: 13.7 G/DL (ref 12–16)
LDLC SERPL CALC-MCNC: 74 MG/DL
MCH RBC QN AUTO: 27.5 PG (ref 27–33)
MCHC RBC AUTO-ENTMCNC: 31.4 G/DL (ref 32.2–35.5)
MCV RBC AUTO: 87.4 FL (ref 81.4–97.8)
PLATELET # BLD AUTO: 363 K/UL (ref 164–446)
PMV BLD AUTO: 11.1 FL (ref 9–12.9)
POTASSIUM SERPL-SCNC: 4 MMOL/L (ref 3.6–5.5)
PROT SERPL-MCNC: 7.3 G/DL (ref 6–8.2)
RBC # BLD AUTO: 4.99 M/UL (ref 4.2–5.4)
SODIUM SERPL-SCNC: 140 MMOL/L (ref 135–145)
TRIGL SERPL-MCNC: 271 MG/DL (ref 0–149)
TSH SERPL DL<=0.005 MIU/L-ACNC: 1.13 UIU/ML (ref 0.38–5.33)
TSH SERPL DL<=0.005 MIU/L-ACNC: 1.18 UIU/ML (ref 0.38–5.33)
WBC # BLD AUTO: 12.9 K/UL (ref 4.8–10.8)

## 2024-02-06 PROCEDURE — 71046 X-RAY EXAM CHEST 2 VIEWS: CPT

## 2024-02-06 PROCEDURE — 83036 HEMOGLOBIN GLYCOSYLATED A1C: CPT

## 2024-02-06 PROCEDURE — 83036 HEMOGLOBIN GLYCOSYLATED A1C: CPT | Mod: 91

## 2024-02-06 PROCEDURE — 82306 VITAMIN D 25 HYDROXY: CPT

## 2024-02-06 PROCEDURE — 80053 COMPREHEN METABOLIC PANEL: CPT

## 2024-02-06 PROCEDURE — 86803 HEPATITIS C AB TEST: CPT

## 2024-02-06 PROCEDURE — 80061 LIPID PANEL: CPT

## 2024-02-06 PROCEDURE — 36415 COLL VENOUS BLD VENIPUNCTURE: CPT

## 2024-02-06 PROCEDURE — 84443 ASSAY THYROID STIM HORMONE: CPT | Mod: 91

## 2024-02-06 PROCEDURE — 93010 ELECTROCARDIOGRAM REPORT: CPT | Performed by: INTERNAL MEDICINE

## 2024-02-06 PROCEDURE — 85027 COMPLETE CBC AUTOMATED: CPT

## 2024-02-06 PROCEDURE — 74240 X-RAY XM UPR GI TRC 1CNTRST: CPT

## 2024-02-06 PROCEDURE — 93005 ELECTROCARDIOGRAM TRACING: CPT | Performed by: COLON & RECTAL SURGERY

## 2024-02-06 PROCEDURE — 84443 ASSAY THYROID STIM HORMONE: CPT

## 2024-02-08 ENCOUNTER — TELEPHONE (OUTPATIENT)
Dept: MEDICAL GROUP | Facility: MEDICAL CENTER | Age: 39
End: 2024-02-08
Payer: COMMERCIAL

## 2024-02-08 NOTE — TELEPHONE ENCOUNTER
Phone Number Called: There are no phone numbers on file.     Call outcome: Spoke to patient regarding message below.    Message: Called and spoke with patient regarding the matters of making an appointment to go over some labs. Patient was scheduled for tomorrow 9 Feb 2024

## 2024-02-09 ENCOUNTER — TELEMEDICINE (OUTPATIENT)
Dept: MEDICAL GROUP | Facility: MEDICAL CENTER | Age: 39
End: 2024-02-09
Payer: COMMERCIAL

## 2024-02-09 VITALS — BODY MASS INDEX: 47.09 KG/M2 | WEIGHT: 293 LBS | HEIGHT: 66 IN

## 2024-02-09 DIAGNOSIS — E78.49 OTHER HYPERLIPIDEMIA: ICD-10-CM

## 2024-02-09 DIAGNOSIS — E66.01 MORBID OBESITY WITH BMI OF 45.0-49.9, ADULT (HCC): ICD-10-CM

## 2024-02-09 DIAGNOSIS — E11.9 TYPE 2 DIABETES MELLITUS WITHOUT COMPLICATION, WITHOUT LONG-TERM CURRENT USE OF INSULIN (HCC): ICD-10-CM

## 2024-02-09 DIAGNOSIS — E55.9 VITAMIN D DEFICIENCY: ICD-10-CM

## 2024-02-09 PROCEDURE — 99214 OFFICE O/P EST MOD 30 MIN: CPT | Mod: 95 | Performed by: NURSE PRACTITIONER

## 2024-02-09 RX ORDER — OMEPRAZOLE 20 MG/1
20 CAPSULE, DELAYED RELEASE ORAL 2 TIMES DAILY
Qty: 180 CAPSULE | Refills: 1 | Status: SHIPPED | OUTPATIENT
Start: 2024-02-09

## 2024-02-09 RX ORDER — TIRZEPATIDE 2.5 MG/.5ML
2.5 INJECTION, SOLUTION SUBCUTANEOUS
Qty: 6 ML | Refills: 1 | Status: SHIPPED | OUTPATIENT
Start: 2024-02-09

## 2024-02-09 RX ORDER — METFORMIN HYDROCHLORIDE 500 MG/1
500 TABLET, EXTENDED RELEASE ORAL DAILY
Qty: 60 TABLET | Refills: 0 | Status: SHIPPED | OUTPATIENT
Start: 2024-02-09 | End: 2024-03-21 | Stop reason: SDUPTHER

## 2024-02-09 RX ORDER — ERGOCALCIFEROL 1.25 MG/1
50000 CAPSULE ORAL
Qty: 12 CAPSULE | Refills: 3 | Status: SHIPPED | OUTPATIENT
Start: 2024-02-09

## 2024-02-09 ASSESSMENT — FIBROSIS 4 INDEX: FIB4 SCORE: 0.59

## 2024-02-09 ASSESSMENT — PATIENT HEALTH QUESTIONNAIRE - PHQ9: CLINICAL INTERPRETATION OF PHQ2 SCORE: 0

## 2024-02-09 NOTE — PROGRESS NOTES
Virtual Visit: Established Patient   This visit was conducted via Zoom using secure and encrypted videoconferencing technology.   The patient was in their home in the [facility].    The patient's identity was confirmed and verbal consent was obtained for this virtual visit.   This evaluation was conducted via Zoom using secure and encrypted videoconferencing technology. The patient was in their home in the [facility].    The patient's identity was confirmed and verbal consent was obtained for this virtual visit.     Subjective:   CC:   Chief Complaint   Patient presents with    Lab Follow-up       Kinza Juan is a 38 y.o. female presenting for evaluation and management of:    Vitamin D deficiency   Latest Reference Range & Units 02/06/24 09:09   25-Hydroxy   Vitamin D 25 30 - 100 ng/mL 15 (L)   (L): Data is abnormally low    Type 2 diabetes mellitus, without long-term current use of insulin (HCC)   Latest Reference Range & Units 02/06/24 09:06 02/06/24 09:09   Glycohemoglobin 4.0 - 5.6 % 6.5 (H) 6.6 (H)   (H): Data is abnormally high  New problem.  Family history of diabetes in father.  Patient with history of excess weight over 300 pounds, working on weight loss.    Other hyperlipidemia   Latest Reference Range & Units 02/06/24 09:09   Cholesterol,Tot 100 - 199 mg/dL 166   Triglycerides 0 - 149 mg/dL 271 (H)   HDL >=40 mg/dL 38 !   LDL <100 mg/dL 74   (H): Data is abnormally high  !: Data is abnormal  New problem.  Likely reflective of recent diagnosis of diabetes.     ROS     Current medicines (including changes today)  Current Outpatient Medications   Medication Sig Dispense Refill    vitamin D2, Ergocalciferol, (DRISDOL) 1.25 MG (54450 UT) Cap capsule Take 1 Capsule by mouth every 7 days. 12 Capsule 3    omeprazole (PRILOSEC) 20 MG delayed-release capsule Take 1 Capsule by mouth 2 times a day. 180 Capsule 1    Tirzepatide (MOUNJARO) 2.5 MG/0.5ML Solution Pen-injector Inject 2.5 mg under the  "skin every 7 days. 6 mL 1    metFORMIN ER (GLUCOPHAGE XR) 500 MG TABLET SR 24 HR Take 1 Tablet by mouth every day. 60 Tablet 0     No current facility-administered medications for this visit.       Patient Active Problem List    Diagnosis Date Noted    Vitamin D deficiency 2024    Type 2 diabetes mellitus, without long-term current use of insulin ([facility]) 2024    Other hyperlipidemia 2024    Gastroesophageal reflux disease without esophagitis 10/03/2023    Chest discomfort 10/03/2023    BMI 50.0-59.9, adult ([facility]) 2022    History of gestational diabetes mellitus (GDM) 2022    Postpartum depression 2022    Hx of  section 2021    PCOS (polycystic ovarian syndrome) 2020        Objective:   Ht 1.676 m (5' 6\")   Wt (!) 144 kg (317 lb)   BMI 51.17 kg/m²     Physical Exam:  Constitutional: Alert, no distress, well-groomed.  Skin: No rashes in visible areas.  Eye: Round. Conjunctiva clear, lids normal. No icterus.   ENMT: Lips pink without lesions, good dentition, moist mucous membranes. Phonation normal.  Neck: No masses, no thyromegaly. Moves freely without pain.  Respiratory: Unlabored respiratory effort, no cough or audible wheeze  Psych: Alert and oriented x3, normal affect and mood.     Assessment and Plan:   The following treatment plan was discussed:   1. Vitamin D deficiency  Uncontrolled, Stable.  Discussed the importance of optimization.   - vitamin D2, Ergocalciferol, (DRISDOL) 1.25 MG (16474 UT) Cap capsule; Take 1 Capsule by mouth every 7 days.  Dispense: 12 Capsule; Refill: 3    2. Morbid obesity with BMI of 45.0-49.9, adult ([facility])  - omeprazole (PRILOSEC) 20 MG delayed-release capsule; Take 1 Capsule by mouth 2 times a day.  Dispense: 180 Capsule; Refill: 1  - Tirzepatide (MOUNJARO) 2.5 MG/0.5ML Solution Pen-injector; Inject 2.5 mg under the skin every 7 days.  Dispense: 6 mL; Refill: 1  - metFORMIN ER (GLUCOPHAGE XR) 500 MG TABLET SR 24 HR; Take 1 Tablet " by mouth every day.  Dispense: 60 Tablet; Refill: 0    3. Type 2 diabetes mellitus without complication, without long-term current use of insulin (HCC)  Uncontrolled, stable.  Educated on the importance of blood sugar control.  Will follow-up in 6 weeks  - Tirzepatide (MOUNJARO) 2.5 MG/0.5ML Solution Pen-injector; Inject 2.5 mg under the skin every 7 days.  Dispense: 6 mL; Refill: 1  - metFORMIN ER (GLUCOPHAGE XR) 500 MG TABLET SR 24 HR; Take 1 Tablet by mouth every day.  Dispense: 60 Tablet; Refill: 0    4. Other hyperlipidemia  Uncontrolled, stable. Discussed etiology and potential risk factors. Discussed 10-year ASCVD risk. Educated on healthy lifestyle including nutrition and exercise.  Avoid excessive red meat and simple carbohydrates.  Increase fiber intake to 25-30 g daily if tolerated and take fish oil 2 g nightly.  Advised low saturated fat, low carbohydrate diet.  Quit smoking if you do. Maintain adequate hydration. Advise daily exercise 45-60 mins per tolerance and preference.        Discussed with patient possible alternative diagnoses, patient is to take all medications as prescribed.      If symptoms persist FU w/PCP, if symptoms worsen go to emergency room.      If experiencing any side effects from prescribed medications report to the office immediately or go to emergency room.     Reviewed indication, dosage, usage and potential adverse effects of prescribed medications.      Reviewed risks and benefits of treatment plan. Patient verbalizes understanding of all instruction and verbally agrees to plan.     Discussed plan with the patient, and patient agrees to the above.      I personally reviewed prior external notes and test results pertinent to today's visit.     Follow-up: 6 wks

## 2024-02-09 NOTE — ASSESSMENT & PLAN NOTE
Latest Reference Range & Units 02/06/24 09:09   25-Hydroxy   Vitamin D 25 30 - 100 ng/mL 15 (L)   (L): Data is abnormally low

## 2024-02-09 NOTE — ASSESSMENT & PLAN NOTE
Latest Reference Range & Units 02/06/24 09:06 02/06/24 09:09   Glycohemoglobin 4.0 - 5.6 % 6.5 (H) 6.6 (H)   (H): Data is abnormally high  New problem.  Family history of diabetes in father.  Patient with history of excess weight over 300 pounds, working on weight loss.

## 2024-02-09 NOTE — ASSESSMENT & PLAN NOTE
Latest Reference Range & Units 02/06/24 09:09   Cholesterol,Tot 100 - 199 mg/dL 166   Triglycerides 0 - 149 mg/dL 271 (H)   HDL >=40 mg/dL 38 !   LDL <100 mg/dL 74   (H): Data is abnormally high  !: Data is abnormal  New problem.  Likely reflective of recent diagnosis of diabetes.

## 2024-03-11 ENCOUNTER — OFFICE VISIT (OUTPATIENT)
Dept: URGENT CARE | Facility: CLINIC | Age: 39
End: 2024-03-11
Payer: COMMERCIAL

## 2024-03-11 VITALS
RESPIRATION RATE: 16 BRPM | HEIGHT: 66 IN | TEMPERATURE: 98 F | BODY MASS INDEX: 47.09 KG/M2 | HEART RATE: 97 BPM | WEIGHT: 293 LBS | DIASTOLIC BLOOD PRESSURE: 82 MMHG | OXYGEN SATURATION: 95 % | SYSTOLIC BLOOD PRESSURE: 118 MMHG

## 2024-03-11 DIAGNOSIS — R68.89 FLU-LIKE SYMPTOMS: ICD-10-CM

## 2024-03-11 LAB
FLUAV RNA SPEC QL NAA+PROBE: NEGATIVE
FLUBV RNA SPEC QL NAA+PROBE: NEGATIVE
RSV RNA SPEC QL NAA+PROBE: NEGATIVE
S PYO DNA SPEC NAA+PROBE: NOT DETECTED
SARS-COV-2 RNA RESP QL NAA+PROBE: NEGATIVE

## 2024-03-11 PROCEDURE — 3074F SYST BP LT 130 MM HG: CPT | Performed by: NURSE PRACTITIONER

## 2024-03-11 PROCEDURE — 87651 STREP A DNA AMP PROBE: CPT | Performed by: NURSE PRACTITIONER

## 2024-03-11 PROCEDURE — 3079F DIAST BP 80-89 MM HG: CPT | Performed by: NURSE PRACTITIONER

## 2024-03-11 PROCEDURE — 99213 OFFICE O/P EST LOW 20 MIN: CPT | Performed by: NURSE PRACTITIONER

## 2024-03-11 PROCEDURE — 0241U POCT CEPHEID COV-2, FLU A/B, RSV - PCR: CPT | Performed by: NURSE PRACTITIONER

## 2024-03-11 RX ORDER — BENZONATATE 100 MG/1
100 CAPSULE ORAL 3 TIMES DAILY PRN
Qty: 60 CAPSULE | Refills: 0 | Status: SHIPPED | OUTPATIENT
Start: 2024-03-11

## 2024-03-11 RX ORDER — DEXTROMETHORPHAN HYDROBROMIDE AND PROMETHAZINE HYDROCHLORIDE 15; 6.25 MG/5ML; MG/5ML
5 SYRUP ORAL EVERY 6 HOURS PRN
Qty: 100 ML | Refills: 0 | Status: SHIPPED | OUTPATIENT
Start: 2024-03-11

## 2024-03-11 ASSESSMENT — FIBROSIS 4 INDEX: FIB4 SCORE: 0.59

## 2024-03-11 NOTE — LETTER
March 11, 2024       Patient: Kinza Juan   YOB: 1985   Date of Visit: 3/11/2024         To Whom It May Concern:    In my medical opinion, I recommend that Kinza Juan be excused from work 3/8/2024 through 3/12/2024 due to illness.     If you have any questions or concerns, please don't hesitate to call [phone]          Sincerely,          MICHELLE MckeonRAftabN.  Electronically Signed

## 2024-03-14 ASSESSMENT — ENCOUNTER SYMPTOMS
GASTROINTESTINAL NEGATIVE: 1
PSYCHIATRIC NEGATIVE: 1
SWOLLEN GLANDS: 1
CARDIOVASCULAR NEGATIVE: 1
WHEEZING: 0
SORE THROAT: 1
FEVER: 0
COUGH: 1
MYALGIAS: 1
HEMOPTYSIS: 0
CHILLS: 1
SPUTUM PRODUCTION: 0
EYES NEGATIVE: 1
SHORTNESS OF BREATH: 0
HEADACHES: 1

## 2024-03-14 NOTE — PROGRESS NOTES
"Subjective:   Kinza uJan is a 38 y.o. female who presents for Pharyngitis (X 4 days, sore throat, headache, cough, body aches, chills, runny nose, congestion)      Pharyngitis   This is a new problem. Episode onset: 4 days. The problem has been unchanged. There has been no fever. The pain is at a severity of 5/10. The pain is moderate. Associated symptoms include congestion, coughing, headaches and swollen glands. Pertinent negatives include no shortness of breath. She has tried NSAIDs, gargles and acetaminophen for the symptoms. The treatment provided mild relief.       Review of Systems   Constitutional:  Positive for chills and malaise/fatigue. Negative for fever.   HENT:  Positive for congestion and sore throat.    Eyes: Negative.    Respiratory:  Positive for cough. Negative for hemoptysis, sputum production, shortness of breath and wheezing.    Cardiovascular: Negative.    Gastrointestinal: Negative.    Genitourinary: Negative.    Musculoskeletal:  Positive for myalgias.   Skin: Negative.    Neurological:  Positive for headaches.   Endo/Heme/Allergies: Negative.    Psychiatric/Behavioral: Negative.     All other systems reviewed and are negative.      Medications, Allergies, and current problem list reviewed today in Epic.     Objective:     /82 (BP Location: Right arm, Patient Position: Sitting, BP Cuff Size: Adult)   Pulse 97   Temp 36.7 °C (98 °F) (Temporal)   Resp 16   Ht 1.676 m (5' 6\")   Wt (!) 142 kg (312 lb)   SpO2 95%     Physical Exam  Vitals reviewed.   Constitutional:       General: She is not in acute distress.     Appearance: Normal appearance. She is ill-appearing.   HENT:      Head: Normocephalic and atraumatic.      Right Ear: Tympanic membrane, ear canal and external ear normal.      Left Ear: Tympanic membrane, ear canal and external ear normal.      Nose: Congestion and rhinorrhea present.      Mouth/Throat:      Mouth: Mucous membranes are moist.      Pharynx: " Uvula midline. Posterior oropharyngeal erythema present. No pharyngeal swelling, oropharyngeal exudate or uvula swelling.   Eyes:      Extraocular Movements: Extraocular movements intact.      Conjunctiva/sclera: Conjunctivae normal.      Pupils: Pupils are equal, round, and reactive to light.   Cardiovascular:      Rate and Rhythm: Normal rate and regular rhythm.   Pulmonary:      Effort: Pulmonary effort is normal.      Breath sounds: Normal breath sounds.   Abdominal:      General: Abdomen is flat.      Palpations: Abdomen is soft.   Musculoskeletal:         General: Normal range of motion.      Cervical back: Normal range of motion and neck supple.   Skin:     General: Skin is warm and dry.      Capillary Refill: Capillary refill takes less than 2 seconds.   Neurological:      General: No focal deficit present.      Mental Status: She is alert.   Psychiatric:         Mood and Affect: Mood normal.         Behavior: Behavior normal.       Results for orders placed or performed in visit on 03/11/24   POCT CoV-2, Flu A/B, RSV by PCR   Result Value Ref Range    SARS-CoV-2 by PCR Negative Negative, Invalid    Influenza virus A RNA Negative Negative, Invalid    Influenza virus B, PCR Negative Negative, Invalid    RSV, PCR Negative Negative, Invalid   POCT GROUP A STREP, PCR   Result Value Ref Range    POC Group A Strep, PCR Not Detected Not Detected, Invalid         Assessment/Plan:     Diagnosis and associated orders:     1. Flu-like symptoms  POCT CoV-2, Flu A/B, RSV by PCR    POCT GROUP A STREP, PCR    benzonatate (TESSALON) 100 MG Cap    promethazine-dextromethorphan (PROMETHAZINE-DM) 6.25-15 MG/5ML syrup         Comments/MDM:     Advised patient symptoms are viral in etiology, recommend supportive care. Increased fluids and rest.  Recommend over-the-counter cold and flu medications and Tylenol and/or ibuprofen for symptomatic relief and fevers.  Discussed use of nedi-pot, humidifier, and Flonase nasal spray as  well.  Discussed good hand hygiene and ways to decrease spread of disease.  Follow-up with PCP return for reevaluation if symptoms persist/worsen.  Patient offered discharge instructions regarding viral illness.  The patient demonstrated a good understanding and agreed with the treatment plan.            Differential diagnosis, natural history, supportive care, and indications for immediate follow-up discussed.    Advised the patient to follow-up with the primary care physician for recheck, reevaluation, and consideration of further management.    Please note that this dictation was created using voice recognition software. I have made a reasonable attempt to correct obvious errors, but I expect that there are errors of grammar and possibly content that I did not discover before finalizing the note.    This note was electronically signed by MARCIA Chen

## 2024-03-15 ENCOUNTER — APPOINTMENT (OUTPATIENT)
Dept: MEDICAL GROUP | Facility: MEDICAL CENTER | Age: 39
End: 2024-03-15
Payer: COMMERCIAL

## 2024-03-21 ENCOUNTER — OFFICE VISIT (OUTPATIENT)
Dept: MEDICAL GROUP | Facility: MEDICAL CENTER | Age: 39
End: 2024-03-21
Payer: COMMERCIAL

## 2024-03-21 VITALS
SYSTOLIC BLOOD PRESSURE: 104 MMHG | HEART RATE: 74 BPM | WEIGHT: 293 LBS | BODY MASS INDEX: 47.09 KG/M2 | HEIGHT: 66 IN | RESPIRATION RATE: 12 BRPM | DIASTOLIC BLOOD PRESSURE: 70 MMHG | TEMPERATURE: 98.4 F | OXYGEN SATURATION: 97 %

## 2024-03-21 DIAGNOSIS — E78.49 OTHER HYPERLIPIDEMIA: ICD-10-CM

## 2024-03-21 DIAGNOSIS — J02.9 SORE THROAT: ICD-10-CM

## 2024-03-21 DIAGNOSIS — E66.01 MORBID OBESITY WITH BMI OF 45.0-49.9, ADULT (HCC): ICD-10-CM

## 2024-03-21 DIAGNOSIS — E11.9 TYPE 2 DIABETES MELLITUS WITHOUT COMPLICATION, WITHOUT LONG-TERM CURRENT USE OF INSULIN (HCC): ICD-10-CM

## 2024-03-21 PROBLEM — Z98.891 HX OF CESAREAN SECTION: Status: RESOLVED | Noted: 2021-08-24 | Resolved: 2024-03-21

## 2024-03-21 PROCEDURE — 3078F DIAST BP <80 MM HG: CPT | Performed by: NURSE PRACTITIONER

## 2024-03-21 PROCEDURE — 3074F SYST BP LT 130 MM HG: CPT | Performed by: NURSE PRACTITIONER

## 2024-03-21 PROCEDURE — 99214 OFFICE O/P EST MOD 30 MIN: CPT | Performed by: NURSE PRACTITIONER

## 2024-03-21 RX ORDER — METFORMIN HYDROCHLORIDE 500 MG/1
500 TABLET, EXTENDED RELEASE ORAL 2 TIMES DAILY
Qty: 180 TABLET | Refills: 1 | Status: SHIPPED | OUTPATIENT
Start: 2024-03-21

## 2024-03-21 ASSESSMENT — ENCOUNTER SYMPTOMS
CHILLS: 0
PALPITATIONS: 0
FEVER: 0
SORE THROAT: 1

## 2024-03-21 ASSESSMENT — FIBROSIS 4 INDEX: FIB4 SCORE: 0.59

## 2024-03-21 NOTE — PROGRESS NOTES
Patient agreed to use of ZHEN: yes  Chief Complaint   Patient presents with    Weight Check    Pharyngitis     X 3 WEEKS        HISTORY OF PRESENT ILLNESS: Patient is a pleasant 38 y.o. female, established patient who presents today to discuss medical problems as listed below:    Assessment/Plan:    Kinza was seen today for weight check and pharyngitis.    Diagnoses and all orders for this visit:    Type 2 diabetes mellitus without complication, without long-term current use of insulin (HCC)  -     metFORMIN ER (GLUCOPHAGE XR) 500 MG TABLET SR 24 HR; Take 1 Tablet by mouth 2 times a day.  -     HEMOGLOBIN A1C; Future  -     Comp Metabolic Panel; Future  -     CBC WITHOUT DIFFERENTIAL; Future    BMI 50.0-59.9, adult (HCC)    Morbid obesity with BMI of 45.0-49.9, adult (HCC)  -     metFORMIN ER (GLUCOPHAGE XR) 500 MG TABLET SR 24 HR; Take 1 Tablet by mouth 2 times a day.    Sore throat    Other hyperlipidemia  -     Lipid Profile; Future              Assessment & Plan  1. Type 2 diabetes.  This is a chronic stable condition. Her last A1c is at 6.6 from 02/06/2025. She is currently on metformin 500 mg daily and also started on Mounjaro; however, her insurance did not approve it. I will increase her metformin to twice a day. Will f/u in 3 mo    2. BMI   This is a chronic uncontrolled condition. She is currently seeing a weight loss specialist, Dr. Medrano. The patient is starting on a  weight loss diet on Friday and will be following up with Dr. Medrano for weight loss surgery pending insurance approval.    3. History of hyperlipidemia.  This is a chronic uncontrolled problem. Her last triglycerides were at 271. This was from 02/06/2025. She will be starting on a weight loss diet starting this Friday and she is currently following with specialist for weight loss, pending surgical approval from insurance. I will recheck cholesterol in 3-6 months from now.    4. Sore throat.  This is a new improving problem. She is doing  "much better. No concerns today    History of Present Illness  This is a pleasant 38-year-old female who is here to follow up on weight loss. She is also having sore throat.    She has decided to go forward with weight loss surgery. She inquired if it is a good idea to start Mounjaro while she is doing weight loss surgery. She is starting on a specialized weight loss diet on Friday.    She is doing well on metformin with no side effects. Her diet is better now.    She had a bad cold in the last 3 weeks, but she is recovering. She was really sick 10 days ago and lost her voice completely, but now this is the best she has been able to speak for. She has had a sore throat for 2 weeks. She went to urgent care last Monday and was tested negative for strep throat and the rest of viral panel. She can swallow without an issue. Her ears have been a little scratchy. She has no difficulty hearing. She had a really awful cough.    Supplemental Information  She has a history of depression after postpartum.    Health Maintenance:  COMPLETED     Allergies, past medical history, past surgical history, family history, social history reviewed and updated.    Review of Systems   Constitutional:  Negative for chills, fever and malaise/fatigue.   HENT:  Positive for sore throat.    Cardiovascular:  Negative for chest pain, palpitations and leg swelling.        Exam:    /70 (BP Location: Left arm, Patient Position: Sitting, BP Cuff Size: Adult long)   Pulse 74   Temp 36.9 °C (98.4 °F) (Temporal)   Resp 12   Ht 1.676 m (5' 6\")   Wt (!) 142 kg (312 lb 6.4 oz)   SpO2 97%   BMI 50.42 kg/m²  Body mass index is 50.42 kg/m².    Physical Exam  Constitutional:       General: She is not in acute distress.     Appearance: She is not ill-appearing.   HENT:      Head: Normocephalic and atraumatic.      Nose: Nose normal.      Mouth/Throat:      Pharynx: Posterior oropharyngeal erythema present.      Comments: Mild erythema in throat, no " exudate   Eyes:      General:         Right eye: No discharge.         Left eye: No discharge.   Cardiovascular:      Rate and Rhythm: Normal rate.      Pulses: Normal pulses.      Heart sounds: Normal heart sounds. No murmur heard.  Pulmonary:      Effort: Pulmonary effort is normal. No respiratory distress.      Breath sounds: Normal breath sounds. No stridor. No wheezing or rales.   Skin:     Findings: No rash.   Neurological:      General: No focal deficit present.      Mental Status: She is alert. Mental status is at baseline.   Psychiatric:         Mood and Affect: Mood normal.         Behavior: Behavior normal.          Results  Laboratory Studies  Her last A1c was at 6.6 from 02/06/2024. Her last triglycerides were at 271.     Discussed with patient possible alternative diagnoses, patient is to take all medications as prescribed.      If symptoms persist FU w/PCP, if symptoms worsen go to emergency room.      If experiencing any side effects from prescribed medications report to the office immediately or go to emergency room.     Reviewed indication, dosage, usage and potential adverse effects of prescribed medications.      Reviewed risks and benefits of treatment plan. Patient verbalizes understanding of all instruction and verbally agrees to plan.     Discussed plan with the patient, and patient agrees to the above.      I personally reviewed prior external notes and test results pertinent to today's visit.      No follow-ups on file. 3 mos

## 2024-03-26 ENCOUNTER — OFFICE VISIT (OUTPATIENT)
Dept: URGENT CARE | Facility: CLINIC | Age: 39
End: 2024-03-26
Payer: COMMERCIAL

## 2024-03-26 VITALS
HEIGHT: 66 IN | HEART RATE: 92 BPM | TEMPERATURE: 97.5 F | SYSTOLIC BLOOD PRESSURE: 128 MMHG | OXYGEN SATURATION: 96 % | BODY MASS INDEX: 47.09 KG/M2 | DIASTOLIC BLOOD PRESSURE: 78 MMHG | WEIGHT: 293 LBS | RESPIRATION RATE: 16 BRPM

## 2024-03-26 DIAGNOSIS — H92.01 OTALGIA OF RIGHT EAR: ICD-10-CM

## 2024-03-26 DIAGNOSIS — H65.01 NON-RECURRENT ACUTE SEROUS OTITIS MEDIA OF RIGHT EAR: ICD-10-CM

## 2024-03-26 PROCEDURE — 3074F SYST BP LT 130 MM HG: CPT

## 2024-03-26 PROCEDURE — 3078F DIAST BP <80 MM HG: CPT

## 2024-03-26 PROCEDURE — 99213 OFFICE O/P EST LOW 20 MIN: CPT

## 2024-03-26 RX ORDER — AMOXICILLIN AND CLAVULANATE POTASSIUM 875; 125 MG/1; MG/1
1 TABLET, FILM COATED ORAL 2 TIMES DAILY
Qty: 14 TABLET | Refills: 0 | Status: SHIPPED | OUTPATIENT
Start: 2024-03-26 | End: 2024-04-02

## 2024-03-26 RX ORDER — FLUTICASONE PROPIONATE 50 MCG
1 SPRAY, SUSPENSION (ML) NASAL DAILY
Qty: 16 G | Refills: 0 | Status: SHIPPED | OUTPATIENT
Start: 2024-03-26

## 2024-03-26 ASSESSMENT — ENCOUNTER SYMPTOMS
DIARRHEA: 0
VOMITING: 0
ABDOMINAL PAIN: 0
SORE THROAT: 0
CHILLS: 0
RHINORRHEA: 0
COUGH: 0
FEVER: 0
NECK PAIN: 0
HEADACHES: 0

## 2024-03-26 ASSESSMENT — FIBROSIS 4 INDEX: FIB4 SCORE: 0.59

## 2024-03-26 NOTE — PROGRESS NOTES
Subjective:   Kinza Juan is a very pleasant 38 y.o. female who presents for:    Chief Complaint   Patient presents with    Otalgia     X3days Right ear pain/discomfort/       HPI:    Otalgia   Episode onset: over the past three weeks, the patient has been sick with URI symptoms. She has been having severe congestion. Five days ago, she developed mild RS otalgia that has been progressively worsening. There has been no fever. Pertinent negatives include no abdominal pain, coughing, diarrhea, ear discharge, headaches, hearing loss, neck pain, rash, rhinorrhea, sore throat or vomiting. Associated symptoms comments: Denies tinnitus, muffled hearing, sinus pain/pressure. She has tried NSAIDs for the symptoms. The treatment provided no relief. There is no history of a chronic ear infection, hearing loss or a tympanostomy tube.       ROS:    Review of Systems   Constitutional:  Negative for chills, fever and malaise/fatigue.   HENT:  Positive for ear pain. Negative for ear discharge, hearing loss, rhinorrhea and sore throat.    Respiratory:  Negative for cough.    Gastrointestinal:  Negative for abdominal pain, diarrhea and vomiting.   Musculoskeletal:  Negative for neck pain.   Skin:  Negative for rash.   Neurological:  Negative for headaches.   All other systems reviewed and are negative.      Medications:      Current Outpatient Medications   Medication Sig    metFORMIN ER (GLUCOPHAGE XR) 500 MG TABLET SR 24 HR Take 1 Tablet by mouth 2 times a day.    omeprazole (PRILOSEC) 20 MG delayed-release capsule Take 1 Capsule by mouth 2 times a day.    benzonatate (TESSALON) 100 MG Cap Take 1 Capsule by mouth 3 times a day as needed for Cough. (Patient not taking: Reported on 3/26/2024)    promethazine-dextromethorphan (PROMETHAZINE-DM) 6.25-15 MG/5ML syrup Take 5 mL by mouth every 6 hours as needed for Cough for up to 20 doses. (Patient not taking: Reported on 3/26/2024)    vitamin D2, Ergocalciferol, (DRISDOL) 1.25  MG (61679 UT) Cap capsule Take 1 Capsule by mouth every 7 days. (Patient not taking: Reported on 3/11/2024)    Tirzepatide (MOUNJARO) 2.5 MG/0.5ML Solution Pen-injector Inject 2.5 mg under the skin every 7 days. (Patient not taking: Reported on 3/11/2024)       Allergies:     No Known Allergies    Problem List:     Patient Active Problem List   Diagnosis    PCOS (polycystic ovarian syndrome)    History of gestational diabetes mellitus (GDM)    Gastroesophageal reflux disease without esophagitis    Chest discomfort    Vitamin D deficiency    Type 2 diabetes mellitus, without long-term current use of insulin (HCC)    Other hyperlipidemia    Morbid obesity with BMI of 45.0-49.9, adult (HCC)    Sore throat       Surgical History:    Past Surgical History:   Procedure Laterality Date    REPEAT C SECTOIN WITH SALPINGECTOMY Bilateral 2022    Procedure:  SECTION, REPEAT, WITH SALPINGECTOMY;  Surgeon: Isabell Connolly D.O.;  Location: [facility];  Service: Obstetrics    [facility] ERCP,DIAGNOSTIC  2020    Procedure: ERCP, DIAGNOSTIC;  Surgeon: Lee Kelly M.D.;  Location[facility];  Service: Gastroenterology    [facility] ERCP,DIAGNOSTIC N/A 2020    Procedure: ERCP (ENDOSCOPIC RETROGRADE CHOLANGIOPANCREATOGRAPHY);  Surgeon: Lee Kelly M.D.;  Location: SURGERY SAME DAY [facility];  Service: Gastroenterology    JACQUI BY LAPAROSCOPY  2020    Procedure: CHOLECYSTECTOMY, LAPAROSCOPIC;  Surgeon: Iftikhar Flowers M.D.;  Location: [facility];  Service: General    PRIMARY C SECTION  3/24/2013    Performed by Briana Cano M.D. at LABOR AND DELIVERY    RECONSTRUCTION, KNEE, ACL, ARTHROSCOPIC  2012    Performed by CAITY DORSEY at [facility]    MENISCECTOMY, KNEE, MEDIAL  2012    Performed by CAITY DORSEY at [facility]    DENTAL EXTRACTION(S)         Past Social Hx:     Social History     Socioeconomic History    Marital status:      Highest education level: Master's degree (e.g., MA, MS, Khari, MEd, MSW, SHEREEN)   Tobacco Use    Smoking status: Never    Smokeless tobacco: Never   Vaping Use    Vaping Use: Never used   Substance and Sexual Activity    Alcohol use: Not Currently    Drug use: Not Currently     Types: Marijuana, Oral     Comment: occ    Sexual activity: Yes     Partners: Male     Birth control/protection: Female Sterilization     Comment: , with boyfriend   Other Topics Concern     Service No    Blood Transfusions No    Caffeine Concern No    Occupational Exposure No    Hobby Hazards No    Sleep Concern No    Stress Concern No    Weight Concern Yes    Special Diet No    Back Care No    Exercise No    Bike Helmet No    Seat Belt Yes    Self-Exams No     Social Determinants of Health     Financial Resource Strain: Low Risk  (10/3/2023)    Overall Financial Resource Strain (CARDIA)     Difficulty of Paying Living Expenses: Not very hard   Food Insecurity: No Food Insecurity (10/3/2023)    Hunger Vital Sign     Worried About Running Out of Food in the Last Year: Never true     Ran Out of Food in the Last Year: Never true   Transportation Needs: No Transportation Needs (10/3/2023)    PRAPARE - Transportation     Lack of Transportation (Medical): No     Lack of Transportation (Non-Medical): No   Physical Activity: Inactive (10/3/2023)    Exercise Vital Sign     Days of Exercise per Week: 0 days     Minutes of Exercise per Session: 0 min   Stress: No Stress Concern Present (10/3/2023)    [employer] - Occupational Stress Questionnaire     Feeling of Stress : Only a little   Social Connections: Moderately Integrated (10/3/2023)    Social Connection and Isolation Panel [NHANES]     Frequency of Communication with Friends and Family: More than three times a week     Frequency of Social Gatherings with Friends and Family: More than three times a week     Attends Rastafari Services: 1 to 4  times per year     Active Member of Clubs or Organizations: No     Attends Club or Organization Meetings: Never     Marital Status:    Housing Stability: Low Risk  (10/3/2023)    Housing Stability Vital Sign     Unable to Pay for Housing in the Last Year: No     Number of Places Lived in the Last Year: 1     Unstable Housing in the Last Year: No        Past Family Hx:      Family History   Problem Relation Age of Onset    Diabetes Paternal Grandmother     Diabetes Father     Stroke Maternal Grandmother     Hypertension Maternal Grandmother     Diabetes Maternal Grandfather     Cancer Neg Hx        Problem list, medications, and allergies reviewed by myself today in Epic.     Objective:     Vitals:    03/26/24 0809   BP: 128/78   Pulse: 92   Resp: 16   Temp: 36.4 °C (97.5 °F)   SpO2: 96%       Physical Exam  Constitutional:       Appearance: Normal appearance. She is normal weight.   HENT:      Right Ear: A middle ear effusion is present. No mastoid tenderness. Tympanic membrane is not injected, erythematous or retracted.      Left Ear:  No middle ear effusion. No mastoid tenderness. Tympanic membrane is not injected, erythematous or retracted.      Nose: No congestion or rhinorrhea.   Neurological:      Mental Status: She is alert.       Assessment/Plan:     Diagnosis and associated orders:     1. Otalgia of right ear  - fluticasone (FLONASE) 50 MCG/ACT nasal spray; Administer 1 Spray into affected nostril(S) every day.  Dispense: 16 g; Refill: 0    2. Non-recurrent acute serous otitis media of right ear  - fluticasone (FLONASE) 50 MCG/ACT nasal spray; Administer 1 Spray into affected nostril(S) every day.  Dispense: 16 g; Refill: 0  - amoxicillin-clavulanate (AUGMENTIN) 875-125 MG Tab; Take 1 Tablet by mouth 2 times a day for 7 days.  Dispense: 14 Tablet; Refill: 0          Comments/MDM:     Patient presents to the urgent care for the evaluation of new onset right-sided otalgia.  Recent URI that she has been  treating symptomatically over the past 3 weeks.  On physical exam, she has a mild serous effusion with slight retraction of right TM.  No evidence of suppurative AOM.  Negative mastoid tenderness, TM is intact.  Discussed supportive measures.  Over the next 72 hours, the patient will engage in supportive therapies, including intranasal fluticasone and antihistamine therapy.  Warm compresses to the area and Tylenol/ibuprofen as needed for pain.  If she develops significantly worsening otalgia, malaise, fever, she will start contingent prescription for Augmentin.  I educated the patient to follow-up with her primary care provider if her otalgia persists despite supportive measures.  Patient in agreement with this plan of care.         All questions answered. Patient verbalized understanding and is in agreement with this plan of care.     If symptoms are worsening or not improving in 3-5 days, follow-up with PCP or return to UC. Differential diagnosis, natural history, and supportive care discussed. AVS handout given and reviewed with patient. Patient educated on red flags and when to seek treatment back in ED or UC.     I personally reviewed prior external notes and test results pertinent to today's visit.  I have independently reviewed and interpreted all diagnostics ordered during this urgent care visit.     This dictation has been created using voice recognition software. The accuracy of the dictation is limited by the abilities of the software. I expect there may be some errors of grammar and possibly content. I made every attempt to manually correct the errors within my dictation. However, errors related to voice recognition software may still exist and should be interpreted within the appropriate context.    This note was electronically signed by TIERRA Vidales

## 2024-03-26 NOTE — PATIENT INSTRUCTIONS
"As we discussed your clinical condition would benefit from over-the-counter remedies:    Congestion:    Nasal rinsing with saline nasal spray or salt water (e.g., neti pot) can help relieve nasal dryness.    Breathe Right nasal strips at night for nasal congestion    Ponaris nasal emmollient for nasal congestion, dryness, and inflammation (do not use with iodine sensitivity)    Cool mist humidification, chest rubs, warm tea with honey, increased fluid intake to thin secretions    SALINE IRRIGATION/SPRAY 2 to 3 times per day    You may consider using a saline nasal irrigation (such as a \"Neti pot\" or similar device using sterile water, NOT tap water) or OTC saline nasal spray. Common brands include Rich Med, Sinex, [place] Nasal. May use short term nasal sprays, such as oxymetazoline (Afrin) to help relieve nasal discomfort, congestion, and/or pressure. Decongestant sprays should not be used longer than three consecutive days.     Over the counter medications:    OTC second generation antihistamine daily (cetirizine, desloratadine, fexofenadine, levocetirizine, and loratadine) daily IN COMBINATION WITH:    FLONASE (once per day) x 2 weeks     You may consider intranasal steroids such as fluticasone (brand name Flonase), (other options include Nasonex, Rhinocort, Nasacort) daily; continue for at least 2-3 weeks. Any generic should work as well but may cause slightly more nasal irritation. Please take according to package directions.  This steroid will help reduce inflammation of your sinuses.  This is the number one medication to help with seasonal allergies and treat nasal inflammation.  Cost: around $8 at [facility] for the generic fluticasone [facility] product (as of 5/20).    SUDAFED (low dose to see if tolerated) daily x 4-5 days    You may consider over-the-counter Sudafed (pseudoephedrine) to act as a decongestant to improve your breathing through your nose.  Please do not use this medication if you already have known " high blood pressure.  Please take according to package directions and note that this has a stimulating effect and should not be taken late in the day.  There is a low dose 12 hour formulation and a higher dose 24 hour formulation.  Start with a low dose to make sure you tolerate the medication.  Do not take late in the day as it may interfere with sleep.   Cost: Around $6 for the generic [facility] branded product at a 10mg dose (as of 2/2023).      SORE THROAT:    Symptom management for a sore throat includes:     Sipping cold or warm beverages (eg, tea with honey or lemon)     Eating cold or frozen desserts (eg, ice cream, popsicles)    Sucking on ice    Sucking on hard candy, CEPACOL lozenges, or medicated throat sprays. These contain the active ingredient benzocaine which is an anesthetic agent.  It helps relieve the symptoms of sore throat and may diminish your cough reflex.Please note that taking too frequently may cause harm - pay attention to package directions.    Gargling with warm salt water -  ¼ to ½ teaspoon of salt per 8 ounces (approximately 240 mL) of warm water.  Cool mist humidification  OTC Tylenol/ibuprofen PRN for pain/fever     PAIN OR FEVER:    NSAIDs  You may take Ibuprofen (brand name Motrin or Advil) 600 to 800 mg may be taken up to three times per day taken with food (do not exceed 2400 mg per day).  If you prefer you may consider Naproxen (brand name Naprosyn or Aleve) around 500 mg twice per day with food (do not exceed 1200 mg per day). Please do not take if you have known stomach ulcers or known kidney disease.     TYLENOL  You may take Tylenol according to package directions (1000 mg every 8 hours not to exceed 3000 mg per day).  Please do not consume with any alcohol products, or if you have known or suspected liver disease. These will help reduce inflammation, help with pain control, and can reduce fevers.

## 2024-03-27 ENCOUNTER — APPOINTMENT (OUTPATIENT)
Dept: ADMISSIONS | Facility: MEDICAL CENTER | Age: 39
DRG: 621 | End: 2024-03-27
Attending: COLON & RECTAL SURGERY
Payer: COMMERCIAL

## 2024-04-01 ENCOUNTER — APPOINTMENT (OUTPATIENT)
Dept: ADMISSIONS | Facility: MEDICAL CENTER | Age: 39
DRG: 621 | End: 2024-04-01
Attending: COLON & RECTAL SURGERY
Payer: COMMERCIAL

## 2024-04-03 ENCOUNTER — PRE-ADMISSION TESTING (OUTPATIENT)
Dept: ADMISSIONS | Facility: MEDICAL CENTER | Age: 39
DRG: 621 | End: 2024-04-03
Attending: COLON & RECTAL SURGERY
Payer: COMMERCIAL

## 2024-04-03 DIAGNOSIS — Z01.812 PRE-OPERATIVE LABORATORY EXAMINATION: ICD-10-CM

## 2024-04-03 LAB
ALBUMIN SERPL BCP-MCNC: 3.9 G/DL (ref 3.2–4.9)
ALBUMIN/GLOB SERPL: 1.2 G/DL
ALP SERPL-CCNC: 96 U/L (ref 30–99)
ALT SERPL-CCNC: 54 U/L (ref 2–50)
ANION GAP SERPL CALC-SCNC: 12 MMOL/L (ref 7–16)
AST SERPL-CCNC: 60 U/L (ref 12–45)
BASOPHILS # BLD AUTO: 0.4 % (ref 0–1.8)
BASOPHILS # BLD: 0.05 K/UL (ref 0–0.12)
BILIRUB SERPL-MCNC: 0.3 MG/DL (ref 0.1–1.5)
BUN SERPL-MCNC: 12 MG/DL (ref 8–22)
CALCIUM ALBUM COR SERPL-MCNC: 8.9 MG/DL (ref 8.5–10.5)
CALCIUM SERPL-MCNC: 8.8 MG/DL (ref 8.5–10.5)
CHLORIDE SERPL-SCNC: 105 MMOL/L (ref 96–112)
CO2 SERPL-SCNC: 22 MMOL/L (ref 20–33)
CREAT SERPL-MCNC: 0.61 MG/DL (ref 0.5–1.4)
EOSINOPHIL # BLD AUTO: 0.27 K/UL (ref 0–0.51)
EOSINOPHIL NFR BLD: 2.2 % (ref 0–6.9)
ERYTHROCYTE [DISTWIDTH] IN BLOOD BY AUTOMATED COUNT: 47.9 FL (ref 35.9–50)
GFR SERPLBLD CREATININE-BSD FMLA CKD-EPI: 117 ML/MIN/1.73 M 2
GLOBULIN SER CALC-MCNC: 3.2 G/DL (ref 1.9–3.5)
GLUCOSE SERPL-MCNC: 94 MG/DL (ref 65–99)
HCG SERPL QL: NEGATIVE
HCT VFR BLD AUTO: 39.7 % (ref 37–47)
HGB BLD-MCNC: 12.8 G/DL (ref 12–16)
IMM GRANULOCYTES # BLD AUTO: 0.05 K/UL (ref 0–0.11)
IMM GRANULOCYTES NFR BLD AUTO: 0.4 % (ref 0–0.9)
INR PPP: 0.98 (ref 0.87–1.13)
LYMPHOCYTES # BLD AUTO: 2.86 K/UL (ref 1–4.8)
LYMPHOCYTES NFR BLD: 23.7 % (ref 22–41)
MCH RBC QN AUTO: 27.9 PG (ref 27–33)
MCHC RBC AUTO-ENTMCNC: 32.2 G/DL (ref 32.2–35.5)
MCV RBC AUTO: 86.7 FL (ref 81.4–97.8)
MONOCYTES # BLD AUTO: 0.83 K/UL (ref 0–0.85)
MONOCYTES NFR BLD AUTO: 6.9 % (ref 0–13.4)
NEUTROPHILS # BLD AUTO: 8.01 K/UL (ref 1.82–7.42)
NEUTROPHILS NFR BLD: 66.4 % (ref 44–72)
NRBC # BLD AUTO: 0 K/UL
NRBC BLD-RTO: 0 /100 WBC (ref 0–0.2)
PLATELET # BLD AUTO: 370 K/UL (ref 164–446)
PMV BLD AUTO: 10.5 FL (ref 9–12.9)
POTASSIUM SERPL-SCNC: 4.1 MMOL/L (ref 3.6–5.5)
PROT SERPL-MCNC: 7.1 G/DL (ref 6–8.2)
PROTHROMBIN TIME: 13.1 SEC (ref 12–14.6)
RBC # BLD AUTO: 4.58 M/UL (ref 4.2–5.4)
SODIUM SERPL-SCNC: 139 MMOL/L (ref 135–145)
WBC # BLD AUTO: 12.1 K/UL (ref 4.8–10.8)

## 2024-04-03 PROCEDURE — 85610 PROTHROMBIN TIME: CPT

## 2024-04-03 PROCEDURE — 84703 CHORIONIC GONADOTROPIN ASSAY: CPT

## 2024-04-03 PROCEDURE — 80053 COMPREHEN METABOLIC PANEL: CPT

## 2024-04-03 PROCEDURE — 85025 COMPLETE CBC W/AUTO DIFF WBC: CPT

## 2024-04-03 PROCEDURE — 36415 COLL VENOUS BLD VENIPUNCTURE: CPT

## 2024-04-04 ENCOUNTER — ANESTHESIA EVENT (OUTPATIENT)
Dept: SURGERY | Facility: MEDICAL CENTER | Age: 39
DRG: 621 | End: 2024-04-04
Payer: COMMERCIAL

## 2024-04-05 ENCOUNTER — ANESTHESIA (OUTPATIENT)
Dept: SURGERY | Facility: MEDICAL CENTER | Age: 39
DRG: 621 | End: 2024-04-05
Payer: COMMERCIAL

## 2024-04-05 ENCOUNTER — HOSPITAL ENCOUNTER (INPATIENT)
Facility: MEDICAL CENTER | Age: 39
LOS: 2 days | DRG: 621 | End: 2024-04-07
Attending: COLON & RECTAL SURGERY | Admitting: COLON & RECTAL SURGERY
Payer: COMMERCIAL

## 2024-04-05 DIAGNOSIS — R11.2 NAUSEA AND VOMITING, UNSPECIFIED VOMITING TYPE: ICD-10-CM

## 2024-04-05 LAB
GLUCOSE BLD STRIP.AUTO-MCNC: 114 MG/DL (ref 65–99)
PATHOLOGY CONSULT NOTE: NORMAL

## 2024-04-05 PROCEDURE — 160035 HCHG PACU - 1ST 60 MINS PHASE I: Performed by: COLON & RECTAL SURGERY

## 2024-04-05 PROCEDURE — 160002 HCHG RECOVERY MINUTES (STAT): Performed by: COLON & RECTAL SURGERY

## 2024-04-05 PROCEDURE — 700111 HCHG RX REV CODE 636 W/ 250 OVERRIDE (IP): Performed by: ANESTHESIOLOGY

## 2024-04-05 PROCEDURE — 82962 GLUCOSE BLOOD TEST: CPT

## 2024-04-05 PROCEDURE — 700111 HCHG RX REV CODE 636 W/ 250 OVERRIDE (IP): Mod: JZ | Performed by: ANESTHESIOLOGY

## 2024-04-05 PROCEDURE — 160048 HCHG OR STATISTICAL LEVEL 1-5: Performed by: COLON & RECTAL SURGERY

## 2024-04-05 PROCEDURE — 700101 HCHG RX REV CODE 250: Performed by: ANESTHESIOLOGY

## 2024-04-05 PROCEDURE — 160009 HCHG ANES TIME/MIN: Performed by: COLON & RECTAL SURGERY

## 2024-04-05 PROCEDURE — 700102 HCHG RX REV CODE 250 W/ 637 OVERRIDE(OP): Performed by: COLON & RECTAL SURGERY

## 2024-04-05 PROCEDURE — 700111 HCHG RX REV CODE 636 W/ 250 OVERRIDE (IP): Mod: JZ

## 2024-04-05 PROCEDURE — A9270 NON-COVERED ITEM OR SERVICE: HCPCS | Performed by: ANESTHESIOLOGY

## 2024-04-05 PROCEDURE — 700105 HCHG RX REV CODE 258

## 2024-04-05 PROCEDURE — 160041 HCHG SURGERY MINUTES - EA ADDL 1 MIN LEVEL 4: Performed by: COLON & RECTAL SURGERY

## 2024-04-05 PROCEDURE — 700111 HCHG RX REV CODE 636 W/ 250 OVERRIDE (IP): Mod: JZ | Performed by: COLON & RECTAL SURGERY

## 2024-04-05 PROCEDURE — 700102 HCHG RX REV CODE 250 W/ 637 OVERRIDE(OP): Performed by: ANESTHESIOLOGY

## 2024-04-05 PROCEDURE — 770001 HCHG ROOM/CARE - MED/SURG/GYN PRIV*

## 2024-04-05 PROCEDURE — 700102 HCHG RX REV CODE 250 W/ 637 OVERRIDE(OP)

## 2024-04-05 PROCEDURE — 0DB64Z3 EXCISION OF STOMACH, PERCUTANEOUS ENDOSCOPIC APPROACH, VERTICAL: ICD-10-PCS | Performed by: COLON & RECTAL SURGERY

## 2024-04-05 PROCEDURE — A9270 NON-COVERED ITEM OR SERVICE: HCPCS

## 2024-04-05 PROCEDURE — 88307 TISSUE EXAM BY PATHOLOGIST: CPT

## 2024-04-05 PROCEDURE — A9270 NON-COVERED ITEM OR SERVICE: HCPCS | Performed by: COLON & RECTAL SURGERY

## 2024-04-05 PROCEDURE — 700101 HCHG RX REV CODE 250: Performed by: COLON & RECTAL SURGERY

## 2024-04-05 PROCEDURE — 700105 HCHG RX REV CODE 258: Performed by: COLON & RECTAL SURGERY

## 2024-04-05 PROCEDURE — 160029 HCHG SURGERY MINUTES - 1ST 30 MINS LEVEL 4: Performed by: COLON & RECTAL SURGERY

## 2024-04-05 RX ORDER — OXYCODONE HCL 5 MG/5 ML
5 SOLUTION, ORAL ORAL
Status: COMPLETED | OUTPATIENT
Start: 2024-04-05 | End: 2024-04-05

## 2024-04-05 RX ORDER — DEXAMETHASONE SODIUM PHOSPHATE 4 MG/ML
4 INJECTION, SOLUTION INTRA-ARTICULAR; INTRALESIONAL; INTRAMUSCULAR; INTRAVENOUS; SOFT TISSUE EVERY 4 HOURS PRN
Status: DISCONTINUED | OUTPATIENT
Start: 2024-04-05 | End: 2024-04-06

## 2024-04-05 RX ORDER — LIDOCAINE HYDROCHLORIDE 20 MG/ML
INJECTION, SOLUTION EPIDURAL; INFILTRATION; INTRACAUDAL; PERINEURAL PRN
Status: DISCONTINUED | OUTPATIENT
Start: 2024-04-05 | End: 2024-04-05 | Stop reason: SURG

## 2024-04-05 RX ORDER — HALOPERIDOL 5 MG/ML
1 INJECTION INTRAMUSCULAR
Status: DISCONTINUED | OUTPATIENT
Start: 2024-04-05 | End: 2024-04-05 | Stop reason: HOSPADM

## 2024-04-05 RX ORDER — OXYCODONE HCL 5 MG/5 ML
10 SOLUTION, ORAL ORAL
Status: COMPLETED | OUTPATIENT
Start: 2024-04-05 | End: 2024-04-05

## 2024-04-05 RX ORDER — SIMETHICONE 125 MG
125 TABLET,CHEWABLE ORAL 3 TIMES DAILY PRN
Status: DISCONTINUED | OUTPATIENT
Start: 2024-04-05 | End: 2024-04-07 | Stop reason: HOSPADM

## 2024-04-05 RX ORDER — ENOXAPARIN SODIUM 100 MG/ML
40 INJECTION SUBCUTANEOUS EVERY 12 HOURS
Status: DISCONTINUED | OUTPATIENT
Start: 2024-04-06 | End: 2024-04-07 | Stop reason: HOSPADM

## 2024-04-05 RX ORDER — ACETAMINOPHEN 500 MG
1000 TABLET ORAL EVERY 6 HOURS
Qty: 32 TABLET | Refills: 0 | Status: DISCONTINUED | OUTPATIENT
Start: 2024-04-06 | End: 2024-04-07 | Stop reason: HOSPADM

## 2024-04-05 RX ORDER — DIPHENHYDRAMINE HYDROCHLORIDE 50 MG/ML
12.5 INJECTION INTRAMUSCULAR; INTRAVENOUS EVERY 6 HOURS PRN
Status: DISCONTINUED | OUTPATIENT
Start: 2024-04-05 | End: 2024-04-07 | Stop reason: HOSPADM

## 2024-04-05 RX ORDER — ACETAMINOPHEN 500 MG
1000 TABLET ORAL EVERY 6 HOURS PRN
Status: DISCONTINUED | OUTPATIENT
Start: 2024-04-10 | End: 2024-04-07 | Stop reason: HOSPADM

## 2024-04-05 RX ORDER — SCOLOPAMINE TRANSDERMAL SYSTEM 1 MG/1
1 PATCH, EXTENDED RELEASE TRANSDERMAL
Status: DISCONTINUED | OUTPATIENT
Start: 2024-04-05 | End: 2024-04-05 | Stop reason: HOSPADM

## 2024-04-05 RX ORDER — HYDROMORPHONE HYDROCHLORIDE 1 MG/ML
0.2 INJECTION, SOLUTION INTRAMUSCULAR; INTRAVENOUS; SUBCUTANEOUS
Status: DISCONTINUED | OUTPATIENT
Start: 2024-04-05 | End: 2024-04-05 | Stop reason: HOSPADM

## 2024-04-05 RX ORDER — EPHEDRINE SULFATE 50 MG/ML
INJECTION, SOLUTION INTRAVENOUS PRN
Status: DISCONTINUED | OUTPATIENT
Start: 2024-04-05 | End: 2024-04-05 | Stop reason: SURG

## 2024-04-05 RX ORDER — SODIUM CHLORIDE, SODIUM LACTATE, POTASSIUM CHLORIDE, AND CALCIUM CHLORIDE .6; .31; .03; .02 G/100ML; G/100ML; G/100ML; G/100ML
500 INJECTION, SOLUTION INTRAVENOUS
Status: DISCONTINUED | OUTPATIENT
Start: 2024-04-05 | End: 2024-04-07 | Stop reason: HOSPADM

## 2024-04-05 RX ORDER — HYDROMORPHONE HYDROCHLORIDE 1 MG/ML
0.5 INJECTION, SOLUTION INTRAMUSCULAR; INTRAVENOUS; SUBCUTANEOUS
Status: DISCONTINUED | OUTPATIENT
Start: 2024-04-05 | End: 2024-04-07 | Stop reason: HOSPADM

## 2024-04-05 RX ORDER — MIDAZOLAM HYDROCHLORIDE 1 MG/ML
INJECTION INTRAMUSCULAR; INTRAVENOUS PRN
Status: DISCONTINUED | OUTPATIENT
Start: 2024-04-05 | End: 2024-04-05 | Stop reason: SURG

## 2024-04-05 RX ORDER — GABAPENTIN 300 MG/1
300 CAPSULE ORAL ONCE
Status: COMPLETED | OUTPATIENT
Start: 2024-04-05 | End: 2024-04-05

## 2024-04-05 RX ORDER — OXYCODONE HCL 10 MG/1
10 TABLET, FILM COATED, EXTENDED RELEASE ORAL ONCE
Status: COMPLETED | OUTPATIENT
Start: 2024-04-05 | End: 2024-04-05

## 2024-04-05 RX ORDER — HYDRALAZINE HYDROCHLORIDE 20 MG/ML
20 INJECTION INTRAMUSCULAR; INTRAVENOUS EVERY 6 HOURS PRN
Status: DISCONTINUED | OUTPATIENT
Start: 2024-04-05 | End: 2024-04-07 | Stop reason: HOSPADM

## 2024-04-05 RX ORDER — DIPHENHYDRAMINE HYDROCHLORIDE 50 MG/ML
25 INJECTION INTRAMUSCULAR; INTRAVENOUS EVERY 6 HOURS PRN
Status: DISCONTINUED | OUTPATIENT
Start: 2024-04-05 | End: 2024-04-07 | Stop reason: HOSPADM

## 2024-04-05 RX ORDER — CALCIUM CARBONATE 500 MG/1
500 TABLET, CHEWABLE ORAL
Status: DISCONTINUED | OUTPATIENT
Start: 2024-04-05 | End: 2024-04-07 | Stop reason: HOSPADM

## 2024-04-05 RX ORDER — PROMETHAZINE HYDROCHLORIDE 25 MG/1
25 SUPPOSITORY RECTAL EVERY 4 HOURS PRN
Status: DISCONTINUED | OUTPATIENT
Start: 2024-04-05 | End: 2024-04-07 | Stop reason: HOSPADM

## 2024-04-05 RX ORDER — HYDROMORPHONE HYDROCHLORIDE 1 MG/ML
0.4 INJECTION, SOLUTION INTRAMUSCULAR; INTRAVENOUS; SUBCUTANEOUS
Status: DISCONTINUED | OUTPATIENT
Start: 2024-04-05 | End: 2024-04-05 | Stop reason: HOSPADM

## 2024-04-05 RX ORDER — HYDROMORPHONE HYDROCHLORIDE 1 MG/ML
0.1 INJECTION, SOLUTION INTRAMUSCULAR; INTRAVENOUS; SUBCUTANEOUS
Status: DISCONTINUED | OUTPATIENT
Start: 2024-04-05 | End: 2024-04-05 | Stop reason: HOSPADM

## 2024-04-05 RX ORDER — BUPIVACAINE HYDROCHLORIDE AND EPINEPHRINE 5; 5 MG/ML; UG/ML
INJECTION, SOLUTION PERINEURAL
Status: DISCONTINUED | OUTPATIENT
Start: 2024-04-05 | End: 2024-04-05 | Stop reason: HOSPADM

## 2024-04-05 RX ORDER — SODIUM CHLORIDE, SODIUM LACTATE, POTASSIUM CHLORIDE, CALCIUM CHLORIDE 600; 310; 30; 20 MG/100ML; MG/100ML; MG/100ML; MG/100ML
INJECTION, SOLUTION INTRAVENOUS CONTINUOUS
Status: ACTIVE | OUTPATIENT
Start: 2024-04-05 | End: 2024-04-05

## 2024-04-05 RX ORDER — EPHEDRINE SULFATE 50 MG/ML
5 INJECTION, SOLUTION INTRAVENOUS
Status: DISCONTINUED | OUTPATIENT
Start: 2024-04-05 | End: 2024-04-05 | Stop reason: HOSPADM

## 2024-04-05 RX ORDER — ACETAMINOPHEN 10 MG/ML
1000 INJECTION, SOLUTION INTRAVENOUS EVERY 6 HOURS
Qty: 200 ML | Refills: 0 | Status: COMPLETED | OUTPATIENT
Start: 2024-04-05 | End: 2024-04-05

## 2024-04-05 RX ORDER — ENALAPRILAT 1.25 MG/ML
2.5 INJECTION INTRAVENOUS EVERY 6 HOURS PRN
Status: DISCONTINUED | OUTPATIENT
Start: 2024-04-05 | End: 2024-04-07 | Stop reason: HOSPADM

## 2024-04-05 RX ORDER — SCOLOPAMINE TRANSDERMAL SYSTEM 1 MG/1
1 PATCH, EXTENDED RELEASE TRANSDERMAL ONCE
COMMUNITY
Start: 2024-03-26 | End: 2024-04-23

## 2024-04-05 RX ORDER — HALOPERIDOL 5 MG/ML
1 INJECTION INTRAMUSCULAR EVERY 6 HOURS PRN
Status: DISCONTINUED | OUTPATIENT
Start: 2024-04-05 | End: 2024-04-07 | Stop reason: HOSPADM

## 2024-04-05 RX ORDER — SODIUM CHLORIDE, SODIUM LACTATE, POTASSIUM CHLORIDE, CALCIUM CHLORIDE 600; 310; 30; 20 MG/100ML; MG/100ML; MG/100ML; MG/100ML
INJECTION, SOLUTION INTRAVENOUS CONTINUOUS
Status: DISCONTINUED | OUTPATIENT
Start: 2024-04-05 | End: 2024-04-05 | Stop reason: HOSPADM

## 2024-04-05 RX ORDER — DIPHENHYDRAMINE HCL 25 MG
25 TABLET ORAL EVERY 6 HOURS PRN
Status: DISCONTINUED | OUTPATIENT
Start: 2024-04-05 | End: 2024-04-07 | Stop reason: HOSPADM

## 2024-04-05 RX ORDER — OXYCODONE HCL 5 MG/5 ML
10 SOLUTION, ORAL ORAL
Status: DISCONTINUED | OUTPATIENT
Start: 2024-04-05 | End: 2024-04-07 | Stop reason: HOSPADM

## 2024-04-05 RX ORDER — ONDANSETRON 2 MG/ML
INJECTION INTRAMUSCULAR; INTRAVENOUS PRN
Status: DISCONTINUED | OUTPATIENT
Start: 2024-04-05 | End: 2024-04-05 | Stop reason: SURG

## 2024-04-05 RX ORDER — ACETAMINOPHEN 10 MG/ML
1 INJECTION, SOLUTION INTRAVENOUS ONCE
Status: COMPLETED | OUTPATIENT
Start: 2024-04-05 | End: 2024-04-05

## 2024-04-05 RX ORDER — ONDANSETRON 2 MG/ML
4 INJECTION INTRAMUSCULAR; INTRAVENOUS
Status: DISCONTINUED | OUTPATIENT
Start: 2024-04-05 | End: 2024-04-05 | Stop reason: HOSPADM

## 2024-04-05 RX ORDER — OXYCODONE HCL 5 MG/5 ML
5 SOLUTION, ORAL ORAL
Status: DISCONTINUED | OUTPATIENT
Start: 2024-04-05 | End: 2024-04-07 | Stop reason: HOSPADM

## 2024-04-05 RX ORDER — ONDANSETRON 2 MG/ML
4 INJECTION INTRAMUSCULAR; INTRAVENOUS EVERY 4 HOURS PRN
Status: DISCONTINUED | OUTPATIENT
Start: 2024-04-05 | End: 2024-04-07 | Stop reason: HOSPADM

## 2024-04-05 RX ORDER — GABAPENTIN 300 MG/1
300 CAPSULE ORAL 3 TIMES DAILY
Status: DISCONTINUED | OUTPATIENT
Start: 2024-04-05 | End: 2024-04-07 | Stop reason: HOSPADM

## 2024-04-05 RX ORDER — METFORMIN HYDROCHLORIDE 500 MG/1
500 TABLET, EXTENDED RELEASE ORAL 2 TIMES DAILY
Status: DISCONTINUED | OUTPATIENT
Start: 2024-04-05 | End: 2024-04-06

## 2024-04-05 RX ORDER — CEFAZOLIN SODIUM 1 G/3ML
INJECTION, POWDER, FOR SOLUTION INTRAMUSCULAR; INTRAVENOUS PRN
Status: DISCONTINUED | OUTPATIENT
Start: 2024-04-05 | End: 2024-04-05 | Stop reason: SURG

## 2024-04-05 RX ORDER — MEPERIDINE HYDROCHLORIDE 25 MG/ML
12.5 INJECTION INTRAMUSCULAR; INTRAVENOUS; SUBCUTANEOUS
Status: DISCONTINUED | OUTPATIENT
Start: 2024-04-05 | End: 2024-04-05 | Stop reason: HOSPADM

## 2024-04-05 RX ORDER — IPRATROPIUM BROMIDE AND ALBUTEROL SULFATE 2.5; .5 MG/3ML; MG/3ML
3 SOLUTION RESPIRATORY (INHALATION)
Status: DISCONTINUED | OUTPATIENT
Start: 2024-04-05 | End: 2024-04-05 | Stop reason: HOSPADM

## 2024-04-05 RX ORDER — DEXMEDETOMIDINE HYDROCHLORIDE 100 UG/ML
INJECTION, SOLUTION INTRAVENOUS PRN
Status: DISCONTINUED | OUTPATIENT
Start: 2024-04-05 | End: 2024-04-05 | Stop reason: SURG

## 2024-04-05 RX ORDER — ROCURONIUM BROMIDE 10 MG/ML
INJECTION, SOLUTION INTRAVENOUS PRN
Status: DISCONTINUED | OUTPATIENT
Start: 2024-04-05 | End: 2024-04-05 | Stop reason: SURG

## 2024-04-05 RX ORDER — HYDRALAZINE HYDROCHLORIDE 20 MG/ML
5 INJECTION INTRAMUSCULAR; INTRAVENOUS
Status: DISCONTINUED | OUTPATIENT
Start: 2024-04-05 | End: 2024-04-05 | Stop reason: HOSPADM

## 2024-04-05 RX ORDER — DIPHENHYDRAMINE HYDROCHLORIDE 50 MG/ML
12.5 INJECTION INTRAMUSCULAR; INTRAVENOUS
Status: DISCONTINUED | OUTPATIENT
Start: 2024-04-05 | End: 2024-04-05 | Stop reason: HOSPADM

## 2024-04-05 RX ADMIN — ACETAMINOPHEN 1000 MG: 1000 INJECTION INTRAVENOUS at 20:57

## 2024-04-05 RX ADMIN — FENTANYL CITRATE 50 MCG: 50 INJECTION, SOLUTION INTRAMUSCULAR; INTRAVENOUS at 11:05

## 2024-04-05 RX ADMIN — HYDROMORPHONE HYDROCHLORIDE 0.4 MG: 1 INJECTION, SOLUTION INTRAMUSCULAR; INTRAVENOUS; SUBCUTANEOUS at 12:56

## 2024-04-05 RX ADMIN — EPHEDRINE SULFATE 10 MG: 50 INJECTION, SOLUTION INTRAVENOUS at 10:30

## 2024-04-05 RX ADMIN — ACETAMINOPHEN 1 G: 1000 INJECTION INTRAVENOUS at 09:16

## 2024-04-05 RX ADMIN — FENTANYL CITRATE 50 MCG: 50 INJECTION, SOLUTION INTRAMUSCULAR; INTRAVENOUS at 11:40

## 2024-04-05 RX ADMIN — HYDROMORPHONE HYDROCHLORIDE 0.2 MG: 1 INJECTION, SOLUTION INTRAMUSCULAR; INTRAVENOUS; SUBCUTANEOUS at 12:20

## 2024-04-05 RX ADMIN — SODIUM CHLORIDE, POTASSIUM CHLORIDE, SODIUM LACTATE AND CALCIUM CHLORIDE: 600; 310; 30; 20 INJECTION, SOLUTION INTRAVENOUS at 09:16

## 2024-04-05 RX ADMIN — FENTANYL CITRATE 50 MCG: 50 INJECTION, SOLUTION INTRAMUSCULAR; INTRAVENOUS at 10:48

## 2024-04-05 RX ADMIN — CEFAZOLIN 3 G: 1 INJECTION, POWDER, FOR SOLUTION INTRAMUSCULAR; INTRAVENOUS at 10:07

## 2024-04-05 RX ADMIN — GABAPENTIN 300 MG: 300 CAPSULE ORAL at 09:15

## 2024-04-05 RX ADMIN — OXYCODONE HYDROCHLORIDE 10 MG: 5 SOLUTION ORAL at 11:04

## 2024-04-05 RX ADMIN — OXYCODONE HYDROCHLORIDE 10 MG: 5 SOLUTION ORAL at 20:55

## 2024-04-05 RX ADMIN — DEXMEDETOMIDINE HYDROCHLORIDE 25 MCG: 100 INJECTION, SOLUTION INTRAVENOUS at 10:13

## 2024-04-05 RX ADMIN — SUGAMMADEX 200 MG: 100 INJECTION, SOLUTION INTRAVENOUS at 10:48

## 2024-04-05 RX ADMIN — POTASSIUM CHLORIDE: 2 INJECTION, SOLUTION, CONCENTRATE INTRAVENOUS at 15:02

## 2024-04-05 RX ADMIN — PROPOFOL 200 MG: 10 INJECTION, EMULSION INTRAVENOUS at 10:07

## 2024-04-05 RX ADMIN — FENTANYL CITRATE 50 MCG: 50 INJECTION, SOLUTION INTRAMUSCULAR; INTRAVENOUS at 11:34

## 2024-04-05 RX ADMIN — OXYCODONE HYDROCHLORIDE 10 MG: 5 SOLUTION ORAL at 14:55

## 2024-04-05 RX ADMIN — SIMETHICONE 125 MG: 125 TABLET, CHEWABLE ORAL at 17:33

## 2024-04-05 RX ADMIN — FENTANYL CITRATE 50 MCG: 50 INJECTION, SOLUTION INTRAMUSCULAR; INTRAVENOUS at 10:05

## 2024-04-05 RX ADMIN — ONDANSETRON 4 MG: 2 INJECTION INTRAMUSCULAR; INTRAVENOUS at 10:48

## 2024-04-05 RX ADMIN — HYDROMORPHONE HYDROCHLORIDE 0.4 MG: 1 INJECTION, SOLUTION INTRAMUSCULAR; INTRAVENOUS; SUBCUTANEOUS at 12:50

## 2024-04-05 RX ADMIN — FENTANYL CITRATE 50 MCG: 50 INJECTION, SOLUTION INTRAMUSCULAR; INTRAVENOUS at 11:15

## 2024-04-05 RX ADMIN — OXYCODONE HYDROCHLORIDE 10 MG: 10 TABLET, FILM COATED, EXTENDED RELEASE ORAL at 09:15

## 2024-04-05 RX ADMIN — ROCURONIUM BROMIDE 60 MG: 50 INJECTION, SOLUTION INTRAVENOUS at 10:07

## 2024-04-05 RX ADMIN — FAMOTIDINE 20 MG: 10 INJECTION, SOLUTION INTRAVENOUS at 17:34

## 2024-04-05 RX ADMIN — DEXMEDETOMIDINE HYDROCHLORIDE 25 MCG: 100 INJECTION, SOLUTION INTRAVENOUS at 10:36

## 2024-04-05 RX ADMIN — GABAPENTIN 300 MG: 300 CAPSULE ORAL at 17:33

## 2024-04-05 RX ADMIN — ONDANSETRON 4 MG: 2 INJECTION INTRAMUSCULAR; INTRAVENOUS at 20:55

## 2024-04-05 RX ADMIN — FENTANYL CITRATE 100 MCG: 50 INJECTION, SOLUTION INTRAMUSCULAR; INTRAVENOUS at 10:38

## 2024-04-05 RX ADMIN — MIDAZOLAM HYDROCHLORIDE 2 MG: 1 INJECTION, SOLUTION INTRAMUSCULAR; INTRAVENOUS at 10:04

## 2024-04-05 RX ADMIN — ACETAMINOPHEN 1000 MG: 1000 INJECTION INTRAVENOUS at 15:05

## 2024-04-05 RX ADMIN — LIDOCAINE HYDROCHLORIDE 80 MG: 20 INJECTION, SOLUTION EPIDURAL; INFILTRATION; INTRACAUDAL at 10:07

## 2024-04-05 ASSESSMENT — COGNITIVE AND FUNCTIONAL STATUS - GENERAL
SUGGESTED CMS G CODE MODIFIER MOBILITY: CK
TURNING FROM BACK TO SIDE WHILE IN FLAT BAD: A LITTLE
STANDING UP FROM CHAIR USING ARMS: A LITTLE
MOVING FROM LYING ON BACK TO SITTING ON SIDE OF FLAT BED: A LITTLE
DAILY ACTIVITIY SCORE: 24
SUGGESTED CMS G CODE MODIFIER DAILY ACTIVITY: CH
CLIMB 3 TO 5 STEPS WITH RAILING: A LITTLE
MOBILITY SCORE: 18
WALKING IN HOSPITAL ROOM: A LITTLE
MOVING TO AND FROM BED TO CHAIR: A LITTLE

## 2024-04-05 ASSESSMENT — PATIENT HEALTH QUESTIONNAIRE - PHQ9
1. LITTLE INTEREST OR PLEASURE IN DOING THINGS: NOT AT ALL
SUM OF ALL RESPONSES TO PHQ9 QUESTIONS 1 AND 2: 0
2. FEELING DOWN, DEPRESSED, IRRITABLE, OR HOPELESS: NOT AT ALL
1. LITTLE INTEREST OR PLEASURE IN DOING THINGS: NOT AT ALL
2. FEELING DOWN, DEPRESSED, IRRITABLE, OR HOPELESS: NOT AT ALL
SUM OF ALL RESPONSES TO PHQ9 QUESTIONS 1 AND 2: 0

## 2024-04-05 ASSESSMENT — LIFESTYLE VARIABLES
DOES PATIENT WANT TO STOP DRINKING: NO
ALCOHOL_USE: YES
HAVE PEOPLE ANNOYED YOU BY CRITICIZING YOUR DRINKING: NO
TOTAL SCORE: 0
TOTAL SCORE: 0
EVER HAD A DRINK FIRST THING IN THE MORNING TO STEADY YOUR NERVES TO GET RID OF A HANGOVER: NO
AVERAGE NUMBER OF DAYS PER WEEK YOU HAVE A DRINK CONTAINING ALCOHOL: 0
HAVE YOU EVER FELT YOU SHOULD CUT DOWN ON YOUR DRINKING: NO
EVER FELT BAD OR GUILTY ABOUT YOUR DRINKING: NO
TOTAL SCORE: 0
CONSUMPTION TOTAL: NEGATIVE
HOW MANY TIMES IN THE PAST YEAR HAVE YOU HAD 5 OR MORE DRINKS IN A DAY: 0
ON A TYPICAL DAY WHEN YOU DRINK ALCOHOL HOW MANY DRINKS DO YOU HAVE: 3

## 2024-04-05 ASSESSMENT — PAIN DESCRIPTION - PAIN TYPE
TYPE: SURGICAL PAIN
TYPE: ACUTE PAIN;SURGICAL PAIN
TYPE: ACUTE PAIN;SURGICAL PAIN
TYPE: SURGICAL PAIN

## 2024-04-05 ASSESSMENT — FIBROSIS 4 INDEX: FIB4 SCORE: .8164965809277260327

## 2024-04-05 ASSESSMENT — PAIN SCALES - GENERAL: PAIN_LEVEL: 3

## 2024-04-05 NOTE — PROGRESS NOTES
1059: Pt arrived from OR post lap sleeve under anesthesia. Pt is arousable to voice. 5 sights to abdomen with steris and bandaids are mostly CDI. Cardiac rhythm appears to be SR.     1135: IV infiltrated; New IV placed.    1143: RN updated pt's dad.     1409: Pt to room via gurney with transport. Pt on 4L; tank is full.

## 2024-04-05 NOTE — ANESTHESIA TIME REPORT
Anesthesia Start and Stop Event Times       Date Time Event    4/5/2024 0950 Ready for Procedure     1002 Anesthesia Start     1102 Anesthesia Stop          Responsible Staff  04/05/24      Name Role Begin End    Allyssa Myers M.D. Anesth 1002 1102          Overtime Reason:  no overtime (within assigned shift)    Comments:

## 2024-04-05 NOTE — ANESTHESIA POSTPROCEDURE EVALUATION
Patient: Kinza Juan    Procedure Summary       Date: 04/05/24 Room / Location: [facility] 09 / SURGERY [facility]    Anesthesia Start: 1002 Anesthesia Stop: 1102    Procedure: LAPAROSCOPIC SLEEVE GASTRECTOMY (Abdomen) Diagnosis: (MORBID OBESITY)    Surgeons: Iftikhar Flowers M.D. Responsible Provider: Allyssa Myers M.D.    Anesthesia Type: general ASA Status: 3            Final Anesthesia Type: general  Last vitals  BP   Blood Pressure: 109/56    Temp   36.6 °C (97.9 °F)    Pulse   67   Resp   (!) 29    SpO2   93 %      Anesthesia Post Evaluation    Patient location during evaluation: PACU  Patient participation: complete - patient participated  Level of consciousness: awake and alert  Pain score: 3    Airway patency: patent  Anesthetic complications: no  Cardiovascular status: hemodynamically stable  Respiratory status: acceptable  Hydration status: euvolemic    PONV: none          No notable events documented.     Nurse Pain Score: 3 (NPRS)

## 2024-04-05 NOTE — ANESTHESIA PREPROCEDURE EVALUATION
Case: [id number] Date/Time: 04/05/24 1015    Procedure: LAPAROSCOPIC SLEEVE GASTRECTOMY    Pre-op diagnosis: MORBID OBESITY    Location: [facility] OR 09 / SURGERY [facility]    Surgeons: Iftikhar Flowers M.D.            Relevant Problems   GI   (positive) Gastroesophageal reflux disease without esophagitis      ENDO   (positive) Type 2 diabetes mellitus, without long-term current use of insulin (HCC)      Other   (positive) History of gestational diabetes mellitus (GDM)   (positive) Morbid obesity with BMI of 45.0-49.9, adult (HCC)   (positive) Other hyperlipidemia   (positive) PCOS (polycystic ovarian syndrome)       Physical Exam    Airway   Mallampati: III  TM distance: >3 FB  Neck ROM: full       Cardiovascular - normal exam  Rhythm: regular  Rate: normal  (-) murmur     Dental - normal exam           Pulmonary - normal exam  Breath sounds clear to auscultation     Abdominal    Neurological - normal exam                   Anesthesia Plan    ASA 3   ASA physical status 3 criteria: morbid obesity - BMI greater than or equal to 40    Plan - general       Airway plan will be ETT          Induction: intravenous    Postoperative Plan: Postoperative administration of opioids is intended.    Pertinent diagnostic labs and testing reviewed    Informed Consent:    Anesthetic plan and risks discussed with patient.    Use of blood products discussed with: patient whom consented to blood products.

## 2024-04-05 NOTE — PROGRESS NOTES
4 Eyes Skin Assessment Completed by KERMIT Hagan and KERMIT Choudhury.    Head WDL  Ears WDL  Nose WDL  Mouth WDL  Neck WDL  Breast/Chest WDL  Shoulder Blades WDL  Spine WDL  (R) Arm/Elbow/Hand WDL  (L) Arm/Elbow/Hand WDL  Abdomen Incision x5 lap sites closed with steri strips and bandaids  Groin WDL  Scrotum/Coccyx/Buttocks WDL  (R) Leg WDL  (L) Leg WDL  (R) Heel/Foot/Toe WDL  (L) Heel/Foot/Toe WDL          Devices In Places Blood Pressure Cuff, Pulse Ox, SCD's, and Nasal Cannula      Interventions In Place NC W/Ear Foams, Pillows, Barrier Cream, and Pressure Redistribution Mattress    Possible Skin Injury No    Pictures Uploaded Into Epic N/A  Wound Consult Placed N/A  RN Wound Prevention Protocol Ordered No

## 2024-04-05 NOTE — ANESTHESIA PROCEDURE NOTES
Airway    Date/Time: 4/5/2024 10:08 AM    Performed by: Allyssa Myers M.D.  Authorized by: Allyssa Myers M.D.    Location:  OR  Urgency:  Elective  Difficult Airway: No    Indications for Airway Management:  Anesthesia      Spontaneous Ventilation: absent    Sedation Level:  Deep  Preoxygenated: Yes    Patient Position:  Sniffing  Mask Difficulty Assessment:  1 - vent by mask  Final Airway Type:  Endotracheal airway  Final Endotracheal Airway:  ETT  Cuffed: Yes    Technique Used for Successful ETT Placement:  Direct laryngoscopy    Insertion Site:  Oral  Blade Type:  Solomon  Laryngoscope Blade/Videolaryngoscope Blade Size:  3  ETT Size (mm):  7.0  Measured from:  Teeth  ETT to Teeth (cm):  21  Placement Verified by: auscultation and capnometry    Cormack-Lehane Classification:  Grade I - full view of glottis  Number of Attempts at Approach:  1

## 2024-04-05 NOTE — OP REPORT
NAME:  Kinza Juan  MRN:  [id number]  :  1985      DATE OF OPERATION: 2024    PREOPERATIVE DIAGNOSIS: Morbid Obesity with medical sequelae    POSTOPERATIVE DIAGNOSIS: Morbid Obesity with medical sequelae    OPERATION PERFORMED: Laparoscopic Sleeve Gastrectomy     SURGEON: Iftikhar Flowers MD    ASSISTANT:  Gómez Cruz PA-C, PA-C    ANESTHESIOLOGIST:  Anesthesiologist: Allyssa Myers M.D.    ANESTHESIA: General endotracheal anesthesia.     SPECIMEN: Stomach    ESTIMATED BLOOD LOSS: <10cc.     INDICATIONS: The patient is a 38 y.o. female with a diagnosis of morbid obesity with medical sequelae. She is taken to the operating room today for Laparoscopic Sleeve Gastrectomy.     PROCEDURE: Following informed consent, the patient was properly identified, taken to the operating room, and placed in the supine position where general endotracheal anesthesia was administered. Intravenous antibiotics were administered by the anesthesiologist in the correct time interval. Sequential compression devices were employed. The abdomen was prepped and draped into a sterile field.       An optical entry bladeless  trocar was utilized and pneumoperitoneum carefully established in the usual fashion.  The bladeless 5 mm separator trocar was introduced and the 5 mm lens/camera was passed into the peritoneal cavity.  Two additional separator trocars were placed under direct vision.  A 5 mm Soledad-type liver retractor was placed into position.  This was used to elevate the left sided segment of the liver.  It was secured to the patients right side with a robot arm.  Careful inspection revealed no untoward events with placement of the trocars.    The gastrocolic omentum was examined and dissected with the ligasure device and a point on the distal antrum was selected to begin the sleeve gastrectomy.  A 40 Frisian bougie was then passed down into the antrum.  With the bougie in position, the greater curvature was freed of  attachments using the ligasure energy device, beginning 4-5 cm proximal to the pylorus in the method of the sleeve gastrectomy.  The greater curvature aspect of the stomach was then freed and the greater curvature vessels and short gastric vessels were divided with the ligasure, and use of some hemoclips.  The hiatus and left hayder were exposed.    The 19mm bladeless port was carefully introduced under direct vision, to the right of midline. Next, the Titan stapler was carefully introduced and the stomach tissue brought into the stapler jaws so as to create a nice sleeve staple line. Care was taken to maintain distance from the esophagus and examine the stomach and stapler position throughout its planned staple line.  After confirming positioning and waiting the requisite time for tissue compression, the stapler was slowly fired and the stomach transected. The tissue was released and the stapler removed. Inspection demonstrated excellent hemostasis of the the surrounding omentum. Hemoclips were used for any oozing along staple line locations. Further inspection of the staple line now demonstrated excellent, meticulous hemostasis and a completely intact staple line with seemless tissue approximation.  Seromuscular omentoplasty sutures were placed using polysorb suture to secure the staple line and prevent wind-sock deformity rotation.  The bougie was then removed.     The large endocatch bag was then used to retrieve the stomach specimen.  Tisseal fibrin glue sealant was sprayed along the entire staple line. The ports were removed under direct vision and the pneumoperitoneum was allowed to escape. The fascia of this port was closed with 0-Vicryl suture.  The port sites were then irrigated well.  The port site skin incisions were closed with interrupted 4-0 Vicryl subcuticular sutures.  Steri-Strips and Benzoin were applied beneath sterile Band-Aids.     The patient tolerated the procedure well and there were no  apparent complications. All sponge, needle, and instrument counts were correct on 2 separate occasions. She was awakened, extubated, and transferred to the recovery room in satisfactory condition.       ____________________________________   Iftikhar Flowers MD  DD: 4/5/2024  10:55 AM    CC:  [facility];

## 2024-04-05 NOTE — PROGRESS NOTES
Patient has arrived on unit from PACU. 4L NC in place. Ambulates with standby hand held assistance. Call light is within patient reach.

## 2024-04-06 PROBLEM — Z98.84 S/P LAPAROSCOPIC SLEEVE GASTRECTOMY: Status: ACTIVE | Noted: 2024-04-06

## 2024-04-06 PROBLEM — R11.2 NAUSEA & VOMITING: Status: ACTIVE | Noted: 2024-04-06

## 2024-04-06 LAB
ALBUMIN SERPL BCP-MCNC: 3.6 G/DL (ref 3.2–4.9)
ALBUMIN/GLOB SERPL: 1.1 G/DL
ALP SERPL-CCNC: 115 U/L (ref 30–99)
ALT SERPL-CCNC: 43 U/L (ref 2–50)
ANION GAP SERPL CALC-SCNC: 12 MMOL/L (ref 7–16)
AST SERPL-CCNC: 39 U/L (ref 12–45)
BILIRUB SERPL-MCNC: 0.3 MG/DL (ref 0.1–1.5)
BUN SERPL-MCNC: 6 MG/DL (ref 8–22)
CALCIUM ALBUM COR SERPL-MCNC: 8.5 MG/DL (ref 8.5–10.5)
CALCIUM SERPL-MCNC: 8.2 MG/DL (ref 8.5–10.5)
CHLORIDE SERPL-SCNC: 105 MMOL/L (ref 96–112)
CO2 SERPL-SCNC: 22 MMOL/L (ref 20–33)
CREAT SERPL-MCNC: 0.61 MG/DL (ref 0.5–1.4)
ERYTHROCYTE [DISTWIDTH] IN BLOOD BY AUTOMATED COUNT: 49.2 FL (ref 35.9–50)
GFR SERPLBLD CREATININE-BSD FMLA CKD-EPI: 117 ML/MIN/1.73 M 2
GLOBULIN SER CALC-MCNC: 3.4 G/DL (ref 1.9–3.5)
GLUCOSE SERPL-MCNC: 97 MG/DL (ref 65–99)
HCT VFR BLD AUTO: 39.3 % (ref 37–47)
HGB BLD-MCNC: 12 G/DL (ref 12–16)
MCH RBC QN AUTO: 27.6 PG (ref 27–33)
MCHC RBC AUTO-ENTMCNC: 30.5 G/DL (ref 32.2–35.5)
MCV RBC AUTO: 90.3 FL (ref 81.4–97.8)
PLATELET # BLD AUTO: 302 K/UL (ref 164–446)
PMV BLD AUTO: 10.7 FL (ref 9–12.9)
POTASSIUM SERPL-SCNC: 4.2 MMOL/L (ref 3.6–5.5)
PROT SERPL-MCNC: 7 G/DL (ref 6–8.2)
RBC # BLD AUTO: 4.35 M/UL (ref 4.2–5.4)
SODIUM SERPL-SCNC: 139 MMOL/L (ref 135–145)
WBC # BLD AUTO: 15.9 K/UL (ref 4.8–10.8)

## 2024-04-06 PROCEDURE — 700105 HCHG RX REV CODE 258

## 2024-04-06 PROCEDURE — 700111 HCHG RX REV CODE 636 W/ 250 OVERRIDE (IP): Performed by: PHYSICIAN ASSISTANT

## 2024-04-06 PROCEDURE — C9113 INJ PANTOPRAZOLE SODIUM, VIA: HCPCS | Performed by: PHYSICIAN ASSISTANT

## 2024-04-06 PROCEDURE — 770001 HCHG ROOM/CARE - MED/SURG/GYN PRIV*

## 2024-04-06 PROCEDURE — A9270 NON-COVERED ITEM OR SERVICE: HCPCS

## 2024-04-06 PROCEDURE — 36415 COLL VENOUS BLD VENIPUNCTURE: CPT

## 2024-04-06 PROCEDURE — RXMED WILLOW AMBULATORY MEDICATION CHARGE: Performed by: PHYSICIAN ASSISTANT

## 2024-04-06 PROCEDURE — 700102 HCHG RX REV CODE 250 W/ 637 OVERRIDE(OP)

## 2024-04-06 PROCEDURE — 85027 COMPLETE CBC AUTOMATED: CPT

## 2024-04-06 PROCEDURE — 700111 HCHG RX REV CODE 636 W/ 250 OVERRIDE (IP): Mod: JZ

## 2024-04-06 PROCEDURE — 80053 COMPREHEN METABOLIC PANEL: CPT

## 2024-04-06 RX ORDER — ONDANSETRON 4 MG/1
4 TABLET, ORALLY DISINTEGRATING ORAL EVERY 6 HOURS PRN
Qty: 20 TABLET | Refills: 0 | Status: SHIPPED | OUTPATIENT
Start: 2024-04-06 | End: 2024-04-23

## 2024-04-06 RX ORDER — DEXAMETHASONE SODIUM PHOSPHATE 4 MG/ML
4 INJECTION, SOLUTION INTRA-ARTICULAR; INTRALESIONAL; INTRAMUSCULAR; INTRAVENOUS; SOFT TISSUE EVERY 6 HOURS
Status: DISCONTINUED | OUTPATIENT
Start: 2024-04-06 | End: 2024-04-07 | Stop reason: HOSPADM

## 2024-04-06 RX ORDER — PANTOPRAZOLE SODIUM 40 MG/10ML
40 INJECTION, POWDER, LYOPHILIZED, FOR SOLUTION INTRAVENOUS 2 TIMES DAILY
Status: DISCONTINUED | OUTPATIENT
Start: 2024-04-06 | End: 2024-04-07 | Stop reason: HOSPADM

## 2024-04-06 RX ADMIN — DEXAMETHASONE SODIUM PHOSPHATE 4 MG: 4 INJECTION, SOLUTION INTRA-ARTICULAR; INTRALESIONAL; INTRAMUSCULAR; INTRAVENOUS; SOFT TISSUE at 23:51

## 2024-04-06 RX ADMIN — PANTOPRAZOLE SODIUM 40 MG: 40 INJECTION, POWDER, LYOPHILIZED, FOR SOLUTION INTRAVENOUS at 17:23

## 2024-04-06 RX ADMIN — GABAPENTIN 300 MG: 300 CAPSULE ORAL at 12:52

## 2024-04-06 RX ADMIN — PANTOPRAZOLE SODIUM 40 MG: 40 INJECTION, POWDER, LYOPHILIZED, FOR SOLUTION INTRAVENOUS at 09:52

## 2024-04-06 RX ADMIN — ONDANSETRON 4 MG: 2 INJECTION INTRAMUSCULAR; INTRAVENOUS at 06:01

## 2024-04-06 RX ADMIN — DEXAMETHASONE SODIUM PHOSPHATE 4 MG: 4 INJECTION, SOLUTION INTRA-ARTICULAR; INTRALESIONAL; INTRAMUSCULAR; INTRAVENOUS; SOFT TISSUE at 09:52

## 2024-04-06 RX ADMIN — POTASSIUM CHLORIDE: 2 INJECTION, SOLUTION, CONCENTRATE INTRAVENOUS at 07:35

## 2024-04-06 RX ADMIN — OXYCODONE HYDROCHLORIDE 10 MG: 5 SOLUTION ORAL at 00:06

## 2024-04-06 RX ADMIN — ACETAMINOPHEN 1000 MG: 500 TABLET, FILM COATED ORAL at 12:51

## 2024-04-06 RX ADMIN — POTASSIUM CHLORIDE: 2 INJECTION, SOLUTION, CONCENTRATE INTRAVENOUS at 23:51

## 2024-04-06 RX ADMIN — DEXAMETHASONE SODIUM PHOSPHATE 4 MG: 4 INJECTION, SOLUTION INTRA-ARTICULAR; INTRALESIONAL; INTRAMUSCULAR; INTRAVENOUS; SOFT TISSUE at 17:24

## 2024-04-06 RX ADMIN — FAMOTIDINE 20 MG: 10 INJECTION, SOLUTION INTRAVENOUS at 17:24

## 2024-04-06 RX ADMIN — SIMETHICONE 125 MG: 125 TABLET, CHEWABLE ORAL at 06:00

## 2024-04-06 RX ADMIN — POTASSIUM CHLORIDE: 2 INJECTION, SOLUTION, CONCENTRATE INTRAVENOUS at 00:04

## 2024-04-06 RX ADMIN — FAMOTIDINE 20 MG: 10 INJECTION, SOLUTION INTRAVENOUS at 06:00

## 2024-04-06 RX ADMIN — OXYCODONE HYDROCHLORIDE 5 MG: 5 SOLUTION ORAL at 23:51

## 2024-04-06 RX ADMIN — ENOXAPARIN SODIUM 40 MG: 100 INJECTION SUBCUTANEOUS at 17:25

## 2024-04-06 RX ADMIN — ENOXAPARIN SODIUM 40 MG: 100 INJECTION SUBCUTANEOUS at 06:00

## 2024-04-06 RX ADMIN — ANTACID TABLETS 500 MG: 500 TABLET, CHEWABLE ORAL at 00:06

## 2024-04-06 RX ADMIN — OXYCODONE HYDROCHLORIDE 10 MG: 5 SOLUTION ORAL at 06:01

## 2024-04-06 RX ADMIN — OXYCODONE HYDROCHLORIDE 5 MG: 5 SOLUTION ORAL at 17:22

## 2024-04-06 RX ADMIN — POTASSIUM CHLORIDE: 2 INJECTION, SOLUTION, CONCENTRATE INTRAVENOUS at 15:55

## 2024-04-06 ASSESSMENT — ENCOUNTER SYMPTOMS
HEARTBURN: 1
PALPITATIONS: 0
SHORTNESS OF BREATH: 0
DIARRHEA: 0
NAUSEA: 1
ABDOMINAL PAIN: 1
COUGH: 0
CHILLS: 0
FEVER: 0
VOMITING: 1

## 2024-04-06 ASSESSMENT — PAIN DESCRIPTION - PAIN TYPE
TYPE: SURGICAL PAIN
TYPE: ACUTE PAIN;SURGICAL PAIN
TYPE: SURGICAL PAIN
TYPE: ACUTE PAIN;SURGICAL PAIN
TYPE: ACUTE PAIN
TYPE: ACUTE PAIN;SURGICAL PAIN
TYPE: SURGICAL PAIN
TYPE: ACUTE PAIN
TYPE: SURGICAL PAIN
TYPE: ACUTE PAIN

## 2024-04-06 NOTE — DISCHARGE INSTRUCTIONS
Bariatric Discharge instructions:    1. DIET: Follow the diet progression detailed in your post-op booklet.  Progressing as instructed will make for a smooth transition after surgery and prevent any pain associated with eating foods before your stomach is ready or eating too much.  Water and hydration is much more important than food intake the first week or two after surgery.  Drink enough water to keep your urine pale yellow.  Increase water intake if your urine is a darker yellow or if have burning with urination.  Nausea and vomiting once or twice after you leave the hospital is normal.  Sip fluids continually and stay hydrated.    2.  SUPPLEMENTS: Start your supplements 1 week after surgery.  You may need to cut some supplements in half initially.  Follow the guidelines from your supplement handout from your pre-op class.     3. ACTIVITIES: After discharge from the hospital, you may resume full routine activities. However, there should be no heavy lifting (greater than 15 pounds) and no strenuous activities for the next 6 weeks. Otherwise, routine activities of daily living are acceptable.  More movement and walking after surgery the better.    4. DRIVING: You may drive whenever you are off pain medications and are able to perform the activities needed to drive, i.e. turning, bending, twisting, etc.    5. BATHING AND WOUND CARE: You may get the wound wet 2 days after surgery. You may shower, but do not submerge in a bath for at least a week. Dressings may come off after 48 hours. Please leave the steri-strips in place, they will fall off over 5-7 days. You may notice some clear or slightly bloody drainage from your wounds and there may be some mild redness or bruising around your incision sites.  This is normal.    6. BOWEL FUNCTION: Constipation is common after an operation, especially with pain medications. The combination of pain medication and decreased activity level can cause constipation in otherwise  normal patients. If you feel this is occurring, take a laxative (Milk of Magnesia, Miralax, stool softener, etc.) until the problem has resolved.  Diarrhea the first few days after surgery can also be normal.  You may notice that it is dark in color or see blood, which is also normal and should subside in the first week post-op.    7. HOME MEDICATIONS/PAIN MEDICATION: You may resume home medications as normal. Preoperatively, you have been given prescriptions for pain medication, antinausea medication, Proton pump inhibitor antacid medication, and home Lovenox/Enoxaparin injections. Please take these as directed. It is important to remember not to take medications on an empty stomach as this may cause nausea.  For minimal discomfort you may use liquid Tylenol 650 mg every 4 hours.  DO NOT exceed 4,000 mg of Tylenol in 24 hours.  DO NOT use non-steroidal anti-inflammatory medication such as: Aspirin, Ibuprofen, Advil, Motrin, Aleve, or steroids.  These medication can cause ulcers after weight loss surgery. If you experience itching or rash, you may take Benadryl or other antihistamines (Claritin, Zyrtec, Allegra, etc.). These are all safe.    8.CALL IF YOU HAVE: (1) Fevers to more than 101.5 F, (2) leg pain or swelling (3) Drainage or fluid from incision that may be foul smelling, increased tenderness or soreness at the wound or the wound edges are no longer together, redness or swelling at the incision site. (4) Night Sweats (5) Shaking, Chills (6) Persistent Nausea or Vomiting for over 24 hours.  Use your anti nausea medication as prescribed. (7) Call 911 if sudden onset of chest pain or shortness of breath that does not improve with 5-10 min of rest.    9. APPOINTMENT: You should have a prescheduled follow up appointment in our office in 1-2 weeks. If not, please contact our office at [phone] to schedule a follow-up appointment in 1-2 weeks following your procedure.  Our office hours are Monday-Friday,  8am-4:30pm.  Please try to call during these hours when we are better able to assist you.    If you have any additional questions, please do not hesitate to call the office and speak to either myself or the physician on call.    Office address:  [facility] [address] [place] [address]   [address]  [place] [address]

## 2024-04-06 NOTE — CARE PLAN
The patient is Stable - Low risk of patient condition declining or worsening    Shift Goals  Clinical Goals: Pain/Nausea Management; Mobility; Tolerate Diet  Patient Goals: Pain Control; Comfort; Ambulate  Family Goals: patient comfort, updates on POC    Progress made toward(s) clinical / shift goals:  Patient medicated per MAR. Non-pharmacologic comfort measures implemented. Safety discussed. Education provided. Ambulation and repositioning encouraged.     Problem: Pain - Standard  Goal: Alleviation of pain or a reduction in pain to the patient’s comfort goal  Outcome: Progressing     Problem: Knowledge Deficit - Standard  Goal: Patient and family/care givers will demonstrate understanding of plan of care, disease process/condition, diagnostic tests and medications  Outcome: Progressing     Problem: Mobility  Goal: Patient's capacity to carry out activities will improve  Outcome: Progressing

## 2024-04-06 NOTE — CARE PLAN
"The patient is Stable - Low risk of patient condition declining or worsening    Shift Goals  Clinical Goals: pain management, diet tolerance, ambulation  Patient Goals: \"get better\"  Family Goals: patient comfort, updates on POC    Progress made toward(s) clinical / shift goals:  Pain has been medicated per MAR. Patient has ambulated around unit. Oxygen weaning is in progress.     Patient is not progressing towards the following goals: Patient reported one episode of emesis but denied ongoing nausea/vomiting issues.       Problem: Pain - Standard  Goal: Alleviation of pain or a reduction in pain to the patient’s comfort goal  Outcome: Progressing     Problem: Knowledge Deficit - Standard  Goal: Patient and family/care givers will demonstrate understanding of plan of care, disease process/condition, diagnostic tests and medications  Outcome: Progressing         "

## 2024-04-06 NOTE — PROGRESS NOTES
Surgical Progress Note    Author: Diandra Jimenez P.A.-C. Date & Time created: 2024   9:50 AM     Interval Events:  POD#1 Laparoscopic Sleeve Gastrectomy. She is not feeling well this morning. She is struggling with nausea/vomiting, reflux. She describes waking in the middle of the night gasping for air, feeling as though she is choking, she reports due to reflux. She has been getting Pepcid. We discussed adding Pantoprazole and Decadron. She is trying to romel in some sips of clears. She reports mild pain with sips.  Admits to typical incisional abdominal pain, controlled with medication. She has been ambulating the bridges and sitting up on the couch in her room. She is voiding.      Review of Systems   Constitutional:  Negative for chills and fever.   Respiratory:  Negative for cough and shortness of breath.    Cardiovascular:  Negative for chest pain and palpitations.   Gastrointestinal:  Positive for abdominal pain (incisional), heartburn, nausea and vomiting. Negative for diarrhea.   Genitourinary:  Negative for dysuria.     Hemodynamics:  Temp (24hrs), Av.5 °C (97.7 °F), Min:36.3 °C (97.3 °F), Max:36.7 °C (98.1 °F)  Temperature: 36.7 °C (98.1 °F)  Pulse  Av.9  Min: 51  Max: 85   Blood Pressure: 112/69     Respiratory:    Respiration: 18, Pulse Oximetry: 97 %        RUL Breath Sounds: Clear, RML Breath Sounds: Clear, RLL Breath Sounds: Clear, LYNSEY Breath Sounds: Clear, LLL Breath Sounds: Clear  Neuro:  GCS       Fluids:    Intake/Output Summary (Last 24 hours) at 2024 0950  Last data filed at 2024 2040  Gross per 24 hour   Intake 860 ml   Output 25 ml   Net 835 ml        Current Diet Order   Procedures    Diet Order Diet: Clear Liquid; Miscellaneous modifications: (optional): Bariatric     Physical Exam  Constitutional:       Appearance: She is obese.   HENT:      Head: Normocephalic and atraumatic.      Mouth/Throat:      Pharynx: Oropharynx is clear.   Eyes:      Conjunctiva/sclera: Conjunctivae  normal.   Cardiovascular:      Rate and Rhythm: Normal rate and regular rhythm.   Pulmonary:      Effort: Pulmonary effort is normal.   Abdominal:      General: There is distension (mild).      Palpations: Abdomen is soft. There is no mass.      Tenderness: There is abdominal tenderness (incisional). There is no guarding or rebound.   Musculoskeletal:         General: Normal range of motion.      Cervical back: Normal range of motion.   Skin:     General: Skin is warm and dry.      Findings: No erythema or rash.      Comments: Incisions with minimal serosanguinous ooze   Neurological:      Mental Status: She is alert and oriented to person, place, and time.   Psychiatric:         Mood and Affect: Mood normal.       Labs:  Recent Results (from the past 24 hour(s))   Histology Request    Collection Time: 04/05/24 10:38 AM   Result Value Ref Range    Pathology Request Sent to Histo    CBC without Differential (blood)    Collection Time: 04/06/24  2:50 AM   Result Value Ref Range    WBC 15.9 (H) 4.8 - 10.8 K/uL    RBC 4.35 4.20 - 5.40 M/uL    Hemoglobin 12.0 12.0 - 16.0 g/dL    Hematocrit 39.3 37.0 - 47.0 %    MCV 90.3 81.4 - 97.8 fL    MCH 27.6 27.0 - 33.0 pg    MCHC 30.5 (L) 32.2 - 35.5 g/dL    RDW 49.2 35.9 - 50.0 fL    Platelet Count 302 164 - 446 K/uL    MPV 10.7 9.0 - 12.9 fL   Comp Metabolic Panel (CMP)    Collection Time: 04/06/24  2:50 AM   Result Value Ref Range    Sodium 139 135 - 145 mmol/L    Potassium 4.2 3.6 - 5.5 mmol/L    Chloride 105 96 - 112 mmol/L    Co2 22 20 - 33 mmol/L    Anion Gap 12.0 7.0 - 16.0    Glucose 97 65 - 99 mg/dL    Bun 6 (L) 8 - 22 mg/dL    Creatinine 0.61 0.50 - 1.40 mg/dL    Calcium 8.2 (L) 8.5 - 10.5 mg/dL    Correct Calcium 8.5 8.5 - 10.5 mg/dL    AST(SGOT) 39 12 - 45 U/L    ALT(SGPT) 43 2 - 50 U/L    Alkaline Phosphatase 115 (H) 30 - 99 U/L    Total Bilirubin 0.3 0.1 - 1.5 mg/dL    Albumin 3.6 3.2 - 4.9 g/dL    Total Protein 7.0 6.0 - 8.2 g/dL    Globulin 3.4 1.9 - 3.5 g/dL     A-G Ratio 1.1 g/dL   ESTIMATED GFR    Collection Time: 04/06/24  2:50 AM   Result Value Ref Range    GFR (CKD-EPI) 117 >60 mL/min/1.73 m 2     Medical Decision Making, by Problem:  Active Hospital Problems    Diagnosis     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 Uncomfortable due to nausea and reflux. Breathing unlabored. Abdomen benign.  Incisions ok. VS stable. Labs reviewed. Mild Leukocytosis, likely reactive. Hb & Hct ok. Mild elevation of Alk phos, otherwise CMP ok. Encouraged ambulation and incentive spirometry.  Wean O2. Pt will need to stay another night for nausea/ vomiting, heartburn/reflux, hypoxia. Dexamethasone added to help with nausea/vomiting and inflammation. Pantoprazole added to help with reflux, gastric healing. Likely discharge home tomorrow morning if feeling better, tolerating sips, pain controlled, and supplemental oxygen weaned.  Pt also seen and examined by Dr. Flowers.    Quality Measures:  Quality-Core Measures   Reviewed items::  Labs reviewed  Frank catheter::  No Frank  DVT prophylaxis pharmacological::  Enoxaparin (Lovenox)  DVT prophylaxis - mechanical:  SCDs  Ulcer Prophylaxis::  Yes      Discussed patient condition with RN, Patient, and Dr. Flowers

## 2024-04-06 NOTE — CARE PLAN
The patient is Stable - Low risk of patient condition declining or worsening    Shift Goals  Clinical Goals: Pain/Nausea Management; Mobility; Tolerate Diet  Patient Goals: Pain Control; Comfort; Ambulate  Family Goals: patient comfort, updates on POC    Progress made toward(s) clinical / shift goals:  Patient is tolerating her diet better without as much nausea since implementation of scheduled protonix and scheduled decadron administrations. Pain has been medicated. Patient has ambulated several times around unit.     Patient is not progressing towards the following goals: Patient continues to require supplemental oxygen support (1L NC).    Problem: Pain - Standard  Goal: Alleviation of pain or a reduction in pain to the patient’s comfort goal  Outcome: Progressing     Problem: Knowledge Deficit - Standard  Goal: Patient and family/care givers will demonstrate understanding of plan of care, disease process/condition, diagnostic tests and medications  Outcome: Progressing     Problem: Mobility  Goal: Patient's capacity to carry out activities will improve  Outcome: Met

## 2024-04-06 NOTE — PROGRESS NOTES
"Received report from previous shift RN at 1900.  Assessment complete.  A&O x 4. Patient calls appropriately.  Patient ambulates independently.  Patient has 8/10 pain. Pain managed with prescribed medications per MAR.  +N/V. Tolerating bariatric clear liquid diet.  Skin per flowsheets.  + void, - flatus, - BM.  Patient denies SOB on 1L O2 via NC.  /75   Pulse (!) 57   Temp 36.5 °C (97.7 °F) (Temporal)   Resp 20   Ht 1.676 m (5' 6\")   Wt (!) 137 kg (302 lb 11.1 oz)   LMP 03/05/2024 (Approximate)   SpO2 93%   BMI 48.86 kg/m²     Patient pleasant and cooperative throughout assessment.  Reviewed plan of care with patient, pt verbalizes understanding. Call light and personal belongings with in reach. Hourly rounding in place. All needs met at this time.    "

## 2024-04-07 ENCOUNTER — PHARMACY VISIT (OUTPATIENT)
Dept: PHARMACY | Facility: MEDICAL CENTER | Age: 39
End: 2024-04-07
Payer: COMMERCIAL

## 2024-04-07 VITALS
OXYGEN SATURATION: 91 % | RESPIRATION RATE: 16 BRPM | HEIGHT: 66 IN | HEART RATE: 72 BPM | SYSTOLIC BLOOD PRESSURE: 116 MMHG | WEIGHT: 293 LBS | BODY MASS INDEX: 47.09 KG/M2 | DIASTOLIC BLOOD PRESSURE: 73 MMHG | TEMPERATURE: 97.9 F

## 2024-04-07 LAB
ALBUMIN SERPL BCP-MCNC: 3.8 G/DL (ref 3.2–4.9)
ALBUMIN/GLOB SERPL: 1.1 G/DL
ALP SERPL-CCNC: 100 U/L (ref 30–99)
ALT SERPL-CCNC: 36 U/L (ref 2–50)
ANION GAP SERPL CALC-SCNC: 15 MMOL/L (ref 7–16)
AST SERPL-CCNC: 26 U/L (ref 12–45)
BILIRUB SERPL-MCNC: 0.2 MG/DL (ref 0.1–1.5)
BUN SERPL-MCNC: 6 MG/DL (ref 8–22)
CALCIUM ALBUM COR SERPL-MCNC: 9.2 MG/DL (ref 8.5–10.5)
CALCIUM SERPL-MCNC: 9 MG/DL (ref 8.5–10.5)
CHLORIDE SERPL-SCNC: 103 MMOL/L (ref 96–112)
CO2 SERPL-SCNC: 20 MMOL/L (ref 20–33)
CREAT SERPL-MCNC: 0.51 MG/DL (ref 0.5–1.4)
ERYTHROCYTE [DISTWIDTH] IN BLOOD BY AUTOMATED COUNT: 48.9 FL (ref 35.9–50)
GFR SERPLBLD CREATININE-BSD FMLA CKD-EPI: 123 ML/MIN/1.73 M 2
GLOBULIN SER CALC-MCNC: 3.5 G/DL (ref 1.9–3.5)
GLUCOSE SERPL-MCNC: 127 MG/DL (ref 65–99)
HCT VFR BLD AUTO: 38.2 % (ref 37–47)
HGB BLD-MCNC: 12.1 G/DL (ref 12–16)
MCH RBC QN AUTO: 28 PG (ref 27–33)
MCHC RBC AUTO-ENTMCNC: 31.7 G/DL (ref 32.2–35.5)
MCV RBC AUTO: 88.4 FL (ref 81.4–97.8)
PLATELET # BLD AUTO: 289 K/UL (ref 164–446)
PMV BLD AUTO: 10.5 FL (ref 9–12.9)
POTASSIUM SERPL-SCNC: 4.2 MMOL/L (ref 3.6–5.5)
PROT SERPL-MCNC: 7.3 G/DL (ref 6–8.2)
RBC # BLD AUTO: 4.32 M/UL (ref 4.2–5.4)
SODIUM SERPL-SCNC: 138 MMOL/L (ref 135–145)
WBC # BLD AUTO: 12.7 K/UL (ref 4.8–10.8)

## 2024-04-07 PROCEDURE — 700111 HCHG RX REV CODE 636 W/ 250 OVERRIDE (IP): Mod: JZ

## 2024-04-07 PROCEDURE — 700111 HCHG RX REV CODE 636 W/ 250 OVERRIDE (IP): Mod: JZ | Performed by: PHYSICIAN ASSISTANT

## 2024-04-07 PROCEDURE — 700102 HCHG RX REV CODE 250 W/ 637 OVERRIDE(OP)

## 2024-04-07 PROCEDURE — A9270 NON-COVERED ITEM OR SERVICE: HCPCS

## 2024-04-07 PROCEDURE — 80053 COMPREHEN METABOLIC PANEL: CPT

## 2024-04-07 PROCEDURE — C9113 INJ PANTOPRAZOLE SODIUM, VIA: HCPCS | Performed by: PHYSICIAN ASSISTANT

## 2024-04-07 PROCEDURE — 700105 HCHG RX REV CODE 258

## 2024-04-07 PROCEDURE — 36415 COLL VENOUS BLD VENIPUNCTURE: CPT

## 2024-04-07 PROCEDURE — 85027 COMPLETE CBC AUTOMATED: CPT

## 2024-04-07 RX ADMIN — ENOXAPARIN SODIUM 40 MG: 100 INJECTION SUBCUTANEOUS at 05:31

## 2024-04-07 RX ADMIN — POTASSIUM CHLORIDE: 2 INJECTION, SOLUTION, CONCENTRATE INTRAVENOUS at 07:36

## 2024-04-07 RX ADMIN — PANTOPRAZOLE SODIUM 40 MG: 40 INJECTION, POWDER, LYOPHILIZED, FOR SOLUTION INTRAVENOUS at 05:30

## 2024-04-07 RX ADMIN — DEXAMETHASONE SODIUM PHOSPHATE 4 MG: 4 INJECTION, SOLUTION INTRA-ARTICULAR; INTRALESIONAL; INTRAMUSCULAR; INTRAVENOUS; SOFT TISSUE at 05:30

## 2024-04-07 RX ADMIN — FAMOTIDINE 20 MG: 10 INJECTION, SOLUTION INTRAVENOUS at 05:31

## 2024-04-07 RX ADMIN — OXYCODONE HYDROCHLORIDE 5 MG: 5 SOLUTION ORAL at 05:30

## 2024-04-07 ASSESSMENT — ENCOUNTER SYMPTOMS
CHILLS: 0
SHORTNESS OF BREATH: 0
DIARRHEA: 0
ABDOMINAL PAIN: 1
COUGH: 0
VOMITING: 0
HEARTBURN: 0
PALPITATIONS: 0
NAUSEA: 0
FEVER: 0

## 2024-04-07 ASSESSMENT — PAIN DESCRIPTION - PAIN TYPE
TYPE: ACUTE PAIN
TYPE: SURGICAL PAIN

## 2024-04-07 NOTE — PROGRESS NOTES
Report received from Sonam CARMEN; care of patient assumed at 0700. Patient observed sleeping comfortably at this time.

## 2024-04-07 NOTE — PROGRESS NOTES
"Received report from previous shift RN at 1900.  Assessment complete.  A&O x 4. Patient calls appropriately.  Patient ambulates independently.  Patient has 3/10 pain. Pt declines interventions for pain at this time, will continue to monitor.  Denies N&V. Tolerating bariatric clear liquid diet.  Skin per flowsheets.  + void, - flatus, - BM.  Patient denies SOB on 1L O2 via NC.  /60   Pulse 74   Temp 36.4 °C (97.5 °F) (Temporal)   Resp 18   Ht 1.676 m (5' 6\")   Wt (!) 137 kg (302 lb 11.1 oz)   LMP 03/05/2024 (Approximate)   SpO2 94%   BMI 48.86 kg/m²     Patient pleasant and cooperative throughout assessment.  Reviewed plan of care with patient, pt verbalizes understanding. Call light and personal belongings with in reach. Hourly rounding in place. All needs met at this time.    "

## 2024-04-07 NOTE — PROGRESS NOTES
Discharge instructions and education were presented to patient via AVS. All questions were answered. Patient signed a copy of AVS and received a copy to keep. Discharge medications were also discussed. Patient has discharged home accompanied by her family members.

## 2024-04-07 NOTE — PROGRESS NOTES
Surgical Progress Note    Author: Diandra Jimenez P.A.-C. Date & Time created: 2024   7:47 AM     Interval Events:  POD#2 Laparoscopic Sleeve Gastrectomy. She stayed an additional night for nausea/vomiting, reflux, hypoxia. She is feeling much better this morning, nausea and reflux resolved. She is tolerating sips of clears.  Admits to typical incisional abdominal pain, controlled with medication. She has been ambulating the bridges. She is voiding. She is passing flatus. Supplemental O2 has been weaned off. Good oxygen saturations on room air this morning.      Review of Systems   Constitutional:  Negative for chills and fever.   Respiratory:  Negative for cough and shortness of breath.    Cardiovascular:  Negative for chest pain and palpitations.   Gastrointestinal:  Positive for abdominal pain (incisional). Negative for diarrhea, heartburn, nausea and vomiting.   Genitourinary:  Negative for dysuria.     Hemodynamics:  Temp (24hrs), Av.6 °C (97.9 °F), Min:36.2 °C (97.2 °F), Max:36.9 °C (98.4 °F)  Temperature: 36.6 °C (97.9 °F)  Pulse  Av.6  Min: 51  Max: 85   Blood Pressure: 116/73     Respiratory:    Respiration: 16, Pulse Oximetry: 91 %        RUL Breath Sounds: Clear, RML Breath Sounds: Clear, RLL Breath Sounds: Clear, LYNSEY Breath Sounds: Clear, LLL Breath Sounds: Clear  Neuro:  GCS       Fluids:    Intake/Output Summary (Last 24 hours) at 2024 0950  Last data filed at 2024 2040  Gross per 24 hour   Intake 860 ml   Output 25 ml   Net 835 ml        Current Diet Order   Procedures    Diet Order Diet: Clear Liquid; Miscellaneous modifications: (optional): Bariatric     Physical Exam  Constitutional:       Appearance: She is obese.   HENT:      Head: Normocephalic and atraumatic.      Mouth/Throat:      Pharynx: Oropharynx is clear.   Eyes:      Conjunctiva/sclera: Conjunctivae normal.   Cardiovascular:      Rate and Rhythm: Normal rate and regular rhythm.   Pulmonary:      Effort: Pulmonary effort  is normal.   Abdominal:      General: There is distension (mild).      Palpations: Abdomen is soft. There is no mass.      Tenderness: There is abdominal tenderness (incisional). There is no guarding or rebound.   Musculoskeletal:         General: Normal range of motion.      Cervical back: Normal range of motion.   Skin:     General: Skin is warm and dry.      Findings: No erythema or rash.      Comments: Incisions with minimal serosanguinous dried ooze   Neurological:      Mental Status: She is alert and oriented to person, place, and time.   Psychiatric:         Mood and Affect: Mood normal.       Labs:  Recent Results (from the past 24 hour(s))   CBC without Differential (blood)    Collection Time: 04/07/24  2:49 AM   Result Value Ref Range    WBC 12.7 (H) 4.8 - 10.8 K/uL    RBC 4.32 4.20 - 5.40 M/uL    Hemoglobin 12.1 12.0 - 16.0 g/dL    Hematocrit 38.2 37.0 - 47.0 %    MCV 88.4 81.4 - 97.8 fL    MCH 28.0 27.0 - 33.0 pg    MCHC 31.7 (L) 32.2 - 35.5 g/dL    RDW 48.9 35.9 - 50.0 fL    Platelet Count 289 164 - 446 K/uL    MPV 10.5 9.0 - 12.9 fL   Comp Metabolic Panel (CMP)    Collection Time: 04/07/24  2:49 AM   Result Value Ref Range    Sodium 138 135 - 145 mmol/L    Potassium 4.2 3.6 - 5.5 mmol/L    Chloride 103 96 - 112 mmol/L    Co2 20 20 - 33 mmol/L    Anion Gap 15.0 7.0 - 16.0    Glucose 127 (H) 65 - 99 mg/dL    Bun 6 (L) 8 - 22 mg/dL    Creatinine 0.51 0.50 - 1.40 mg/dL    Calcium 9.0 8.5 - 10.5 mg/dL    Correct Calcium 9.2 8.5 - 10.5 mg/dL    AST(SGOT) 26 12 - 45 U/L    ALT(SGPT) 36 2 - 50 U/L    Alkaline Phosphatase 100 (H) 30 - 99 U/L    Total Bilirubin 0.2 0.1 - 1.5 mg/dL    Albumin 3.8 3.2 - 4.9 g/dL    Total Protein 7.3 6.0 - 8.2 g/dL    Globulin 3.5 1.9 - 3.5 g/dL    A-G Ratio 1.1 g/dL   ESTIMATED GFR    Collection Time: 04/07/24  2:49 AM   Result Value Ref Range    GFR (CKD-EPI) 123 >60 mL/min/1.73 m 2     Medical Decision Making, by Problem:  Active Hospital Problems    Diagnosis     S/P  laparoscopic sleeve gastrectomy [Z98.84]     Nausea & vomiting [R11.2]     Morbid obesity with BMI of 45.0-49.9, adult ([facility]) [E66.01, Z68.42]     Type 2 diabetes mellitus, without long-term current use of insulin ([facility]) [E11.9]      Plan:  Pt is alert and oriented, NAD. Much more comfortable appearing this morning now that nausea and reflux resolved. Breathing unlabored. Abdomen benign.  Incisions ok. VS stable. Labs reviewed. Mild Leukocytosis trended down from yesterday, likely reactive. Hb & Hct ok. Mild elevation of Alk phos trended down, likely reactive, otherwise CMP ok. Encouraged ambulation and incentive spirometry.  Supplemental O2 has been weaned off, good oxygen saturations on room air. Pt is cleared for discharge home today. Zofran sent electronically. All other prescriptions sent from our office prior to surgery. We discussed stopping Metformin, follow up with PCP. Discussed with Dr. Flowers.    Quality Measures:  Quality-Core Measures   Reviewed items::  Labs reviewed  Frank catheter::  No Frank  DVT prophylaxis pharmacological::  Enoxaparin (Lovenox)  DVT prophylaxis - mechanical:  SCDs  Ulcer Prophylaxis::  Yes      Discussed patient condition with RN, Patient, and Dr. Flowers

## 2024-04-07 NOTE — CARE PLAN
The patient is Stable - Low risk of patient condition declining or worsening    Shift Goals  Clinical Goals: Pain/Nausea Management; Comfort  Patient Goals: Rest  Family Goals: updates on POC    Progress made toward(s) clinical / shift goals:  Patient medicated per MAR. Non-pharmacologic comfort measures implemented. Safety discussed. Education provided. Ambulation and repositioning encouraged.     Problem: Pain - Standard  Goal: Alleviation of pain or a reduction in pain to the patient’s comfort goal  Outcome: Progressing     Problem: Knowledge Deficit - Standard  Goal: Patient and family/care givers will demonstrate understanding of plan of care, disease process/condition, diagnostic tests and medications  Outcome: Progressing     Problem: Gastrointestinal Irritability  Goal: Nausea and vomiting will be absent or improve  Outcome: Progressing

## 2024-04-07 NOTE — DISCHARGE SUMMARY
Discharge Summary    CHIEF COMPLAINT ON ADMISSION  No chief complaint on file.      Reason for Admission  Morbid obesity (HCC)     Admission Date  4/5/2024    CODE STATUS  Full Code    HPI & HOSPITAL COURSE  This is a 38 y.o. female who completed the preoperative evaluation and education process for bariatric surgery. Postoperatively, the patient struggled with some nausea, reflux, and incisional pain control. With the help of antinausea and PPI medications, the nausea and reflux resolved. The patient was able to slowly tolerate more oral intake and ambulate the bridges more during the hospital stay. Incisional abdominal pain slowly improved and was better controlled with oral pain medications. Vitals signs and labs were reviewed and monitored daily throughout the hospital stay. Supplemental Oxygen was weaned off with good oxygen saturations on room air by POD2. The patient is cleared for discharge home today with close outpatient follow up in our surgical office. Patient is tolerating PO, pain is controlled, is ambulating, passing flatus, and voiding. The patient was given prescriptions for pain medication, antinausea medication, PPI, and home lovenox injections for home.The patient should follow the postop diet plan and follow up in the office in 1-2 weeks.     No notes on file    Therefore, she is discharged in good and stable condition to home with close outpatient follow-up.    The patient met 2-midnight criteria for an inpatient stay at the time of discharge.    Discharge Date  4/7/2024    FOLLOW UP ITEMS POST DISCHARGE  Follow the postop diet plan and Follow up in the office in 1-2 weeks    DISCHARGE DIAGNOSES  Active Problems:    Type 2 diabetes mellitus, without long-term current use of insulin (HCC) (POA: Yes)    Morbid obesity with BMI of 45.0-49.9, adult ([facility]) (POA: Yes)    S/P laparoscopic sleeve gastrectomy (POA: Unknown)    Nausea & vomiting (POA: Unknown)  Resolved Problems:    * No resolved hospital  problems. *      FOLLOW UP  Future Appointments   Date Time Provider Department Center   6/21/2024  9:00 AM TIERRA Stark [facility]     No follow-up provider specified.    MEDICATIONS ON DISCHARGE     Medication List        START taking these medications        Instructions   ondansetron 4 MG Tbdp  Commonly known as: Zofran ODT   Take 1 Tablet by mouth every 6 hours as needed for Nausea/Vomiting.  Dose: 4 mg            CONTINUE taking these medications        Instructions   omeprazole 20 MG delayed-release capsule  Commonly known as: PriLOSEC   Take 1 Capsule by mouth 2 times a day.  Dose: 20 mg     scopolamine 1 mg/72hr Pt72  Commonly known as: Transderm-Scop   Place 1 Patch on the skin one time.  Dose: 1 Patch     vitamin D2 (Ergocalciferol) 1.25 MG (10749 UT) Caps capsule  Commonly known as: Drisdol   Take 1 Capsule by mouth every 7 days.  Dose: 50,000 Units            STOP taking these medications      metFORMIN  MG Tb24  Commonly known as: Glucophage XR              Allergies  No Known Allergies    DIET  Orders Placed This Encounter   Procedures    Diet Order Diet: Clear Liquid; Miscellaneous modifications: (optional): Bariatric     Standing Status:   Standing     Number of Occurrences:   1     Order Specific Question:   Diet:     Answer:   Clear Liquid [10]     Order Specific Question:   Miscellaneous modifications: (optional)     Answer:   Bariatric [3]       ACTIVITY  As tolerated.  15-lb lifting restriction    CONSULTATIONS  none    PROCEDURES  Laparoscopic Sleeve Gastrectomy     LABORATORY  Lab Results   Component Value Date    SODIUM 138 04/07/2024    POTASSIUM 4.2 04/07/2024    CHLORIDE 103 04/07/2024    CO2 20 04/07/2024    GLUCOSE 127 (H) 04/07/2024    BUN 6 (L) 04/07/2024    CREATININE 0.51 04/07/2024        Lab Results   Component Value Date    WBC 12.7 (H) 04/07/2024    HEMOGLOBIN 12.1 04/07/2024    HEMATOCRIT 38.2 04/07/2024    PLATELETCT 289 04/07/2024        Total time  of the discharge process exceeds 25 minutes.

## 2024-04-07 NOTE — CARE PLAN
"The patient is Stable - Low risk of patient condition declining or worsening    Shift Goals  Clinical Goals: pain management, wean oxygen, discharge home  Patient Goals: \"I want to go home today\"  Family Goals: not currently present    Progress made toward(s) clinical / shift goals:  Patient reports pain and nausea to be well controlled. Patient uses incentive spirometer effectively. Patient has been weaned to room air. Patient has received discharge orders. Patient is dressed and has belongings gathered. Discharge medication has been delivered to patient. PIV has been removed.     Patient is not progressing towards the following goals: N/A    Problem: Pain - Standard  Goal: Alleviation of pain or a reduction in pain to the patient’s comfort goal  Outcome: Met     Problem: Knowledge Deficit - Standard  Goal: Patient and family/care givers will demonstrate understanding of plan of care, disease process/condition, diagnostic tests and medications  Outcome: Met     Problem: Gastrointestinal Irritability  Goal: Nausea and vomiting will be absent or improve  Outcome: Met         "

## 2024-04-13 ENCOUNTER — OFFICE VISIT (OUTPATIENT)
Dept: URGENT CARE | Facility: CLINIC | Age: 39
End: 2024-04-13
Payer: COMMERCIAL

## 2024-04-13 ENCOUNTER — HOSPITAL ENCOUNTER (OUTPATIENT)
Facility: MEDICAL CENTER | Age: 39
End: 2024-04-13
Attending: REGISTERED NURSE
Payer: COMMERCIAL

## 2024-04-13 VITALS
BODY MASS INDEX: 47.09 KG/M2 | SYSTOLIC BLOOD PRESSURE: 128 MMHG | HEIGHT: 66 IN | OXYGEN SATURATION: 97 % | TEMPERATURE: 98.1 F | DIASTOLIC BLOOD PRESSURE: 76 MMHG | WEIGHT: 293 LBS | HEART RATE: 89 BPM | RESPIRATION RATE: 18 BRPM

## 2024-04-13 DIAGNOSIS — N30.01 ACUTE CYSTITIS WITH HEMATURIA: ICD-10-CM

## 2024-04-13 LAB
APPEARANCE UR: NORMAL
BILIRUB UR STRIP-MCNC: NORMAL MG/DL
COLOR UR AUTO: NORMAL
GLUCOSE UR STRIP.AUTO-MCNC: NORMAL MG/DL
KETONES UR STRIP.AUTO-MCNC: 160 MG/DL
LEUKOCYTE ESTERASE UR QL STRIP.AUTO: NORMAL
NITRITE UR QL STRIP.AUTO: NORMAL
PH UR STRIP.AUTO: 6 [PH] (ref 5–8)
PROT UR QL STRIP: 100 MG/DL
RBC UR QL AUTO: NORMAL
SP GR UR STRIP.AUTO: 1.02
UROBILINOGEN UR STRIP-MCNC: 0.2 MG/DL

## 2024-04-13 PROCEDURE — 3078F DIAST BP <80 MM HG: CPT | Performed by: REGISTERED NURSE

## 2024-04-13 PROCEDURE — 87086 URINE CULTURE/COLONY COUNT: CPT

## 2024-04-13 PROCEDURE — 3074F SYST BP LT 130 MM HG: CPT | Performed by: REGISTERED NURSE

## 2024-04-13 PROCEDURE — 81002 URINALYSIS NONAUTO W/O SCOPE: CPT | Performed by: REGISTERED NURSE

## 2024-04-13 PROCEDURE — 87186 SC STD MICRODIL/AGAR DIL: CPT

## 2024-04-13 PROCEDURE — 87077 CULTURE AEROBIC IDENTIFY: CPT

## 2024-04-13 PROCEDURE — 99214 OFFICE O/P EST MOD 30 MIN: CPT | Performed by: REGISTERED NURSE

## 2024-04-13 RX ORDER — POLYETHYLENE GLYCOL 3350 17 G/17G
POWDER, FOR SOLUTION ORAL
COMMUNITY
Start: 2024-03-26

## 2024-04-13 RX ORDER — ENOXAPARIN SODIUM 40 MG/.4ML
INJECTION SUBCUTANEOUS
COMMUNITY
Start: 2024-03-26 | End: 2024-04-23

## 2024-04-13 RX ORDER — CEPHALEXIN 500 MG/1
500 CAPSULE ORAL 2 TIMES DAILY
Qty: 10 CAPSULE | Refills: 0 | Status: SHIPPED | OUTPATIENT
Start: 2024-04-13 | End: 2024-04-18

## 2024-04-13 RX ORDER — PHENAZOPYRIDINE HYDROCHLORIDE 200 MG/1
200 TABLET, FILM COATED ORAL 3 TIMES DAILY PRN
Qty: 6 TABLET | Refills: 0 | Status: SHIPPED | OUTPATIENT
Start: 2024-04-13

## 2024-04-13 ASSESSMENT — ENCOUNTER SYMPTOMS
VOMITING: 0
ABDOMINAL PAIN: 0
CHILLS: 0
NAUSEA: 0
FLANK PAIN: 0
SHORTNESS OF BREATH: 0
DIZZINESS: 0
FEVER: 0

## 2024-04-13 ASSESSMENT — FIBROSIS 4 INDEX: FIB4 SCORE: 0.55

## 2024-04-13 NOTE — PROGRESS NOTES
Subjective:   Kinza Juan is a 38 y.o. female who presents for Dysuria (X2 days, burning with urination, discomfort and pressure to urinate )      HPI  Urinary symptoms x 2 days. Including burning, frequency, urgency, bladder pressure.    Hx of uncomplicated UTI's? yes  Hx of complicated UTI's? no  Hx of hospitalizations from UTI? no  Hx of hard to treat UTI or antibiotic resistant infections? no    No vaginal discharge or odor.  No pelvic pain.  Tolerating p.o.  No concern for STIs. She was recently in hospital for gastric sleeve procedure.      Review of Systems   Constitutional:  Negative for chills, fever and malaise/fatigue.   Respiratory:  Negative for shortness of breath.    Cardiovascular:  Negative for chest pain.   Gastrointestinal:  Negative for abdominal pain, nausea and vomiting.   Genitourinary:  Positive for dysuria, frequency and urgency. Negative for flank pain.   Neurological:  Negative for dizziness.       No Known Allergies    Patient Active Problem List    Diagnosis Date Noted    S/P laparoscopic sleeve gastrectomy 04/06/2024    Nausea & vomiting 04/06/2024    Morbid obesity with BMI of 45.0-49.9, adult ([facility]) 03/21/2024    Sore throat 03/21/2024    Vitamin D deficiency 02/09/2024    Type 2 diabetes mellitus, without long-term current use of insulin ([facility]) 02/09/2024    Other hyperlipidemia 02/09/2024    Gastroesophageal reflux disease without esophagitis 10/03/2023    Chest discomfort 10/03/2023    History of gestational diabetes mellitus (GDM) 02/22/2022    PCOS (polycystic ovarian syndrome) 11/19/2020       Current Outpatient Medications Ordered in Epic   Medication Sig Dispense Refill    LOVENOX 40 MG/0.4ML Solution Prefilled Syringe inj 0.4 ml Subcutaneous Once a day for 10 days      cephALEXin (KEFLEX) 500 MG Cap Take 1 Capsule by mouth 2 times a day for 5 days. 10 Capsule 0    phenazopyridine (PYRIDIUM) 200 MG Tab Take 1 Tablet by mouth 3 times a day as needed for Mild Pain. 6  Tablet 0    vitamin D2, Ergocalciferol, (DRISDOL) 1.25 MG (94647 UT) Cap capsule Take 1 Capsule by mouth every 7 days. 12 Capsule 3    omeprazole (PRILOSEC) 20 MG delayed-release capsule Take 1 Capsule by mouth 2 times a day. 180 Capsule 1    polyethylene glycol/lytes (MIRALAX) Pack 1 packet mixed with 8 ounces of fluid Orally Once a day for 30 day(s) (Patient not taking: Reported on 2024)      HYDROcodone-acetaminophen 2.5-108 mg/5mL (HYCET) 7.5-325 MG/15ML solution 15 ml as needed Oral every 4 hrs for 5 days (Patient not taking: Reported on 2024)      chlorhexidine (HIBICLENS) 4 % liquid clean abdomen with hibiclens solution the night before surgery and the morning of surgery. External twice for 2 days (Patient not taking: Reported on 2024)      ondansetron (ZOFRAN ODT) 4 MG TABLET DISPERSIBLE Take 1 Tablet by mouth every 6 hours as needed for Nausea/Vomiting. (Patient not taking: Reported on 2024) 20 Tablet 0    scopolamine (TRANSDERM-SCOP) 1 mg/72hr PATCH 72 HR Place 1 Patch on the skin one time. (Patient not taking: Reported on 2024)       No current [facility]-ordered facility-administered medications on file.       Past Surgical History:   Procedure Laterality Date    SC LAP, NAOMI RESTRICT PROC, LONGITUDINAL GAS* N/A 2024    Procedure: LAPAROSCOPIC SLEEVE GASTRECTOMY;  Surgeon: Iftikhar Flowers M.D.;  Location: SURGERY [facility];  Service: General    REPEAT C SECTOIN WITH SALPINGECTOMY Bilateral 2022    Procedure:  SECTION, REPEAT, WITH SALPINGECTOMY;  Surgeon: Isabell Connolly D.O.;  Location: SURGERY [facility];  Service: Obstetrics    SC ERCP,DIAGNOSTIC  2020    Procedure: ERCP, DIAGNOSTIC;  Surgeon: Lee Kelly M.D.;  Location: SURGERY [facility];  Service: Gastroenterology    SC ERCP,DIAGNOSTIC N/A 2020    Procedure: ERCP (ENDOSCOPIC RETROGRADE CHOLANGIOPANCREATOGRAPHY);  Surgeon: Lee Kelly M.D.;  Location: SURGERY [facility];   "Service: Gastroenterology    JACQUI BY LAPAROSCOPY  06/20/2020    Procedure: CHOLECYSTECTOMY, LAPAROSCOPIC;  Surgeon: Iftikhar Flowers M.D.;  Location: SURGERY [facility];  Service: General    PRIMARY C SECTION  03/24/2013    Performed by Briana Cano M.D. at LABOR AND DELIVERY    RECONSTRUCTION, KNEE, ACL, ARTHROSCOPIC  05/31/2012    Performed by CAITY DORSEY at SURGERY [facility]    MENISCECTOMY, KNEE, MEDIAL  05/31/2012    Performed by CAITY DORSEY at SURGERY [facility]    DENTAL EXTRACTION(S)  2005       Social History     Tobacco Use    Smoking status: Never     Passive exposure: Never    Smokeless tobacco: Never   Vaping Use    Vaping Use: Never used   Substance Use Topics    Alcohol use: Not Currently    Drug use: Not Currently     Types: Oral     Comment: occ       family history includes Diabetes in her father, maternal grandfather, and paternal grandmother; Hypertension in her maternal grandmother; Stroke in her maternal grandmother.     Problem list, medications, and allergies reviewed by myself today in Epic.     Objective:   /76   Pulse 89   Temp 36.7 °C (98.1 °F) (Temporal)   Resp 18   Ht 1.676 m (5' 6\")   Wt (!) 137 kg (302 lb)   LMP 03/05/2024 (Approximate)   SpO2 97%   Breastfeeding No   BMI 48.74 kg/m²     Physical Exam  Vitals and nursing note reviewed.   Constitutional:       General: She is not in acute distress.     Appearance: Normal appearance. She is not ill-appearing, toxic-appearing or diaphoretic.   HENT:      Head: Normocephalic and atraumatic.      Right Ear: External ear normal.      Left Ear: External ear normal.      Nose: Nose normal.   Eyes:      Conjunctiva/sclera: Conjunctivae normal.   Cardiovascular:      Rate and Rhythm: Normal rate and regular rhythm.      Heart sounds: Normal heart sounds.   Pulmonary:      Effort: Pulmonary effort is normal. No respiratory distress.      Breath sounds: Normal breath sounds. No wheezing, rhonchi or rales. "   Abdominal:      General: Abdomen is flat. A surgical scar is present. There is no distension.      Palpations: Abdomen is soft.      Tenderness: There is no abdominal tenderness. There is no right CVA tenderness, left CVA tenderness, guarding or rebound.      Comments: No suprapubic tenderness   Musculoskeletal:      Cervical back: Normal range of motion and neck supple.   Skin:     General: Skin is warm and dry.   Neurological:      General: No focal deficit present.      Mental Status: She is alert and oriented to person, place, and time. Mental status is at baseline.   Psychiatric:         Mood and Affect: Mood normal.         Behavior: Behavior normal.         Thought Content: Thought content normal.         Judgment: Judgment normal.         Assessment/Plan:     I personally reviewed prior external notes and test results pertinent to today's visit as well as additional imaging and testing completed in clinic today.     1. Acute cystitis with hematuria  POCT Urinalysis    URINE CULTURE(NEW)    cephALEXin (KEFLEX) 500 MG Cap    phenazopyridine (PYRIDIUM) 200 MG Tab        Testing: Urine indicative of infection  Vital signs: WNL  Plan/MDM:  Kinza Juan is a very pleasant 38 y.o. female presenting with 1-2 days of urinary symptoms. No hx of complicated urinary tract infection. Was recently in hospital for gastric sleeve procedure. Vitals are reassuring. Non toxic appearance. Moist membranes. No abdominal/ suprapubic/ CVA tenderness. Does have some discomfort on the abdomen from procedure but nothing new.  Overall unremarkable exam, and no signs or symptoms consistent with complicated UTI.  Do have some concerns for an antibiotic resistant bacteria given recent hospital encounter so we will send the urine for culture.      Will start on cephalexin.    We will send urine culture, modify plan if needed given recent hospital encounter.    She is instructed to take the full course of antibiotics.     Significantly increase fluid intake.    Given discomfort with urination we will add Pyridium  We reviewed signs and symptoms of worsening infection at length.      Shared decision-making was utilized with patient for treatment plan. Differential Diagnosis, natural history, and supportive care discussed.     Medication discussed included indication for use and the potential benefits and side effects. Education was provided regarding the aforementioned assessments. All of the patient's questions were answered to their satisfaction at the time of discharge. Patient was encouraged to monitor symptoms closely. Those signs and symptoms which would warrant concern and mandate seeking a higher level of service through the emergency department discussed at length. Patient stated agreement and understanding of this plan of care.     Please note that this dictation was created using voice recognition software. I have made every reasonable attempt to correct obvious errors, but I expect that there are errors of grammar and possibly content that I did not discover before finalizing the note.    This note was electronically signed by TIERRA Hooper

## 2024-04-15 LAB
BACTERIA UR CULT: ABNORMAL
BACTERIA UR CULT: ABNORMAL
SIGNIFICANT IND 70042: ABNORMAL
SITE SITE: ABNORMAL
SOURCE SOURCE: ABNORMAL

## 2024-04-23 ENCOUNTER — HOSPITAL ENCOUNTER (OUTPATIENT)
Facility: MEDICAL CENTER | Age: 39
End: 2024-04-23
Attending: INTERNAL MEDICINE
Payer: COMMERCIAL

## 2024-04-23 ENCOUNTER — OFFICE VISIT (OUTPATIENT)
Dept: MEDICAL GROUP | Facility: MEDICAL CENTER | Age: 39
End: 2024-04-23
Payer: COMMERCIAL

## 2024-04-23 VITALS
SYSTOLIC BLOOD PRESSURE: 114 MMHG | DIASTOLIC BLOOD PRESSURE: 64 MMHG | TEMPERATURE: 96.6 F | BODY MASS INDEX: 47.09 KG/M2 | WEIGHT: 293 LBS | OXYGEN SATURATION: 93 % | HEIGHT: 66 IN | HEART RATE: 99 BPM

## 2024-04-23 DIAGNOSIS — R31.9 URINARY TRACT INFECTION WITH HEMATURIA, SITE UNSPECIFIED: ICD-10-CM

## 2024-04-23 DIAGNOSIS — N39.0 URINARY TRACT INFECTION WITH HEMATURIA, SITE UNSPECIFIED: ICD-10-CM

## 2024-04-23 DIAGNOSIS — E11.9 TYPE 2 DIABETES MELLITUS WITHOUT COMPLICATION, WITHOUT LONG-TERM CURRENT USE OF INSULIN (HCC): ICD-10-CM

## 2024-04-23 DIAGNOSIS — R30.0 DYSURIA: ICD-10-CM

## 2024-04-23 DIAGNOSIS — N39.0 RECURRENT UTI: ICD-10-CM

## 2024-04-23 LAB
APPEARANCE UR: NORMAL
BILIRUB UR STRIP-MCNC: NORMAL MG/DL
COLOR UR AUTO: NORMAL
GLUCOSE UR STRIP.AUTO-MCNC: NEGATIVE MG/DL
KETONES UR STRIP.AUTO-MCNC: 80 MG/DL
LEUKOCYTE ESTERASE UR QL STRIP.AUTO: NORMAL
NITRITE UR QL STRIP.AUTO: NEGATIVE
PH UR STRIP.AUTO: 6 [PH] (ref 5–8)
POCT INT CON NEG: NEGATIVE
POCT INT CON POS: POSITIVE
POCT URINE PREGNANCY TEST: NEGATIVE
PROT UR QL STRIP: 100 MG/DL
RBC UR QL AUTO: NORMAL
SP GR UR STRIP.AUTO: 1.01
UROBILINOGEN UR STRIP-MCNC: 0.2 MG/DL

## 2024-04-23 PROCEDURE — 87086 URINE CULTURE/COLONY COUNT: CPT

## 2024-04-23 PROCEDURE — 99214 OFFICE O/P EST MOD 30 MIN: CPT | Performed by: INTERNAL MEDICINE

## 2024-04-23 PROCEDURE — 81002 URINALYSIS NONAUTO W/O SCOPE: CPT | Performed by: INTERNAL MEDICINE

## 2024-04-23 PROCEDURE — 3078F DIAST BP <80 MM HG: CPT | Performed by: INTERNAL MEDICINE

## 2024-04-23 PROCEDURE — 81025 URINE PREGNANCY TEST: CPT | Performed by: INTERNAL MEDICINE

## 2024-04-23 PROCEDURE — 3074F SYST BP LT 130 MM HG: CPT | Performed by: INTERNAL MEDICINE

## 2024-04-23 PROCEDURE — 87077 CULTURE AEROBIC IDENTIFY: CPT | Mod: 91

## 2024-04-23 RX ORDER — SULFAMETHOXAZOLE AND TRIMETHOPRIM 800; 160 MG/1; MG/1
1 TABLET ORAL 2 TIMES DAILY
Qty: 10 TABLET | Refills: 0 | Status: SHIPPED | OUTPATIENT
Start: 2024-04-23 | End: 2024-04-28

## 2024-04-23 ASSESSMENT — ENCOUNTER SYMPTOMS
FEVER: 0
CHILLS: 0

## 2024-04-23 ASSESSMENT — FIBROSIS 4 INDEX: FIB4 SCORE: 0.55

## 2024-04-23 NOTE — PROGRESS NOTES
"CC: No chief complaint on file.    Diagnoses of Dysuria, Urinary tract infection with hematuria, site unspecified, Type 2 diabetes mellitus without complication, without long-term current use of insulin (HCC), and Recurrent UTI were pertinent to this visit.  Verbal consent was acquired by the patient to use [employer] ambient listening note generation during this visit Yes     HPI: Kinza is a pleasant 38 y.o. female who presents today to discuss the following problems:   History of Present Illness  The patient presents for evaluation of multiple medical concerns. She is accompanied by an adult female.    The patient reports persistent urinary urgency. She sought medical attention at an urgent care facility where a urine test was conducted, and she was prescribed a 5-day course of cephalexin. While the medication provided temporary relief, her symptoms gradually returned. She experienced a recurrence of symptoms last night and this morning, characterized by severe pain during urination. She expresses uncertainty about the resolution of her symptoms and is uncertain if this is a new symptom. She has been making efforts to maintain hydration by consuming a minimum of 64 ounces of water daily.    The patient was diagnosed with diabetes several months ago. She underwent gastric sleeve surgery 3 weeks ago and is currently not on any medication as her physician has advised against its use in anticipation of a future solution to her diabetes.       Past Medical History:   Diagnosis Date    Acute nasopharyngitis 03/10/2024    4/1/2024 pt reports she is \"feeling fine\" now    COVID-19     4/1/2024 history of    Diabetes (HCC) 02/01/2024    takes metformin    GERD (gastroesophageal reflux disease)     h pylori 12/2015    Heart burn 01/01/2020    Postpartum depression 02/08/2022     Current Outpatient Medications Ordered in Epic   Medication Sig Dispense Refill    sulfamethoxazole-trimethoprim (BACTRIM DS) 800-160 MG tablet " "Take 1 Tablet by mouth 2 times a day for 5 days. 10 Tablet 0    polyethylene glycol/lytes (MIRALAX) Pack 1 packet mixed with 8 ounces of fluid Orally Once a day for 30 day(s) (Patient not taking: Reported on 4/13/2024)      phenazopyridine (PYRIDIUM) 200 MG Tab Take 1 Tablet by mouth 3 times a day as needed for Mild Pain. 6 Tablet 0    vitamin D2, Ergocalciferol, (DRISDOL) 1.25 MG (83468 UT) Cap capsule Take 1 Capsule by mouth every 7 days. 12 Capsule 3    omeprazole (PRILOSEC) 20 MG delayed-release capsule Take 1 Capsule by mouth 2 times a day. 180 Capsule 1     No current Epic-ordered facility-administered medications on file.     Review of Systems   Constitutional:  Negative for chills and fever.   Genitourinary:  Positive for dysuria and frequency.     Objective:     Exam:  /64 (BP Location: Left arm, Patient Position: Sitting, BP Cuff Size: Adult)   Pulse 99   Temp 35.9 °C (96.6 °F) (Temporal)   Ht 1.676 m (5' 6\")   Wt (!) 137 kg (302 lb 0.5 oz)   LMP 03/05/2024 (Approximate)   SpO2 93%   BMI 48.75 kg/m²  Body mass index is 48.75 kg/m².    Physical Exam  Constitutional:       Appearance: Normal appearance. She is obese.   HENT:      Head: Normocephalic and atraumatic.   Neurological:      General: No focal deficit present.      Mental Status: She is alert.   Psychiatric:         Mood and Affect: Mood normal.         Behavior: Behavior normal.       Results:  Results  Laboratory Studies  Urine culture grew E. coli.  POCT UA in office positive for leukocyte esterase protein and ketones.   Reviewed POCT UA and urine culture from 4/13/2024 from urgent care I have reviewed urgent care note from 4/13/2023  Assessment & Plan:   Kinza  is a pleasant 38 y.o. female with the following -     Assessment & Plan  1. Diabetes.  The patient was recently diagnosed with diabetes and is currently managing it with lifestyle modifications. She is not currently on any medication, as she has been advised against taking " any medication following her recent gastric sleeve procedure.    2. Recurrent Urinary Tract Infection (UTI).  The patient was evaluated in urgent care on 04/13/2024 and was prescribed cephalexin, which she has completed. However, her symptoms have since returned. Upon reviewing the urine culture and UA from urgent care, it was observed that the urine culture grew E. coli, sensitive to multiple antibiotics excluding ampicillin. The current treatment plan involves a 5-day course of Bactrim. The patient has been advised to consume 64 ounces of water, take cranberry pills, and take over-the-counter Pyridium for bladder spasms.    3. Obesity class 3   This is a chronic condition that is associated with Polycystic Ovary Syndrome (PCOS) and diabetes. She has undergone gastric sleeve surgery and expresses satisfaction with her weight loss goals.     Problem List Items Addressed This Visit       Recurrent UTI    Type 2 diabetes mellitus, without long-term current use of insulin (HCC)     Other Visit Diagnoses       Dysuria        Relevant Orders    POCT Urinalysis (Completed)    POCT Pregnancy (Completed)    Urinary tract infection with hematuria, site unspecified        Relevant Medications    sulfamethoxazole-trimethoprim (BACTRIM DS) 800-160 MG tablet    Other Relevant Orders    URINE CULTURE(NEW)          Return if symptoms worsen or fail to improve.    Please note that this dictation was created using voice recognition software. I have made every reasonable attempt to correct obvious errors, but I expect that there are errors of grammar and possibly content that I did not discover before finalizing the note.

## 2024-04-25 LAB
BACTERIA UR CULT: NORMAL
SIGNIFICANT IND 70042: NORMAL
SITE SITE: NORMAL
SOURCE SOURCE: NORMAL

## 2024-06-06 ENCOUNTER — OFFICE VISIT (OUTPATIENT)
Dept: URGENT CARE | Facility: CLINIC | Age: 39
End: 2024-06-06
Payer: COMMERCIAL

## 2024-06-06 VITALS
DIASTOLIC BLOOD PRESSURE: 78 MMHG | TEMPERATURE: 97.8 F | WEIGHT: 280 LBS | HEIGHT: 66 IN | SYSTOLIC BLOOD PRESSURE: 124 MMHG | RESPIRATION RATE: 16 BRPM | HEART RATE: 74 BPM | OXYGEN SATURATION: 99 % | BODY MASS INDEX: 45 KG/M2

## 2024-06-06 DIAGNOSIS — N30.01 ACUTE CYSTITIS WITH HEMATURIA: ICD-10-CM

## 2024-06-06 DIAGNOSIS — R30.0 DYSURIA: ICD-10-CM

## 2024-06-06 LAB
APPEARANCE UR: NORMAL
BILIRUB UR STRIP-MCNC: NEGATIVE MG/DL
COLOR UR AUTO: YELLOW
GLUCOSE UR STRIP.AUTO-MCNC: NEGATIVE MG/DL
KETONES UR STRIP.AUTO-MCNC: NEGATIVE MG/DL
LEUKOCYTE ESTERASE UR QL STRIP.AUTO: NORMAL
NITRITE UR QL STRIP.AUTO: NEGATIVE
PH UR STRIP.AUTO: 6 [PH] (ref 5–8)
POCT INT CON NEG: NEGATIVE
POCT INT CON POS: POSITIVE
POCT URINE PREGNANCY TEST: NEGATIVE
PROT UR QL STRIP: 30 MG/DL
RBC UR QL AUTO: NORMAL
SP GR UR STRIP.AUTO: 1.01
UROBILINOGEN UR STRIP-MCNC: 0.2 MG/DL

## 2024-06-06 PROCEDURE — 81002 URINALYSIS NONAUTO W/O SCOPE: CPT

## 2024-06-06 PROCEDURE — 81025 URINE PREGNANCY TEST: CPT

## 2024-06-06 PROCEDURE — 99213 OFFICE O/P EST LOW 20 MIN: CPT

## 2024-06-06 PROCEDURE — 3074F SYST BP LT 130 MM HG: CPT

## 2024-06-06 PROCEDURE — 3078F DIAST BP <80 MM HG: CPT

## 2024-06-06 RX ORDER — NITROFURANTOIN 25; 75 MG/1; MG/1
100 CAPSULE ORAL 2 TIMES DAILY
Qty: 10 CAPSULE | Refills: 0 | Status: SHIPPED | OUTPATIENT
Start: 2024-06-06 | End: 2024-06-11

## 2024-06-06 RX ORDER — PHENAZOPYRIDINE HYDROCHLORIDE 100 MG/1
100 TABLET, FILM COATED ORAL 3 TIMES DAILY PRN
Qty: 6 TABLET | Refills: 0 | Status: SHIPPED | OUTPATIENT
Start: 2024-06-06 | End: 2024-06-28

## 2024-06-06 ASSESSMENT — ENCOUNTER SYMPTOMS
FLANK PAIN: 0
BACK PAIN: 0
FEVER: 0

## 2024-06-06 ASSESSMENT — FIBROSIS 4 INDEX: FIB4 SCORE: 0.57

## 2024-06-06 NOTE — PROGRESS NOTES
Subjective:   Kinza Juan is a 39 y.o. female who presents for UTI (X1day Burning sensation/urgency/lower abdomen pressure/)      HPI: This is a 39-year-old female who presents today for UTI symptoms.  The patient reports developing pain with urination and urinary urgency yesterday.  She reports history of UTIs with the same presentation.  She denies any fevers or back pain.  No vaginal discharge.    Review of Systems   Constitutional:  Negative for fever.   Genitourinary:  Positive for dysuria and urgency. Negative for flank pain, frequency and hematuria.   Musculoskeletal:  Negative for back pain.       Medications:    Current Outpatient Medications on File Prior to Visit   Medication Sig Dispense Refill    polyethylene glycol/lytes (MIRALAX) Pack 1 packet mixed with 8 ounces of fluid Orally Once a day for 30 day(s) (Patient not taking: Reported on 4/13/2024)      phenazopyridine (PYRIDIUM) 200 MG Tab Take 1 Tablet by mouth 3 times a day as needed for Mild Pain. (Patient not taking: Reported on 6/6/2024) 6 Tablet 0    vitamin D2, Ergocalciferol, (DRISDOL) 1.25 MG (83143 UT) Cap capsule Take 1 Capsule by mouth every 7 days. 12 Capsule 3    omeprazole (PRILOSEC) 20 MG delayed-release capsule Take 1 Capsule by mouth 2 times a day. 180 Capsule 1     No current facility-administered medications on file prior to visit.        Allergies:   Patient has no known allergies.    Problem List:   Patient Active Problem List   Diagnosis    PCOS (polycystic ovarian syndrome)    History of gestational diabetes mellitus (GDM)    Gastroesophageal reflux disease without esophagitis    Chest discomfort    Vitamin D deficiency    Type 2 diabetes mellitus, without long-term current use of insulin ([facility])    Other hyperlipidemia    Morbid obesity with BMI of 45.0-49.9, adult ([facility])    Sore throat    S/P laparoscopic sleeve gastrectomy    Nausea & vomiting    Recurrent UTI        Surgical History:  Past Surgical History:  "  Procedure Laterality Date    FL LAP, NAOMI RESTRICT PROC, LONGITUDINAL GAS* N/A 2024    Procedure: LAPAROSCOPIC SLEEVE GASTRECTOMY;  Surgeon: Iftikhar Flowers M.D.;  Location: SURGERY [facility];  Service: General    REPEAT C SECTOIN WITH SALPINGECTOMY Bilateral 2022    Procedure:  SECTION, REPEAT, WITH SALPINGECTOMY;  Surgeon: Isabell Connolly D.O.;  Location: [facility];  Service: Obstetrics    FL ERCP,DIAGNOSTIC  2020    Procedure: ERCP, DIAGNOSTIC;  Surgeon: Lee Kelly M.D.;  Location: SURGERY [facility];  Service: Gastroenterology    FL ERCP,DIAGNOSTIC N/A 2020    Procedure: ERCP (ENDOSCOPIC RETROGRADE CHOLANGIOPANCREATOGRAPHY);  Surgeon: Lee Kelly M.D.;  Location: SURGERY SAME DAY [facility];  Service: Gastroenterology    JACQUI BY LAPAROSCOPY  2020    Procedure: CHOLECYSTECTOMY, LAPAROSCOPIC;  Surgeon: Iftikhar Flowers M.D.;  Location: [facility];  Service: General    PRIMARY C SECTION  2013    Performed by Briana Cano M.D. at LABOR AND DELIVERY    RECONSTRUCTION, KNEE, ACL, ARTHROSCOPIC  2012    Performed by CAITY DORSEY at [facility]    MENISCECTOMY, KNEE, MEDIAL  2012    Performed by CAITY DORSEY at [facility]    DENTAL EXTRACTION(S)         Past Social Hx:   Social History     Tobacco Use    Smoking status: Never     Passive exposure: Never    Smokeless tobacco: Never   Vaping Use    Vaping status: Never Used   Substance Use Topics    Alcohol use: Not Currently    Drug use: Not Currently     Types: Oral     Comment: occ          Problem list, medications, and allergies reviewed by myself today in Epic.     Objective:     /78   Pulse 74   Temp 36.6 °C (97.8 °F) (Temporal)   Resp 16   Ht 1.676 m (5' 6\")   Wt (!) 127 kg (280 lb)   SpO2 99%   BMI 45.19 kg/m²     Physical Exam  Vitals and nursing note reviewed.   Constitutional:       General: She is not in acute " distress.     Appearance: Normal appearance. She is normal weight. She is not ill-appearing or toxic-appearing.   HENT:      Head: Normocephalic and atraumatic.   Cardiovascular:      Rate and Rhythm: Normal rate and regular rhythm.      Pulses: Normal pulses.      Heart sounds: Normal heart sounds. No murmur heard.     No friction rub. No gallop.   Pulmonary:      Effort: Pulmonary effort is normal. No respiratory distress.      Breath sounds: Normal breath sounds. No stridor. No wheezing, rhonchi or rales.   Chest:      Chest wall: No tenderness.   Abdominal:      Tenderness: There is no right CVA tenderness or left CVA tenderness.   Skin:     General: Skin is warm and dry.      Capillary Refill: Capillary refill takes less than 2 seconds.   Neurological:      General: No focal deficit present.      Mental Status: She is alert and oriented to person, place, and time. Mental status is at baseline.      Cranial Nerves: No cranial nerve deficit.      Motor: No weakness.      Gait: Gait normal.   Psychiatric:         Mood and Affect: Mood normal.         Behavior: Behavior normal.         Thought Content: Thought content normal.         Judgment: Judgment normal.         Assessment/Plan:     Diagnosis and associated orders:   1. Acute cystitis with hematuria  nitrofurantoin (MACROBID) 100 MG Cap    POCT Urinalysis    phenazopyridine (PYRIDIUM) 100 MG Tab    POCT PREGNANCY      2. Dysuria  nitrofurantoin (MACROBID) 100 MG Cap    POCT Urinalysis    phenazopyridine (PYRIDIUM) 100 MG Tab    POCT PREGNANCY         Results for orders placed or performed in visit on 06/06/24   POCT Urinalysis   Result Value Ref Range    POC Color YELLOW Negative    POC Appearance CLOUDY Negative    POC Glucose NEGATIVE Negative mg/dL    POC Bilirubin NEGATIVE Negative mg/dL    POC Ketones NEGATIVE Negative mg/dL    POC Specific Gravity 1.015 <1.005 - >1.030    POC Blood LARGE Negative    POC Urine PH 6.0 5.0 - 8.0    POC Protein 30 Negative  mg/dL    POC Urobiligen 0.2 Negative (0.2) mg/dL    POC Nitrites NEGATIVE Negative    POC Leukocyte Esterase MODERATE Negative   POCT PREGNANCY   Result Value Ref Range    POC Urine Pregnancy Test Negative     Internal Control Positive Positive     Internal Control Negative Negative           Comments/MDM:   Pt is clinically stable at today's acute urgent care visit.  No acute distress noted. Appropriate for outpatient management at this time.     Acute problem.  The patient presents today for UTI symptoms.  POC UA is positive for leukocytes, blood, and protein consistent with acute cystitis.  The patient does have history of UTIs in the past.  I have reviewed urine culture results from the April 2024 which was positive for E. coli and sensitive to nitrofurantoin.  Patient was prescribed nitrofurantoin and Pyridium.  I have advised patient to begin taking the medications as they are prescribed and stay well-hydrated. Patient is to return to UC or go to ER for any new or worsening signs or symptoms, and follow with with PCP for recheck. Patient is agreeable with plan of care and verbalizes good understanding.             Discussed DDx, management options (risks,benefits, and alternatives to planned treatment), natural progression and supportive care.  Expressed understanding and the treatment plan was agreed upon. Questions were encouraged and answered   Return to urgent care prn if new or worsening sx or if there is no improvement in condition prn.    Educated in Red flags and indications to immediately call 911 or present to the Emergency Department.   Advised the patient to follow-up with the primary care physician for recheck, reevaluation, and consideration of further management.    I personally reviewed prior external notes and test results pertinent to today's visit.  I have independently reviewed and interpreted all diagnostics ordered during this urgent care acute visit.       Please note that this dictation  was created using voice recognition software. I have made a reasonable attempt to correct obvious errors, but I expect that there are errors of grammar and possibly content that I did not discover before finalizing the note.    This note was electronically signed by MARCIA Carty

## 2024-06-17 ENCOUNTER — APPOINTMENT (OUTPATIENT)
Dept: MEDICAL GROUP | Facility: MEDICAL CENTER | Age: 39
End: 2024-06-17
Payer: COMMERCIAL

## 2024-06-24 ENCOUNTER — HOSPITAL ENCOUNTER (OUTPATIENT)
Dept: LAB | Facility: MEDICAL CENTER | Age: 39
End: 2024-06-24
Attending: NURSE PRACTITIONER
Payer: COMMERCIAL

## 2024-06-24 LAB
25(OH)D3 SERPL-MCNC: 25 NG/ML (ref 30–100)
ALBUMIN SERPL BCP-MCNC: 3.6 G/DL (ref 3.2–4.9)
ALBUMIN/GLOB SERPL: 1.2 G/DL
ALP SERPL-CCNC: 106 U/L (ref 30–99)
ALT SERPL-CCNC: 21 U/L (ref 2–50)
ANION GAP SERPL CALC-SCNC: 11 MMOL/L (ref 7–16)
AST SERPL-CCNC: 23 U/L (ref 12–45)
BASOPHILS # BLD AUTO: 0.6 % (ref 0–1.8)
BASOPHILS # BLD: 0.05 K/UL (ref 0–0.12)
BILIRUB SERPL-MCNC: 0.2 MG/DL (ref 0.1–1.5)
BUN SERPL-MCNC: 11 MG/DL (ref 8–22)
CALCIUM ALBUM COR SERPL-MCNC: 9 MG/DL (ref 8.5–10.5)
CALCIUM SERPL-MCNC: 8.7 MG/DL (ref 8.5–10.5)
CHLORIDE SERPL-SCNC: 106 MMOL/L (ref 96–112)
CHOLEST SERPL-MCNC: 153 MG/DL (ref 100–199)
CO2 SERPL-SCNC: 25 MMOL/L (ref 20–33)
CREAT SERPL-MCNC: 0.74 MG/DL (ref 0.5–1.4)
EOSINOPHIL # BLD AUTO: 0.15 K/UL (ref 0–0.51)
EOSINOPHIL NFR BLD: 1.7 % (ref 0–6.9)
ERYTHROCYTE [DISTWIDTH] IN BLOOD BY AUTOMATED COUNT: 50.7 FL (ref 35.9–50)
EST. AVERAGE GLUCOSE BLD GHB EST-MCNC: 117 MG/DL
FERRITIN SERPL-MCNC: 44.4 NG/ML (ref 10–291)
FOLATE SERPL-MCNC: 5.5 NG/ML
GFR SERPLBLD CREATININE-BSD FMLA CKD-EPI: 106 ML/MIN/1.73 M 2
GLOBULIN SER CALC-MCNC: 3.1 G/DL (ref 1.9–3.5)
GLUCOSE SERPL-MCNC: 109 MG/DL (ref 65–99)
HBA1C MFR BLD: 5.7 % (ref 4–5.6)
HCT VFR BLD AUTO: 43.7 % (ref 37–47)
HDLC SERPL-MCNC: 38 MG/DL
HGB BLD-MCNC: 13.4 G/DL (ref 12–16)
IMM GRANULOCYTES # BLD AUTO: 0.03 K/UL (ref 0–0.11)
IMM GRANULOCYTES NFR BLD AUTO: 0.3 % (ref 0–0.9)
IRON SERPL-MCNC: 42 UG/DL (ref 40–170)
LDLC SERPL CALC-MCNC: 82 MG/DL
LYMPHOCYTES # BLD AUTO: 2.38 K/UL (ref 1–4.8)
LYMPHOCYTES NFR BLD: 26.8 % (ref 22–41)
MCH RBC QN AUTO: 27.6 PG (ref 27–33)
MCHC RBC AUTO-ENTMCNC: 30.7 G/DL (ref 32.2–35.5)
MCV RBC AUTO: 89.9 FL (ref 81.4–97.8)
MONOCYTES # BLD AUTO: 0.54 K/UL (ref 0–0.85)
MONOCYTES NFR BLD AUTO: 6.1 % (ref 0–13.4)
NEUTROPHILS # BLD AUTO: 5.72 K/UL (ref 1.82–7.42)
NEUTROPHILS NFR BLD: 64.5 % (ref 44–72)
NRBC # BLD AUTO: 0 K/UL
NRBC BLD-RTO: 0 /100 WBC (ref 0–0.2)
PLATELET # BLD AUTO: 341 K/UL (ref 164–446)
PMV BLD AUTO: 11 FL (ref 9–12.9)
POTASSIUM SERPL-SCNC: 4.1 MMOL/L (ref 3.6–5.5)
PREALB SERPL-MCNC: 19.9 MG/DL (ref 18–38)
PROT SERPL-MCNC: 6.7 G/DL (ref 6–8.2)
RBC # BLD AUTO: 4.86 M/UL (ref 4.2–5.4)
SODIUM SERPL-SCNC: 142 MMOL/L (ref 135–145)
TRANSFERRIN SERPL-MCNC: 221 MG/DL (ref 200–370)
TRIGL SERPL-MCNC: 167 MG/DL (ref 0–149)
VIT B12 SERPL-MCNC: 486 PG/ML (ref 211–911)
WBC # BLD AUTO: 8.9 K/UL (ref 4.8–10.8)

## 2024-06-24 PROCEDURE — 80053 COMPREHEN METABOLIC PANEL: CPT

## 2024-06-24 PROCEDURE — 82306 VITAMIN D 25 HYDROXY: CPT

## 2024-06-24 PROCEDURE — 84134 ASSAY OF PREALBUMIN: CPT

## 2024-06-24 PROCEDURE — 85025 COMPLETE CBC W/AUTO DIFF WBC: CPT

## 2024-06-24 PROCEDURE — 82728 ASSAY OF FERRITIN: CPT

## 2024-06-24 PROCEDURE — 82746 ASSAY OF FOLIC ACID SERUM: CPT

## 2024-06-24 PROCEDURE — 83036 HEMOGLOBIN GLYCOSYLATED A1C: CPT

## 2024-06-24 PROCEDURE — 83540 ASSAY OF IRON: CPT

## 2024-06-24 PROCEDURE — 84252 ASSAY OF VITAMIN B-2: CPT

## 2024-06-24 PROCEDURE — 82607 VITAMIN B-12: CPT

## 2024-06-24 PROCEDURE — 80061 LIPID PANEL: CPT

## 2024-06-24 PROCEDURE — 84466 ASSAY OF TRANSFERRIN: CPT

## 2024-06-24 PROCEDURE — 84207 ASSAY OF VITAMIN B-6: CPT

## 2024-06-24 PROCEDURE — 84425 ASSAY OF VITAMIN B-1: CPT

## 2024-06-24 PROCEDURE — 36415 COLL VENOUS BLD VENIPUNCTURE: CPT

## 2024-06-24 PROCEDURE — 84630 ASSAY OF ZINC: CPT

## 2024-06-26 LAB — ZINC SERPL-MCNC: 75.2 UG/DL (ref 60–120)

## 2024-06-27 LAB — VIT B6 SERPL-MCNC: 11 NMOL/L (ref 20–125)

## 2024-06-28 ENCOUNTER — APPOINTMENT (OUTPATIENT)
Dept: MEDICAL GROUP | Facility: MEDICAL CENTER | Age: 39
End: 2024-06-28
Payer: COMMERCIAL

## 2024-06-28 ENCOUNTER — HOSPITAL ENCOUNTER (OUTPATIENT)
Facility: MEDICAL CENTER | Age: 39
End: 2024-06-28
Attending: NURSE PRACTITIONER
Payer: COMMERCIAL

## 2024-06-28 VITALS
WEIGHT: 270.4 LBS | BODY MASS INDEX: 43.46 KG/M2 | OXYGEN SATURATION: 98 % | RESPIRATION RATE: 16 BRPM | HEART RATE: 75 BPM | DIASTOLIC BLOOD PRESSURE: 74 MMHG | SYSTOLIC BLOOD PRESSURE: 124 MMHG | TEMPERATURE: 97.8 F | HEIGHT: 66 IN

## 2024-06-28 DIAGNOSIS — R35.0 URINE FREQUENCY: ICD-10-CM

## 2024-06-28 DIAGNOSIS — E11.9 TYPE 2 DIABETES MELLITUS WITHOUT COMPLICATION, WITHOUT LONG-TERM CURRENT USE OF INSULIN (HCC): ICD-10-CM

## 2024-06-28 DIAGNOSIS — E11.9 TYPE 2 DIABETES MELLITUS, WITHOUT LONG-TERM CURRENT USE OF INSULIN (HCC): ICD-10-CM

## 2024-06-28 DIAGNOSIS — R30.0 DYSURIA: ICD-10-CM

## 2024-06-28 DIAGNOSIS — E66.01 MORBID OBESITY WITH BMI OF 45.0-49.9, ADULT (HCC): ICD-10-CM

## 2024-06-28 DIAGNOSIS — K21.9 GASTROESOPHAGEAL REFLUX DISEASE WITHOUT ESOPHAGITIS: ICD-10-CM

## 2024-06-28 DIAGNOSIS — E55.9 VITAMIN D DEFICIENCY: ICD-10-CM

## 2024-06-28 DIAGNOSIS — Z23 NEED FOR VACCINATION: ICD-10-CM

## 2024-06-28 LAB
APPEARANCE UR: NORMAL
BILIRUB UR STRIP-MCNC: NORMAL MG/DL
COLOR UR AUTO: YELLOW
CREAT UR-MCNC: 76.18 MG/DL
GLUCOSE UR STRIP.AUTO-MCNC: NORMAL MG/DL
KETONES UR STRIP.AUTO-MCNC: 15 MG/DL
LEUKOCYTE ESTERASE UR QL STRIP.AUTO: NORMAL
MICROALBUMIN UR-MCNC: 3.1 MG/DL
MICROALBUMIN/CREAT UR: 41 MG/G (ref 0–30)
NITRITE UR QL STRIP.AUTO: NORMAL
PH UR STRIP.AUTO: 6 [PH] (ref 5–8)
PROT UR QL STRIP: NORMAL MG/DL
RBC UR QL AUTO: NORMAL
SP GR UR STRIP.AUTO: 1.01
UROBILINOGEN UR STRIP-MCNC: 0.2 MG/DL

## 2024-06-28 PROCEDURE — 87077 CULTURE AEROBIC IDENTIFY: CPT

## 2024-06-28 PROCEDURE — 82043 UR ALBUMIN QUANTITATIVE: CPT

## 2024-06-28 PROCEDURE — 82570 ASSAY OF URINE CREATININE: CPT

## 2024-06-28 PROCEDURE — 87186 SC STD MICRODIL/AGAR DIL: CPT

## 2024-06-28 PROCEDURE — 87086 URINE CULTURE/COLONY COUNT: CPT

## 2024-06-28 RX ORDER — CEFDINIR 300 MG/1
300 CAPSULE ORAL 2 TIMES DAILY
Qty: 10 CAPSULE | Refills: 0 | Status: SHIPPED | OUTPATIENT
Start: 2024-06-28 | End: 2024-07-03

## 2024-06-28 RX ORDER — OMEPRAZOLE 20 MG/1
20 CAPSULE, DELAYED RELEASE ORAL DAILY
Qty: 90 CAPSULE | Refills: 1 | Status: SHIPPED | OUTPATIENT
Start: 2024-06-28

## 2024-06-28 RX ORDER — ERGOCALCIFEROL 1.25 MG/1
50000 CAPSULE ORAL
Qty: 12 CAPSULE | Refills: 3 | Status: SHIPPED | OUTPATIENT
Start: 2024-06-28

## 2024-06-28 ASSESSMENT — ENCOUNTER SYMPTOMS
FEVER: 0
PALPITATIONS: 0
CHILLS: 0

## 2024-06-28 ASSESSMENT — FIBROSIS 4 INDEX: FIB4 SCORE: 0.56

## 2024-06-28 NOTE — PROGRESS NOTES
Patient agreed to use of ZHEN: yes  Chief Complaint   Patient presents with    Follow-Up     Weight loss     Lab Follow-up    UTI     X 3 days ago        HISTORY OF PRESENT ILLNESS: Patient is a pleasant 39 y.o. female, established patient who presents today to discuss medical problems as listed below:    Assessment/Plan:    Kinza was seen today for follow-up, lab follow-up and uti.    Diagnoses and all orders for this visit:    Urine frequency  -     Referral to Urology  -     POCT Urinalysis  -     URINE CULTURE(NEW); Future    Dysuria  -     Referral to Urology  -     POCT Urinalysis  -     URINE CULTURE(NEW); Future    Type 2 diabetes mellitus without complication, without long-term current use of insulin ([facility])  -     metFORMIN (GLUCOPHAGE) 500 MG Tab; Take 1 Tablet by mouth 2 times a day with meals.    Type 2 diabetes mellitus, without long-term current use of insulin ([facility])  -     Microalbumin Creat Ratio Urine (Clinic Collect); Future  -     Diabetic Monofilament LE Exam  -     POCT Retinal Eye Exam    Need for vaccination  -     Pneumococcal Conjugate Vaccine 20-Valent (6 wks+)              Assessment & Plan  1. Urinary frequency.    2. Dysuria.  A POCT was conducted today, revealing cystitis. Cefdinir will be administered, and an evaluation by urology will be appreciated due to the recurrent UTI. The patient is advised to maintain good hygiene.    3. Type 2 diabetes.  This is a chronic and stable condition. The patient is advised to continue with metformin. Monofilament, microalbumin, and retina screen will be obtained today.  Monofilament testing with a 10 gram force: sensation intact: intact bilaterally  Visual Inspection: Feet without maceration, ulcers, fissures.  Pedal pulses: intact bilaterally     4. Acid reflux.  This is a chronic and unstable condition. This is well controlled on Prilosec, which will be continued.    5. Morbid obesity.  This condition is a chronic and stable condition. The patient  "recently underwent bariatric surgery and is doing well. She is under the care of a bariatric specialist.    History of Present Illness  The patient presents for evaluation of multiple medical concerns.    The patient reports experiencing dysuria and urinary frequency. She denies the presence of back pain, fever, flank pain, or hematuria. Over the past three months, she has experienced four urinary tract infections (UTI) episodes, with no one in the past three years. Despite increasing her water intake and maintaining a healthy diet, the UTIs persist. She denies any pruritus or discharge.    The patient was previously on metformin for diabetes management. However, following her weight loss surgery, she was advised to discontinue metformin to assess the necessity of this medication based on her lab results. She is contemplating whether to continue the metformin. She reports a weight loss of approximately 35 pounds, from 34 pounds to 270 pounds. Her overall health status is satisfactory. Her dietary habits include infrequent consumption of sugary and starchy foods, and she admits to frequent snacking. She has not yet tried metformin post-surgery. She continues to attend the weight loss surgical group.    Health Maintenance:  COMPLETED     Allergies, past medical history, past surgical history, family history, social history reviewed and updated.    Review of Systems   Constitutional:  Negative for chills, fever and malaise/fatigue.   Cardiovascular:  Negative for chest pain, palpitations and leg swelling.   Genitourinary:  Positive for dysuria and urgency.        Exam:    /74 (BP Location: Other (Comment), Patient Position: Sitting, BP Cuff Size: Large adult) Comment (BP Location): forearm  Pulse 75   Temp 36.6 °C (97.8 °F) (Temporal)   Resp 16   Ht 1.676 m (5' 6\")   Wt 123 kg (270 lb 6.4 oz)   SpO2 98%   BMI 43.64 kg/m²  Body mass index is 43.64 kg/m².    Physical Exam  Constitutional:       General: She " is not in acute distress.     Appearance: She is not ill-appearing.   HENT:      Head: Normocephalic and atraumatic.      Nose: Nose normal.   Eyes:      General:         Right eye: No discharge.         Left eye: No discharge.   Cardiovascular:      Rate and Rhythm: Normal rate.      Pulses: Normal pulses.      Heart sounds: Normal heart sounds. No murmur heard.  Pulmonary:      Effort: Pulmonary effort is normal. No respiratory distress.      Breath sounds: Normal breath sounds. No stridor. No wheezing or rales.   Skin:     Findings: No rash.   Neurological:      General: No focal deficit present.      Mental Status: She is alert. Mental status is at baseline.   Psychiatric:         Mood and Affect: Mood normal.         Behavior: Behavior normal.          Results  Laboratory Studies  A1c is 5.7.       Discussed with patient possible alternative diagnoses, patient is to take all medications as prescribed.      If symptoms persist FU w/PCP, if symptoms worsen go to emergency room.      If experiencing any side effects from prescribed medications report to the office immediately or go to emergency room.     Reviewed indication, dosage, usage and potential adverse effects of prescribed medications.      Reviewed risks and benefits of treatment plan. Patient verbalizes understanding of all instruction and verbally agrees to plan.     Discussed plan with the patient, and patient agrees to the above.      I personally reviewed prior external notes and test results pertinent to today's visit.      No follow-ups on file. 3 mos

## 2024-06-29 LAB — VIT B1 BLD-MCNC: 132 NMOL/L (ref 70–180)

## 2024-06-30 LAB
BACTERIA UR CULT: ABNORMAL
BACTERIA UR CULT: ABNORMAL
SIGNIFICANT IND 70042: ABNORMAL
SITE SITE: ABNORMAL
SOURCE SOURCE: ABNORMAL
VIT B2 SERPL-SCNC: 6 NMOL/L (ref 5–50)

## 2024-07-24 ENCOUNTER — OFFICE VISIT (OUTPATIENT)
Dept: UROLOGY | Facility: MEDICAL CENTER | Age: 39
End: 2024-07-24
Payer: COMMERCIAL

## 2024-07-24 VITALS
BODY MASS INDEX: 42.59 KG/M2 | OXYGEN SATURATION: 96 % | DIASTOLIC BLOOD PRESSURE: 52 MMHG | SYSTOLIC BLOOD PRESSURE: 87 MMHG | WEIGHT: 265 LBS | HEART RATE: 75 BPM | HEIGHT: 66 IN

## 2024-07-24 DIAGNOSIS — N39.0 RECURRENT UTI: ICD-10-CM

## 2024-07-24 DIAGNOSIS — R35.0 URINE FREQUENCY: ICD-10-CM

## 2024-07-24 DIAGNOSIS — R30.0 DYSURIA: ICD-10-CM

## 2024-07-24 LAB
APPEARANCE UR: CLEAR
BILIRUB UR STRIP-MCNC: NORMAL MG/DL
COLOR UR AUTO: NORMAL
GLUCOSE UR STRIP.AUTO-MCNC: NORMAL MG/DL
KETONES UR STRIP.AUTO-MCNC: NORMAL MG/DL
LEUKOCYTE ESTERASE UR QL STRIP.AUTO: NORMAL
NITRITE UR QL STRIP.AUTO: NORMAL
PH UR STRIP.AUTO: 6 [PH] (ref 5–8)
POC POST-VOID: 91 ML
POC PRE-VOID: NORMAL
PROT UR QL STRIP: 30 MG/DL
RBC UR QL AUTO: NORMAL
SP GR UR STRIP.AUTO: 1.01
UROBILINOGEN UR STRIP-MCNC: 1 MG/DL

## 2024-07-24 PROCEDURE — 81002 URINALYSIS NONAUTO W/O SCOPE: CPT | Mod: 59

## 2024-07-24 PROCEDURE — 51798 US URINE CAPACITY MEASURE: CPT

## 2024-07-24 PROCEDURE — 99213 OFFICE O/P EST LOW 20 MIN: CPT | Mod: 25

## 2024-07-24 PROCEDURE — 3074F SYST BP LT 130 MM HG: CPT

## 2024-07-24 PROCEDURE — 3078F DIAST BP <80 MM HG: CPT

## 2024-07-24 ASSESSMENT — FIBROSIS 4 INDEX: FIB4 SCORE: 0.56

## 2024-07-31 DIAGNOSIS — E11.9 TYPE 2 DIABETES MELLITUS WITHOUT COMPLICATION, WITHOUT LONG-TERM CURRENT USE OF INSULIN (HCC): ICD-10-CM

## 2024-10-11 ENCOUNTER — APPOINTMENT (OUTPATIENT)
Dept: MEDICAL GROUP | Facility: MEDICAL CENTER | Age: 39
End: 2024-10-11
Payer: COMMERCIAL

## 2024-11-04 ENCOUNTER — OFFICE VISIT (OUTPATIENT)
Dept: URGENT CARE | Facility: CLINIC | Age: 39
End: 2024-11-04
Payer: COMMERCIAL

## 2024-11-04 VITALS
SYSTOLIC BLOOD PRESSURE: 116 MMHG | HEART RATE: 86 BPM | WEIGHT: 257 LBS | HEIGHT: 66 IN | DIASTOLIC BLOOD PRESSURE: 60 MMHG | BODY MASS INDEX: 41.3 KG/M2 | RESPIRATION RATE: 17 BRPM | OXYGEN SATURATION: 97 % | TEMPERATURE: 97.6 F

## 2024-11-04 DIAGNOSIS — J02.9 ACUTE PHARYNGITIS, UNSPECIFIED ETIOLOGY: ICD-10-CM

## 2024-11-04 PROCEDURE — 3074F SYST BP LT 130 MM HG: CPT | Performed by: NURSE PRACTITIONER

## 2024-11-04 PROCEDURE — 3078F DIAST BP <80 MM HG: CPT | Performed by: NURSE PRACTITIONER

## 2024-11-04 PROCEDURE — 99214 OFFICE O/P EST MOD 30 MIN: CPT | Performed by: NURSE PRACTITIONER

## 2024-11-04 RX ORDER — AMOXICILLIN 500 MG/1
500 CAPSULE ORAL 2 TIMES DAILY
Qty: 20 CAPSULE | Refills: 0 | Status: SHIPPED | OUTPATIENT
Start: 2024-11-04 | End: 2024-11-14

## 2024-11-04 ASSESSMENT — FIBROSIS 4 INDEX: FIB4 SCORE: 0.56

## 2024-11-04 NOTE — LETTER
November 4, 2024         Patient: Kinza Juan   YOB: 1985   Date of Visit: 11/4/2024           To Whom it May Concern:    Kinza Juan was seen in my clinic on 11/4/2024. She may be excused from work tomorrow.      If you have any questions or concerns, please don't hesitate to call.        Sincerely,           LUIS ANGEL Bernal.  Electronically Signed

## 2024-11-04 NOTE — PROGRESS NOTES
Chief Complaint   Patient presents with    Sore Throat     Patient is here today for a sore throat and fevers. Patient states sore throat has been going on since Friday. States her sister had strep        HISTORY OF PRESENT ILLNESS: Patient is a pleasant 39 y.o. female who presents today due to complaints of a sore throat for the past two days. Reports associated fever, chills, pain with swallowing. Pain is moderate. Denies associated rash, chest pain, urinary complaints, congestion, cough, or difficulty breathing. She has tried OTC medications at home without much improvement.  Her sister, who she is very close with, has been diagnosed with strep throat this week.      Patient Active Problem List    Diagnosis Date Noted    Urine frequency 06/28/2024    Dysuria 06/28/2024    Recurrent UTI 04/23/2024    S/P laparoscopic sleeve gastrectomy 04/06/2024    Nausea & vomiting 04/06/2024    Morbid obesity with BMI of 45.0-49.9, adult ([facility]) 03/21/2024    Sore throat 03/21/2024    Vitamin D deficiency 02/09/2024    Type 2 diabetes mellitus, without long-term current use of insulin ([facility]) 02/09/2024    Other hyperlipidemia 02/09/2024    Gastroesophageal reflux disease without esophagitis 10/03/2023    Chest discomfort 10/03/2023    History of gestational diabetes mellitus (GDM) 02/22/2022    PCOS (polycystic ovarian syndrome) 11/19/2020       Allergies:Patient has no known allergies.    Current Outpatient Medications Ordered in Epic   Medication Sig Dispense Refill    amoxicillin (AMOXIL) 500 MG Cap Take 1 Capsule by mouth 2 times a day for 10 days. 20 Capsule 0    metFORMIN (GLUCOPHAGE) 500 MG Tab Take 1 Tablet by mouth 2 times a day with meals. 90 Tablet 0    vitamin D2, Ergocalciferol, (DRISDOL) 1.25 MG (27413 UT) Cap capsule Take 1 Capsule by mouth every 7 days. 12 Capsule 3    omeprazole (PRILOSEC) 20 MG delayed-release capsule Take 1 Capsule by mouth every day. 90 Capsule 1     No current Epic-ordered  "facility-administered medications on file.       Past Medical History:   Diagnosis Date    Acute nasopharyngitis 03/10/2024    2024 pt reports she is \"feeling fine\" now    COVID-19     2024 history of    Diabetes ([facility]) 2024    takes metformin    GERD (gastroesophageal reflux disease)     h pylori 2015    Heart burn 2020    Postpartum depression 2022       Social History     Tobacco Use    Smoking status: Never     Passive exposure: Never    Smokeless tobacco: Never   Vaping Use    Vaping status: Never Used   Substance Use Topics    Alcohol use: Not Currently    Drug use: Not Currently     Types: Oral     Comment: occ       Family Status   Relation Name Status    PGMo      Mo  Alive    Fa  Alive    Sis  Alive    Bro  Alive    MGMo  Alive    MGFa      PGFa          accident    Neg Hx  (Not Specified)   No partnership data on file     Family History   Problem Relation Age of Onset    Diabetes Paternal Grandmother     Diabetes Father     Stroke Maternal Grandmother     Hypertension Maternal Grandmother     Diabetes Maternal Grandfather     Cancer Neg Hx        ROS:  Review of Systems   Constitutional: Positive for fever, chills, malaise, fatigue. Negative for weight loss.  HENT: Positive for sore throat. Negative for ear pain, nosebleeds, congestion.   Eyes: Negative for vision changes.   Cardiovascular: Negative for chest pain, palpitations, orthopnea and leg swelling.   Respiratory: Negative for cough, sputum production, shortness of breath and wheezing.   Gastrointestinal: Negative for abdominal pain, nausea, vomiting or diarrhea.   : Negative for changes in urination.   Skin: Negative for rash, diaphoresis.     Exam:  /60   Pulse 86   Temp 36.4 °C (97.6 °F) (Temporal)   Resp 17   Ht 1.676 m (5' 6\")   Wt 117 kg (257 lb)   SpO2 97%   General: well-nourished, well-developed female in NAD  Head: normocephalic, atraumatic  Eyes: PERRLA, no conjunctival " injection, acuity grossly intact, lids normal.  Ears: normal shape and symmetry, no tenderness, no discharge. External canals are without any significant edema or erythema. Tympanic membranes are without any inflammation, no effusion. Gross auditory acuity is intact.  Nose: symmetrical without tenderness, no discharge.  Mouth/Throat: reasonable hygiene. There is erythema, without exudates or tonsillar enlargement present.  Neck: no masses, range of motion within normal limits, no tracheal deviation. No obvious thyroid enlargement.   Lymph: bilateral anterior cervical adenopathy. No supraclavicular adenopathy.   Neuro: alert and oriented. Cranial nerves 1-12 grossly intact. No sensory deficit.   Cardiovascular: regular rate and rhythm. No edema.  Pulmonary: no distress. Chest is symmetrical with respiration, no wheezes, crackles, or rhonchi.   Musculoskeletal: no clubbing, appropriate muscle tone, gait is stable.  Skin: warm, dry, intact, no clubbing, no cyanosis, no rashes.   Psych: appropriate mood, affect, judgement.           Assessment/Plan:  1. Acute pharyngitis, unspecified etiology  amoxicillin (AMOXIL) 500 MG Cap              Suspect strep as causation due to presentation and close exposure, will treat with amoxicillin.  OTC  tylenol for pain/fever control. Hand hygiene. Increase fluid intake, rest. Warm salt water gargles.   Supportive care, differential diagnoses, and indications for immediate follow-up discussed with patient.   Pathogenesis of diagnosis discussed including typical length and natural progression.   Instructed to return to clinic or nearest emergency department for any change in condition, further concerns, or worsening of symptoms.  Patient states understanding of the plan of care and discharge instructions.        Please note that this dictation was created using voice recognition software. I have made every reasonable attempt to correct obvious errors, but I expect that there are errors  of grammar and possibly content that I did not discover before finalizing the note.       LUIS ANGEL Bernal.

## 2024-11-11 ENCOUNTER — HOSPITAL ENCOUNTER (OUTPATIENT)
Facility: MEDICAL CENTER | Age: 39
End: 2024-11-11
Attending: NURSE PRACTITIONER
Payer: COMMERCIAL

## 2024-11-11 ENCOUNTER — OFFICE VISIT (OUTPATIENT)
Dept: URGENT CARE | Facility: CLINIC | Age: 39
End: 2024-11-11
Payer: COMMERCIAL

## 2024-11-11 ENCOUNTER — PHARMACY VISIT (OUTPATIENT)
Dept: PHARMACY | Facility: MEDICAL CENTER | Age: 39
End: 2024-11-11
Payer: COMMERCIAL

## 2024-11-11 VITALS
TEMPERATURE: 97.3 F | HEART RATE: 64 BPM | DIASTOLIC BLOOD PRESSURE: 72 MMHG | OXYGEN SATURATION: 96 % | SYSTOLIC BLOOD PRESSURE: 106 MMHG | RESPIRATION RATE: 15 BRPM | BODY MASS INDEX: 41.46 KG/M2 | WEIGHT: 258 LBS | HEIGHT: 66 IN

## 2024-11-11 DIAGNOSIS — R30.0 DYSURIA: ICD-10-CM

## 2024-11-11 DIAGNOSIS — N30.01 ACUTE CYSTITIS WITH HEMATURIA: ICD-10-CM

## 2024-11-11 LAB
APPEARANCE UR: NORMAL
BILIRUB UR STRIP-MCNC: NEGATIVE MG/DL
COLOR UR AUTO: YELLOW
GLUCOSE UR STRIP.AUTO-MCNC: NEGATIVE MG/DL
KETONES UR STRIP.AUTO-MCNC: NEGATIVE MG/DL
LEUKOCYTE ESTERASE UR QL STRIP.AUTO: NORMAL
NITRITE UR QL STRIP.AUTO: NEGATIVE
PH UR STRIP.AUTO: 6 [PH] (ref 5–8)
POCT INT CON NEG: NEGATIVE
POCT INT CON POS: POSITIVE
POCT URINE PREGNANCY TEST: NEGATIVE
PROT UR QL STRIP: 30 MG/DL
RBC UR QL AUTO: NORMAL
SP GR UR STRIP.AUTO: 1.02
UROBILINOGEN UR STRIP-MCNC: 1 MG/DL

## 2024-11-11 PROCEDURE — 81002 URINALYSIS NONAUTO W/O SCOPE: CPT | Performed by: NURSE PRACTITIONER

## 2024-11-11 PROCEDURE — 87086 URINE CULTURE/COLONY COUNT: CPT

## 2024-11-11 PROCEDURE — RXMED WILLOW AMBULATORY MEDICATION CHARGE: Performed by: NURSE PRACTITIONER

## 2024-11-11 PROCEDURE — 87077 CULTURE AEROBIC IDENTIFY: CPT

## 2024-11-11 PROCEDURE — 3078F DIAST BP <80 MM HG: CPT | Performed by: NURSE PRACTITIONER

## 2024-11-11 PROCEDURE — 81025 URINE PREGNANCY TEST: CPT | Performed by: NURSE PRACTITIONER

## 2024-11-11 PROCEDURE — 99213 OFFICE O/P EST LOW 20 MIN: CPT | Performed by: NURSE PRACTITIONER

## 2024-11-11 PROCEDURE — 87186 SC STD MICRODIL/AGAR DIL: CPT

## 2024-11-11 PROCEDURE — 3074F SYST BP LT 130 MM HG: CPT | Performed by: NURSE PRACTITIONER

## 2024-11-11 RX ORDER — NITROFURANTOIN 25; 75 MG/1; MG/1
100 CAPSULE ORAL 2 TIMES DAILY
Qty: 10 CAPSULE | Refills: 0 | Status: SHIPPED | OUTPATIENT
Start: 2024-11-11 | End: 2024-11-16

## 2024-11-11 ASSESSMENT — ENCOUNTER SYMPTOMS
FLANK PAIN: 0
FATIGUE: 0
FEVER: 0
ABDOMINAL PAIN: 0
BACK PAIN: 0

## 2024-11-11 ASSESSMENT — FIBROSIS 4 INDEX: FIB4 SCORE: 0.56

## 2024-11-12 NOTE — PROGRESS NOTES
Subjective:   Kinza Juan is a 39 y.o. female who presents for UTI (Patient states that she is here for vaginal pain that is constant. Patient states that it started yesterday but got worse today)      UTI  This is a new problem. The current episode started yesterday. The problem occurs constantly. Associated symptoms include urinary symptoms. Pertinent negatives include no abdominal pain, fatigue or fever. She has tried drinking for the symptoms. The treatment provided no relief.       Review of Systems   Constitutional:  Negative for fatigue and fever.   Gastrointestinal:  Negative for abdominal pain.   Genitourinary:  Positive for dysuria, frequency and urgency. Negative for flank pain and hematuria.   Musculoskeletal:  Negative for back pain.       Medications:    amoxicillin Caps  metFORMIN Tabs  nitrofurantoin Caps  omeprazole  vitamin D2 (Ergocalciferol) Caps    Allergies: Patient has no known allergies.    Problem List: Kinza Juan does not have any pertinent problems on file.    Surgical History:  Past Surgical History:   Procedure Laterality Date    MD LAP NAOMI RESTRICT PROC LONGITUDINAL GAS* N/A 2024    Procedure: LAPAROSCOPIC SLEEVE GASTRECTOMY;  Surgeon: Iftikhar Flowers M.D.;  Location: SURGERY [facility];  Service: General    REPEAT C SECTOIN WITH SALPINGECTOMY Bilateral 2022    Procedure:  SECTION, REPEAT, WITH SALPINGECTOMY;  Surgeon: Isabell Connolly D.O.;  Location: SURGERY [facility];  Service: Obstetrics    MD ERCP,DIAGNOSTIC  2020    Procedure: ERCP, DIAGNOSTIC;  Surgeon: Lee Kelly M.D.;  Location: SURGERY [facility];  Service: Gastroenterology    MD ERCP,DIAGNOSTIC N/A 2020    Procedure: ERCP (ENDOSCOPIC RETROGRADE CHOLANGIOPANCREATOGRAPHY);  Surgeon: Lee Kelly M.D.;  Location: SURGERY SAME DAY [facility];  Service: Gastroenterology    JACQUI BY LAPAROSCOPY  2020    Procedure: CHOLECYSTECTOMY, LAPAROSCOPIC;   "Surgeon: Iftikhar Flowers M.D.;  Location: SURGERY [facility];  Service: General    PRIMARY C SECTION  03/24/2013    Performed by Briana Cano M.D. at LABOR AND DELIVERY    RECONSTRUCTION, KNEE, ACL, ARTHROSCOPIC  05/31/2012    Performed by CAITY DORSEY at SURGERY [facility]    MENISCECTOMY, KNEE, MEDIAL  05/31/2012    Performed by CAITY DORSEY at SURGERY [facility]    DENTAL EXTRACTION(S)  2005       Past Social Hx: Kinza Juan  reports that she has never smoked. She has never been exposed to tobacco smoke. She has never used smokeless tobacco. She reports that she does not currently use alcohol. She reports that she does not currently use drugs after having used the following drugs: Oral.     Past Family Hx:  Kinza Juan family history includes Diabetes in her father, maternal grandfather, and paternal grandmother; Hypertension in her maternal grandmother; Stroke in her maternal grandmother.     Problem list, medications, and allergies reviewed by myself today in Epic.     Objective:     /72   Pulse 64   Temp 36.3 °C (97.3 °F) (Temporal)   Resp 15   Ht 1.676 m (5' 6\")   Wt 117 kg (258 lb)   SpO2 96%   BMI 41.64 kg/m²     Physical Exam  Constitutional:       Appearance: Normal appearance. She is not ill-appearing or toxic-appearing.   HENT:      Head: Normocephalic.      Right Ear: External ear normal.      Left Ear: External ear normal.      Nose: Nose normal.      Mouth/Throat:      Lips: Pink.   Eyes:      General: Lids are normal.   Pulmonary:      Effort: Pulmonary effort is normal. No accessory muscle usage.   Abdominal:      Tenderness: There is no abdominal tenderness. There is no right CVA tenderness or left CVA tenderness.   Musculoskeletal:      Cervical back: Full passive range of motion without pain.   Neurological:      Mental Status: She is alert and oriented to person, place, and time.   Psychiatric:         Mood and Affect: Mood normal.         " Thought Content: Thought content normal.         Assessment/Plan:     Diagnosis and associated orders:     1. Dysuria  POCT Urinalysis    POCT Pregnancy    URINE CULTURE(NEW)    nitrofurantoin (MACROBID) 100 MG Cap      2. Acute cystitis with hematuria                 Comments/MDM:     Pt. Was given ABX therapy today and will change therapy if culture indicates this is necessary. ER precautions given- worsening symptoms, back pain, abd. Pain, or fevers.   Pt. Is to increase fluids, and take the complete duration of the therapy.   Pt. Understands and agrees with the plan.   F/U with PCP in 3-4 days as needed.                Please note that this dictation was created using voice recognition software. I have made a reasonable attempt to correct obvious errors, but I expect that there are errors of grammar and possibly content that I did not discover before finalizing the note.    This note was electronically signed by Juancarlos MORENO
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
## 2025-02-05 DIAGNOSIS — E11.9 TYPE 2 DIABETES MELLITUS WITHOUT COMPLICATION, WITHOUT LONG-TERM CURRENT USE OF INSULIN (HCC): ICD-10-CM

## 2025-02-05 NOTE — TELEPHONE ENCOUNTER
Received request via: Pharmacy    Was the patient seen in the last year in this department? Yes    Does the patient have an active prescription (recently filled or refills available) for medication(s) requested? No    Pharmacy Name: [facility]    Does the patient have senior living Plus and need 100-day supply? (This applies to ALL medications) Patient does not have SCP

## 2025-04-03 ENCOUNTER — OFFICE VISIT (OUTPATIENT)
Dept: MEDICAL GROUP | Facility: MEDICAL CENTER | Age: 40
End: 2025-04-03
Payer: COMMERCIAL

## 2025-04-03 VITALS
TEMPERATURE: 97.7 F | SYSTOLIC BLOOD PRESSURE: 90 MMHG | HEIGHT: 66 IN | WEIGHT: 249.12 LBS | HEART RATE: 73 BPM | OXYGEN SATURATION: 95 % | DIASTOLIC BLOOD PRESSURE: 58 MMHG | BODY MASS INDEX: 40.04 KG/M2

## 2025-04-03 DIAGNOSIS — E66.01 MORBID OBESITY WITH BMI OF 45.0-49.9, ADULT (HCC): ICD-10-CM

## 2025-04-03 DIAGNOSIS — N39.0 RECURRENT UTI: ICD-10-CM

## 2025-04-03 DIAGNOSIS — E11.9 TYPE 2 DIABETES MELLITUS WITHOUT COMPLICATION, WITHOUT LONG-TERM CURRENT USE OF INSULIN (HCC): Primary | ICD-10-CM

## 2025-04-03 DIAGNOSIS — Z00.00 PE (PHYSICAL EXAM), ANNUAL: ICD-10-CM

## 2025-04-03 DIAGNOSIS — E78.49 OTHER HYPERLIPIDEMIA: ICD-10-CM

## 2025-04-03 DIAGNOSIS — K21.9 GASTROESOPHAGEAL REFLUX DISEASE WITHOUT ESOPHAGITIS: ICD-10-CM

## 2025-04-03 PROBLEM — R11.2 NAUSEA & VOMITING: Status: RESOLVED | Noted: 2024-04-06 | Resolved: 2025-04-03

## 2025-04-03 PROBLEM — J02.9 SORE THROAT: Status: RESOLVED | Noted: 2024-03-21 | Resolved: 2025-04-03

## 2025-04-03 PROBLEM — R07.89 CHEST DISCOMFORT: Status: RESOLVED | Noted: 2023-10-03 | Resolved: 2025-04-03

## 2025-04-03 PROBLEM — R35.0 URINE FREQUENCY: Status: RESOLVED | Noted: 2024-06-28 | Resolved: 2025-04-03

## 2025-04-03 LAB
HBA1C MFR BLD: 5.4 % (ref ?–5.8)
POCT INT CON NEG: NEGATIVE
POCT INT CON POS: POSITIVE

## 2025-04-03 PROCEDURE — 99395 PREV VISIT EST AGE 18-39: CPT | Performed by: NURSE PRACTITIONER

## 2025-04-03 PROCEDURE — 83036 HEMOGLOBIN GLYCOSYLATED A1C: CPT | Performed by: NURSE PRACTITIONER

## 2025-04-03 PROCEDURE — 3074F SYST BP LT 130 MM HG: CPT | Performed by: NURSE PRACTITIONER

## 2025-04-03 PROCEDURE — 3078F DIAST BP <80 MM HG: CPT | Performed by: NURSE PRACTITIONER

## 2025-04-03 RX ORDER — OMEPRAZOLE 20 MG/1
20 CAPSULE, DELAYED RELEASE ORAL DAILY
Qty: 90 CAPSULE | Refills: 1 | Status: SHIPPED | OUTPATIENT
Start: 2025-04-03

## 2025-04-03 ASSESSMENT — ENCOUNTER SYMPTOMS
EYE PAIN: 0
SPEECH CHANGE: 0
SENSORY CHANGE: 0
NAUSEA: 0
FOCAL WEAKNESS: 0
DIZZINESS: 0
POLYDIPSIA: 0
ABDOMINAL PAIN: 0
BACK PAIN: 0
BLOOD IN STOOL: 0
PALPITATIONS: 0
FEVER: 0
VOMITING: 0
SHORTNESS OF BREATH: 0
BRUISES/BLEEDS EASILY: 0
SORE THROAT: 0
EYE REDNESS: 0
WHEEZING: 0
WEAKNESS: 0
SINUS PAIN: 0
HEADACHES: 0
NERVOUS/ANXIOUS: 0
SPUTUM PRODUCTION: 0
COUGH: 0
CONSTIPATION: 0
INSOMNIA: 0
WEIGHT LOSS: 0
MYALGIAS: 0
EYE DISCHARGE: 0
CHILLS: 0
DIARRHEA: 0
TREMORS: 0
LOSS OF CONSCIOUSNESS: 0
DEPRESSION: 0
FLANK PAIN: 0

## 2025-04-03 ASSESSMENT — PATIENT HEALTH QUESTIONNAIRE - PHQ9: CLINICAL INTERPRETATION OF PHQ2 SCORE: 0

## 2025-04-03 ASSESSMENT — FIBROSIS 4 INDEX: FIB4 SCORE: 0.56
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
## 2025-06-04 SDOH — ECONOMIC STABILITY: INCOME INSECURITY: IN THE LAST 12 MONTHS, WAS THERE A TIME WHEN YOU WERE NOT ABLE TO PAY THE MORTGAGE OR RENT ON TIME?: NO

## 2025-06-04 SDOH — ECONOMIC STABILITY: INCOME INSECURITY: HOW HARD IS IT FOR YOU TO PAY FOR THE VERY BASICS LIKE FOOD, HOUSING, MEDICAL CARE, AND HEATING?: NOT VERY HARD

## 2025-06-04 SDOH — ECONOMIC STABILITY: FOOD INSECURITY: WITHIN THE PAST 12 MONTHS, YOU WORRIED THAT YOUR FOOD WOULD RUN OUT BEFORE YOU GOT MONEY TO BUY MORE.: NEVER TRUE

## 2025-06-04 SDOH — HEALTH STABILITY: PHYSICAL HEALTH: ON AVERAGE, HOW MANY MINUTES DO YOU ENGAGE IN EXERCISE AT THIS LEVEL?: 0 MIN

## 2025-06-04 SDOH — ECONOMIC STABILITY: FOOD INSECURITY: WITHIN THE PAST 12 MONTHS, THE FOOD YOU BOUGHT JUST DIDN'T LAST AND YOU DIDN'T HAVE MONEY TO GET MORE.: NEVER TRUE

## 2025-06-04 SDOH — HEALTH STABILITY: PHYSICAL HEALTH: ON AVERAGE, HOW MANY DAYS PER WEEK DO YOU ENGAGE IN MODERATE TO STRENUOUS EXERCISE (LIKE A BRISK WALK)?: 0 DAYS

## 2025-06-04 ASSESSMENT — SOCIAL DETERMINANTS OF HEALTH (SDOH)
HOW MANY DRINKS CONTAINING ALCOHOL DO YOU HAVE ON A TYPICAL DAY WHEN YOU ARE DRINKING: 1 OR 2
HOW OFTEN DO YOU ATTENT MEETINGS OF THE CLUB OR ORGANIZATION YOU BELONG TO?: NEVER
IN A TYPICAL WEEK, HOW MANY TIMES DO YOU TALK ON THE PHONE WITH FAMILY, FRIENDS, OR NEIGHBORS?: MORE THAN THREE TIMES A WEEK
HOW OFTEN DO YOU ATTEND CHURCH OR RELIGIOUS SERVICES?: NEVER
HOW OFTEN DO YOU ATTENT MEETINGS OF THE CLUB OR ORGANIZATION YOU BELONG TO?: NEVER
HOW OFTEN DO YOU GET TOGETHER WITH FRIENDS OR RELATIVES?: THREE TIMES A WEEK
IN THE PAST 12 MONTHS, HAS THE ELECTRIC, GAS, OIL, OR WATER COMPANY THREATENED TO SHUT OFF SERVICE IN YOUR HOME?: NO
DO YOU BELONG TO ANY CLUBS OR ORGANIZATIONS SUCH AS CHURCH GROUPS UNIONS, FRATERNAL OR ATHLETIC GROUPS, OR SCHOOL GROUPS?: NO
HOW OFTEN DO YOU HAVE A DRINK CONTAINING ALCOHOL: MONTHLY OR LESS
DO YOU BELONG TO ANY CLUBS OR ORGANIZATIONS SUCH AS CHURCH GROUPS UNIONS, FRATERNAL OR ATHLETIC GROUPS, OR SCHOOL GROUPS?: NO
HOW HARD IS IT FOR YOU TO PAY FOR THE VERY BASICS LIKE FOOD, HOUSING, MEDICAL CARE, AND HEATING?: NOT VERY HARD
WITHIN THE PAST 12 MONTHS, YOU WORRIED THAT YOUR FOOD WOULD RUN OUT BEFORE YOU GOT THE MONEY TO BUY MORE: NEVER TRUE
IN A TYPICAL WEEK, HOW MANY TIMES DO YOU TALK ON THE PHONE WITH FAMILY, FRIENDS, OR NEIGHBORS?: MORE THAN THREE TIMES A WEEK
HOW OFTEN DO YOU HAVE SIX OR MORE DRINKS ON ONE OCCASION: NEVER
HOW OFTEN DO YOU GET TOGETHER WITH FRIENDS OR RELATIVES?: THREE TIMES A WEEK
HOW OFTEN DO YOU ATTEND CHURCH OR RELIGIOUS SERVICES?: NEVER

## 2025-06-04 ASSESSMENT — LIFESTYLE VARIABLES
HOW OFTEN DO YOU HAVE A DRINK CONTAINING ALCOHOL: MONTHLY OR LESS
AUDIT-C TOTAL SCORE: 1
HOW MANY STANDARD DRINKS CONTAINING ALCOHOL DO YOU HAVE ON A TYPICAL DAY: 1 OR 2
HOW OFTEN DO YOU HAVE SIX OR MORE DRINKS ON ONE OCCASION: NEVER
SKIP TO QUESTIONS 9-10: 1

## 2025-06-05 ENCOUNTER — APPOINTMENT (OUTPATIENT)
Dept: MEDICAL GROUP | Facility: MEDICAL CENTER | Age: 40
End: 2025-06-05
Payer: COMMERCIAL

## 2025-06-05 ENCOUNTER — HOSPITAL ENCOUNTER (OUTPATIENT)
Facility: MEDICAL CENTER | Age: 40
End: 2025-06-05
Attending: NURSE PRACTITIONER
Payer: COMMERCIAL

## 2025-06-05 VITALS
WEIGHT: 254.19 LBS | SYSTOLIC BLOOD PRESSURE: 102 MMHG | TEMPERATURE: 97.7 F | HEART RATE: 66 BPM | OXYGEN SATURATION: 95 % | HEIGHT: 66 IN | BODY MASS INDEX: 40.85 KG/M2 | DIASTOLIC BLOOD PRESSURE: 70 MMHG

## 2025-06-05 DIAGNOSIS — Z11.51 SCREENING FOR HPV (HUMAN PAPILLOMAVIRUS): ICD-10-CM

## 2025-06-05 DIAGNOSIS — Z01.419 WELL FEMALE EXAM WITH ROUTINE GYNECOLOGICAL EXAM: Primary | ICD-10-CM

## 2025-06-05 DIAGNOSIS — Z01.419 WELL FEMALE EXAM WITH ROUTINE GYNECOLOGICAL EXAM: ICD-10-CM

## 2025-06-05 PROCEDURE — 3078F DIAST BP <80 MM HG: CPT | Performed by: NURSE PRACTITIONER

## 2025-06-05 PROCEDURE — 88142 CYTOPATH C/V THIN LAYER: CPT

## 2025-06-05 PROCEDURE — 87624 HPV HI-RISK TYP POOLED RSLT: CPT

## 2025-06-05 PROCEDURE — 3074F SYST BP LT 130 MM HG: CPT | Performed by: NURSE PRACTITIONER

## 2025-06-05 PROCEDURE — 99395 PREV VISIT EST AGE 18-39: CPT | Performed by: NURSE PRACTITIONER

## 2025-06-05 ASSESSMENT — FIBROSIS 4 INDEX: FIB4 SCORE: 0.57

## 2025-06-05 NOTE — PROGRESS NOTES
Established Patient    CC: Kinza Juan is a 40 y.o.,female who presents today for Pap and pelvic exam. No complaints today.    HPI:       History of Present Illness     Her LMP was 2 wks ago.    She is having regular menses.   Her menstrual history is unremarkable.   Her form of contraception is  tubal ligation  Hx of abnormal pelvic exams: No .   Last Pap 3 yrs ago.  Hx of abnormal Pap no  OB History    Para Term  AB Living   2 2 2 0 0 2   SAB IAB Ectopic Molar Multiple Live Births   0 0 0 0 0 2      Social History     Substance and Sexual Activity   Sexual Activity Yes    Partners: Male    Birth control/protection: Female Sterilization    Comment: , with boyfriend     Sexual history: single partner   She  reports that she has never smoked. She has never been exposed to tobacco smoke. She has never used smokeless tobacco.  Patient denies any current symptoms: No dyspareunia, no itching, no discharge, and no lesions.  Patient denies fam Hx of GYN or breast cancers.   She is , P:2.   Previous pregnancies, without significant complications.   Patient denies any STD risks or concerns.   Patient denies any history of sexual abuse.   Patient denies breast masses or lesions .  Last mammo: at age 41 (due at 41 )    No problem-specific Assessment & Plan notes found for this encounter.          Allergies: Patient has no known allergies.    Current Medications[1]  Patient Active Problem List    Diagnosis Date Noted    PE (physical exam), annual 2025    Dysuria 2024    Recurrent UTI 2024    S/P laparoscopic sleeve gastrectomy 2024    Morbid obesity with BMI of 45.0-49.9, adult ([facility]) 2024    Vitamin D deficiency 2024    Type 2 diabetes mellitus, without long-term current use of insulin ([facility]) 2024    Other hyperlipidemia 2024    Gastroesophageal reflux disease without esophagitis 10/03/2023    History of gestational diabetes mellitus (GDM)  02/22/2022    PCOS (polycystic ovarian syndrome) 11/19/2020     Medications Ordered Prior to Encounter[2]  Family History   Problem Relation Age of Onset    Diabetes Paternal Grandmother     Diabetes Father     Stroke Maternal Grandmother     Hypertension Maternal Grandmother     Diabetes Maternal Grandfather     Cancer Neg Hx      Social History     Tobacco Use    Smoking status: Never     Passive exposure: Never    Smokeless tobacco: Never   Substance Use Topics    Alcohol use: Not Currently       Allergies, past medical history, past surgical history, medications, family history, social history reviewed and updated.    Exercise: moderate regular exercise program  Her preventative health screens are up to date.    ROS:  Constitutional: Denies fevers or chills  Eyes: Denies changes in vision  Ears/Nose/Throat/Mouth: Denies nasal congestion or sore throat   Cardiovascular: Denies chest pain or palpitations   Respiratory: Denies shortness of breath, Denies cough  Gastrointestinal/Hepatic: Denies abdominal pain, nausea, vomiting   Genitourinary: Denies dysuria or frequency  Musculoskeletal/Rheum: Denies joint pain and swelling   Neurological: Denies headache or visual changes  Psychiatric: Denies mood disorder   Endocrine: Denies hx of diabetes or thyroid dysfunction  Heme/Oncology/Lymph Nodes: Denies weight changes or enlarged LNs.    GYN ROS:  Normal menses.  No significant bloating/fluid retention, no pelvic pain, or dyspareunia. No abnormal bleeding or vaginal discharge.   No breast pain or tenderness, no new or enlarging lumps on elf-exam, nipple discharge, changes in size or contour, or abnormal cyclic discomfort.  No urinary tract symptoms, no incontinence, no polydipsia, polyuria,  No abdominal pain, change in bowel habits, black or bloody stools.    No menstrual migraines   No depression, labile mood, anxiety, libido changes, insomnia.  No temperature intolerance.    /70   Pulse 66   Temp 36.5 °C  "(97.7 °F) (Temporal)   Ht 1.676 m (5' 6\")   Wt 115 kg (254 lb 3.1 oz)   SpO2 95%   BMI 41.03 kg/m²   Body mass index is 41.03 kg/m².  Vitals Noted and Reviewed    Physical Exam    Physical Exam  Constitutional:       General: She is not in acute distress.     Appearance: She is not ill-appearing.   HENT:      Head: Normocephalic and atraumatic.      Nose: Nose normal.   Eyes:      General:         Right eye: No discharge.         Left eye: No discharge.   Cardiovascular:      Rate and Rhythm: Normal rate.      Pulses: Normal pulses.      Heart sounds: Normal heart sounds. No murmur heard.  Pulmonary:      Effort: Pulmonary effort is normal. No respiratory distress.      Breath sounds: Normal breath sounds. No stridor. No wheezing or rales.   Skin:     Findings: No rash.   Neurological:      General: No focal deficit present.      Mental Status: She is alert. Mental status is at baseline.   Psychiatric:         Mood and Affect: Mood normal.         Behavior: Behavior normal.          Pelvic exam:   External genitalia are normal in appearance, no lesions.   Vulva: grossly unremarkable, no lesions or masses noted  Vagina: no abnormal discharge, normal color  Speculum exam:  Vaginal Mucosa: normal vaginal mucosa  Cervix: parous, normal cervix and mucosa, no lesions, non-friable. No cervical motion tenderness.  Pap performed and sample collected and sent to lab.  Uterus: Normal shape, position and consistency, normal in size, no masses.  Bimanual exam: No uteromegaly, negative chandelier sign, adnexa freely movable and without enlargements bilaterally.  Rectal: not performed  Breast exam: Bilateral breasts are normal in appearance. Nipple and areola are normal in appearance. No nipple retraction. No abnormal skin texture. No dominant masses. No axillary lymphadenopathy, no skin changes.   Breasts: breasts symmetric, no dominant or suspicious mass, no skin or nipple changes, and no axillary adenopathy    A chaperone " was offered to the patient during today's exam. Chaperone name: Ashley was present.    Assessment and Plan  NormalGYN Exam  mammogram  pap smear  return annually or prn  Pap was processed and sent to the lab.    There are no diagnoses linked to this encounter.      Assessment & Plan           Plan:   mammogram  pap smear  return annually or prn  Discussed  breast self exam, feminine hygiene, diet and exercise, Kegel exercises     Discussed  breast self exam, diet and exercise, different methods of contraception   Follow-up in one year for annual physical.    This note was created using voice recognition software. There may be unintended errors in spelling, grammar or content.                             [1]   Current Outpatient Medications   Medication Sig Dispense Refill    metFORMIN (GLUCOPHAGE) 500 MG Tab Take 1 Tablet by mouth every day. 90 Tablet 1    omeprazole (PRILOSEC) 20 MG delayed-release capsule Take 1 Capsule by mouth every day. 90 Capsule 1    vitamin D2, Ergocalciferol, (DRISDOL) 1.25 MG (07712 UT) Cap capsule Take 1 Capsule by mouth every 7 days. (Patient not taking: Reported on 11/11/2024) 12 Capsule 3     No current facility-administered medications for this visit.   [2]   Current Outpatient Medications on File Prior to Visit   Medication Sig Dispense Refill    metFORMIN (GLUCOPHAGE) 500 MG Tab Take 1 Tablet by mouth every day. 90 Tablet 1    omeprazole (PRILOSEC) 20 MG delayed-release capsule Take 1 Capsule by mouth every day. 90 Capsule 1    vitamin D2, Ergocalciferol, (DRISDOL) 1.25 MG (14840 UT) Cap capsule Take 1 Capsule by mouth every 7 days. (Patient not taking: Reported on 11/11/2024) 12 Capsule 3     No current facility-administered medications on file prior to visit.

## 2025-06-12 ENCOUNTER — RESULTS FOLLOW-UP (OUTPATIENT)
Dept: MEDICAL GROUP | Facility: MEDICAL CENTER | Age: 40
End: 2025-06-12

## 2025-06-12 LAB
HPV I/H RISK 1 DNA SPEC QL NAA+PROBE: NOT DETECTED
SPECIMEN SOURCE: NORMAL
THINPREP PAP, CYTOLOGY NL11781: NORMAL

## 2025-07-29 ENCOUNTER — HOSPITAL ENCOUNTER (OUTPATIENT)
Dept: LAB | Facility: MEDICAL CENTER | Age: 40
End: 2025-07-29
Attending: NURSE PRACTITIONER
Payer: COMMERCIAL

## 2025-07-29 DIAGNOSIS — Z00.00 PE (PHYSICAL EXAM), ANNUAL: ICD-10-CM

## 2025-07-29 LAB
25(OH)D3 SERPL-MCNC: 31 NG/ML (ref 30–100)
ALBUMIN SERPL BCP-MCNC: 3.9 G/DL (ref 3.2–4.9)
ALBUMIN/GLOB SERPL: 1.3 G/DL
ALP SERPL-CCNC: 89 U/L (ref 30–99)
ALT SERPL-CCNC: 9 U/L (ref 2–50)
ANION GAP SERPL CALC-SCNC: 13 MMOL/L (ref 7–16)
AST SERPL-CCNC: 15 U/L (ref 12–45)
BILIRUB SERPL-MCNC: 0.3 MG/DL (ref 0.1–1.5)
BUN SERPL-MCNC: 16 MG/DL (ref 8–22)
CALCIUM ALBUM COR SERPL-MCNC: 8.7 MG/DL (ref 8.5–10.5)
CALCIUM SERPL-MCNC: 8.6 MG/DL (ref 8.5–10.5)
CHLORIDE SERPL-SCNC: 105 MMOL/L (ref 96–112)
CHOLEST SERPL-MCNC: 167 MG/DL (ref 100–199)
CO2 SERPL-SCNC: 23 MMOL/L (ref 20–33)
CREAT SERPL-MCNC: 0.7 MG/DL (ref 0.5–1.4)
ERYTHROCYTE [DISTWIDTH] IN BLOOD BY AUTOMATED COUNT: 44.5 FL (ref 35.9–50)
EST. AVERAGE GLUCOSE BLD GHB EST-MCNC: 117 MG/DL
GFR SERPLBLD CREATININE-BSD FMLA CKD-EPI: 113 ML/MIN/1.73 M 2
GLOBULIN SER CALC-MCNC: 2.9 G/DL (ref 1.9–3.5)
GLUCOSE SERPL-MCNC: 83 MG/DL (ref 65–99)
HBA1C MFR BLD: 5.7 % (ref 4–5.6)
HCT VFR BLD AUTO: 40.9 % (ref 37–47)
HDLC SERPL-MCNC: 42 MG/DL
HGB BLD-MCNC: 13 G/DL (ref 12–16)
LDLC SERPL CALC-MCNC: 88 MG/DL
MCH RBC QN AUTO: 28.4 PG (ref 27–33)
MCHC RBC AUTO-ENTMCNC: 31.8 G/DL (ref 32.2–35.5)
MCV RBC AUTO: 89.5 FL (ref 81.4–97.8)
PLATELET # BLD AUTO: 291 K/UL (ref 164–446)
PMV BLD AUTO: 11 FL (ref 9–12.9)
POTASSIUM SERPL-SCNC: 4.2 MMOL/L (ref 3.6–5.5)
PROT SERPL-MCNC: 6.8 G/DL (ref 6–8.2)
RBC # BLD AUTO: 4.57 M/UL (ref 4.2–5.4)
SODIUM SERPL-SCNC: 141 MMOL/L (ref 135–145)
TRIGL SERPL-MCNC: 186 MG/DL (ref 0–149)
TSH SERPL DL<=0.005 MIU/L-ACNC: 2.13 UIU/ML (ref 0.38–5.33)
WBC # BLD AUTO: 11.5 K/UL (ref 4.8–10.8)

## 2025-07-29 PROCEDURE — 82306 VITAMIN D 25 HYDROXY: CPT

## 2025-07-29 PROCEDURE — 85027 COMPLETE CBC AUTOMATED: CPT

## 2025-07-29 PROCEDURE — 80053 COMPREHEN METABOLIC PANEL: CPT

## 2025-07-29 PROCEDURE — 80061 LIPID PANEL: CPT

## 2025-07-29 PROCEDURE — 84443 ASSAY THYROID STIM HORMONE: CPT

## 2025-07-29 PROCEDURE — 83036 HEMOGLOBIN GLYCOSYLATED A1C: CPT

## 2025-07-29 PROCEDURE — 36415 COLL VENOUS BLD VENIPUNCTURE: CPT

## 2025-08-13 DIAGNOSIS — K21.9 GASTROESOPHAGEAL REFLUX DISEASE WITHOUT ESOPHAGITIS: ICD-10-CM

## 2025-08-13 DIAGNOSIS — E66.01 MORBID OBESITY WITH BMI OF 45.0-49.9, ADULT (HCC): ICD-10-CM

## 2025-08-14 RX ORDER — OMEPRAZOLE 20 MG/1
20 CAPSULE, DELAYED RELEASE ORAL DAILY
Qty: 90 CAPSULE | Refills: 0 | Status: SHIPPED | OUTPATIENT
Start: 2025-08-14

## (undated) DEVICE — Device

## (undated) DEVICE — DETERGENT RENUZYME PLUS 10 OZ PACKET (50/BX)

## (undated) DEVICE — SET LEADWIRE 5 LEAD BEDSIDE DISPOSABLE ECG (1SET OF 5/EA)

## (undated) DEVICE — SYRINGE DISP. 12 CC LL - (100/BX)

## (undated) DEVICE — GLOVE BIOGEL PI INDICATOR SZ 7.5 SURGICAL PF LF -(50/BX 4BX/CA)

## (undated) DEVICE — FILM CASSETTE ENDO

## (undated) DEVICE — CLIP APPLIER 10MM ENDO LARGE (3EA/BX)

## (undated) DEVICE — BLOCK BITE ENDOSCOPIC 2809 - (100/BX) INTERMEDIATE

## (undated) DEVICE — LACTATED RINGERS INJ 1000 ML - (14EA/CA 60CA/PF)

## (undated) DEVICE — SUTURE 0 VICRYL PLUS UR-6 - 27 INCH (36/BX)

## (undated) DEVICE — CLOSURE SKIN STRIP 1/2 X 4 IN - (STERI STRIP) (50/BX 4BX/CA)

## (undated) DEVICE — SLEEVE, VASO, REPROC, LARGE

## (undated) DEVICE — GLOVE BIOGEL PI ORTHO SZ 6 SURGICAL PF LF (40PR/BX)

## (undated) DEVICE — SUCTION INSTRUMENT YANKAUER BULBOUS TIP W/O VENT (50EA/CA)

## (undated) DEVICE — GLOVE SZ 7 BIOGEL PI MICRO - PF LF (50PR/BX 4BX/CA)

## (undated) DEVICE — MASK ANESTHESIA ADULT  - (100/CA)

## (undated) DEVICE — TUBE CONNECT SUCTION CLEAR 120 X 1/4" (50EA/CA)"

## (undated) DEVICE — ELECTRODE DUAL RETURN W/ CORD - (50/PK)

## (undated) DEVICE — LIGASURE SM JAW SEALER CRVD - (6EA/CA)

## (undated) DEVICE — SYRINGE DISP. 60 CC LL - (30/BX, 12BX/CA)**WHEN THESE ARE GONE ORDER #500206**

## (undated) DEVICE — PAD GROUNDING PRE-JELLED - (50EA/PK)

## (undated) DEVICE — INTRODUCER STENT NAVIFLEX 10FR

## (undated) DEVICE — KIT CUSTOM PROCEDURE SINGLE FOR ENDO  (15/CA)

## (undated) DEVICE — SYRINGE SAFETY 3 ML 18 GA X 1 1/2 BLUNT LL (100/BX 8BX/CA)

## (undated) DEVICE — TROCAR LAPAROSCOPIC TITAN STANDARD (6EA/BX)

## (undated) DEVICE — SODIUM CHL IRRIGATION 0.9% 1000ML (12EA/CA)

## (undated) DEVICE — TUBING CLEARLINK DUO-VENT - C-FLO (48EA/CA)

## (undated) DEVICE — HEAD HOLDER JUNIOR/ADULT

## (undated) DEVICE — CHLORAPREP 26 ML APPLICATOR - ORANGE TINT(25/CA)

## (undated) DEVICE — SPONGE GAUZE NON-STERILE 4X4 - (2000/CA 10PK/CA)

## (undated) DEVICE — CATHETER IV SAFETY 20 GA X 1-1/4 (50/BX)

## (undated) DEVICE — GOWN WARMING STANDARD FLEX - (30/CA)

## (undated) DEVICE — BANDAID SHEER STRIP 3/4 IN (100EA/BX 12BX/CA)

## (undated) DEVICE — SUTURE2-0 27IN VCRL ANTI VIOL (36PK/BX)

## (undated) DEVICE — TISSEEL 4ML ----MUST ORDER A MIN OF 6EA----

## (undated) DEVICE — STAPLER ENDOSCOPIC TITAN SGS (3EA/BX)

## (undated) DEVICE — GLOVE, LITE (PAIR)

## (undated) DEVICE — CAPTIVATOR II-10MM ROUND STIFF  (40/BX)

## (undated) DEVICE — SENSOR SPO2 NEO LNCS ADHESIVE (20/BX) SEE USER NOTES

## (undated) DEVICE — SUTURE 4-0 VICRYL PLUS FS-1 - 27 INCH (36/BX)

## (undated) DEVICE — NEPTUNE 4 PORT MANIFOLD - (20/PK)

## (undated) DEVICE — SUTURE 3-0 VICRYL PLUS CT-1 - 36 INCH (36/BX)

## (undated) DEVICE — WRAP PROBE OXIMETER INFANT UNIVERSAL DESIGN TO FIT NEONATAL FOOT INFANT TOE OR PED FINGER  (12EA/BX)

## (undated) DEVICE — SENSOR SPO2 ADULT LNCS ADTX (20/BX) ORDER ITEM #19593

## (undated) DEVICE — ELECTRODE 850 FOAM ADHESIVE - HYDROGEL RADIOTRNSPRNT (50/PK)

## (undated) DEVICE — SCISSORS 5MM CVD (6EA/BX)

## (undated) DEVICE — SYRINGE DISP. 50CC LS - (40/BX)

## (undated) DEVICE — TROCAR Z THREAD12MM OPTICAL - NON BLADED (6/BX)

## (undated) DEVICE — DRAPESURG STERI-DRAPE LONG - (10/BX 4BX/CA)

## (undated) DEVICE — BITE BLOCK ADULT 60FR (100EA/CA)

## (undated) DEVICE — SLEEVE, SEQUENTIAL CALF REG

## (undated) DEVICE — KIT  I.V. START (100EA/CA)

## (undated) DEVICE — PACK LAP CHOLE OR - (2EA/CA)

## (undated) DEVICE — PROTECTOR ULNA NERVE - (36PR/CA)

## (undated) DEVICE — SET EXTENSION WITH 2 PORTS (48EA/CA) ***PART #2C8610 IS A SUBSTITUTE*****

## (undated) DEVICE — SYRINGE 12 CC LUER TIP - (80/BX) OBSOLETE ITEM

## (undated) DEVICE — DRESSING NON-ADHERING 8 X 3 - (50/BX)

## (undated) DEVICE — SUTURE ABSORBABLE MONOFILAMENT VIOLET MONOCRYL CT-1 L36 IN (36EA/BX)

## (undated) DEVICE — GOWN SURGEONS LARGE - (32/CA)

## (undated) DEVICE — TRAY SPINAL ANESTHESIA NON-SAFETY (10/CA)

## (undated) DEVICE — GOWN WARMING X-LARGE FLEX - (20/CA)

## (undated) DEVICE — KIT ANESTHESIA W/CIRCUIT & 3/LT BAG W/FILTER (20EA/CA)

## (undated) DEVICE — SWABSTICK BENZOIN SINGLE (50EA/BX)

## (undated) DEVICE — TROCAR 5X100 NON BLADED Z-TH - READ KII (6/BX)

## (undated) DEVICE — MAT PATIENT POSITIONING PREVALON (10EA/CA)

## (undated) DEVICE — BALLOON RETRIEVAL EXTRACTOR PRO RX   9-12MM

## (undated) DEVICE — BLANKET STERILE CHICKIE FOR L&D (100/CA)

## (undated) DEVICE — CANNULA O2 COMFORT SOFT EAR ADULT 7 FT TUBING (50/CA)

## (undated) DEVICE — WATER IRRIGATION STERILE 1000ML (12EA/CA)

## (undated) DEVICE — CANNULA W/SEAL 5X100 Z-THRE - ADED KII (12/BX)

## (undated) DEVICE — STAPLER ECHELON 3000 60MM STANDARD (3EA/BX)

## (undated) DEVICE — CANISTER SUCTION 3000ML MECHANICAL FILTER AUTO SHUTOFF MEDI-VAC NONSTERILE LF DISP  (40EA/CA)

## (undated) DEVICE — CONTAINER, SPECIMEN, STERILE

## (undated) DEVICE — ELECTRODE RADIOLUCENT LF 3PK - RED DOT (3/PK 200PK/CA)

## (undated) DEVICE — GLOVE BIOGEL PI INDICATOR SZ 7.0 SURGICAL PF LF - (50/BX 4BX/CA)

## (undated) DEVICE — HEMOSTAT SURG ABSORBABLE - 4 X 8 IN SURGICEL (24EA/CA)

## (undated) DEVICE — SOD. CHL. INJ. 0.9% 1000 ML - (14EA/CA 60CA/PF)

## (undated) DEVICE — SYSTEM CALIBARATION  GASTRECTOMY 40FR WITH BULB (5/BX)

## (undated) DEVICE — SUTURE 0 VICRYL PLUS CT-1 - 36 INCH (36/BX)

## (undated) DEVICE — TUBE E-T HI-LO CUFF 7.5MM (10EA/PK)

## (undated) DEVICE — SUTURE GENERAL

## (undated) DEVICE — SUTURE 0 LIGATING REEL VICRYL PLUS (12PK/BX)

## (undated) DEVICE — JAGWIRE 035 260 CM STRAIGHT (2/BX)

## (undated) DEVICE — JAGTOME RX 39 PRELOADED .025 X 260CM

## (undated) DEVICE — COVER LIGHT HANDLE ALC PLUS DISP (18EA/BX)

## (undated) DEVICE — SUTURE 4-0 VICRYL PLUSFS-1 - 27 INCH (36/BX)

## (undated) DEVICE — STERI STRIP COMPOUND BENZOIN - TINCTURE 0.6ML WITH APPLICATOR (40EA/BX)

## (undated) DEVICE — SOLUTION PLASMA-LYTE PH 7.4 INJ 1000ML  (14EA/CA)

## (undated) DEVICE — SYRINGE SAFETY 5 ML 18 GA X 1-1/2 BLUNT LL (100/BX 4BX/CA)

## (undated) DEVICE — VESSEL DIVIDER SEAL LAP LIGASURE 37CM (6EA/BX)

## (undated) DEVICE — CHLORAPREP 3 ML APPLICATOR - (25/BX 4BX/CS 100/CS)

## (undated) DEVICE — BAG RETRIEVAL 10ML (10EA/BX)

## (undated) DEVICE — ADHESIVE MASTISOL - (48/BX)

## (undated) DEVICE — FORCEP RADIAL JAW 4 STANDARD CAPACITY W/NEEDLE 240CM (40EA/BX)

## (undated) DEVICE — PACK C-SECTION (2EA/CA)

## (undated) DEVICE — APPLICATOR DUPLO SPRAYER (5EA/CA)

## (undated) DEVICE — PACK ROOM TURNOVER L&D (12/CA)

## (undated) DEVICE — GLOVE BIOGEL ECLIPSE PF LATEX SIZE 7.5

## (undated) DEVICE — BAG SPONGE COUNT 10.25 X 32 - BLUE (250/CA)

## (undated) DEVICE — TRAY FOLEY CATHETER STATLOCK 16FR SURESTEP  (10EA/CA)

## (undated) DEVICE — SUTURE 4-0 27IN VCRL PLUS ANTI (36PK/BX)

## (undated) DEVICE — JAGTOME RX 44 PRELOADED .035 X 260CM

## (undated) DEVICE — CATHETER IV NON-SAFETY 18 GA X 1 1/4 (50/BX 4BX/CA)

## (undated) DEVICE — PACK GASTRIC BANDING OR - (1/CA)

## (undated) DEVICE — SET SUCTION/IRRIGATION WITH DISPOSABLE TIP (6/CA )PART #0250-070-520 IS A SUB

## (undated) DEVICE — SENSOR OXIMETER ADULT SPO2 RD SET (20EA/BX)

## (undated) DEVICE — BLANKET UNDERBODY FULL ACCES - (5/CA)

## (undated) DEVICE — BALLOON RETRIEVAL EXTRACTOR PRO RX  12-15MM

## (undated) DEVICE — BAG RETRIEVAL 12/15 MM INZII (5EA/CA) THIS WILL REPLACE ITEM 75018

## (undated) DEVICE — DRESSING TRANSPARENT FILM TEGADERM 4 X 4.75" (50EA/BX)"

## (undated) DEVICE — SYRINGE SAFETY 10 ML 18 GA X 1 1/2 BLUNT LL (100/BX 4BX/CA)

## (undated) DEVICE — MASK WITH FACE SHIELD (25/BX 4BX/CA)

## (undated) DEVICE — TUBE SUCTION YANKAUER  1/4 X 6FT (20EA/CA)"